# Patient Record
Sex: FEMALE | Race: WHITE | NOT HISPANIC OR LATINO | Employment: OTHER | URBAN - METROPOLITAN AREA
[De-identification: names, ages, dates, MRNs, and addresses within clinical notes are randomized per-mention and may not be internally consistent; named-entity substitution may affect disease eponyms.]

---

## 2017-05-11 ENCOUNTER — APPOINTMENT (OUTPATIENT)
Dept: RADIOLOGY | Facility: IMAGING CENTER | Age: 82
End: 2017-05-11
Attending: PHYSICIAN ASSISTANT
Payer: MEDICARE

## 2017-05-11 ENCOUNTER — OFFICE VISIT (OUTPATIENT)
Dept: URGENT CARE | Facility: CLINIC | Age: 82
End: 2017-05-11
Payer: MEDICARE

## 2017-05-11 VITALS
DIASTOLIC BLOOD PRESSURE: 80 MMHG | RESPIRATION RATE: 18 BRPM | SYSTOLIC BLOOD PRESSURE: 156 MMHG | HEART RATE: 97 BPM | OXYGEN SATURATION: 97 % | TEMPERATURE: 98.1 F

## 2017-05-11 DIAGNOSIS — S63.105A THUMB DISLOCATION, LEFT, INITIAL ENCOUNTER: ICD-10-CM

## 2017-05-11 DIAGNOSIS — S69.92XA THUMB INJURY, LEFT, INITIAL ENCOUNTER: ICD-10-CM
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
## 2017-05-11 PROCEDURE — 1101F PT FALLS ASSESS-DOCD LE1/YR: CPT | Mod: 8P | Performed by: PHYSICIAN ASSISTANT

## 2017-05-11 PROCEDURE — 26770 TREAT FINGER DISLOCATION: CPT | Performed by: PHYSICIAN ASSISTANT

## 2017-05-11 PROCEDURE — 73140 X-RAY EXAM OF FINGER(S): CPT | Mod: TC,LT | Performed by: PHYSICIAN ASSISTANT

## 2017-05-11 PROCEDURE — 99202 OFFICE O/P NEW SF 15 MIN: CPT | Mod: 25 | Performed by: PHYSICIAN ASSISTANT

## 2017-05-11 PROCEDURE — G8432 DEP SCR NOT DOC, RNG: HCPCS | Performed by: PHYSICIAN ASSISTANT

## 2017-05-11 PROCEDURE — G8421 BMI NOT CALCULATED: HCPCS | Performed by: PHYSICIAN ASSISTANT

## 2017-05-11 PROCEDURE — 4040F PNEUMOC VAC/ADMIN/RCVD: CPT | Mod: 8P | Performed by: PHYSICIAN ASSISTANT

## 2017-05-11 PROCEDURE — 4004F PT TOBACCO SCREEN RCVD TLK: CPT | Mod: 8P | Performed by: PHYSICIAN ASSISTANT

## 2017-05-11 ASSESSMENT — ENCOUNTER SYMPTOMS
SENSORY CHANGE: 0
CHILLS: 0
NECK PAIN: 0
LOSS OF CONSCIOUSNESS: 0
FOCAL WEAKNESS: 0
ABDOMINAL PAIN: 0
NAUSEA: 0
VOMITING: 0
BACK PAIN: 0
FEVER: 0

## 2017-05-11 NOTE — PROGRESS NOTES
Subjective:      Chrissie Dueñas is a 86 y.o. female who presents with Hand Injury            Hand Injury  Pertinent negatives include no abdominal pain, chills, fever, nausea, neck pain or vomiting.   pt seen w/ daughter present, states pt was visiting her from , not familiar w/ space, had fall in home, mechanical trip and fall, did not see step, fell forward onto left side, landed on left knee and FOOSH left hand. Felt fine after, happened early this am, the two went to a green house and walked around, no complaints of pain to knee. Denies swelling/catching or instability to knee. Denies PMH of injury/surgery to knee. Denies pain to knee.     Daughter noted swelling and impressive bruising to all of left thumb, brought pt here for eval. Notes full motion to base of thumb but c/o pain w/ motion to thumb itself, bruising and swelling, notes normal sensation. Denies PMH of injury/history of known arthritis or surgery to left thumb.     Review of Systems   Constitutional: Negative for fever and chills.   Gastrointestinal: Negative for nausea, vomiting and abdominal pain.   Musculoskeletal: Positive for joint pain ( POS For pain to left thumb s/p fall today). Negative for back pain and neck pain.   Neurological: Negative for sensory change, focal weakness and loss of consciousness.       PMH:  has no past medical history on file.  MEDS:   Current outpatient prescriptions:   •  LOSARTAN POTASSIUM PO, Take  by mouth., Disp: , Rfl:   ALLERGIES: No Known Allergies  SURGHX: No past surgical history on file.  SOCHX:    FH: Family history was reviewed, no pertinent findings to report  I have worn a mask for the entire encounter with this patient.      Objective:     /80 mmHg  Pulse 97  Temp(Src) 36.7 °C (98.1 °F)  Resp 18  SpO2 97%     Physical Exam   Constitutional: She is oriented to person, place, and time. She appears well-developed and well-nourished. No distress.   HENT:   Head: Normocephalic and atraumatic.    Right Ear: External ear normal.   Left Ear: External ear normal.   Nose: Nose normal.   Eyes: Conjunctivae and lids are normal. Right eye exhibits no discharge. Left eye exhibits no discharge. No scleral icterus.   Neck: Neck supple.   Pulmonary/Chest: Effort normal. No respiratory distress.   Musculoskeletal:        Left knee: Normal. She exhibits normal range of motion, no swelling, no effusion, no ecchymosis, no deformity, no laceration, no erythema, normal alignment, no LCL laxity, normal patellar mobility, no bony tenderness and no MCL laxity. No tenderness found. No medial joint line, no lateral joint line, no MCL, no LCL and no patellar tendon tenderness noted.        Left hand: She exhibits decreased range of motion ( pain w/ ROM at IPJ), tenderness, bony tenderness ( IPJ) and swelling. She exhibits normal two-point discrimination, normal capillary refill, no deformity and no laceration. Normal sensation noted. Normal strength ( normal motion and strength, IPJ not tested 2.2 pain) noted.        Hands:  Neurological: She is alert and oriented to person, place, and time. She is not disoriented.   Skin: Skin is warm and dry. She is not diaphoretic. No erythema. No pallor.   Psychiatric: Her speech is normal and behavior is normal.   Nursing note and vitals reviewed.    Dx thumb -   5/11/2017 1:16 PM    HISTORY/REASON FOR EXAM:  Pain/Deformity Following Trauma.  Ground-level fall    TECHNIQUE/EXAM DESCRIPTION AND NUMBER OF VIEWS:  3 views of the LEFT fingers.    COMPARISON: None    FINDINGS:  There is a fracture dislocation at the interphalangeal joint of the first digit. Small fracture fragments are seen. The distal phalanx is dislocated in the dorsal direction. Osteoarthritis effects the scaphoid/trapezium articulation and the first   carpometacarpal joint. There are cystic lucencies within the carpus. There is an old posttraumatic deformity of the fifth metacarpal. Interphalangeal joint space narrowing and  spurring is seen.         Impression        Fracture or dislocation of the interphalangeal joint of the first digit with associated soft tissue swelling.    Degenerative changes as above described.    Old post traumatic deformity of the fifth metacarpal.         Reading Provider Reading Date     Jesús Burgos M.D. May 11, 2017         Signing Provider Signing Date Signing Time     Jesús Burgos M.D. May 11, 2017  1:42 PM        Post reduction pt is w/ return of normal AROM to IPJ, will f/u w/ ortho. Urgent ortho referral placed. F/u w/ ortho       Assessment/Plan:     1. Thumb injury, left, initial encounter  Recommend conservative care, rest, ice, keep splint in place, OTC tylenol, f/u w/ ortho  Return to clinic with lack of resolution or progression of symptoms.  ER precautions with any worsening symptoms are reviewed with patient/caregiver and they do express understanding  - DX-FINGER(S) 2+ LEFT; Future    2. Thumb dislocation, left, initial encounter    - REFERRAL TO ORTHOPEDICS    3. Fall, initial encounter

## 2017-05-11 NOTE — MR AVS SNAPSHOT
Chrissie Dueñas   2017 12:15 PM   Office Visit   MRN: [id number]    Department:  [facility]   Dept Phone:  [phone]    Description:  Female : 1930   Provider:  Yordy Umana PA-C           Reason for Visit     Hand Injury x this am, Lt. hand thumb injury after fall. Swelling, brusing and numbness      Allergies as of 2017     No Known Allergies      You were diagnosed with     Thumb injury, left, initial encounter   [[id number]]       Fall, initial encounter   [[id number]]       Thumb dislocation, left, initial encounter   [[id number]]         Vital Signs     Blood Pressure Pulse Temperature Respirations Oxygen Saturation       156/80 mmHg 97 36.7 °C (98.1 °F) 18 97%       Basic Information     Date Of Birth Sex Race Ethnicity Preferred Language    1930 Female White Non- English      Health Maintenance     Patient has no pending health maintenance at this time      Current Immunizations     No immunizations on file.      Below and/or attached are the medications your provider expects you to take. Review all of your home medications and newly ordered medications with your provider and/or pharmacist. Follow medication instructions as directed by your provider and/or pharmacist. Please keep your medication list with you and share with your provider. Update the information when medications are discontinued, doses are changed, or new medications (including over-the-counter products) are added; and carry medication information at all times in the event of emergency situations     Allergies:  No Known Allergies          Medications  Valid as of: May 11, 2017 -  2:36 PM    Generic Name Brand Name Tablet Size Instructions for use    Losartan Potassium   Take  by mouth.        .                 Medicines prescribed today were sent to:     [id number] [place] - [address]    [address] [place] [address]    Phone: [phone] Fax: [phone]    Open 24 Hours?: No      Medication refill instructions:       If your prescription bottle indicates you have medication refills left, it is not necessary to call your provider’s office. Please contact your pharmacy and they will refill your medication.    If your prescription bottle indicates you do not have any refills left, you may request refills at any time through one of the following ways: The online [employer] system (except Urgent Care), by calling your provider’s office, or by asking your pharmacy to contact your provider’s office with a refill request. Medication refills are processed only during regular business hours and may not be available until the next business day. Your provider may request additional information or to have a follow-up visit with you prior to refilling your medication.   *Please Note: Medication refills are assigned a new Rx number when refilled electronically. Your pharmacy may indicate that no refills were authorized even though a new prescription for the same medication is available at the pharmacy. Please request the medicine by name with the pharmacy before contacting your provider for a refill.        Your To Do List     Future Labs/Procedures Complete By Expires    DX-FINGER(S) 2+ LEFT  As directed 5/11/2018      Referral     A referral request has been sent to our patient care coordination department. Please allow 3-5 business days for us to process this request and contact you either by phone or mail. If you do not hear from us by the 5th business day, please call us at [phone].           3[employer] Access Code: [email or web address]  Expires: 6/10/2017  1:17 PM    [employer]  A secure, online tool to manage your health information     [employer]’s [employer]® is a secure, online tool that connects you to your personalized health information from the privacy of your home -- day or night - making it very easy for you to manage your healthcare. Once the activation process is completed, you can even  access your medical information using the [employer] corrine, which is available for free in the Apple Corrine store or Google Play store.     [employer] provides the following levels of access (as shown below):   My Chart Features   [facility] Doctor [facility]  Specialists [facility]  Urgent  Care Non-[facility]  Primary Care  Doctor   Email your healthcare team securely and privately 24/7 X X X    Manage appointments: schedule your next appointment; view details of past/upcoming appointments X      Request prescription refills. X      View recent personal medical records, including lab and immunizations X X X X   View health record, including health history, allergies, medications X X X X   Read reports about your outpatient visits, procedures, consult and ER notes X X X X   See your discharge summary, which is a recap of your hospital and/or ER visit that includes your diagnosis, lab results, and care plan. X X       How to register for [employer]:  1. Go to  [email or web address].  2. Click on the Sign Up Now box, which takes you to the New Member Sign Up page. You will need to provide the following information:  a. Enter your [employer] Access Code exactly as it appears at the top of this page. (You will not need to use this code after you’ve completed the sign-up process. If you do not sign up before the expiration date, you must request a new code.)   b. Enter your date of birth.   c. Enter your home email address.   d. Click Submit, and follow the next screen’s instructions.  3. Create a [employer] ID. This will be your [employer] login ID and cannot be changed, so think of one that is secure and easy to remember.  4. Create a [employer] password. You can change your password at any time.  5. Enter your Password Reset Question and Answer. This can be used at a later time if you forget your password.   6. Enter your e-mail address. This allows you to receive e-mail notifications when new information is available in [employer].  7. Click  Sign Up. You can now view your health information.    For assistance activating your [employer] account, call [phone]

## 2020-03-30 ENCOUNTER — TELEPHONE (OUTPATIENT)
Dept: SCHEDULING | Facility: IMAGING CENTER | Age: 85
End: 2020-03-30

## 2020-06-01 ENCOUNTER — OFFICE VISIT (OUTPATIENT)
Dept: MEDICAL GROUP | Facility: PHYSICIAN GROUP | Age: 85
End: 2020-06-01
Payer: MEDICARE

## 2020-06-01 VITALS
TEMPERATURE: 98.2 F | OXYGEN SATURATION: 94 % | HEIGHT: 60 IN | RESPIRATION RATE: 16 BRPM | BODY MASS INDEX: 23.66 KG/M2 | SYSTOLIC BLOOD PRESSURE: 130 MMHG | HEART RATE: 94 BPM | WEIGHT: 120.5 LBS | DIASTOLIC BLOOD PRESSURE: 78 MMHG

## 2020-06-01 DIAGNOSIS — I10 BENIGN ESSENTIAL HTN: ICD-10-CM

## 2020-06-01 DIAGNOSIS — Z86.69 HISTORY OF MACULAR DEGENERATION: ICD-10-CM

## 2020-06-01 PROCEDURE — 99203 OFFICE O/P NEW LOW 30 MIN: CPT | Performed by: FAMILY MEDICINE

## 2020-06-01 RX ORDER — LOSARTAN POTASSIUM 50 MG/1
50 TABLET ORAL DAILY
COMMUNITY
End: 2021-06-21

## 2020-06-01 ASSESSMENT — ENCOUNTER SYMPTOMS
PALPITATIONS: 0
DIZZINESS: 0
EYES NEGATIVE: 1
PSYCHIATRIC NEGATIVE: 1
MYALGIAS: 0
BRUISES/BLEEDS EASILY: 0
COUGH: 0
DEPRESSION: 0
MUSCULOSKELETAL NEGATIVE: 1
HEMOPTYSIS: 0
HEADACHES: 0
HEARTBURN: 0
CHILLS: 0
NEUROLOGICAL NEGATIVE: 1
DOUBLE VISION: 0
GASTROINTESTINAL NEGATIVE: 1
TINGLING: 0
FEVER: 0
BLURRED VISION: 0
NAUSEA: 0
CARDIOVASCULAR NEGATIVE: 1
RESPIRATORY NEGATIVE: 1
CONSTITUTIONAL NEGATIVE: 1

## 2020-06-01 ASSESSMENT — PATIENT HEALTH QUESTIONNAIRE - PHQ9: CLINICAL INTERPRETATION OF PHQ2 SCORE: 0

## 2020-06-01 NOTE — LETTER
[facility]  Leobardo Barajas M.D.  [address]  [facility] [address]  Fax: [phone]   Authorization for Release/Disclosure of   Protected Health Information   Name: MICHELLE DUEÑAS : 1930 SSN: [id number]   Address: [address] [place] [address] [address] [place] [place] [address] Phone:    [phone] (home)    I authorize the entity listed below to release/disclose the PHI below to:   [facility]/Leobardo Barajas M.D. and Leobardo Barajas M.D.   Provider or Entity Name:     Address   City, State, [facility]   Phone:      Fax:     Reason for request: continuity of care   Information to be released:    [  ] LAST COLONOSCOPY,  including any PATH REPORT and follow-up  [  ] LAST FIT/COLOGUARD RESULT [  ] LAST DEXA  [  ] LAST MAMMOGRAM  [  ] LAST PAP  [  ] LAST LABS [  ] RETINA EXAM REPORT  [  ] IMMUNIZATION RECORDS  [  ] Release all info      [  ] Check here and initial the line next to each item to release ALL health information INCLUDING  _____ Care and treatment for drug and / or alcohol abuse  _____ HIV testing, infection status, or AIDS  _____ Genetic Testing    DATES OF SERVICE OR TIME PERIOD TO BE DISCLOSED: _____________  I understand and acknowledge that:  * This Authorization may be revoked at any time by you in writing, except if your health information has already been used or disclosed.  * Your health information that will be used or disclosed as a result of you signing this authorization could be re-disclosed by the recipient. If this occurs, your re-disclosed health information may no longer be protected by State or Federal laws.  * You may refuse to sign this Authorization. Your refusal will not affect your ability to obtain treatment.  * This Authorization becomes effective upon signing and will  on (date) __________.      If no date is indicated, this Authorization will  one (1) year from the signature date.    Name: Michelle Dueñas    Signature:   Date:     2020       PLEASE FAX REQUESTED  RECORDS BACK TO: [phone]

## 2020-06-01 NOTE — PROGRESS NOTES
Subjective:      Chrissie Dueñas is a 89 y.o. female who presents with [facility] (recently moved from [facility])            Moved here from [facility] to live in assisted living center. Daughter and family lives here  No new issues  1. History of macular degeneration    - REFERRAL TO OPHTHALMOLOGY  - Comp Metabolic Panel; Future  - Lipid Profile; Future  - CBC WITHOUT DIFFERENTIAL; Future    2. Benign essential HTN  Currently treated for HTN, taking meds with no CP or sob, monitors bp at home periodically. controlled    - Comp Metabolic Panel; Future  - Lipid Profile; Future  - CBC WITHOUT DIFFERENTIAL; Future    Past Medical History:  No date: Hypertension  Past Surgical History:  No date: OVARIAN CYSTECTOMY  Social History    Tobacco Use      Smoking status: Former Smoker        Quit date: 1974        Years since quittin.0      Smokeless tobacco: Never Used    Alcohol use: Not Currently    Drug use: Not Currently    Review of patient's family history indicates:  Problem: Cancer      Relation: Mother          Age of Onset: (Not Specified)  Problem: Stroke      Relation: Father          Age of Onset: (Not Specified)      Current Outpatient Medications: •  losartan (COZAAR) 50 MG Tab, Take 50 mg by mouth every day., Disp: , Rfl: •  LOSARTAN POTASSIUM PO, Take  by mouth., Disp: , Rfl:     Patient was instructed on the use of medications, either prescriptions or OTC and informed on when the appropriate follow up time period should be. In addition, patient was also instructed that should any acute worsening occur that they should notify this clinic asap or call 911.          Review of Systems   Constitutional: Negative.  Negative for chills and fever.   HENT: Negative.  Negative for hearing loss.    Eyes: Negative.  Negative for blurred vision and double vision.   Respiratory: Negative.  Negative for cough and hemoptysis.    Cardiovascular: Negative.  Negative for chest pain and palpitations.    Gastrointestinal: Negative.  Negative for heartburn and nausea.   Genitourinary: Negative.  Negative for dysuria.   Musculoskeletal: Negative.  Negative for myalgias.   Skin: Negative.  Negative for rash.   Neurological: Negative.  Negative for dizziness, tingling and headaches.   Endo/Heme/Allergies: Negative.  Does not bruise/bleed easily.   Psychiatric/Behavioral: Negative.  Negative for depression and suicidal ideas.   All other systems reviewed and are negative.         Objective:     /78 (BP Location: Right arm, Patient Position: Sitting)   Pulse 94   Temp 36.8 °C (98.2 °F) (Tympanic)   Resp 16   Ht 1.524 m (5')   Wt 54.7 kg (120 lb 8 oz)   SpO2 94%   BMI 23.53 kg/m²      Physical Exam  Vitals signs and nursing note reviewed.   Constitutional:       General: She is not in acute distress.     Appearance: She is well-developed. She is not diaphoretic.   HENT:      Head: Normocephalic and atraumatic.      Mouth/Throat:      Pharynx: No oropharyngeal exudate.   Eyes:      Pupils: Pupils are equal, round, and reactive to light.   Cardiovascular:      Rate and Rhythm: Normal rate and regular rhythm.      Heart sounds: Normal heart sounds. No murmur. No friction rub. No gallop.    Pulmonary:      Effort: Pulmonary effort is normal. No respiratory distress.      Breath sounds: Normal breath sounds. No wheezing or rales.   Chest:      Chest wall: No tenderness.   Neurological:      Mental Status: She is alert and oriented to person, place, and time.   Psychiatric:         Behavior: Behavior normal.         Thought Content: Thought content normal.         Judgment: Judgment normal.                 Assessment/Plan:       1. History of macular degeneration    - REFERRAL TO OPHTHALMOLOGY  - Comp Metabolic Panel; Future  - Lipid Profile; Future  - CBC WITHOUT DIFFERENTIAL; Future    2. Benign essential HTN    - Comp Metabolic Panel; Future  - Lipid Profile; Future  - CBC WITHOUT DIFFERENTIAL; Future

## 2020-06-23 ENCOUNTER — HOSPITAL ENCOUNTER (OUTPATIENT)
Dept: LAB | Facility: MEDICAL CENTER | Age: 85
End: 2020-06-23
Attending: FAMILY MEDICINE
Payer: MEDICARE

## 2020-06-23 DIAGNOSIS — I10 BENIGN ESSENTIAL HTN: ICD-10-CM

## 2020-06-23 DIAGNOSIS — Z86.69 HISTORY OF MACULAR DEGENERATION: ICD-10-CM

## 2020-06-23 LAB
ALBUMIN SERPL BCP-MCNC: 4.1 G/DL (ref 3.2–4.9)
ALBUMIN/GLOB SERPL: 1.3 G/DL
ALP SERPL-CCNC: 92 U/L (ref 30–99)
ALT SERPL-CCNC: 22 U/L (ref 2–50)
ANION GAP SERPL CALC-SCNC: 12 MMOL/L (ref 7–16)
AST SERPL-CCNC: 26 U/L (ref 12–45)
BILIRUB SERPL-MCNC: 0.6 MG/DL (ref 0.1–1.5)
BUN SERPL-MCNC: 23 MG/DL (ref 8–22)
CALCIUM SERPL-MCNC: 9.2 MG/DL (ref 8.5–10.5)
CHLORIDE SERPL-SCNC: 101 MMOL/L (ref 96–112)
CHOLEST SERPL-MCNC: 246 MG/DL (ref 100–199)
CO2 SERPL-SCNC: 24 MMOL/L (ref 20–33)
CREAT SERPL-MCNC: 0.72 MG/DL (ref 0.5–1.4)
ERYTHROCYTE [DISTWIDTH] IN BLOOD BY AUTOMATED COUNT: 46.4 FL (ref 35.9–50)
FASTING STATUS PATIENT QL REPORTED: NORMAL
GLOBULIN SER CALC-MCNC: 3.2 G/DL (ref 1.9–3.5)
GLUCOSE SERPL-MCNC: 84 MG/DL (ref 65–99)
HCT VFR BLD AUTO: 45.6 % (ref 37–47)
HDLC SERPL-MCNC: 69 MG/DL
HGB BLD-MCNC: 14.9 G/DL (ref 12–16)
LDLC SERPL CALC-MCNC: 151 MG/DL
MCH RBC QN AUTO: 30.7 PG (ref 27–33)
MCHC RBC AUTO-ENTMCNC: 32.7 G/DL (ref 33.6–35)
MCV RBC AUTO: 94 FL (ref 81.4–97.8)
PLATELET # BLD AUTO: 243 K/UL (ref 164–446)
PMV BLD AUTO: 9.8 FL (ref 9–12.9)
POTASSIUM SERPL-SCNC: 4.5 MMOL/L (ref 3.6–5.5)
PROT SERPL-MCNC: 7.3 G/DL (ref 6–8.2)
RBC # BLD AUTO: 4.85 M/UL (ref 4.2–5.4)
SODIUM SERPL-SCNC: 137 MMOL/L (ref 135–145)
TRIGL SERPL-MCNC: 132 MG/DL (ref 0–149)
WBC # BLD AUTO: 8.5 K/UL (ref 4.8–10.8)

## 2020-06-23 PROCEDURE — 80061 LIPID PANEL: CPT

## 2020-06-23 PROCEDURE — 85027 COMPLETE CBC AUTOMATED: CPT

## 2020-06-23 PROCEDURE — 80053 COMPREHEN METABOLIC PANEL: CPT

## 2020-06-23 PROCEDURE — 36415 COLL VENOUS BLD VENIPUNCTURE: CPT

## 2020-12-21 ENCOUNTER — OFFICE VISIT (OUTPATIENT)
Dept: MEDICAL GROUP | Facility: PHYSICIAN GROUP | Age: 85
End: 2020-12-21
Payer: MEDICARE

## 2020-12-21 VITALS
SYSTOLIC BLOOD PRESSURE: 148 MMHG | DIASTOLIC BLOOD PRESSURE: 82 MMHG | HEIGHT: 60 IN | HEART RATE: 129 BPM | TEMPERATURE: 97.2 F | BODY MASS INDEX: 24.15 KG/M2 | WEIGHT: 123 LBS | OXYGEN SATURATION: 97 %

## 2020-12-21 DIAGNOSIS — I10 BENIGN ESSENTIAL HTN: ICD-10-CM

## 2020-12-21 DIAGNOSIS — Z86.69 HISTORY OF MACULAR DEGENERATION: ICD-10-CM

## 2020-12-21 PROCEDURE — 99214 OFFICE O/P EST MOD 30 MIN: CPT | Performed by: FAMILY MEDICINE

## 2020-12-21 RX ORDER — LOSARTAN POTASSIUM 50 MG/1
TABLET ORAL
COMMUNITY
Start: 2020-10-03 | End: 2021-06-21 | Stop reason: SDUPTHER

## 2020-12-21 ASSESSMENT — ENCOUNTER SYMPTOMS
RESPIRATORY NEGATIVE: 1
NEUROLOGICAL NEGATIVE: 1
HEMOPTYSIS: 0
FEVER: 0
HEADACHES: 0
EYES NEGATIVE: 1
BRUISES/BLEEDS EASILY: 0
TINGLING: 0
PALPITATIONS: 0
COUGH: 0
DIZZINESS: 0
DOUBLE VISION: 0
DEPRESSION: 0
HEARTBURN: 0
MUSCULOSKELETAL NEGATIVE: 1
CONSTITUTIONAL NEGATIVE: 1
CHILLS: 0
BLURRED VISION: 0
NAUSEA: 0
MYALGIAS: 0
GASTROINTESTINAL NEGATIVE: 1
CARDIOVASCULAR NEGATIVE: 1
PSYCHIATRIC NEGATIVE: 1

## 2020-12-21 ASSESSMENT — FIBROSIS 4 INDEX: FIB4 SCORE: 2.05

## 2020-12-21 NOTE — PROGRESS NOTES
Subjective:      Chrissie Dueñas is a 89 y.o. female who presents with [facility] ([facility]            1. Benign essential HTN  Currently treated for HTN, taking meds with no CP or sob, monitors bp at home periodically. controlled      2. History of macular degeneration  Seen by ophth this year    Past Medical History:  No date: Hypertension  Past Surgical History:  No date: OVARIAN CYSTECTOMY  Social History    Tobacco Use      Smoking status: Former Smoker        Quit date: 1974        Years since quittin.5      Smokeless tobacco: Never Used    Alcohol use: Not Currently    Drug use: Not Currently    Review of patient's family history indicates:  Problem: Cancer      Relation: Mother          Age of Onset: (Not Specified)  Problem: Stroke      Relation: Father          Age of Onset: (Not Specified)      Current Outpatient Medications: •  losartan (COZAAR) 50 MG Tab, Take 50 mg by mouth every day., Disp: , Rfl: •  losartan (COZAAR) 50 MG Tab, TAKE 1 TABLET BY MOUTH  DAILY, Disp: , Rfl: •  LOSARTAN POTASSIUM PO, Take  by mouth., Disp: , Rfl:     Patient was instructed on the use of medications, either prescriptions or OTC and informed on when the appropriate follow up time period should be. In addition, patient was also instructed that should any acute worsening occur that they should notify this clinic asap or call 911.          Review of Systems   Constitutional: Negative.  Negative for chills and fever.   HENT: Negative.  Negative for hearing loss.    Eyes: Negative.  Negative for blurred vision and double vision.   Respiratory: Negative.  Negative for cough and hemoptysis.    Cardiovascular: Negative.  Negative for chest pain and palpitations.   Gastrointestinal: Negative.  Negative for heartburn and nausea.   Genitourinary: Negative.  Negative for dysuria.   Musculoskeletal: Negative.  Negative for myalgias.   Skin: Negative.  Negative for rash.   Neurological: Negative.  Negative for  dizziness, tingling and headaches.   Endo/Heme/Allergies: Negative.  Does not bruise/bleed easily.   Psychiatric/Behavioral: Negative.  Negative for depression and suicidal ideas.   All other systems reviewed and are negative.         Objective:     /82   Pulse (!) 129   Temp 36.2 °C (97.2 °F)   Ht 1.524 m (5')   Wt 55.8 kg (123 lb)   SpO2 97%   BMI 24.02 kg/m²      Physical Exam  Vitals signs and nursing note reviewed.   Constitutional:       General: She is not in acute distress.     Appearance: She is well-developed. She is not diaphoretic.   HENT:      Head: Normocephalic and atraumatic.      Mouth/Throat:      Pharynx: No oropharyngeal exudate.   Eyes:      Pupils: Pupils are equal, round, and reactive to light.   Cardiovascular:      Rate and Rhythm: Normal rate and regular rhythm.      Heart sounds: Normal heart sounds. No murmur. No friction rub. No gallop.    Pulmonary:      Effort: Pulmonary effort is normal. No respiratory distress.      Breath sounds: Normal breath sounds. No wheezing or rales.   Chest:      Chest wall: No tenderness.   Neurological:      Mental Status: She is alert and oriented to person, place, and time.   Psychiatric:         Behavior: Behavior normal.         Thought Content: Thought content normal.         Judgment: Judgment normal.                 Assessment/Plan:        1. Benign essential HTN      2. History of macular degeneration

## 2021-01-11 DIAGNOSIS — Z23 NEED FOR VACCINATION: ICD-10-CM

## 2021-03-19 ENCOUNTER — TELEPHONE (OUTPATIENT)
Dept: MEDICAL GROUP | Facility: PHYSICIAN GROUP | Age: 86
End: 2021-03-19

## 2021-03-20 NOTE — TELEPHONE ENCOUNTER
VOICEMAIL  1. Caller Name: Kailee Ruiz                      Call Back Number: [phone]    2. Message: pt's daughter called asking for lab orders to be sent to her so that pt can have them done for her June appointment.    3. Patient approves office to leave a detailed voicemail/MyChart message: N\A

## 2021-03-23 NOTE — TELEPHONE ENCOUNTER
Phone Number Called: [phone]     Call outcome: Left detailed message for patient. Informed to call back with any additional questions.    Message: Left message for daugghter to advise of lab orders and asked to return call to let us know what address she would like them mailed to

## 2021-05-12 ENCOUNTER — HOSPITAL ENCOUNTER (OUTPATIENT)
Dept: LAB | Facility: MEDICAL CENTER | Age: 86
End: 2021-05-12
Attending: FAMILY MEDICINE
Payer: MEDICARE

## 2021-05-12 DIAGNOSIS — Z86.69 HISTORY OF MACULAR DEGENERATION: ICD-10-CM

## 2021-05-12 DIAGNOSIS — I10 BENIGN ESSENTIAL HTN: ICD-10-CM

## 2021-05-12 LAB
ALBUMIN SERPL BCP-MCNC: 4.3 G/DL (ref 3.2–4.9)
ALBUMIN/GLOB SERPL: 1.4 G/DL
ALP SERPL-CCNC: 95 U/L (ref 30–99)
ALT SERPL-CCNC: 17 U/L (ref 2–50)
ANION GAP SERPL CALC-SCNC: 11 MMOL/L (ref 7–16)
AST SERPL-CCNC: 22 U/L (ref 12–45)
BILIRUB SERPL-MCNC: 0.6 MG/DL (ref 0.1–1.5)
BUN SERPL-MCNC: 19 MG/DL (ref 8–22)
CALCIUM SERPL-MCNC: 9.7 MG/DL (ref 8.5–10.5)
CHLORIDE SERPL-SCNC: 105 MMOL/L (ref 96–112)
CO2 SERPL-SCNC: 26 MMOL/L (ref 20–33)
CREAT SERPL-MCNC: 0.9 MG/DL (ref 0.5–1.4)
ERYTHROCYTE [DISTWIDTH] IN BLOOD BY AUTOMATED COUNT: 46.8 FL (ref 35.9–50)
FASTING STATUS PATIENT QL REPORTED: NORMAL
GLOBULIN SER CALC-MCNC: 3 G/DL (ref 1.9–3.5)
GLUCOSE SERPL-MCNC: 93 MG/DL (ref 65–99)
HCT VFR BLD AUTO: 44.9 % (ref 37–47)
HGB BLD-MCNC: 14.5 G/DL (ref 12–16)
MCH RBC QN AUTO: 32.5 PG (ref 27–33)
MCHC RBC AUTO-ENTMCNC: 32.3 G/DL (ref 33.6–35)
MCV RBC AUTO: 100.7 FL (ref 81.4–97.8)
PLATELET # BLD AUTO: 267 K/UL (ref 164–446)
PMV BLD AUTO: 10.2 FL (ref 9–12.9)
POTASSIUM SERPL-SCNC: 5.5 MMOL/L (ref 3.6–5.5)
PROT SERPL-MCNC: 7.3 G/DL (ref 6–8.2)
RBC # BLD AUTO: 4.46 M/UL (ref 4.2–5.4)
SODIUM SERPL-SCNC: 142 MMOL/L (ref 135–145)
T3 SERPL-MCNC: 75.5 NG/DL (ref 60–181)
T4 FREE SERPL-MCNC: 1.01 NG/DL (ref 0.93–1.7)
WBC # BLD AUTO: 8.7 K/UL (ref 4.8–10.8)

## 2021-05-12 PROCEDURE — 84480 ASSAY TRIIODOTHYRONINE (T3): CPT

## 2021-05-12 PROCEDURE — 84439 ASSAY OF FREE THYROXINE: CPT

## 2021-05-12 PROCEDURE — 36415 COLL VENOUS BLD VENIPUNCTURE: CPT

## 2021-05-12 PROCEDURE — 80053 COMPREHEN METABOLIC PANEL: CPT

## 2021-05-12 PROCEDURE — 85027 COMPLETE CBC AUTOMATED: CPT

## 2021-06-21 ENCOUNTER — OFFICE VISIT (OUTPATIENT)
Dept: MEDICAL GROUP | Facility: PHYSICIAN GROUP | Age: 86
End: 2021-06-21
Payer: MEDICARE

## 2021-06-21 VITALS
OXYGEN SATURATION: 98 % | WEIGHT: 127 LBS | RESPIRATION RATE: 12 BRPM | HEIGHT: 60 IN | DIASTOLIC BLOOD PRESSURE: 90 MMHG | BODY MASS INDEX: 24.94 KG/M2 | TEMPERATURE: 96.8 F | SYSTOLIC BLOOD PRESSURE: 132 MMHG | HEART RATE: 78 BPM

## 2021-06-21 DIAGNOSIS — Z23 NEED FOR VACCINATION: ICD-10-CM

## 2021-06-21 DIAGNOSIS — Z78.0 POST-MENOPAUSAL: ICD-10-CM

## 2021-06-21 DIAGNOSIS — I10 BENIGN ESSENTIAL HTN: ICD-10-CM

## 2021-06-21 DIAGNOSIS — Z86.69 HISTORY OF MACULAR DEGENERATION: ICD-10-CM

## 2021-06-21 PROCEDURE — 90732 PPSV23 VACC 2 YRS+ SUBQ/IM: CPT | Performed by: FAMILY MEDICINE

## 2021-06-21 PROCEDURE — G0009 ADMIN PNEUMOCOCCAL VACCINE: HCPCS | Performed by: FAMILY MEDICINE

## 2021-06-21 PROCEDURE — 99214 OFFICE O/P EST MOD 30 MIN: CPT | Mod: 25 | Performed by: FAMILY MEDICINE

## 2021-06-21 RX ORDER — LOSARTAN POTASSIUM 50 MG/1
TABLET ORAL
Qty: 90 TABLET | Refills: 3 | Status: SHIPPED
Start: 2021-06-21 | End: 2021-09-29

## 2021-06-21 ASSESSMENT — ENCOUNTER SYMPTOMS
CARDIOVASCULAR NEGATIVE: 1
DIZZINESS: 0
RESPIRATORY NEGATIVE: 1
DOUBLE VISION: 0
EYES NEGATIVE: 1
NAUSEA: 0
DEPRESSION: 0
PSYCHIATRIC NEGATIVE: 1
HEMOPTYSIS: 0
BLURRED VISION: 0
HEADACHES: 0
CONSTITUTIONAL NEGATIVE: 1
TINGLING: 0
NEUROLOGICAL NEGATIVE: 1
CHILLS: 0
MUSCULOSKELETAL NEGATIVE: 1
GASTROINTESTINAL NEGATIVE: 1
COUGH: 0
HEARTBURN: 0
FEVER: 0
PALPITATIONS: 0
BRUISES/BLEEDS EASILY: 0
MYALGIAS: 0

## 2021-06-21 ASSESSMENT — FIBROSIS 4 INDEX: FIB4 SCORE: 1.8

## 2021-06-21 ASSESSMENT — PATIENT HEALTH QUESTIONNAIRE - PHQ9: CLINICAL INTERPRETATION OF PHQ2 SCORE: 0

## 2021-06-21 NOTE — PROGRESS NOTES
Subjective:      Chrissie Dueñas is a 89 y.o. female who presents with Lab Results            1. Benign essential HTN  Currently treated for HTN, taking meds with no CP or sob, monitors bp at home periodically. controlled    - losartan (COZAAR) 50 MG Tab; TAKE 1 TABLET BY MOUTH  DAILY  Dispense: 90 tablet; Refill: 3    2. History of macular degeneration  Seeing ophth no treatment at now    3. Need for vaccination  =  - PneumoVax PPV23 =>1yo  - Shingles Vaccine (Shingrix)    4. Post-menopausal    - DS-BONE DENSITY STUDY (DEXA)    Past Medical History:  No date: Hypertension  Past Surgical History:  No date: OVARIAN CYSTECTOMY  Social History    Tobacco Use      Smoking status: Former Smoker        Quit date: 1974        Years since quittin.0      Smokeless tobacco: Never Used    Vaping Use      Vaping Use: Never used    Alcohol use: Not Currently    Drug use: Not Currently    Review of patient's family history indicates:  Problem: Cancer      Relation: Mother          Age of Onset: (Not Specified)  Problem: Stroke      Relation: Father          Age of Onset: (Not Specified)      Current Outpatient Medications: •  losartan (COZAAR) 50 MG Tab, TAKE 1 TABLET BY MOUTH  DAILY, Disp: 90 tablet, Rfl: 3    Patient was instructed on the use of medications, either prescriptions or OTC and informed on when the appropriate follow up time period should be. In addition, patient was also instructed that should any acute worsening occur that they should notify this clinic asap or call 911.          Review of Systems   Constitutional: Negative.  Negative for chills and fever.   HENT: Negative.  Negative for hearing loss.    Eyes: Negative.  Negative for blurred vision and double vision.   Respiratory: Negative.  Negative for cough and hemoptysis.    Cardiovascular: Negative.  Negative for chest pain and palpitations.   Gastrointestinal: Negative.  Negative for heartburn and nausea.   Genitourinary: Negative.  Negative for  dysuria.   Musculoskeletal: Negative.  Negative for myalgias.   Skin: Negative.  Negative for rash.   Neurological: Negative.  Negative for dizziness, tingling and headaches.   Endo/Heme/Allergies: Negative.  Does not bruise/bleed easily.   Psychiatric/Behavioral: Negative.  Negative for depression and suicidal ideas.   All other systems reviewed and are negative.         Objective:     /90 (BP Location: Left arm, Patient Position: Sitting, BP Cuff Size: Adult)   Pulse 78   Temp 36 °C (96.8 °F) (Temporal)   Resp 12   Ht 1.524 m (5')   Wt 57.6 kg (127 lb)   SpO2 98%   Breastfeeding No   BMI 24.80 kg/m²      Physical Exam  Vitals and nursing note reviewed.   Constitutional:       General: She is not in acute distress.     Appearance: She is well-developed. She is not diaphoretic.   HENT:      Head: Normocephalic and atraumatic.      Mouth/Throat:      Pharynx: No oropharyngeal exudate.   Eyes:      Pupils: Pupils are equal, round, and reactive to light.   Cardiovascular:      Rate and Rhythm: Normal rate and regular rhythm.      Heart sounds: Normal heart sounds. No murmur heard.   No friction rub. No gallop.    Pulmonary:      Effort: Pulmonary effort is normal. No respiratory distress.      Breath sounds: Normal breath sounds. No wheezing or rales.   Chest:      Chest wall: No tenderness.   Neurological:      Mental Status: She is alert and oriented to person, place, and time.   Psychiatric:         Behavior: Behavior normal.         Thought Content: Thought content normal.         Judgment: Judgment normal.                        Assessment/Plan:        1. Benign essential HTN    - losartan (COZAAR) 50 MG Tab; TAKE 1 TABLET BY MOUTH  DAILY  Dispense: 90 tablet; Refill: 3    2. History of macular degeneration      3. Need for vaccination    - PneumoVax PPV23 =>1yo  - Shingles Vaccine (Shingrix)    4. Post-menopausal    - DS-BONE DENSITY STUDY (DEXA)

## 2021-07-30 ENCOUNTER — HOSPITAL ENCOUNTER (OUTPATIENT)
Dept: RADIOLOGY | Facility: MEDICAL CENTER | Age: 86
End: 2021-07-30
Attending: FAMILY MEDICINE
Payer: MEDICARE

## 2021-07-30 ENCOUNTER — OFFICE VISIT (OUTPATIENT)
Dept: MEDICAL GROUP | Facility: MEDICAL CENTER | Age: 86
End: 2021-07-30
Payer: MEDICARE

## 2021-07-30 VITALS
WEIGHT: 126.4 LBS | DIASTOLIC BLOOD PRESSURE: 62 MMHG | BODY MASS INDEX: 24.81 KG/M2 | OXYGEN SATURATION: 97 % | HEART RATE: 88 BPM | HEIGHT: 60 IN | TEMPERATURE: 97.4 F | SYSTOLIC BLOOD PRESSURE: 110 MMHG

## 2021-07-30 DIAGNOSIS — Z86.69 HISTORY OF MACULAR DEGENERATION: ICD-10-CM

## 2021-07-30 DIAGNOSIS — I10 BENIGN ESSENTIAL HTN: ICD-10-CM

## 2021-07-30 DIAGNOSIS — L98.9 SKIN LESION: ICD-10-CM

## 2021-07-30 PROCEDURE — 77080 DXA BONE DENSITY AXIAL: CPT

## 2021-07-30 PROCEDURE — 99213 OFFICE O/P EST LOW 20 MIN: CPT | Performed by: PHYSICIAN ASSISTANT

## 2021-07-30 ASSESSMENT — FIBROSIS 4 INDEX: FIB4 SCORE: 1.82

## 2021-07-30 NOTE — ASSESSMENT & PLAN NOTE
Chronic, stable, no concerns. No dizziness, headache, leg swelling, chest pain. No h/o MI or stroke. Labs up to date.

## 2021-07-30 NOTE — ASSESSMENT & PLAN NOTE
Left cheek. Flesh/pearly white colored papule for 3 years. Not changing. Her youngest daughter is concerned about cancer. Patient agrees to see derm for bx.

## 2021-07-30 NOTE — PROGRESS NOTES
Chief Complaint   Patient presents with   • Establish Care     General check up       HISTORY OF THE PRESENT ILLNESS: This is a 89 y.o. female new patient to our practice. This pleasant patient is here today to establish care. Here with daughter. Doing very well. Living at a very nice assisted care facility. Daughter is here with her at appointment.     Skin lesion  Left cheek. Flesh/pearly white colored papule for 3 years. Not changing. Her youngest daughter is concerned about cancer. Patient agrees to see derm for bx.    Benign essential HTN  Chronic, stable, no concerns. No dizziness, headache, leg swelling, chest pain. No h/o MI or stroke. Labs up to date.    History of macular degeneration  Chronic, stable, monitored by specialist      Past Medical History:   Diagnosis Date   • Hypertension        Past Surgical History:   Procedure Laterality Date   • APPENDECTOMY     • OVARIAN CYSTECTOMY         Family Status   Relation Name Status   • Mo     • Fa       Family History   Problem Relation Age of Onset   • Cancer Mother    • Stroke Father        Social History     Tobacco Use   • Smoking status: Former Smoker     Quit date: 1974     Years since quittin.1   • Smokeless tobacco: Never Used   Vaping Use   • Vaping Use: Never used   Substance Use Topics   • Alcohol use: Not Currently   • Drug use: Not Currently       Allergies: Patient has no known allergies.    Current Outpatient Medications Ordered in Epic   Medication Sig Dispense Refill   • losartan (COZAAR) 50 MG Tab TAKE 1 TABLET BY MOUTH  DAILY 90 tablet 3     No current Epic-ordered facility-administered medications on file.       Review of Systems   Constitutional: Negative for fever, chills, weight loss and malaise/fatigue.   HENT: Negative for ear pain, nosebleeds, congestion, sore throat and neck pain.    Eyes: Negative for blurred vision.   Respiratory: Negative for cough, sputum production, shortness of breath and wheezing.     Cardiovascular: Negative for chest pain, palpitations, orthopnea and leg swelling.   Gastrointestinal: Negative for heartburn, nausea, vomiting and abdominal pain.   Genitourinary: Negative for dysuria, urgency and frequency.   Musculoskeletal: Negative for myalgias, back pain and joint pain.   Skin: Negative for rash and itching.   Neurological: Negative for dizziness, tingling, tremors, sensory change, focal weakness and headaches.   Endo/Heme/Allergies: Does not bruise/bleed easily.   Psychiatric/Behavioral: Negative for depression, anxiety, or memory loss.     All other systems reviewed and are negative except as in HPI.    Exam: /62 (BP Location: Left arm, Patient Position: Sitting, BP Cuff Size: Adult long)   Pulse 88   Temp 36.3 °C (97.4 °F) (Temporal)   Ht 1.524 m (5')   Wt 57.3 kg (126 lb 6.4 oz)   SpO2 97%   General: Normal appearing. No distress.  Pulmonary: Clear to ausculation.  Normal effort. No rales, ronchi, or wheezing.  Cardiovascular: Regular rate and rhythm without murmur. Carotid and radial pulses are intact and equal bilaterally.  Skin: Warm and dry.  Small, <5mm raised pearly white papule  Musculoskeletal: Normal gait. No extremity cyanosis, clubbing, or edema.  Psych: Normal mood and affect. Alert and oriented x3. Judgment and insight is normal.      Assessment/Plan  1. Skin lesion  REFERRAL TO DERMATOLOGY   2. Benign essential HTN     3. History of macular degeneration       F/u May for annual

## 2021-09-15 ENCOUNTER — OFFICE VISIT (OUTPATIENT)
Dept: MEDICAL GROUP | Facility: MEDICAL CENTER | Age: 86
End: 2021-09-15
Payer: MEDICARE

## 2021-09-15 VITALS
DIASTOLIC BLOOD PRESSURE: 58 MMHG | TEMPERATURE: 97.9 F | WEIGHT: 125.6 LBS | HEIGHT: 60 IN | HEART RATE: 75 BPM | SYSTOLIC BLOOD PRESSURE: 88 MMHG | OXYGEN SATURATION: 96 % | BODY MASS INDEX: 24.66 KG/M2

## 2021-09-15 DIAGNOSIS — I10 BENIGN ESSENTIAL HTN: ICD-10-CM

## 2021-09-15 PROCEDURE — 99213 OFFICE O/P EST LOW 20 MIN: CPT | Performed by: PHYSICIAN ASSISTANT

## 2021-09-15 RX ORDER — LOSARTAN POTASSIUM 25 MG/1
25 TABLET ORAL DAILY
Qty: 90 TABLET | Refills: 0 | Status: ON HOLD
Start: 2021-09-15 | End: 2021-10-10

## 2021-09-15 ASSESSMENT — FIBROSIS 4 INDEX: FIB4 SCORE: 1.82

## 2021-09-15 NOTE — ASSESSMENT & PLAN NOTE
Taking losartan 50mg daily, low BPs for the last 2 weeks, will decrease to 25mg daily and f/u 2 weeks

## 2021-09-29 ENCOUNTER — OFFICE VISIT (OUTPATIENT)
Dept: MEDICAL GROUP | Facility: MEDICAL CENTER | Age: 86
End: 2021-09-29
Payer: MEDICARE

## 2021-09-29 VITALS
SYSTOLIC BLOOD PRESSURE: 132 MMHG | TEMPERATURE: 97.6 F | RESPIRATION RATE: 18 BRPM | DIASTOLIC BLOOD PRESSURE: 62 MMHG | BODY MASS INDEX: 25.21 KG/M2 | OXYGEN SATURATION: 96 % | WEIGHT: 128.4 LBS | HEART RATE: 80 BPM | HEIGHT: 60 IN

## 2021-09-29 DIAGNOSIS — I10 BENIGN ESSENTIAL HTN: ICD-10-CM

## 2021-09-29 PROCEDURE — 99213 OFFICE O/P EST LOW 20 MIN: CPT | Performed by: PHYSICIAN ASSISTANT

## 2021-09-29 ASSESSMENT — FIBROSIS 4 INDEX: FIB4 SCORE: 1.82

## 2021-09-29 NOTE — ASSESSMENT & PLAN NOTE
Doing well on lowered dose of losartan. Currently taking 25mg in the morning. No dizziness or headache. No SOB or leg swelling. Has home BP monitor that she uses sometimes. Did get one reading at 140 systolic, otherwise normal range.

## 2021-09-29 NOTE — PROGRESS NOTES
Subjective     Chrissie Dueñas is a 89 y.o. female who presents with Follow-Up (Blood Pressure check )          Chief Complaint   Patient presents with   • Follow-Up     Blood Pressure check        HPI   Benign essential HTN  Doing well on lowered dose of losartan. Currently taking 25mg in the morning. No dizziness or headache. No SOB or leg swelling. Has home BP monitor that she uses sometimes. Did get one reading at 140 systolic, otherwise normal range.      ROS  No weight change. No fever/chills. No headache/dizziness. No neck or back pain. Sleeping well, eating well, no falls. No pain. No new urinary concerns or bowel concerns. Mood is good.       Active Ambulatory Problems     Diagnosis Date Noted   • History of macular degeneration 06/01/2020   • Benign essential HTN 06/01/2020   • Skin lesion 07/30/2021     Resolved Ambulatory Problems     Diagnosis Date Noted   • No Resolved Ambulatory Problems     Past Medical History:   Diagnosis Date   • Hypertension      Current Outpatient Medications   Medication Sig Dispense Refill   • losartan (COZAAR) 25 MG Tab Take 1 Tablet by mouth every day. 90 Tablet 0     No current facility-administered medications for this visit.   nkda  Non-smoker    Objective     /62 (BP Location: Left arm, Patient Position: Sitting, BP Cuff Size: Adult)   Pulse 80   Temp 36.4 °C (97.6 °F) (Temporal)   Resp 18   Ht 1.524 m (5')   Wt 58.2 kg (128 lb 6.4 oz)   SpO2 96%   BMI 25.08 kg/m²      Physical Exam  Vitals and nursing note reviewed.   Constitutional:       General: She is not in acute distress.     Appearance: Normal appearance. She is normal weight. She is not ill-appearing, toxic-appearing or diaphoretic.   Cardiovascular:      Rate and Rhythm: Normal rate and regular rhythm.   Pulmonary:      Effort: Pulmonary effort is normal.      Breath sounds: Normal breath sounds.   Skin:     General: Skin is warm and dry.      Coloration: Skin is not pale.      Findings: No rash.    Neurological:      General: No focal deficit present.      Mental Status: She is alert and oriented to person, place, and time.   Psychiatric:         Mood and Affect: Mood normal.         Behavior: Behavior normal.         Thought Content: Thought content normal.         Judgment: Judgment normal.                             Assessment & Plan        1. Benign essential HTN   cont losartan 25mg in the am, cont checking home BPs - if consistently 140 or over systolic then increase back to the 50mg daily

## 2021-10-07 ENCOUNTER — HOSPITAL ENCOUNTER (INPATIENT)
Facility: MEDICAL CENTER | Age: 86
LOS: 2 days | DRG: 605 | End: 2021-10-10
Attending: EMERGENCY MEDICINE | Admitting: SURGERY
Payer: MEDICARE

## 2021-10-07 ENCOUNTER — APPOINTMENT (OUTPATIENT)
Dept: RADIOLOGY | Facility: MEDICAL CENTER | Age: 86
DRG: 605 | End: 2021-10-07
Attending: EMERGENCY MEDICINE
Payer: MEDICARE

## 2021-10-07 DIAGNOSIS — S01.01XA LACERATION OF SCALP, INITIAL ENCOUNTER: ICD-10-CM

## 2021-10-07 DIAGNOSIS — S09.90XA CLOSED HEAD INJURY, INITIAL ENCOUNTER: ICD-10-CM

## 2021-10-07 LAB
BASOPHILS # BLD AUTO: 0.4 % (ref 0–1.8)
BASOPHILS # BLD: 0.05 K/UL (ref 0–0.12)
EOSINOPHIL # BLD AUTO: 0.03 K/UL (ref 0–0.51)
EOSINOPHIL NFR BLD: 0.2 % (ref 0–6.9)
ERYTHROCYTE [DISTWIDTH] IN BLOOD BY AUTOMATED COUNT: 45 FL (ref 35.9–50)
HCT VFR BLD AUTO: 39.1 % (ref 37–47)
HGB BLD-MCNC: 12.8 G/DL (ref 12–16)
IMM GRANULOCYTES # BLD AUTO: 0.07 K/UL (ref 0–0.11)
IMM GRANULOCYTES NFR BLD AUTO: 0.5 % (ref 0–0.9)
LYMPHOCYTES # BLD AUTO: 2.76 K/UL (ref 1–4.8)
LYMPHOCYTES NFR BLD: 19.7 % (ref 22–41)
MCH RBC QN AUTO: 31.8 PG (ref 27–33)
MCHC RBC AUTO-ENTMCNC: 32.7 G/DL (ref 33.6–35)
MCV RBC AUTO: 97.3 FL (ref 81.4–97.8)
MONOCYTES # BLD AUTO: 0.67 K/UL (ref 0–0.85)
MONOCYTES NFR BLD AUTO: 4.8 % (ref 0–13.4)
NEUTROPHILS # BLD AUTO: 10.42 K/UL (ref 2–7.15)
NEUTROPHILS NFR BLD: 74.4 % (ref 44–72)
NRBC # BLD AUTO: 0 K/UL
NRBC BLD-RTO: 0 /100 WBC
PLATELET # BLD AUTO: 287 K/UL (ref 164–446)
PMV BLD AUTO: 9.9 FL (ref 9–12.9)
RBC # BLD AUTO: 4.02 M/UL (ref 4.2–5.4)
WBC # BLD AUTO: 14 K/UL (ref 4.8–10.8)

## 2021-10-07 PROCEDURE — 700111 HCHG RX REV CODE 636 W/ 250 OVERRIDE (IP): Performed by: EMERGENCY MEDICINE

## 2021-10-07 PROCEDURE — 86901 BLOOD TYPING SEROLOGIC RH(D): CPT

## 2021-10-07 PROCEDURE — 304999 HCHG REPAIR-SIMPLE/INTERMED LEVEL 1

## 2021-10-07 PROCEDURE — 304217 HCHG IRRIGATION SYSTEM

## 2021-10-07 PROCEDURE — 86850 RBC ANTIBODY SCREEN: CPT

## 2021-10-07 PROCEDURE — 0HQ0XZZ REPAIR SCALP SKIN, EXTERNAL APPROACH: ICD-10-PCS | Performed by: EMERGENCY MEDICINE

## 2021-10-07 PROCEDURE — 85576 BLOOD PLATELET AGGREGATION: CPT | Mod: 91

## 2021-10-07 PROCEDURE — 99285 EMERGENCY DEPT VISIT HI MDM: CPT

## 2021-10-07 PROCEDURE — 96376 TX/PRO/DX INJ SAME DRUG ADON: CPT

## 2021-10-07 PROCEDURE — 86900 BLOOD TYPING SEROLOGIC ABO: CPT

## 2021-10-07 PROCEDURE — 96374 THER/PROPH/DIAG INJ IV PUSH: CPT

## 2021-10-07 PROCEDURE — 36415 COLL VENOUS BLD VENIPUNCTURE: CPT

## 2021-10-07 PROCEDURE — 85025 COMPLETE CBC W/AUTO DIFF WBC: CPT | Mod: 91

## 2021-10-07 PROCEDURE — 85384 FIBRINOGEN ACTIVITY: CPT

## 2021-10-07 PROCEDURE — 85610 PROTHROMBIN TIME: CPT

## 2021-10-07 PROCEDURE — 85347 COAGULATION TIME ACTIVATED: CPT

## 2021-10-07 PROCEDURE — 700101 HCHG RX REV CODE 250: Performed by: EMERGENCY MEDICINE

## 2021-10-07 PROCEDURE — 99291 CRITICAL CARE FIRST HOUR: CPT | Performed by: SURGERY

## 2021-10-07 PROCEDURE — 303747 HCHG EXTRA SUTURE

## 2021-10-07 PROCEDURE — 96375 TX/PRO/DX INJ NEW DRUG ADDON: CPT

## 2021-10-07 PROCEDURE — 85730 THROMBOPLASTIN TIME PARTIAL: CPT

## 2021-10-07 PROCEDURE — 70450 CT HEAD/BRAIN W/O DYE: CPT | Mod: MC

## 2021-10-07 RX ORDER — CEPHALEXIN 500 MG/1
500 CAPSULE ORAL 4 TIMES DAILY
Qty: 20 CAPSULE | Refills: 0 | Status: SHIPPED
Start: 2021-10-07 | End: 2021-10-10

## 2021-10-07 RX ORDER — CEPHALEXIN 500 MG/1
500 CAPSULE ORAL ONCE
Status: COMPLETED | OUTPATIENT
Start: 2021-10-07 | End: 2021-10-08

## 2021-10-07 RX ORDER — ONDANSETRON 2 MG/ML
4 INJECTION INTRAMUSCULAR; INTRAVENOUS ONCE
Status: COMPLETED | OUTPATIENT
Start: 2021-10-07 | End: 2021-10-07

## 2021-10-07 RX ORDER — LIDOCAINE HYDROCHLORIDE AND EPINEPHRINE 10; 10 MG/ML; UG/ML
20 INJECTION, SOLUTION INFILTRATION; PERINEURAL ONCE
Status: COMPLETED | OUTPATIENT
Start: 2021-10-07 | End: 2021-10-07

## 2021-10-07 RX ADMIN — FENTANYL CITRATE 50 MCG: 50 INJECTION INTRAMUSCULAR; INTRAVENOUS at 21:12

## 2021-10-07 RX ADMIN — ONDANSETRON 4 MG: 2 INJECTION INTRAMUSCULAR; INTRAVENOUS at 20:34

## 2021-10-07 RX ADMIN — LIDOCAINE HYDROCHLORIDE,EPINEPHRINE BITARTRATE 20 ML: 10; .01 INJECTION, SOLUTION INFILTRATION; PERINEURAL at 20:00

## 2021-10-07 RX ADMIN — FENTANYL CITRATE 50 MCG: 50 INJECTION, SOLUTION INTRAMUSCULAR; INTRAVENOUS at 20:00

## 2021-10-07 ASSESSMENT — PAIN DESCRIPTION - PAIN TYPE
TYPE: ACUTE PAIN

## 2021-10-07 ASSESSMENT — FIBROSIS 4 INDEX: FIB4 SCORE: 1.82

## 2021-10-08 ENCOUNTER — APPOINTMENT (OUTPATIENT)
Dept: RADIOLOGY | Facility: MEDICAL CENTER | Age: 86
DRG: 605 | End: 2021-10-08
Attending: NURSE PRACTITIONER
Payer: MEDICARE

## 2021-10-08 PROBLEM — D62 ACUTE BLOOD LOSS ANEMIA: Status: ACTIVE | Noted: 2021-10-08

## 2021-10-08 PROBLEM — Z11.52 ENCOUNTER FOR SCREENING FOR COVID-19: Status: ACTIVE | Noted: 2021-10-08

## 2021-10-08 PROBLEM — Z53.09 CONTRAINDICATION TO DEEP VEIN THROMBOSIS (DVT) PROPHYLAXIS: Status: ACTIVE | Noted: 2021-10-08

## 2021-10-08 PROBLEM — Z11.52 ENCOUNTER FOR SCREENING FOR COVID-19: Status: RESOLVED | Noted: 2021-10-08 | Resolved: 2021-10-08

## 2021-10-08 PROBLEM — T14.90XA TRAUMA: Status: ACTIVE | Noted: 2021-10-08

## 2021-10-08 PROBLEM — S01.01XA SCALP LACERATION, INITIAL ENCOUNTER: Status: ACTIVE | Noted: 2021-10-08

## 2021-10-08 LAB
ABO + RH BLD: NORMAL
ABO GROUP BLD: NORMAL
ALBUMIN SERPL BCP-MCNC: 3.9 G/DL (ref 3.2–4.9)
ALBUMIN/GLOB SERPL: 1.8 G/DL
ALP SERPL-CCNC: 75 U/L (ref 30–99)
ALT SERPL-CCNC: 11 U/L (ref 2–50)
ANION GAP SERPL CALC-SCNC: 12 MMOL/L (ref 7–16)
APTT PPP: 31.6 SEC (ref 24.7–36)
AST SERPL-CCNC: 22 U/L (ref 12–45)
BASOPHILS # BLD AUTO: 0.2 % (ref 0–1.8)
BASOPHILS # BLD AUTO: 0.3 % (ref 0–1.8)
BASOPHILS # BLD AUTO: 0.4 % (ref 0–1.8)
BASOPHILS # BLD: 0.03 K/UL (ref 0–0.12)
BASOPHILS # BLD: 0.05 K/UL (ref 0–0.12)
BASOPHILS # BLD: 0.08 K/UL (ref 0–0.12)
BILIRUB SERPL-MCNC: 0.5 MG/DL (ref 0.1–1.5)
BLD GP AB SCN SERPL QL: NORMAL
BUN SERPL-MCNC: 22 MG/DL (ref 8–22)
CALCIUM SERPL-MCNC: 8.3 MG/DL (ref 8.5–10.5)
CFT BLD TEG: 3.7 MIN (ref 4.6–9.1)
CFT P HPASE BLD TEG: 4.7 MIN (ref 4.3–8.3)
CHLORIDE SERPL-SCNC: 105 MMOL/L (ref 96–112)
CLOT ANGLE BLD TEG: 77 DEGREES (ref 63–78)
CLOT LYSIS 30M P MA LENFR BLD TEG: 0 % (ref 0–2.6)
CO2 SERPL-SCNC: 22 MMOL/L (ref 20–33)
CREAT SERPL-MCNC: 1.01 MG/DL (ref 0.5–1.4)
CT.EXTRINSIC BLD ROTEM: 0.8 MIN (ref 0.8–2.1)
EOSINOPHIL # BLD AUTO: 0 K/UL (ref 0–0.51)
EOSINOPHIL NFR BLD: 0 % (ref 0–6.9)
ERYTHROCYTE [DISTWIDTH] IN BLOOD BY AUTOMATED COUNT: 45.4 FL (ref 35.9–50)
ERYTHROCYTE [DISTWIDTH] IN BLOOD BY AUTOMATED COUNT: 45.6 FL (ref 35.9–50)
ERYTHROCYTE [DISTWIDTH] IN BLOOD BY AUTOMATED COUNT: 46.7 FL (ref 35.9–50)
GLOBULIN SER CALC-MCNC: 2.2 G/DL (ref 1.9–3.5)
GLUCOSE BLD-MCNC: 120 MG/DL (ref 65–99)
GLUCOSE SERPL-MCNC: 144 MG/DL (ref 65–99)
HCT VFR BLD AUTO: 30.7 % (ref 37–47)
HCT VFR BLD AUTO: 33.3 % (ref 37–47)
HCT VFR BLD AUTO: 34 % (ref 37–47)
HGB BLD-MCNC: 10.8 G/DL (ref 12–16)
HGB BLD-MCNC: 11 G/DL (ref 12–16)
HGB BLD-MCNC: 9.8 G/DL (ref 12–16)
IMM GRANULOCYTES # BLD AUTO: 0.11 K/UL (ref 0–0.11)
IMM GRANULOCYTES # BLD AUTO: 0.14 K/UL (ref 0–0.11)
IMM GRANULOCYTES # BLD AUTO: 0.14 K/UL (ref 0–0.11)
IMM GRANULOCYTES NFR BLD AUTO: 0.7 % (ref 0–0.9)
INR PPP: 1.27 (ref 0.87–1.13)
IRON SATN MFR SERPL: 20 % (ref 15–55)
IRON SERPL-MCNC: 42 UG/DL (ref 40–170)
LYMPHOCYTES # BLD AUTO: 1.06 K/UL (ref 1–4.8)
LYMPHOCYTES # BLD AUTO: 1.35 K/UL (ref 1–4.8)
LYMPHOCYTES # BLD AUTO: 1.86 K/UL (ref 1–4.8)
LYMPHOCYTES NFR BLD: 11.9 % (ref 22–41)
LYMPHOCYTES NFR BLD: 5.4 % (ref 22–41)
LYMPHOCYTES NFR BLD: 7 % (ref 22–41)
MCF BLD TEG: 65.2 MM (ref 52–69)
MCF.PLATELET INHIB BLD ROTEM: 25.8 MM (ref 15–32)
MCH RBC QN AUTO: 31.9 PG (ref 27–33)
MCH RBC QN AUTO: 32.1 PG (ref 27–33)
MCH RBC QN AUTO: 32.4 PG (ref 27–33)
MCHC RBC AUTO-ENTMCNC: 31.9 G/DL (ref 33.6–35)
MCHC RBC AUTO-ENTMCNC: 32.4 G/DL (ref 33.6–35)
MCHC RBC AUTO-ENTMCNC: 32.4 G/DL (ref 33.6–35)
MCV RBC AUTO: 100 FL (ref 81.4–97.8)
MCV RBC AUTO: 100.7 FL (ref 81.4–97.8)
MCV RBC AUTO: 98.6 FL (ref 81.4–97.8)
MONOCYTES # BLD AUTO: 0.52 K/UL (ref 0–0.85)
MONOCYTES # BLD AUTO: 0.55 K/UL (ref 0–0.85)
MONOCYTES # BLD AUTO: 0.98 K/UL (ref 0–0.85)
MONOCYTES NFR BLD AUTO: 2.7 % (ref 0–13.4)
MONOCYTES NFR BLD AUTO: 2.8 % (ref 0–13.4)
MONOCYTES NFR BLD AUTO: 6.3 % (ref 0–13.4)
NEUTROPHILS # BLD AUTO: 12.67 K/UL (ref 2–7.15)
NEUTROPHILS # BLD AUTO: 17.18 K/UL (ref 2–7.15)
NEUTROPHILS # BLD AUTO: 17.77 K/UL (ref 2–7.15)
NEUTROPHILS NFR BLD: 80.8 % (ref 44–72)
NEUTROPHILS NFR BLD: 89.1 % (ref 44–72)
NEUTROPHILS NFR BLD: 91 % (ref 44–72)
NRBC # BLD AUTO: 0 K/UL
NRBC BLD-RTO: 0 /100 WBC
PA AA BLD-ACNC: 3.7 % (ref 0–11)
PA ADP BLD-ACNC: 12.5 % (ref 0–17)
PLATELET # BLD AUTO: 249 K/UL (ref 164–446)
PLATELET # BLD AUTO: 258 K/UL (ref 164–446)
PLATELET # BLD AUTO: 272 K/UL (ref 164–446)
PMV BLD AUTO: 10.1 FL (ref 9–12.9)
PMV BLD AUTO: 10.2 FL (ref 9–12.9)
PMV BLD AUTO: 9.8 FL (ref 9–12.9)
POTASSIUM SERPL-SCNC: 4.7 MMOL/L (ref 3.6–5.5)
PROT SERPL-MCNC: 6.1 G/DL (ref 6–8.2)
PROTHROMBIN TIME: 15.6 SEC (ref 12–14.6)
RBC # BLD AUTO: 3.05 M/UL (ref 4.2–5.4)
RBC # BLD AUTO: 3.33 M/UL (ref 4.2–5.4)
RBC # BLD AUTO: 3.45 M/UL (ref 4.2–5.4)
RH BLD: NORMAL
SARS-COV+SARS-COV-2 AG RESP QL IA.RAPID: NOTDETECTED
SODIUM SERPL-SCNC: 139 MMOL/L (ref 135–145)
SPECIMEN SOURCE: NORMAL
TEG ALGORITHM TGALG: ABNORMAL
TIBC SERPL-MCNC: 209 UG/DL (ref 250–450)
UIBC SERPL-MCNC: 167 UG/DL (ref 110–370)
WBC # BLD AUTO: 15.7 K/UL (ref 4.8–10.8)
WBC # BLD AUTO: 19.3 K/UL (ref 4.8–10.8)
WBC # BLD AUTO: 19.5 K/UL (ref 4.8–10.8)

## 2021-10-08 PROCEDURE — 700111 HCHG RX REV CODE 636 W/ 250 OVERRIDE (IP): Performed by: SURGERY

## 2021-10-08 PROCEDURE — 85025 COMPLETE CBC W/AUTO DIFF WBC: CPT

## 2021-10-08 PROCEDURE — 83540 ASSAY OF IRON: CPT

## 2021-10-08 PROCEDURE — 700102 HCHG RX REV CODE 250 W/ 637 OVERRIDE(OP): Performed by: SURGERY

## 2021-10-08 PROCEDURE — 97165 OT EVAL LOW COMPLEX 30 MIN: CPT

## 2021-10-08 PROCEDURE — 770006 HCHG ROOM/CARE - MED/SURG/GYN SEMI*

## 2021-10-08 PROCEDURE — 80053 COMPREHEN METABOLIC PANEL: CPT

## 2021-10-08 PROCEDURE — 99232 SBSQ HOSP IP/OBS MODERATE 35: CPT | Performed by: SURGERY

## 2021-10-08 PROCEDURE — 700105 HCHG RX REV CODE 258: Performed by: SURGERY

## 2021-10-08 PROCEDURE — 87426 SARSCOV CORONAVIRUS AG IA: CPT

## 2021-10-08 PROCEDURE — 96376 TX/PRO/DX INJ SAME DRUG ADON: CPT

## 2021-10-08 PROCEDURE — A9270 NON-COVERED ITEM OR SERVICE: HCPCS | Performed by: SURGERY

## 2021-10-08 PROCEDURE — 83550 IRON BINDING TEST: CPT

## 2021-10-08 PROCEDURE — 93970 EXTREMITY STUDY: CPT

## 2021-10-08 PROCEDURE — 36415 COLL VENOUS BLD VENIPUNCTURE: CPT

## 2021-10-08 PROCEDURE — 700102 HCHG RX REV CODE 250 W/ 637 OVERRIDE(OP): Performed by: EMERGENCY MEDICINE

## 2021-10-08 PROCEDURE — 97162 PT EVAL MOD COMPLEX 30 MIN: CPT

## 2021-10-08 PROCEDURE — A9270 NON-COVERED ITEM OR SERVICE: HCPCS | Performed by: EMERGENCY MEDICINE

## 2021-10-08 PROCEDURE — 82962 GLUCOSE BLOOD TEST: CPT

## 2021-10-08 PROCEDURE — 97535 SELF CARE MNGMENT TRAINING: CPT

## 2021-10-08 RX ORDER — OXYCODONE HYDROCHLORIDE 5 MG/1
2.5 TABLET ORAL
Status: DISCONTINUED | OUTPATIENT
Start: 2021-10-08 | End: 2021-10-10 | Stop reason: HOSPADM

## 2021-10-08 RX ORDER — OXYCODONE HYDROCHLORIDE 5 MG/1
5 TABLET ORAL
Status: DISCONTINUED | OUTPATIENT
Start: 2021-10-08 | End: 2021-10-10 | Stop reason: HOSPADM

## 2021-10-08 RX ORDER — ACETAMINOPHEN 325 MG/1
650 TABLET ORAL EVERY 6 HOURS
Status: DISCONTINUED | OUTPATIENT
Start: 2021-10-08 | End: 2021-10-10 | Stop reason: HOSPADM

## 2021-10-08 RX ORDER — BISACODYL 10 MG
10 SUPPOSITORY, RECTAL RECTAL
Status: DISCONTINUED | OUTPATIENT
Start: 2021-10-08 | End: 2021-10-10 | Stop reason: HOSPADM

## 2021-10-08 RX ORDER — AMOXICILLIN 250 MG
1 CAPSULE ORAL
Status: DISCONTINUED | OUTPATIENT
Start: 2021-10-08 | End: 2021-10-10 | Stop reason: HOSPADM

## 2021-10-08 RX ORDER — HYDROMORPHONE HYDROCHLORIDE 1 MG/ML
0.25 INJECTION, SOLUTION INTRAMUSCULAR; INTRAVENOUS; SUBCUTANEOUS
Status: DISCONTINUED | OUTPATIENT
Start: 2021-10-08 | End: 2021-10-08

## 2021-10-08 RX ORDER — POLYETHYLENE GLYCOL 3350 17 G/17G
1 POWDER, FOR SOLUTION ORAL 2 TIMES DAILY
Status: DISCONTINUED | OUTPATIENT
Start: 2021-10-08 | End: 2021-10-10 | Stop reason: HOSPADM

## 2021-10-08 RX ORDER — ONDANSETRON 2 MG/ML
4 INJECTION INTRAMUSCULAR; INTRAVENOUS EVERY 4 HOURS PRN
Status: DISCONTINUED | OUTPATIENT
Start: 2021-10-08 | End: 2021-10-10 | Stop reason: HOSPADM

## 2021-10-08 RX ORDER — SODIUM CHLORIDE 9 MG/ML
INJECTION, SOLUTION INTRAVENOUS CONTINUOUS
Status: DISCONTINUED | OUTPATIENT
Start: 2021-10-08 | End: 2021-10-08

## 2021-10-08 RX ORDER — ONDANSETRON 4 MG/1
4 TABLET, ORALLY DISINTEGRATING ORAL EVERY 4 HOURS PRN
Status: DISCONTINUED | OUTPATIENT
Start: 2021-10-08 | End: 2021-10-10 | Stop reason: HOSPADM

## 2021-10-08 RX ORDER — AMOXICILLIN 250 MG
1 CAPSULE ORAL NIGHTLY
Status: DISCONTINUED | OUTPATIENT
Start: 2021-10-08 | End: 2021-10-10 | Stop reason: HOSPADM

## 2021-10-08 RX ORDER — CHOLECALCIFEROL (VITAMIN D3) 125 MCG
5 CAPSULE ORAL NIGHTLY
Status: DISCONTINUED | OUTPATIENT
Start: 2021-10-08 | End: 2021-10-10 | Stop reason: HOSPADM

## 2021-10-08 RX ORDER — ENEMA 19; 7 G/133ML; G/133ML
1 ENEMA RECTAL
Status: DISCONTINUED | OUTPATIENT
Start: 2021-10-08 | End: 2021-10-10 | Stop reason: HOSPADM

## 2021-10-08 RX ORDER — ACETAMINOPHEN 325 MG/1
650 TABLET ORAL EVERY 6 HOURS PRN
Status: DISCONTINUED | OUTPATIENT
Start: 2021-10-13 | End: 2021-10-10 | Stop reason: HOSPADM

## 2021-10-08 RX ORDER — DOCUSATE SODIUM 100 MG/1
100 CAPSULE, LIQUID FILLED ORAL 2 TIMES DAILY
Status: DISCONTINUED | OUTPATIENT
Start: 2021-10-08 | End: 2021-10-10 | Stop reason: HOSPADM

## 2021-10-08 RX ADMIN — OXYCODONE 2.5 MG: 5 TABLET ORAL at 04:22

## 2021-10-08 RX ADMIN — ACETAMINOPHEN 650 MG: 325 TABLET, FILM COATED ORAL at 02:28

## 2021-10-08 RX ADMIN — Medication 5 MG: at 01:27

## 2021-10-08 RX ADMIN — ONDANSETRON 4 MG: 2 INJECTION INTRAMUSCULAR; INTRAVENOUS at 00:11

## 2021-10-08 RX ADMIN — ACETAMINOPHEN 650 MG: 325 TABLET, FILM COATED ORAL at 19:49

## 2021-10-08 RX ADMIN — OXYCODONE 5 MG: 5 TABLET ORAL at 10:43

## 2021-10-08 RX ADMIN — HYDROMORPHONE HYDROCHLORIDE 0.25 MG: 1 INJECTION, SOLUTION INTRAMUSCULAR; INTRAVENOUS; SUBCUTANEOUS at 06:52

## 2021-10-08 RX ADMIN — SODIUM CHLORIDE 1000 ML: 9 INJECTION, SOLUTION INTRAVENOUS at 01:26

## 2021-10-08 RX ADMIN — OXYCODONE 5 MG: 5 TABLET ORAL at 21:42

## 2021-10-08 RX ADMIN — CEPHALEXIN 500 MG: 500 CAPSULE ORAL at 00:11

## 2021-10-08 RX ADMIN — ACETAMINOPHEN 650 MG: 325 TABLET, FILM COATED ORAL at 13:14

## 2021-10-08 RX ADMIN — OXYCODONE 2.5 MG: 5 TABLET ORAL at 17:30

## 2021-10-08 RX ADMIN — ACETAMINOPHEN 650 MG: 325 TABLET, FILM COATED ORAL at 08:25

## 2021-10-08 ASSESSMENT — COGNITIVE AND FUNCTIONAL STATUS - GENERAL
CLIMB 3 TO 5 STEPS WITH RAILING: A LITTLE
MOBILITY SCORE: 22
SUGGESTED CMS G CODE MODIFIER MOBILITY: CJ
DAILY ACTIVITIY SCORE: 23
SUGGESTED CMS G CODE MODIFIER MOBILITY: CI
HELP NEEDED FOR BATHING: A LITTLE
DAILY ACTIVITIY SCORE: 24
WALKING IN HOSPITAL ROOM: A LITTLE
SUGGESTED CMS G CODE MODIFIER DAILY ACTIVITY: CI
SUGGESTED CMS G CODE MODIFIER DAILY ACTIVITY: CH
CLIMB 3 TO 5 STEPS WITH RAILING: A LITTLE
MOBILITY SCORE: 23

## 2021-10-08 ASSESSMENT — LIFESTYLE VARIABLES
HAVE PEOPLE ANNOYED YOU BY CRITICIZING YOUR DRINKING: NO
HOW MANY TIMES IN THE PAST YEAR HAVE YOU HAD 5 OR MORE DRINKS IN A DAY: 0
EVER HAD A DRINK FIRST THING IN THE MORNING TO STEADY YOUR NERVES TO GET RID OF A HANGOVER: NO
ON A TYPICAL DAY WHEN YOU DRINK ALCOHOL HOW MANY DRINKS DO YOU HAVE: 1
TOTAL SCORE: 0
CONSUMPTION TOTAL: NEGATIVE
TOTAL SCORE: 0
EVER FELT BAD OR GUILTY ABOUT YOUR DRINKING: NO
ALCOHOL_USE: YES
TOTAL SCORE: 0
DOES PATIENT WANT TO STOP DRINKING: NO
AVERAGE NUMBER OF DAYS PER WEEK YOU HAVE A DRINK CONTAINING ALCOHOL: 5
HAVE YOU EVER FELT YOU SHOULD CUT DOWN ON YOUR DRINKING: NO

## 2021-10-08 ASSESSMENT — PAIN DESCRIPTION - PAIN TYPE
TYPE: ACUTE PAIN

## 2021-10-08 ASSESSMENT — PATIENT HEALTH QUESTIONNAIRE - PHQ9
1. LITTLE INTEREST OR PLEASURE IN DOING THINGS: NOT AT ALL
SUM OF ALL RESPONSES TO PHQ9 QUESTIONS 1 AND 2: 0
2. FEELING DOWN, DEPRESSED, IRRITABLE, OR HOPELESS: NOT AT ALL

## 2021-10-08 ASSESSMENT — COPD QUESTIONNAIRES
DO YOU EVER COUGH UP ANY MUCUS OR PHLEGM?: NO/ONLY WITH OCCASIONAL COLDS OR INFECTIONS
COPD SCREENING SCORE: 4
DURING THE PAST 4 WEEKS HOW MUCH DID YOU FEEL SHORT OF BREATH: NONE/LITTLE OF THE TIME
HAVE YOU SMOKED AT LEAST 100 CIGARETTES IN YOUR ENTIRE LIFE: YES

## 2021-10-08 ASSESSMENT — ACTIVITIES OF DAILY LIVING (ADL): TOILETING: INDEPENDENT

## 2021-10-08 ASSESSMENT — GAIT ASSESSMENTS: GAIT LEVEL OF ASSIST: UNABLE TO PARTICIPATE

## 2021-10-08 ASSESSMENT — ENCOUNTER SYMPTOMS
CARDIOVASCULAR NEGATIVE: 1
GASTROINTESTINAL NEGATIVE: 1
CONSTITUTIONAL NEGATIVE: 1
RESPIRATORY NEGATIVE: 1
MUSCULOSKELETAL NEGATIVE: 1
EYES NEGATIVE: 1
PSYCHIATRIC NEGATIVE: 1
HEADACHES: 1

## 2021-10-08 ASSESSMENT — FIBROSIS 4 INDEX
FIB4 SCORE: 1.79
FIB4 SCORE: 2.42
FIB4 SCORE: 1.79

## 2021-10-08 NOTE — ED NOTES
Pt ambulated with ER Tech 50ft. Pt became dizzy and diaphoretic.  Pt was sat down, BP was 57/38. ERP notified, pt back on [facility].

## 2021-10-08 NOTE — CARE PLAN
Problem: Pain - Standard  Goal: Alleviation of pain or a reduction in pain to the patient’s comfort goal  Outcome: Progressing  Note: Tolerating pain level with PRN medications     Problem: Fall Risk  Goal: Patient will remain free from falls  Outcome: Progressing  Note: Educated on moderate fall level risk and patient verbalizes fall risk precautions    The patient is Watcher - Medium risk of patient condition declining or worsening    Shift Goals  Clinical Goals: neuro assessments, dressing changes  Patient Goals: D/C   Family Goals: safety    Progress made toward(s) clinical / shift goals:  Monitoring patient's head laceration for signs of bleeding. Initiating Q4 neuro checks. Verbalized understanding of POC     Patient is not progressing towards the following goals:

## 2021-10-08 NOTE — DISCHARGE INSTRUCTIONS
Discharge Instructions    Discharged to home by car with relative. Discharged via wheelchair, hospital escort: Yes.  Special equipment needed: Not Applicable    Be sure to schedule a follow-up appointment with your primary care doctor or any specialists as instructed.     Discharge Plan:   Diet Plan: Discussed  Activity Level: Discussed  Confirmed Follow up Appointment: Patient to Call and Schedule Appointment  Confirmed Symptoms Management: Discussed  Medication Reconciliation Updated: Yes  Influenza Vaccine Indication: Not indicated: Previously immunized this influenza season and > 8 years of age    I understand that a diet low in cholesterol, fat, and sodium is recommended for good health. Unless I have been given specific instructions below for another diet, I accept this instruction as my diet prescription.   Other diet: As tolerated    Special Instructions: None    · Is patient discharged on Warfarin / Coumadin?   No     Depression / Suicide Risk    As you are discharged from this [facility] facility, it is important to learn how to keep safe from harming yourself.    Recognize the warning signs:  · Abrupt changes in personality, positive or negative- including increase in energy   · Giving away possessions  · Change in eating patterns- significant weight changes-  positive or negative  · Change in sleeping patterns- unable to sleep or sleeping all the time   · Unwillingness or inability to communicate  · Depression  · Unusual sadness, discouragement and loneliness  · Talk of wanting to die  · Neglect of personal appearance   · Rebelliousness- reckless behavior  · Withdrawal from people/activities they love  · Confusion- inability to concentrate     If you or a loved one observes any of these behaviors or has concerns about self-harm, here's what you can do:  · Talk about it- your feelings and reasons for harming yourself  · Remove any means that you might use to hurt yourself (examples: pills, rope,  extension cords, firearm)  · Get professional help from the community (Mental Health, Substance Abuse, psychological counseling)  · Do not be alone:Call your Safe Contact- someone whom you trust who will be there for you.  · Call your local CRISIS HOTLINE 565-6908 or 862-539-2963  · Call your local Children's Mobile Crisis Response Team [place] (475) 689-9658 or www.[employer]  · Call the toll free National Suicide Prevention Hotlines   · National Suicide Prevention Lifeline 937-932-OBFD (5052)  · [employer] Hope Line Network 800-SUICIDE (098-9492)      1.  Call or seek medical attention if questions or concerns arise   2.  Follow follow-up with Dr. Marlon Santo and or primary care physician within 1 week's time ,as needed ,if symptoms worsen,for wound check, and suture removal.   3.  May take over-the-counter Tylenol for pain.   4.  Follow-up with primary care provider before resuming prehospital antihypertensive.   5.  No swimming, hot tubs, baths or wound submersion. May shower.   6.  Wound may be open to air.   9. Seek immediate medical attention for for signs and symptoms of infection and/or bleeding.y shower.      Laceration Care    Keep the wound moist with bacitracin for at least 5 days,  keep it clean afterwards and protect the wound from sunburn with SPF 50 for 9 months.  Return for evidence of infection streaking, pus from the wound.    A laceration is a cut or lesion that goes through all layers of the skin and into the tissue just beneath the skin.    This has been repaired by your caregiver. Your caregiver used either suturing, stapling, or steri-strips to repair the laceration. This brings the skin margins together. This allows faster healing.    HOME CARE INSTRUCTIONS  Ø If you were given a dressing, you should change it at least once a day, or as instructed by your caregiver. If the bandage sticks, soak it off with a solution of hydrogen peroxide or soapy water.  Ø Twice a day, wash  the area with soap and water to remove all the creams or ointments if these were used. Rinse off the soap. Pat dry with a clean towel. Look for signs of infection (see below).  Ø Re-apply prescribed creams or ointments as instructed. This will help prevent infection. This also helps keep the bandage from sticking. Telfa over the wound and under the dressing or wrap will also help keep the bandage from sticking.  Ø If the bandage becomes wet, dirty, or develops a foul smell, change it as soon as possible.  Ø Acetaminophen (Tylenol®) or ibuprofen (Advil® or Motrin®) may be taken as directed for relief from pain and discomfort.  1   2 Seek medical attention IF:  Ø There is redness, swelling, increasing pain in the wound  Ø There is a red line that goes up your arm or leg.  Ø Pus is coming from wound.  Ø You develop an unexplained oral temperature above 101.  Ø You notice a foul smell coming from the wound or dressing.  Ø There is a breaking open of the wound  (edges not staying together) after sutures have been removed.    If you did not receive a tetanus shot today because you did not recall when your last one was given, make sure to check with your caregiver when you have your sutures removed to determine if you need one.

## 2021-10-08 NOTE — ED PROVIDER NOTES
ED Provider Note  CHIEF COMPLAINT  Chief Complaint   Patient presents with   • T-5000 Head Injury     pt bent down to get when she stood up hit the back of her head on the freezer door   • T-5000 GLF     pt fell forward after hitting her head       HPI  Chrissie Dueñas is a 89 y.o. female who presents to the emergency department with complaint of head injury.  The patient states she bent down and when she stood up she hit the back of her head on the freezer door resulting in a large laceration and significant active bleeding.  EMS was called secondary to profound bleeding.  She is on anticoagulation or on aspirin and Plavix.  She denies loss of consciousness, loss of sensation or strength arms or legs, nausea, vomiting but does complain of a significant profound headache on the left side of profound bleeding.    REVIEW OF SYSTEMS  Positives as above. Pertinent negatives include loss of sensation or strength arms legs, nausea, vomiting, use of anticoagulant, blood thinners.  All other 10 review of systems are negative    PAST MEDICAL HISTORY  Past Medical History:   Diagnosis Date   • Hypertension        FAMILY HISTORY  Noncontributory    SOCIAL HISTORY  Social History     Socioeconomic History   • Marital status: Single     Spouse name: Not on file   • Number of children: Not on file   • Years of education: Not on file   • Highest education level: Not on file   Occupational History   • Not on file   Tobacco Use   • Smoking status: Former Smoker     Quit date: 1974     Years since quittin.3   • Smokeless tobacco: Never Used   Vaping Use   • Vaping Use: Never used   Substance and Sexual Activity   • Alcohol use: Yes     Comment: glass of wine everyday    • Drug use: Not Currently   • Sexual activity: Not on file   Other Topics Concern   • Not on file   Social History Narrative   • Not on file     Social Determinants of Health     Financial Resource Strain:    • Difficulty of Paying Living Expenses:    Food  Insecurity:    • Worried About Running Out of Food in the Last Year:    • Ran Out of Food in the Last Year:    Transportation Needs:    • Lack of Transportation (Medical):    • Lack of Transportation (Non-Medical):    Physical Activity:    • Days of Exercise per Week:    • Minutes of Exercise per Session:    Stress:    • Feeling of Stress :    Social Connections:    • Frequency of Communication with Friends and Family:    • Frequency of Social Gatherings with Friends and Family:    • Attends Yazdanism Services:    • Active Member of Clubs or Organizations:    • Attends Club or Organization Meetings:    • Marital Status:    Intimate Partner Violence:    • Fear of Current or Ex-Partner:    • Emotionally Abused:    • Physically Abused:    • Sexually Abused:        SURGICAL HISTORY  Past Surgical History:   Procedure Laterality Date   • APPENDECTOMY     • OVARIAN CYSTECTOMY         CURRENT MEDICATIONS  Home Medications     Reviewed by Dre Roland (Pharmacy Tech) on 10/07/21 at 2239  Med List Status: Complete   Medication Last Dose Status   losartan (COZAAR) 25 MG Tab 10/7/2021 Active                ALLERGIES  No Known Allergies    PHYSICAL EXAM  VITAL SIGNS: BP (!) 90/57   Pulse 87   Temp 36 °C (96.8 °F) (Temporal)   Resp 18   Ht 1.524 m (5')   Wt 58.1 kg (128 lb)   SpO2 90%   BMI 25.00 kg/m²      Constitutional: Well developed, Well nourished, No acute distress, Non-toxic appearance.   Eyes: PERRLA, EOMI, Conjunctiva normal, No discharge.   HENT: 4 x 3 full-thickness laceration down to the periosteum of the skull with slight arterial bleeding in the left posterior parietal region.  Cardiovascular: Normal heart rate, Normal rhythm, No murmurs, No rubs, No gallops, and intact distal pulses.   Thorax & Lungs:  No respiratory distress, no rales, no rhonchi, No wheezing, No chest wall tenderness.   Abdomen: Bowel sounds normal, Soft, No tenderness, No guarding, No rebound, No pulsatile masses.    Extremities: Full range of motion, no deformity, no edema.  Neurologic: Alert & oriented x 3, No focal deficits noted, acting appropriately on exam.  Psychiatric: Affect normal for clinical presentation.      LABORATORY/ECG  Results for orders placed or performed during the hospital encounter of 10/07/21   CBC WITH DIFFERENTIAL   Result Value Ref Range    WBC 14.0 (H) 4.8 - 10.8 K/uL    RBC 4.02 (L) 4.20 - 5.40 M/uL    Hemoglobin 12.8 12.0 - 16.0 g/dL    Hematocrit 39.1 37.0 - 47.0 %    MCV 97.3 81.4 - 97.8 fL    MCH 31.8 27.0 - 33.0 pg    MCHC 32.7 (L) 33.6 - 35.0 g/dL    RDW 45.0 35.9 - 50.0 fL    Platelet Count 287 164 - 446 K/uL    MPV 9.9 9.0 - 12.9 fL    Neutrophils-Polys 74.40 (H) 44.00 - 72.00 %    Lymphocytes 19.70 (L) 22.00 - 41.00 %    Monocytes 4.80 0.00 - 13.40 %    Eosinophils 0.20 0.00 - 6.90 %    Basophils 0.40 0.00 - 1.80 %    Immature Granulocytes 0.50 0.00 - 0.90 %    Nucleated RBC 0.00 /100 WBC    Neutrophils (Absolute) 10.42 (H) 2.00 - 7.15 K/uL    Lymphs (Absolute) 2.76 1.00 - 4.80 K/uL    Monos (Absolute) 0.67 0.00 - 0.85 K/uL    Eos (Absolute) 0.03 0.00 - 0.51 K/uL    Baso (Absolute) 0.05 0.00 - 0.12 K/uL    Immature Granulocytes (abs) 0.07 0.00 - 0.11 K/uL    NRBC (Absolute) 0.00 K/uL   PT/INR   Result Value Ref Range    PT 15.6 (H) 12.0 - 14.6 sec    INR 1.27 (H) 0.87 - 1.13   PTT   Result Value Ref Range    APTT 31.6 24.7 - 36.0 sec   CBC WITH DIFFERENTIAL   Result Value Ref Range    WBC 19.3 (H) 4.8 - 10.8 K/uL    RBC 3.45 (L) 4.20 - 5.40 M/uL    Hemoglobin 11.0 (L) 12.0 - 16.0 g/dL    Hematocrit 34.0 (L) 37.0 - 47.0 %    MCV 98.6 (H) 81.4 - 97.8 fL    MCH 31.9 27.0 - 33.0 pg    MCHC 32.4 (L) 33.6 - 35.0 g/dL    RDW 45.4 35.9 - 50.0 fL    Platelet Count 272 164 - 446 K/uL    MPV 10.2 9.0 - 12.9 fL    Neutrophils-Polys 89.10 (H) 44.00 - 72.00 %    Lymphocytes 7.00 (L) 22.00 - 41.00 %    Monocytes 2.80 0.00 - 13.40 %    Eosinophils 0.00 0.00 - 6.90 %    Basophils 0.40 0.00 - 1.80 %     Immature Granulocytes 0.70 0.00 - 0.90 %    Nucleated RBC 0.00 /100 WBC    Neutrophils (Absolute) 17.18 (H) 2.00 - 7.15 K/uL    Lymphs (Absolute) 1.35 1.00 - 4.80 K/uL    Monos (Absolute) 0.55 0.00 - 0.85 K/uL    Eos (Absolute) 0.00 0.00 - 0.51 K/uL    Baso (Absolute) 0.08 0.00 - 0.12 K/uL    Immature Granulocytes (abs) 0.14 (H) 0.00 - 0.11 K/uL    NRBC (Absolute) 0.00 K/uL         RADIOLOGY/PROCEDURES  CT-HEAD W/O   Final Result      1.  Diffuse atrophy and white matter changes.   2.  No acute intracranial hemorrhage or territorial infarct.   3.  Large LEFT occipital scalp hematoma with probable associated laceration.        Laceration Repair Procedure Note    Indication: Laceration    Procedure: The patient was placed in the appropriate position and anesthesia around the laceration was obtained by infiltration using 2% Lidocaine with epinephrine. The area was then irrigated with normal saline.  There was significant arterial bleeding therefore 2.0 Vicryl sutures were utilized approximately 10 figure-of-eight sutures and then 1.0 Vicryl suture was utilized approximately 3 with significant hemostasis.  There were outside sutures of two-point 0 Ethilon suture numbers 12.  The patient has slight oozing from the wound on direct pressure after this but no significant hemorrhage.   Total repaired wound length: 3.5 cm  Other Items: Suture count: 25    The patient tolerated the procedure poorly and became lightheaded and dizzy upon ambulating required hospitalization.          COURSE & MEDICAL DECISION MAKING  Pertinent Labs & Imaging studies reviewed. (See chart for details)  This is a pleasant 89-year-old female presents with closed head injury as well as profound laceration to the left parietal region.  Here in the emergency department, the patient had arterial hemorrhage that required over 1.5 hours of intervention.  And multiple figure-of-eight sutures and finally had cessation of hemorrhaging.  For this reason, she  was closed as above.  Intermittently she had hypotensive episodes but did receive fentanyl x2 that may be causing her hypotension and vasovagal episode.  Prior to discharge, her blood pressure was normal, she received 2 L normal saline with significant improvement of her blood pressure, she is speaking in full sentences.  She did feel nauseous therefore she received Zofran x2.  She could have a closed head injury resulting in her nausea and vasovagal-like episodes.  I tried to ambulate the patient but she made around the nursing station and then became extremely lightheaded and dizzy had to sit down.  Her blood pressure was down in the 50s at that point time but responded in the next blood pressure is 95.  Blood pressure is waxing and wheezing.  She has had no symptoms prior to this although she does have a hemoglobin of 12 and a slight leukocytosis I do believe secondary to stress response.  At this point she is slightly oozy at the lesion on her head, she see 1 Keflex tab because of the nature of the wound is deep and multiple sutures are involved.  I discussed the patient Dr. Santo who graciously excepts hospitalization of this patient for observation secondary to her hypotension, dizziness, inability to ambulate without feeling that she is going to fall.      FINAL IMPRESSION     1. Closed head injury, initial encounter Active   2. Laceration of scalp, initial encounter Active              Electronically signed by: Jd Daniels D.O., 10/7/2021 7:16 PM

## 2021-10-08 NOTE — PROGRESS NOTES
Patient arrived to floor from ED.  Assessment complete.  A&O x 4. Patient calls appropriately.  Patient ambulates with standby assist.   Patient has 4/10 pain. Pain managed with prescribed medications.  + nausea. Pt on a clear liquid diet.  Laceration to back of head, gauze/ace wrap. Drainage noted. Ice pack in use as needed.  - void, + flatus, last BM PTA.  Patient denies SOB.  SCD's on.  Review plan with of care with patient. Call light and personal belongings within reach. Hourly rounding in place. All needs met at this time.

## 2021-10-08 NOTE — CARE PLAN
The patient is Watcher - Medium risk of patient condition declining or worsening    Shift Goals  Clinical Goals: hemodynamic stability  Patient Goals: rest, discharge    Progress made toward(s) clinical / shift goals: Patient reports a decrease in headache pain, pain scale in use, medicating per MAR. Patient encouraged to call for assistance.    Problem: Pain - Standard  Goal: Alleviation of pain or a reduction in pain to the patient’s comfort goal  Outcome: Progressing     Problem: Knowledge Deficit - Standard  Goal: Patient and family/care givers will demonstrate understanding of plan of care, disease process/condition, diagnostic tests and medications  Outcome: Progressing     Patient is not progressing towards the following goals:

## 2021-10-08 NOTE — ASSESSMENT & PLAN NOTE
Struck he head on freezer door.  Pt did not fall to the ground after  T-5000 Activation.  Marlon Santo MD. Trauma Surgery.

## 2021-10-08 NOTE — ASSESSMENT & PLAN NOTE
Significant blood loss noted at the scene as well as prior to control bleeding here  Hypotension in the emergency department -responded to fluids  Trend Hb  Transfuse 1 unit PRBC if Hb < 7.0  Iron studies completed.  IV iron supplementation not indicated.

## 2021-10-08 NOTE — PROGRESS NOTES
TRAUMA TERTIARY SURVEY     Mental status adequate for full examination?: Yes    Spine cleared (radiologically and/or clinically): Yes    PHYSICAL EXAMINATION:  Vitals: BP (!) 93/57   Pulse (!) 103   Temp 36.3 °C (97.4 °F) (Temporal)   Resp 18   Ht 1.524 m (5')   Wt 56.7 kg (125 lb)   SpO2 97%   BMI 24.41 kg/m²   Constitutional:     General Appearance: appears stated age, is in no apparent distress.  HEENT:    Laceration wrapped.  No overt signs of bleeding.. The pupils are equal, round, and reactive to light bilaterally. The extraocular muscles are intact bilaterally.. The nares and oropharynx are clear. The midface and jaw are stable. No malocclusion is evident.  Neck:    The cervical spine is supple and non tender. Normal range of motion. The trachea is midline.  Respiratory:   Inspection: Unlabored respirations, no intercostal retractions, paradoxical motion, or accessory muscle use.   Palpation:  The chest is nontender. The clavicles are non deformed bilaterally..   Auscultation: clear to auscultation.  Cardiovascular:   Auscultation: tachycardia .   Peripheral Pulses: Normal.   Abdomen:   Abdomen is soft, nontender, without organomegaly or masses.  Musculoskeletal:   The pelvis is stable.  No significant angulation, deformity, or soft tissue injury involving the upper and lower extremities. Normal range of motion.   Back:   The thoracolumbar spine was examined. Examination is remarkable for no significant tenderness, swelling, or deformity in the thoracolumbar region.  Skin:   The skin is warm and dry.  Neurologic:    [place] Coma Scale (GCS) 15 [id number]. Neurologic examination revealed no focal deficits noted, mental status intact.  Psychiatric:   The patient does not appear depressed or anxious.    IMAGING:  CT-HEAD W/O   Final Result      1.  Diffuse atrophy and white matter changes.   2.  No acute intracranial hemorrhage or territorial infarct.   3.  Large LEFT occipital scalp hematoma with probable  associated laceration.      US-TRAUMA VEIN SCREEN LOWER BILAT EXTREMITY    (Results Pending)     All current laboratory studies/radiology exams reviewed: Yes    Completed Consultations:  Not applicable.    Pending Consultations:  Not applicable.    Newly Identified Diagnoses and Injuries:  None.    TOTAL RAP SCORE:  RAP Score Total: 4      Assesment ETOH: Negative.

## 2021-10-08 NOTE — H&P
Patient seen, data reviewed and discussed.  Agree with assessment and plan.   Trauma Surgery History and Physical  10/8/2021    Trauma Physician: Marlon Santo MD.     CC: Trauma The patient was triaged as a T-5000 in accordance with established pre hospital protols. An expeditious primary and secondary survey with required adjuncts was conducted. See Trauma Narrator for full details.    HPI: This is a 89 y.o female presents to [facility] for bleeding from scalp wound.  The patient states she was getting ice cream out of the freezer and left the door open.  She bent down and when she stood up she hit the back of her head on the freezer door.  She sustained a significant laceration to the back of her head and active bleeding.  Report patient reports a significant blood in her kitchen. EMS was called secondary to profound bleeding.  She is on anticoagulation or on aspirin and Plavix.  She denies loss of consciousness, loss of sensation or strength arms or legs, or vomiting.  She does complain of a left posterior headache and nausea.  Patient reports she did not fall down when this happened and she has full recall of events.       Past Medical History:   Diagnosis Date   • Hypertension        Past Surgical History:   Procedure Laterality Date   • APPENDECTOMY     • OVARIAN CYSTECTOMY         Current Facility-Administered Medications   Medication Dose Route Frequency Provider Last Rate Last Admin   • Respiratory Therapy Consult   Nebulization Continuous RT Marlon Santo M.D.       • Pharmacy Consult Request ...Pain Management Review 1 Each  1 Each Other PHARMACY TO DOSE Marlon Santo M.D.       • ondansetron (ZOFRAN) syringe/vial injection 4 mg  4 mg Intravenous Q4HRS PRN Marlon Santo M.D.   4 mg at 10/08/21 0011   • ondansetron (ZOFRAN ODT) dispertab 4 mg  4 mg Oral Q4HRS PRN Marlon Santo M.D.       • docusate sodium (COLACE) capsule 100 mg  100 mg Oral BID Marlon  Reji Santo M.D.       • senna-docusate (PERICOLACE or SENOKOT S) 8.6-50 MG per tablet 1 Tablet  1 Tablet Oral Nightly Marlon Santo M.D.       • senna-docusate (PERICOLACE or SENOKOT S) 8.6-50 MG per tablet 1 Tablet  1 Tablet Oral Q24HRS PRN Marlon Santo M.D.       • polyethylene glycol/lytes (MIRALAX) PACKET 1 Packet  1 Packet Oral BID Marlon Santo M.D.       • magnesium hydroxide (MILK OF MAGNESIA) suspension 30 mL  30 mL Oral DAILY Marlon Santo M.D.       • bisacodyl (DULCOLAX) suppository 10 mg  10 mg Rectal Q24HRS PRN Marlon Santo M.D.       • sodium phosphate (Fleet) enema 133 mL  1 Each Rectal Once PRN Marlon Santo M.D.       • NS infusion   Intravenous Continuous Marlon Santo M.D. 75 mL/hr at 10/08/21 0126 1,000 mL at 10/08/21 0126   • acetaminophen (Tylenol) tablet 650 mg  650 mg Oral Q6HRS Marlon Santo M.D.        Followed by   • [START ON 10/13/2021] acetaminophen (Tylenol) tablet 650 mg  650 mg Oral Q6HRS PRN Marlon Santo M.D.       • oxyCODONE immediate-release (ROXICODONE) tablet 2.5 mg  2.5 mg Oral Q3HRS PRN Marlon Santo M.D.        Or   • oxyCODONE immediate-release (ROXICODONE) tablet 5 mg  5 mg Oral Q3HRS PRN Marlon Santo M.D.        Or   • HYDROmorphone (Dilaudid) injection 0.25 mg  0.25 mg Intravenous Q3HRS PRN Marlon Santo M.D.       • melatonin tablet 5 mg  5 mg Oral Nightly Marlon Santo M.D.   5 mg at 10/08/21 0127     Current Outpatient Medications   Medication Sig Dispense Refill   • cephALEXin (KEFLEX) 500 MG Cap Take 1 Capsule by mouth 4 times a day for 5 days. 20 Capsule 0   • losartan (COZAAR) 25 MG Tab Take 1 Tablet by mouth every day. 90 Tablet 0       Social History     Socioeconomic History   • Marital status: Single     Spouse name: Not on file   • Number of children: Not on file   • Years of education: Not on file   • Highest education level: Not on file   Occupational  History   • Not on file   Tobacco Use   • Smoking status: Former Smoker     Quit date: 1974     Years since quittin.3   • Smokeless tobacco: Never Used   Vaping Use   • Vaping Use: Never used   Substance and Sexual Activity   • Alcohol use: Yes     Comment: glass of wine everyday    • Drug use: Not Currently   • Sexual activity: Not on file   Other Topics Concern   • Not on file   Social History Narrative   • Not on file     Social Determinants of Health     Financial Resource Strain:    • Difficulty of Paying Living Expenses:    Food Insecurity:    • Worried About Running Out of Food in the Last Year:    • Ran Out of Food in the Last Year:    Transportation Needs:    • Lack of Transportation (Medical):    • Lack of Transportation (Non-Medical):    Physical Activity:    • Days of Exercise per Week:    • Minutes of Exercise per Session:    Stress:    • Feeling of Stress :    Social Connections:    • Frequency of Communication with Friends and Family:    • Frequency of Social Gatherings with Friends and Family:    • Attends Holiness Services:    • Active Member of Clubs or Organizations:    • Attends Club or Organization Meetings:    • Marital Status:    Intimate Partner Violence:    • Fear of Current or Ex-Partner:    • Emotionally Abused:    • Physically Abused:    • Sexually Abused:        Family History   Problem Relation Age of Onset   • Cancer Mother    • Stroke Father        Allergies:  Patient has no known allergies.    Review of Systems:  Constitutional: Negative for fever, chills, weight loss, malaise/fatigue and diaphoresis.   HENT: Negative for hearing loss, ear pain, nosebleeds, congestion, sore throat, neck pain, and ear discharge.    Eyes: Negative for blurred vision, double vision, and redness.   Respiratory: Negative for cough, sputum production, shortness of breath, wheezing and stridor.    Cardiovascular: Negative for chest pain, palpitations.   Gastrointestinal: Negative for heartburn,  nausea, vomiting, abdominal pain, diarrhea, constipation.  Genitourinary: Negative for dysuria, urgency, frequency.   Musculoskeletal: Negative for myalgias, back pain, joint pain and falls.   Skin: Negative for itching and rash.  Neurological: Negative for dizziness, loss of consciousness, weakness. Positive for headache.   Endo/Heme/Allergies: Negative for environmental allergies. Does not bruise/bleed easily.   Psychiatric/Behavioral: Negative for depression and substance abuse. The patient is not nervous/anxious.    Physical Exam:  BP (!) 91/54   Pulse 91   Temp 36 °C (96.8 °F) (Temporal)   Resp 18   Ht 1.524 m (5')   Wt 58.1 kg (128 lb)   SpO2 92%     Constitutional: Awake, alert, oriented x3. No acute distress. GCS 15. E4 V5 M6.  Head: No cephalohematoma. Pupils are 2 mm,  reactive bilaterally. Midface stable. No malocclusion.  TMs clear bilaterally. No drainage from the mouth or nose.  Approximately 3.5 cm posterior scalp laceration -previously closed and dressed  Neck: No tracheal deviation. No midline cervical spine tenderness.  Cardiovascular: Normal rate, regular rhythm, normal heart sounds and intact distal pulses.  Exam reveals no gallop and no friction rub.  No murmur heard.  Pulmonary/Chest: Clavicles nontender to palpation. There is no chest wall tenderness bilaterally.  No crepitus. Positive breath sounds bilaterally.   Abdominal: Soft, nondistended. Nontender to palpation. Pelvis is stable to anterior-posterior compression. .   Musculoskeletal: Right upper extremity grossly atraumatic, palpable radial pulse. 5/5  strength. Full ROM and strength at elbow.  Left upper extremity grossly atraumatic, palpable radial pulse. 5/5  strength. Full ROM and strength at elbow.  Right lower extremity grossly atraumatic. 5/5 strength in ankle plantar flexion and dorsiflexion. No pain and full ROM at right knee and hip.   Left  lower extremity grossly atraumatic. 5/5 strength in ankle plantar flexion  and dorsiflexion. No pain and full ROM at left knee and hip.   Back: Midline thoracic and lumbar spines are nontender to palpation. No step-offs.   : Normal male external genitalia. Rectal exam not done. No blood visible at urethral meatus.   Neurological: Sensation intact to light touch dorsum and plantar surfaces of both feet and the medial and lateral aspects of both lower legs.  Sensation intact to light touch dorsum and plantar surfaces of both hands.   Skin: Skin is warm and dry.  No diaphoresis. No erythema. No pallor.     Labs:  Recent Labs     10/07/21  2048 10/07/21  2336   WBC 14.0* 19.3*   RBC 4.02* 3.45*   HEMOGLOBIN 12.8 11.0*   HEMATOCRIT 39.1 34.0*   MCV 97.3 98.6*   MCH 31.8 31.9   MCHC 32.7* 32.4*   RDW 45.0 45.4   PLATELETCT 287 272   MPV 9.9 10.2         Recent Labs     10/07/21  2336   APTT 31.6   INR 1.27*     Recent Labs     10/07/21  2336   INR 1.27*       Radiology:  CT-HEAD W/O   Final Result      1.  Diffuse atrophy and white matter changes.   2.  No acute intracranial hemorrhage or territorial infarct.   3.  Large LEFT occipital scalp hematoma with probable associated laceration.      US-TRAUMA VEIN SCREEN LOWER BILAT EXTREMITY    (Results Pending)         Assessment: This is a 89 y.o with hypotension after significant blood loss from scalp laceration.    Plan:   TEG with platelet mapping  Serial H/H  Check iron studies  Admit to floor  Covid testing    Acute blood loss anemia  Significant blood loss noted at the scene as well as prior to control bleeding here  Hypotension in the emergency department -responded to fluids  Serial Hb  Transfuse 1 unit PRBC if Hb < 7.0  Check iron studies and replace as indicated.    Trauma  Struck he head on freezer door.  Pt did not fall to the ground after  T-5000 Activation.  Marlon Santo MD. Trauma Surgery.    Encounter for screening for COVID-19  Admission SARS-CoV-2 testing negative. Repeat SARS-CoV-2 testing not indicated. Isolation  precautions de-escalated.    Scalp laceration, initial encounter  Large left occipital scalp hematoma with probable associated laceration.  Repaired in ED.    Contraindication to deep vein thrombosis (DVT) prophylaxis  Prophylactic anticoagulation for thrombotic prevention initially contraindicated secondary to elevated bleeding risk.  10/8 Trauma surveillance venous duplex scanning ordered.    Findings discussed with Dr. Daniels in the emergency department, the patient and her daughter at the bedside.    Time spent: Trauma / Critical Care Time 55 minutes excluding procedures.    Marlon Santo MD  [facility]  [phone]

## 2021-10-08 NOTE — PROGRESS NOTES
Trauma / Surgical Daily Progress Note    Date of Service  10/8/2021    Chief Complaint  89 y.o. female admitted 10/7/2021 with scalp laceration.    Interval Events    Admitted to general surgical harden.  Scalp laceration dressed.  No overt signs of bleeding.    -Trend laboratory studies.  -Disposition: Home when medically cleared.  -Counseled.    Review of Systems  Review of Systems   Constitutional: Negative.    HENT: Negative.    Eyes: Negative.    Respiratory: Negative.    Cardiovascular: Negative.    Gastrointestinal: Negative.    Genitourinary: Negative.    Musculoskeletal: Negative.    Neurological: Positive for headaches.   Psychiatric/Behavioral: Negative.         Vital Signs  Temp:  [36 °C (96.8 °F)-36.3 °C (97.4 °F)] 36.3 °C (97.4 °F)  Pulse:  [] 103  Resp:  [18] 18  BP: ()/(35-87) 93/57  SpO2:  [86 %-99 %] 97 %    Physical Exam  Physical Exam  Constitutional:       General: She is not in acute distress.     Appearance: She is not ill-appearing, toxic-appearing or diaphoretic.   HENT:      Head:      Comments: Laceration dressed.  No overt signs of bleeding.     Mouth/Throat:      Mouth: Mucous membranes are moist.      Pharynx: Oropharynx is clear.   Eyes:      Conjunctiva/sclera: Conjunctivae normal.   Cardiovascular:      Rate and Rhythm: Tachycardia present.      Pulses: Normal pulses.   Pulmonary:      Effort: No respiratory distress.   Chest:      Chest wall: No tenderness.   Abdominal:      General: There is no distension.      Tenderness: There is no abdominal tenderness. There is no guarding or rebound.   Musculoskeletal:         General: No swelling or tenderness.      Cervical back: Neck supple. No tenderness.   Skin:     General: Skin is warm and dry.      Capillary Refill: Capillary refill takes less than 2 seconds.   Neurological:      General: No focal deficit present.      Mental Status: She is alert and oriented to person, place, and time.   Psychiatric:         Mood and Affect:  Mood normal.         Behavior: Behavior normal.         Thought Content: Thought content normal.         Judgment: Judgment normal.         Laboratory  Recent Results (from the past 24 hour(s))   CBC WITH DIFFERENTIAL    Collection Time: 10/07/21  8:48 PM   Result Value Ref Range    WBC 14.0 (H) 4.8 - 10.8 K/uL    RBC 4.02 (L) 4.20 - 5.40 M/uL    Hemoglobin 12.8 12.0 - 16.0 g/dL    Hematocrit 39.1 37.0 - 47.0 %    MCV 97.3 81.4 - 97.8 fL    MCH 31.8 27.0 - 33.0 pg    MCHC 32.7 (L) 33.6 - 35.0 g/dL    RDW 45.0 35.9 - 50.0 fL    Platelet Count 287 164 - 446 K/uL    MPV 9.9 9.0 - 12.9 fL    Neutrophils-Polys 74.40 (H) 44.00 - 72.00 %    Lymphocytes 19.70 (L) 22.00 - 41.00 %    Monocytes 4.80 0.00 - 13.40 %    Eosinophils 0.20 0.00 - 6.90 %    Basophils 0.40 0.00 - 1.80 %    Immature Granulocytes 0.50 0.00 - 0.90 %    Nucleated RBC 0.00 /100 WBC    Neutrophils (Absolute) 10.42 (H) 2.00 - 7.15 K/uL    Lymphs (Absolute) 2.76 1.00 - 4.80 K/uL    Monos (Absolute) 0.67 0.00 - 0.85 K/uL    Eos (Absolute) 0.03 0.00 - 0.51 K/uL    Baso (Absolute) 0.05 0.00 - 0.12 K/uL    Immature Granulocytes (abs) 0.07 0.00 - 0.11 K/uL    NRBC (Absolute) 0.00 K/uL   ABO Rh Confirm    Collection Time: 10/07/21  8:48 PM   Result Value Ref Range    ABO Rh Confirm O POS    PT/INR    Collection Time: 10/07/21 11:36 PM   Result Value Ref Range    PT 15.6 (H) 12.0 - 14.6 sec    INR 1.27 (H) 0.87 - 1.13   PTT    Collection Time: 10/07/21 11:36 PM   Result Value Ref Range    APTT 31.6 24.7 - 36.0 sec   CBC WITH DIFFERENTIAL    Collection Time: 10/07/21 11:36 PM   Result Value Ref Range    WBC 19.3 (H) 4.8 - 10.8 K/uL    RBC 3.45 (L) 4.20 - 5.40 M/uL    Hemoglobin 11.0 (L) 12.0 - 16.0 g/dL    Hematocrit 34.0 (L) 37.0 - 47.0 %    MCV 98.6 (H) 81.4 - 97.8 fL    MCH 31.9 27.0 - 33.0 pg    MCHC 32.4 (L) 33.6 - 35.0 g/dL    RDW 45.4 35.9 - 50.0 fL    Platelet Count 272 164 - 446 K/uL    MPV 10.2 9.0 - 12.9 fL    Neutrophils-Polys 89.10 (H) 44.00 - 72.00 %     Lymphocytes 7.00 (L) 22.00 - 41.00 %    Monocytes 2.80 0.00 - 13.40 %    Eosinophils 0.00 0.00 - 6.90 %    Basophils 0.40 0.00 - 1.80 %    Immature Granulocytes 0.70 0.00 - 0.90 %    Nucleated RBC 0.00 /100 WBC    Neutrophils (Absolute) 17.18 (H) 2.00 - 7.15 K/uL    Lymphs (Absolute) 1.35 1.00 - 4.80 K/uL    Monos (Absolute) 0.55 0.00 - 0.85 K/uL    Eos (Absolute) 0.00 0.00 - 0.51 K/uL    Baso (Absolute) 0.08 0.00 - 0.12 K/uL    Immature Granulocytes (abs) 0.14 (H) 0.00 - 0.11 K/uL    NRBC (Absolute) 0.00 K/uL   PLATELET MAPPING WITH BASIC TEG    Collection Time: 10/07/21 11:36 PM   Result Value Ref Range    Reaction Time Initial-R 3.7 (L) 4.6 - 9.1 min    React Time Initial Hep 4.7 4.3 - 8.3 min    Clot Kinetics-K 0.8 0.8 - 2.1 min    Clot Angle-Angle 77.0 63.0 - 78.0 degrees    Maximum Clot Strength-MA 65.2 52.0 - 69.0 mm    TEG Functional Fibrinogen(MA) 25.8 15.0 - 32.0 mm    Lysis 30 minutes-LY30 0.0 0.0 - 2.6 %    % Inhibition ADP 12.5 0.0 - 17.0 %    % Inhibition AA 3.7 0.0 - 11.0 %    TEG Algorithm Link Algorithm    COD - Adult (Type and Screen)    Collection Time: 10/07/21 11:41 PM   Result Value Ref Range    ABO Grouping Only O     Rh Grouping Only POS     Antibody Screen-Cod NEG    SARS-COV Antigen KLAUS: Collect dry nasal swab    Collection Time: 10/08/21 12:14 AM   Result Value Ref Range    SARS-CoV-2 Source Nasal Swab     SARS-COV ANTIGEN KLAUS NotDetected Not-Detected   POCT glucose device results    Collection Time: 10/08/21  3:10 AM   Result Value Ref Range    Glucose - Accu-Ck 120 (H) 65 - 99 mg/dL   Comp Metabolic Panel    Collection Time: 10/08/21  4:18 AM   Result Value Ref Range    Sodium 139 135 - 145 mmol/L    Potassium 4.7 3.6 - 5.5 mmol/L    Chloride 105 96 - 112 mmol/L    Co2 22 20 - 33 mmol/L    Anion Gap 12.0 7.0 - 16.0    Glucose 144 (H) 65 - 99 mg/dL    Bun 22 8 - 22 mg/dL    Creatinine 1.01 0.50 - 1.40 mg/dL    Calcium 8.3 (L) 8.5 - 10.5 mg/dL    AST(SGOT) 22 12 - 45 U/L    ALT(SGPT) 11 2  - 50 U/L    Alkaline Phosphatase 75 30 - 99 U/L    Total Bilirubin 0.5 0.1 - 1.5 mg/dL    Albumin 3.9 3.2 - 4.9 g/dL    Total Protein 6.1 6.0 - 8.2 g/dL    Globulin 2.2 1.9 - 3.5 g/dL    A-G Ratio 1.8 g/dL   CBC with Differential: Tomorrow AM    Collection Time: 10/08/21  4:18 AM   Result Value Ref Range    WBC 19.5 (H) 4.8 - 10.8 K/uL    RBC 3.33 (L) 4.20 - 5.40 M/uL    Hemoglobin 10.8 (L) 12.0 - 16.0 g/dL    Hematocrit 33.3 (L) 37.0 - 47.0 %    .0 (H) 81.4 - 97.8 fL    MCH 32.4 27.0 - 33.0 pg    MCHC 32.4 (L) 33.6 - 35.0 g/dL    RDW 45.6 35.9 - 50.0 fL    Platelet Count 258 164 - 446 K/uL    MPV 10.1 9.0 - 12.9 fL    Neutrophils-Polys 91.00 (H) 44.00 - 72.00 %    Lymphocytes 5.40 (L) 22.00 - 41.00 %    Monocytes 2.70 0.00 - 13.40 %    Eosinophils 0.00 0.00 - 6.90 %    Basophils 0.20 0.00 - 1.80 %    Immature Granulocytes 0.70 0.00 - 0.90 %    Nucleated RBC 0.00 /100 WBC    Neutrophils (Absolute) 17.77 (H) 2.00 - 7.15 K/uL    Lymphs (Absolute) 1.06 1.00 - 4.80 K/uL    Monos (Absolute) 0.52 0.00 - 0.85 K/uL    Eos (Absolute) 0.00 0.00 - 0.51 K/uL    Baso (Absolute) 0.03 0.00 - 0.12 K/uL    Immature Granulocytes (abs) 0.14 (H) 0.00 - 0.11 K/uL    NRBC (Absolute) 0.00 K/uL   ESTIMATED GFR    Collection Time: 10/08/21  4:18 AM   Result Value Ref Range    GFR If African American >60 >60 mL/min/1.73 m 2    GFR If Non  51 (A) >60 mL/min/1.73 m 2       Fluids    Intake/Output Summary (Last 24 hours) at 10/8/2021 0847  Last data filed at 10/8/2021 0833  Gross per 24 hour   Intake 240 ml   Output 0 ml   Net 240 ml       Core Measures & Quality Metrics    Cornejo catheter: No Cornejo      DVT Prophylaxis: Contraindicated - High bleeding risk  DVT prophylaxis - mechanical: Not indicated at this time, ambulatory      Assessed for rehab: Patient returned to prior level of function, rehabilitation not indicated at this time    RAP Score Total: 4    Assesment ETOH:  Negative.        Assessment/Plan  Contraindication to deep vein thrombosis (DVT) prophylaxis- (present on admission)  Assessment & Plan  Prophylactic anticoagulation for thrombotic prevention initially contraindicated secondary to elevated bleeding risk.  10/8 Trauma surveillance venous duplex scanning ordered.   Ambulatory.      Scalp laceration, initial encounter- (present on admission)  Assessment & Plan  Large left occipital scalp hematoma with probable associated laceration.  Repaired in ED.      Acute blood loss anemia- (present on admission)  Assessment & Plan  Significant blood loss noted at the scene as well as prior to control bleeding here  Hypotension in the emergency department -responded to fluids  Trend Hb  Transfuse 1 unit PRBC if Hb < 7.0  Check iron studies and replace as indicated.      Trauma- (present on admission)  Assessment & Plan  Struck he head on freezer door.  Pt did not fall to the ground after  T-5000 Activation.  Marlon Santo MD. Trauma Surgery.          Discussed patient condition with Patient and trauma surgery, Dr. Marlon Santo.

## 2021-10-08 NOTE — DISCHARGE SUMMARY
Trauma Discharge Summary    DATE OF ADMISSION: 10/7/2021    DATE OF DISCHARGE: 10/10/2021    LENGTH OF STAY: 3 days    ATTENDING PHYSICIAN: Marlon Santo M.D.    CONSULTING PHYSICIAN:   1.  None    DISCHARGE DIAGNOSIS:  Active Problems:    1. Acute blood loss anemia POA: Yes    2. Scalp laceration, initial encounter POA: Yes    3. Contraindication to deep vein thrombosis (DVT) prophylaxis POA: Yes    4. Trauma POA: Yes  Resolved Problems:    1. Encounter for screening for COVID-19 POA: Yes    PROCEDURES:  1.  Laceration repair in the emergency department.    HISTORY OF PRESENT ILLNESS: The patient is a 89 y.o. female, on Plavix and aspirin, who reportedly sustained a scalp laceration.  Review of the records indicate the patient was getting ice cream out of the freezer and left the freezer door open.  She then bent down and when she stood up she hit her back of her head on the freezer door.  There was profound bleeding and emergency medical services was called.  There was no loss of consciousness.  She was transferred to [facility] in [facility].    HOSPITAL COURSE: The patient was triaged as a trauma activation. Following resuscitation and laceration repair the patient was transported to General Surgical Unit.  CT imaging of the head demonstrated no acute intracranial hemorrhage or tentorial infarct.  Laboratory studies were trended.    Past medical history was significant for hypertension with outpatient use of Cozaar.  Patient is normotensive at this time.  It is recommended that she does not take Cozaar at this time.  Follow-up with her primary care provider.    On the day of discharge patient was a Kenyatta Coma Score 15 with no focal neurological deficits.  Was no active bleeding noted from her scalp laceration.  Subsequently ready to be discharged home in good condition on......    HOSPITAL PROBLEM LIST:  Contraindication to deep vein thrombosis (DVT) prophylaxis- (present on  admission)  Assessment & Plan  Prophylactic anticoagulation for thrombotic prevention initially contraindicated secondary to elevated bleeding risk.  10/8 Trauma surveillance venous duplex scanning ordered.   Ambulatory.      Scalp laceration, initial encounter- (present on admission)  Assessment & Plan  Large left occipital scalp hematoma with probable associated laceration.  Repaired in ED.      Acute blood loss anemia- (present on admission)  Assessment & Plan  Significant blood loss noted at the scene as well as prior to control bleeding here  Hypotension in the emergency department -responded to fluids  Trend Hb  Transfuse 1 unit PRBC if Hb < 7.0  Check iron studies and replace as indicated.      Trauma- (present on admission)  Assessment & Plan  Struck he head on freezer door.  Pt did not fall to the ground after  T-5000 Activation.  Marlon Santo MD. Trauma Surgery.        DISCHARGE PHYSICAL EXAM: See epic physical exam dated 10/10/21    DISPOSITION: Discharged  on 10/10/2021. The patient was and her family were counseled and questions were answered. Specifically, signs and symptoms of infection, and bleeding discussed and the patient agrees to seek medical attention if any of these develop.    DISCHARGE MEDICATIONS:  I reviewed the patients controlled substance history and obtained a controlled substance use informed consent (if applicable) provided by [facility] and the patient has been prescribed.     Medication List      START taking these medications      Instructions   cephALEXin 500 MG Caps  Commonly known as: KEFLEX   Take 1 Capsule by mouth 4 times a day for 5 days.  Dose: 500 mg        ASK your doctor about these medications      Instructions   losartan 25 MG Tabs  Commonly known as: COZAAR   Take 1 Tablet by mouth every day.  Dose: 25 mg            ACTIVITY:  As tolerated    WOUND CARE:  Wound may be open to air.  No swimming or hot tubs baths or wound submersion.  May  shower.  Follow-up with primary care provider or Dr. Marlon Santo within 1 week's time for suture/staple removal and wound check.    DIET:  Orders Placed This Encounter   Procedures   • Diet Order Diet: Clear Liquid     Standing Status:   Standing     Number of Occurrences:   1     Order Specific Question:   Diet:     Answer:   Clear Liquid [10]       FOLLOW UP:  [facility], Emergency Dept  [address] [id number] [phone]    If symptoms worsen    Corina Madison P.A.-C.  [address]  [address]  [facility] [id number] [phone]    Schedule an appointment as soon as possible for a visit  As needed    Marlon Santo M.D.  [address]  [address]  [facility] [address] [phone]      For wound re-check, As needed, If symptoms worsen, suture/staple removal      TIME SPENT ON DISCHARGE: 35 minutes      ____________________________________________  TIMI Andino

## 2021-10-08 NOTE — ASSESSMENT & PLAN NOTE
Prophylactic anticoagulation for thrombotic prevention initially contraindicated secondary to elevated bleeding risk.  10/8 Trauma screening bilateral lower extremity venous duplex negative for above knee DVT.   Ambulatory.

## 2021-10-08 NOTE — ED NOTES
Pt transported to the floor via gurney by  Tech. Pt alert and oriented at this time. Pt belongings and paper work sent with patient.

## 2021-10-08 NOTE — ED NOTES
Report from Mandi VIVAS. Pt a&ox4, bleeding to back of head. Pt medicated per MAR. Family at bedside.

## 2021-10-08 NOTE — ED TRIAGE NOTES
Chief Complaint   Patient presents with   • T-5000 Head Injury     pt bent down to get when she stood up hit the back of her head on the freezer door   • T-5000 GLF     pt fell forward after hitting her head     Pt BIB EMS for above complaint. Pt was getting ice cream from her freezer was bent down and when she stood up she hit her head on the freezer door. Pt has large hematoma to the Lt occipital region. Pt denies LOC and is not taking any blood thinners. Pt is alert and oriented GCS 15. Pt is unsure when her last tetanus shot was.    BP (!) 189/87   Pulse 92   Temp 36 °C (96.8 °F) (Temporal)   Resp 18   Ht 1.524 m (5')   Wt 58.1 kg (128 lb)   SpO2 93%   BMI 25.00 kg/m²

## 2021-10-08 NOTE — ED NOTES
Med Rec completed per patient's daughter at bedside  Allergies reviewed  No ORAL antibiotics in last 30 days

## 2021-10-08 NOTE — THERAPY
"Physical Therapy   Initial Evaluation     Patient Name: Chrissie Dueñas  Age:  89 y.o., Sex:  female  Medical Record #: [id number]  Today's Date: 10/8/2021     Precautions  Precautions: Fall Risk    Assessment  Patient is 89 y.o. female admitted after hitting head on freezer door, now with large scalp hematoma. Pt I with all bed mobility, however, limited by vitals. Pt became tachy into 120's sitting EOB with drop in BP (see below) with nausea and dizziness. Would benefit from a cardiology consult given unstable vitals with EOB activity. Pt participates in exercise classes 4x/week and anticipate will be able to ambulate community distances once BP controlled. Recommend home with no needs once vitals well managed. Will follow for PT.     Plan    Recommend Physical Therapy 2 times per week until therapy goals are met for the following treatments:  Gait Training, Stair Training, Therapeutic Activities and Therapeutic Exercises    DC Equipment Recommendations: None  Discharge Recommendations: Anticipate that the patient will have no further physical therapy needs after discharge from the hospital (once BP/HR controlled)       Subjective    \"My blood pressure is usually high\"     Objective     10/08/21 1209   Total Time Spent   Total Time Spent (Mins) 30   Charge Group   PT Evaluation PT Evaluation Mod   PT Self Care / Home Evaluation  1   Initial Contact Note    Initial Contact Note Order Received and Verified, Physical Therapy Evaluation in Progress with Full Report to Follow.   Precautions   Precautions Fall Risk   Vitals   O2 Delivery Device None - Room Air   Vitals Comments Pt received in Semi-fowlers with . Supine /57 Hr 112; Seated BP 98/53  w/ dizziness and nausea; Returned to supine BP 99/54 HR 99 with symptoms resolved.   Pain 0 - 10 Group   Therapist Pain Assessment Post Activity Pain Same as Prior to Activity;Nurse Notified;0   Prior Living Situation   Prior Services Home-Independent   Housing " / Facility Independent Living Facility   Steps Into Home 0   Steps In Home 0   Bathroom Set up Walk In Shower;Grab Bars;Shower Chair   Equipment Owned None   Lives with - Patient's Self Care Capacity Alone and Able to Care For Self   Comments Aide assists with shopping and appointments on wednesdays. Daughter visits on saturdays. Pt fully independent, attends aerobics and yoga classes 4x/week. No longer drives   Prior Level of Functional Mobility   Bed Mobility Independent   Transfer Status Independent   Ambulation Independent   Distance Ambulation (Feet)   (Community)   Assistive Devices Used None   Stairs Independent   Cognition    Cognition / Consciousness WDL   Level of Consciousness Alert   Comments A&Ox4, pleasant and cooperative   Passive ROM Lower Body   Passive ROM Lower Body WDL   Active ROM Lower Body    Active ROM Lower Body  WDL   Strength Lower Body   Lower Body Strength  WDL   Sensation Lower Body   Lower Extremity Sensation   WDL   Strength Upper Body   Upper Body Strength  WDL   Upper Body Muscle Tone   Upper Body Muscle Tone  WDL   Neurological Concerns   Neurological Concerns No   Coordination Upper Body   Coordination Not Tested   Coordination Lower Body    Coordination Lower Body  WDL   Vision   Vision Comments Pt wears glasses at baseline   Balance Assessment   Sitting Balance (Static) Good   Sitting Balance (Dynamic) Good   Standing Balance (Static) Good   Standing Balance (Dynamic) Good   Weight Shift Sitting Good   Weight Shift Standing   (NT)   Comments Standing not truly tested 2/2 vitals not stable   Gait Analysis   Gait Level Of Assist Unable to Participate  (2/2 vitals)   Bed Mobility    Supine to Sit Independent   Sit to Supine Independent   Scooting Independent   Functional Mobility   Sit to Stand Minimal Assist  (took few side steps toward HOB)   How much difficulty does the patient currently have...   Turning over in bed (including adjusting bedclothes, sheets and blankets)? 4    Sitting down on and standing up from a chair with arms (e.g., wheelchair, bedside commode, etc.) 4   Moving from lying on back to sitting on the side of the bed? 4   How much help from another person does the patient currently need...   Moving to and from a bed to a chair (including a wheelchair)? 4   Need to walk in a hospital room? 3   Climbing 3-5 steps with a railing? 3   6 clicks Mobility Score 22   Activity Tolerance   Sitting in Chair NT 2/2 vitals   Sitting Edge of Bed 15 min   Standing 1 min   Edema / Skin Assessment   Edema / Skin  WDL   Patient / Family Goals    Patient / Family Goal #1 To go home   Short Term Goals    Short Term Goal # 1 Pt will be able to transfer to chair with SPV in 6 visits in order to improved functional mobility   Short Term Goal # 2 Pt will perform STS with SPV in 6 visits in order to improve functional mobility   Short Term Goal # 3 Pt will ambulate 150ft with SPV in 6 visits in order to ambulate community distances.   Education Group   Education Provided Role of Physical Therapist   Role of Physical Therapist Patient Response Patient;Acceptance;Explanation;Verbal Demonstration   Additional Comments Pt receptive to edu about BP and HR and orthostatic hypotension, management on s/s of orthostatic hypotension, and PT POC.   Problem List    Problems Other (Comments)  (mainly limited by HR/BP not stable with OOB mobility)   Anticipated Discharge Equipment and Recommendations   DC Equipment Recommendations None   Discharge Recommendations Anticipate that the patient will have no further physical therapy needs after discharge from the hospital  (once BP/HR controlled)   Interdisciplinary Plan of Care Collaboration   IDT Collaboration with  Nursing;Occupational Therapist   Patient Position at End of Therapy In Bed;Call Light within Reach;Tray Table within Reach;Phone within Reach;Family / Friend in Room   Session Information   Date / Session Number  10/8 1 (1/2, 10/14)          264.9

## 2021-10-08 NOTE — PROGRESS NOTES
4 Eyes Skin Assessment Completed by SANGEETHA Cano and SANGEETHA Booker.    Head Incision to dry gauze/ace wrap with drainage  Ears Redness, abrasion to right ear.  Nose WDL  Mouth WDL  Neck WDL  Breast/Chest WDL  Shoulder Blades WDL  Spine WDL, bump to mid back.  (R) Arm/Elbow/Hand WDL  (L) Arm/Elbow/Hand Redness and Blanching  Abdomen WDL  Groin WDL  Scrotum/Coccyx/Buttocks WDL  (R) Leg WDL  (L) Leg WDL  (R) Heel/Foot/Toe WDL  (L) Heel/Foot/Toe WDL          Devices In Places Pulse Ox, SCD's and Nasal Cannula      Interventions In Place Gray Ear Foams, Pillows and Pressure Redistribution Mattress    Possible Skin Injury No    Pictures Uploaded Into Epic N/A  Wound Consult Placed N/A  RN Wound Prevention Protocol Ordered No

## 2021-10-08 NOTE — THERAPY
"Occupational Therapy   Initial Evaluation     Patient Name: Chrissie Dueñas  Age:  89 y.o., Sex:  female  Medical Record #: [id number]  Today's Date: 10/8/2021       Precautions: Fall Risk  Comments: due to dizziness with OOB     Assessment    Patient is 89 y.o. female who stood from flexed position and struck head on open freezer door resulting in scalp lac with significant blood loss. Pt dx with anemia. Seen now for OT eval. Pt very pleasant, cooperative, oriented to all but exact day of month (was within 1 day). Pt able to mobilize to/from EOB, stand and side step with no more than supv, however OOB activity limited by unstable VS (see below for details). Pt is likely close to baseline function but unable to tolerate OOB activity due to hypotension and tachycardia. Anticipate minimal skilled need, but OT will follow at lower frequency to ensure safe ADL and transfers prior to DC.     Plan    Recommend Occupational Therapy 2 times per week until therapy goals are met for the following treatments:  Neuro Re-Education / Balance, Self Care/Activities of Daily Living and Therapeutic Activities.    DC Equipment Recommendations: Unable to determine at this time (Anticipate none)  Discharge Recommendations: Anticipate that the patient will have no further occupational therapy needs after discharge from the hospital     Subjective    \"I only take one medication.\"     Objective       10/08/21 1202   Prior Living Situation   Housing / Facility Independent Living Facility   Steps Into Home 0   Steps In Home 0   Bathroom Set up Walk In Shower;Grab Bars;Shower Chair   Equipment Owned Grab Bar(s) In Tub / Shower;Tub / Shower Seat  (built-in seat)   Lives with - Patient's Self Care Capacity Alone and Able to Care For Self  (ILF)   Comments Pt lives in ILF. Report she has meal and housekeeping services. Reports hired aide comes 1x/week to take her shopping and to some appointments. Daughter also assists with transportation and " checks on pt regularly.    Prior Level of ADL Function   Self Feeding Independent   Grooming / Hygiene Independent   Bathing Independent  (standing)   Dressing Independent   Toileting Independent   Prior Level of IADL Function   Medication Management Independent   Laundry Independent   Kitchen Mobility Independent   Finances Independent   Home Management Requires Assist   Shopping Independent   Prior Level Of Mobility Independent Without Device in Community;Independent Without Device in Home   Driving / Transportation Relatives / Others Provide Transportation   Leisure Interests Exercise   Vitals   Pulse (!) 106   Patient BP Position Supine   Blood Pressure  120/57   Vitals Comments BP 98/53 with HR of 126 sitting EOB with report of mild dizziness & nausea    Active ROM Upper Body   Dominant Hand Right   Comments 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   Strength Upper Body   Comments WNL BUE except decreased  strength due to RA   Coordination Upper Body   Fine Motor Coordination Impaired dexterity B hands due to RA   Balance Assessment   Sitting Balance (Static) Good   Sitting Balance (Dynamic) Good   Standing Balance (Static) Fair   Standing Balance (Dynamic) Fair -   Weight Shift Sitting Good   Weight Shift Standing Fair   Comments brief standing due to unstable VS   Bed Mobility    Supine to Sit Modified Independent   Sit to Supine Modified Independent   Scooting Modified Independent  (seated)   ADL Assessment   Grooming   (NT, already completed )   Lower Body Dressing   (NT due to VS unstable )   Toileting   (NT due to VS unstable )   Functional Mobility   Sit to Stand Minimal Assist  (CGA)   Bed, Chair, Wheelchair Transfer   (deferred due to VS unstable )   Short Term Goals   Short Term Goal # 1 Pt will complete ADL transfers with supv   Short Term Goal # 2 Pt will complete standing grooming with supv    Short Term Goal # 3 Pt will complete FB dressing with supv    Short Term Goal # 4 Pt  will tolerate >20 min OOB activity with VSS and no c/o dizziness

## 2021-10-09 LAB
ANION GAP SERPL CALC-SCNC: 8 MMOL/L (ref 7–16)
BUN SERPL-MCNC: 22 MG/DL (ref 8–22)
CALCIUM SERPL-MCNC: 8.1 MG/DL (ref 8.5–10.5)
CHLORIDE SERPL-SCNC: 102 MMOL/L (ref 96–112)
CO2 SERPL-SCNC: 23 MMOL/L (ref 20–33)
CREAT SERPL-MCNC: 0.93 MG/DL (ref 0.5–1.4)
ERYTHROCYTE [DISTWIDTH] IN BLOOD BY AUTOMATED COUNT: 45.8 FL (ref 35.9–50)
GLUCOSE SERPL-MCNC: 106 MG/DL (ref 65–99)
HCT VFR BLD AUTO: 26.4 % (ref 37–47)
HGB BLD-MCNC: 8.4 G/DL (ref 12–16)
MCH RBC QN AUTO: 31.9 PG (ref 27–33)
MCHC RBC AUTO-ENTMCNC: 31.8 G/DL (ref 33.6–35)
MCV RBC AUTO: 100.4 FL (ref 81.4–97.8)
PLATELET # BLD AUTO: 189 K/UL (ref 164–446)
PMV BLD AUTO: 10.3 FL (ref 9–12.9)
POTASSIUM SERPL-SCNC: 4.3 MMOL/L (ref 3.6–5.5)
RBC # BLD AUTO: 2.63 M/UL (ref 4.2–5.4)
SODIUM SERPL-SCNC: 133 MMOL/L (ref 135–145)
WBC # BLD AUTO: 11.1 K/UL (ref 4.8–10.8)

## 2021-10-09 PROCEDURE — 700102 HCHG RX REV CODE 250 W/ 637 OVERRIDE(OP): Performed by: SURGERY

## 2021-10-09 PROCEDURE — 99231 SBSQ HOSP IP/OBS SF/LOW 25: CPT | Performed by: SURGERY

## 2021-10-09 PROCEDURE — 770006 HCHG ROOM/CARE - MED/SURG/GYN SEMI*

## 2021-10-09 PROCEDURE — 80048 BASIC METABOLIC PNL TOTAL CA: CPT

## 2021-10-09 PROCEDURE — 36415 COLL VENOUS BLD VENIPUNCTURE: CPT

## 2021-10-09 PROCEDURE — 85027 COMPLETE CBC AUTOMATED: CPT

## 2021-10-09 PROCEDURE — A9270 NON-COVERED ITEM OR SERVICE: HCPCS | Performed by: SURGERY

## 2021-10-09 RX ADMIN — ACETAMINOPHEN 650 MG: 325 TABLET, FILM COATED ORAL at 19:40

## 2021-10-09 RX ADMIN — OXYCODONE 2.5 MG: 5 TABLET ORAL at 12:37

## 2021-10-09 RX ADMIN — OXYCODONE 2.5 MG: 5 TABLET ORAL at 03:23

## 2021-10-09 RX ADMIN — POLYETHYLENE GLYCOL 3350 1 PACKET: 17 POWDER, FOR SOLUTION ORAL at 05:39

## 2021-10-09 RX ADMIN — OXYCODONE 2.5 MG: 5 TABLET ORAL at 08:32

## 2021-10-09 RX ADMIN — ACETAMINOPHEN 650 MG: 325 TABLET, FILM COATED ORAL at 01:57

## 2021-10-09 RX ADMIN — OXYCODONE 5 MG: 5 TABLET ORAL at 19:40

## 2021-10-09 ASSESSMENT — PAIN DESCRIPTION - PAIN TYPE
TYPE: ACUTE PAIN

## 2021-10-09 ASSESSMENT — ENCOUNTER SYMPTOMS
CONSTITUTIONAL NEGATIVE: 1
EYES NEGATIVE: 1
PSYCHIATRIC NEGATIVE: 1
RESPIRATORY NEGATIVE: 1
GASTROINTESTINAL NEGATIVE: 1
MUSCULOSKELETAL NEGATIVE: 1
HEADACHES: 1
CARDIOVASCULAR NEGATIVE: 1

## 2021-10-09 NOTE — CARE PLAN
The patient is Stable - Low risk of patient condition declining or worsening    Shift Goals  Clinical Goals: reduction in dizziness, pain control  Patient Goals: pain control  Family Goals: safety    Progress made toward(s) clinical / shift goals: Pain assessments in use, medicated for pain as needed per MAR. Patient encouraged to call for assistance.     Problem: Pain - Standard  Goal: Alleviation of pain or a reduction in pain to the patient’s comfort goal  Outcome: Progressing     Problem: Knowledge Deficit - Standard  Goal: Patient and family/care givers will demonstrate understanding of plan of care, disease process/condition, diagnostic tests and medications  Outcome: Progressing     Patient is not progressing towards the following goals:

## 2021-10-09 NOTE — PROGRESS NOTES
This RN attempted to get PT to bathroom with walker. Patient began experiencing dizziness within a minute of being OOB. Trauma notified. Not discharging today.

## 2021-10-09 NOTE — PROGRESS NOTES
Bedside report received.  Assessment complete.  A&O x 4. Patient calls appropriately.  Patient x1 assist transfer to commode. Dizziness reported upon ambulating.   Patient has 5/10 pain. Pain managed with prescribed medications.  Denies N&V. Tolerating regular diet.  Laceration to back of head to sutures, dressing CDI.  Last BM PTA.  Patient denies SOB.  SCD's on.  Review plan with of care with patient. Call light and personal belongings within reach. Hourly rounding in place. All needs met at this time.

## 2021-10-09 NOTE — CARE PLAN
Problem: Pain - Standard  Problem: Knowledge Deficit - Standard  Goal: Patient and family/care givers will demonstrate understanding of plan of care, disease process/condition, diagnostic tests and medications  Outcome: Progressing  Note: Explained need for further hospital treatment and how D/C with dizziness and fatigue is dangerous. Patient verbalizes understanding     Goal: Alleviation of pain or a reduction in pain to the patient’s comfort goal  Outcome: Progressing  Note: Level of pain is decreasing. Pain that does occur is maintained with PRN medications   The patient is Stable - Low risk of patient condition declining or worsening    Shift Goals  Clinical Goals: safety, hemodynamic stability  Patient Goals: discharge  Family Goals: safety    Progress made toward(s) clinical / shift goals:  Monitoring patient's hgb level. Provider updated on labs. Fall risk precautions in place.     Patient is not progressing towards the following goals:

## 2021-10-09 NOTE — PROGRESS NOTES
Trauma / Surgical Daily Progress Note    Date of Service  10/9/2021    Chief Complaint  89 y.o. female admitted 10/7/2021 with scalp laceration.    Interval Events    No critical events overnight.  No overt changes in clinical exam.  Wound approximated with no further signs and symptoms of bleeding or hematoma.  Hemoglobin drift down.  Iron studies completed IV supplementation not indicated.    -Disposition: Road test.  If patient able to ambulate and accomplish activities of daily living potential discharge.  -Counseled    Review of Systems  Review of Systems   Constitutional: Negative.    HENT: Negative.    Eyes: Negative.    Respiratory: Negative.    Cardiovascular: Negative.    Gastrointestinal: Negative.    Genitourinary: Negative.    Musculoskeletal: Negative.    Neurological: Positive for headaches.   Psychiatric/Behavioral: Negative.         Vital Signs  Temp:  [36.2 °C (97.1 °F)-36.8 °C (98.2 °F)] 36.8 °C (98.2 °F)  Pulse:  [] 73  Resp:  [14-18] 14  BP: ()/(45-61) 102/56  SpO2:  [92 %-97 %] 97 %    Physical Exam  Physical Exam  Constitutional:       General: She is not in acute distress.     Appearance: She is not ill-appearing, toxic-appearing or diaphoretic.   HENT:      Head:      Comments: Wound approximated.  No hematoma or overt signs of bleeding.     Mouth/Throat:      Mouth: Mucous membranes are moist.      Pharynx: Oropharynx is clear.   Eyes:      Conjunctiva/sclera: Conjunctivae normal.   Cardiovascular:      Rate and Rhythm: Normal rate.      Pulses: Normal pulses.   Pulmonary:      Effort: No respiratory distress.   Chest:      Chest wall: No tenderness.   Abdominal:      General: There is no distension.      Tenderness: There is no abdominal tenderness. There is no guarding or rebound.   Musculoskeletal:         General: No swelling or tenderness.      Cervical back: Neck supple. No tenderness.   Skin:     General: Skin is warm and dry.      Capillary Refill: Capillary refill takes  less than 2 seconds.   Neurological:      General: No focal deficit present.      Mental Status: She is alert and oriented to person, place, and time.   Psychiatric:         Mood and Affect: Mood normal.         Behavior: Behavior normal.         Thought Content: Thought content normal.         Judgment: Judgment normal.         Laboratory  Recent Results (from the past 24 hour(s))   IRON/TOTAL IRON BIND    Collection Time: 10/08/21 11:35 AM   Result Value Ref Range    Iron 42 40 - 170 ug/dL    Total Iron Binding 209 (L) 250 - 450 ug/dL    Unsat Iron Binding 167 110 - 370 ug/dL    % Saturation 20 15 - 55 %   CBC WITH DIFFERENTIAL    Collection Time: 10/08/21 11:35 AM   Result Value Ref Range    WBC 15.7 (H) 4.8 - 10.8 K/uL    RBC 3.05 (L) 4.20 - 5.40 M/uL    Hemoglobin 9.8 (L) 12.0 - 16.0 g/dL    Hematocrit 30.7 (L) 37.0 - 47.0 %    .7 (H) 81.4 - 97.8 fL    MCH 32.1 27.0 - 33.0 pg    MCHC 31.9 (L) 33.6 - 35.0 g/dL    RDW 46.7 35.9 - 50.0 fL    Platelet Count 249 164 - 446 K/uL    MPV 9.8 9.0 - 12.9 fL    Neutrophils-Polys 80.80 (H) 44.00 - 72.00 %    Lymphocytes 11.90 (L) 22.00 - 41.00 %    Monocytes 6.30 0.00 - 13.40 %    Eosinophils 0.00 0.00 - 6.90 %    Basophils 0.30 0.00 - 1.80 %    Immature Granulocytes 0.70 0.00 - 0.90 %    Nucleated RBC 0.00 /100 WBC    Neutrophils (Absolute) 12.67 (H) 2.00 - 7.15 K/uL    Lymphs (Absolute) 1.86 1.00 - 4.80 K/uL    Monos (Absolute) 0.98 (H) 0.00 - 0.85 K/uL    Eos (Absolute) 0.00 0.00 - 0.51 K/uL    Baso (Absolute) 0.05 0.00 - 0.12 K/uL    Immature Granulocytes (abs) 0.11 0.00 - 0.11 K/uL    NRBC (Absolute) 0.00 K/uL   Basic Metabolic Panel    Collection Time: 10/09/21  3:48 AM   Result Value Ref Range    Sodium 133 (L) 135 - 145 mmol/L    Potassium 4.3 3.6 - 5.5 mmol/L    Chloride 102 96 - 112 mmol/L    Co2 23 20 - 33 mmol/L    Glucose 106 (H) 65 - 99 mg/dL    Bun 22 8 - 22 mg/dL    Creatinine 0.93 0.50 - 1.40 mg/dL    Calcium 8.1 (L) 8.5 - 10.5 mg/dL    Anion Gap 8.0  7.0 - 16.0   CBC WITHOUT DIFFERENTIAL    Collection Time: 10/09/21  3:48 AM   Result Value Ref Range    WBC 11.1 (H) 4.8 - 10.8 K/uL    RBC 2.63 (L) 4.20 - 5.40 M/uL    Hemoglobin 8.4 (L) 12.0 - 16.0 g/dL    Hematocrit 26.4 (L) 37.0 - 47.0 %    .4 (H) 81.4 - 97.8 fL    MCH 31.9 27.0 - 33.0 pg    MCHC 31.8 (L) 33.6 - 35.0 g/dL    RDW 45.8 35.9 - 50.0 fL    Platelet Count 189 164 - 446 K/uL    MPV 10.3 9.0 - 12.9 fL   ESTIMATED GFR    Collection Time: 10/09/21  3:48 AM   Result Value Ref Range    GFR If African American >60 >60 mL/min/1.73 m 2    GFR If Non  56 (A) >60 mL/min/1.73 m 2       Fluids    Intake/Output Summary (Last 24 hours) at 10/9/2021 0724  Last data filed at 10/9/2021 0400  Gross per 24 hour   Intake 900 ml   Output 420 ml   Net 480 ml       Core Measures & Quality Metrics    Cornejo catheter: No Cornejo      DVT Prophylaxis: Contraindicated - High bleeding risk  DVT prophylaxis - mechanical: Not indicated at this time, ambulatory      Assessed for rehab: Patient returned to prior level of function, rehabilitation not indicated at this time    RAP Score Total: 4    Assesment ETOH: Negative.        Assessment/Plan  Contraindication to deep vein thrombosis (DVT) prophylaxis- (present on admission)  Assessment & Plan  Prophylactic anticoagulation for thrombotic prevention initially contraindicated secondary to elevated bleeding risk.  10/8 Trauma surveillance venous duplex scanning ordered.   Ambulatory.    Scalp laceration, initial encounter- (present on admission)  Assessment & Plan  Large left occipital scalp hematoma with probable associated laceration.  Repaired in ED.      Acute blood loss anemia- (present on admission)  Assessment & Plan  Significant blood loss noted at the scene as well as prior to control bleeding here  Hypotension in the emergency department -responded to fluids  Trend Hb  Transfuse 1 unit PRBC if Hb < 7.0  Iron studies completed.  IV iron supplementation not  indicated.    Trauma- (present on admission)  Assessment & Plan  Struck he head on freezer door.  Pt did not fall to the ground after  T-5000 Activation.  Marlon Santo MD. Trauma Surgery.        Discussed patient condition with Patient and trauma surgery, Dr. Marlon Santo.

## 2021-10-10 VITALS
OXYGEN SATURATION: 91 % | BODY MASS INDEX: 24.32 KG/M2 | RESPIRATION RATE: 16 BRPM | HEIGHT: 60 IN | SYSTOLIC BLOOD PRESSURE: 124 MMHG | TEMPERATURE: 97.2 F | DIASTOLIC BLOOD PRESSURE: 53 MMHG | WEIGHT: 123.9 LBS | HEART RATE: 71 BPM

## 2021-10-10 LAB
ANION GAP SERPL CALC-SCNC: 9 MMOL/L (ref 7–16)
BASOPHILS # BLD AUTO: 0.3 % (ref 0–1.8)
BASOPHILS # BLD: 0.03 K/UL (ref 0–0.12)
BUN SERPL-MCNC: 13 MG/DL (ref 8–22)
CALCIUM SERPL-MCNC: 8.4 MG/DL (ref 8.5–10.5)
CHLORIDE SERPL-SCNC: 105 MMOL/L (ref 96–112)
CO2 SERPL-SCNC: 26 MMOL/L (ref 20–33)
CREAT SERPL-MCNC: 0.69 MG/DL (ref 0.5–1.4)
EOSINOPHIL # BLD AUTO: 0.02 K/UL (ref 0–0.51)
EOSINOPHIL NFR BLD: 0.2 % (ref 0–6.9)
ERYTHROCYTE [DISTWIDTH] IN BLOOD BY AUTOMATED COUNT: 45.7 FL (ref 35.9–50)
GLUCOSE SERPL-MCNC: 100 MG/DL (ref 65–99)
HCT VFR BLD AUTO: 25.6 % (ref 37–47)
HGB BLD-MCNC: 8.1 G/DL (ref 12–16)
IMM GRANULOCYTES # BLD AUTO: 0.03 K/UL (ref 0–0.11)
IMM GRANULOCYTES NFR BLD AUTO: 0.3 % (ref 0–0.9)
LYMPHOCYTES # BLD AUTO: 2.32 K/UL (ref 1–4.8)
LYMPHOCYTES NFR BLD: 27 % (ref 22–41)
MCH RBC QN AUTO: 31.6 PG (ref 27–33)
MCHC RBC AUTO-ENTMCNC: 31.6 G/DL (ref 33.6–35)
MCV RBC AUTO: 100 FL (ref 81.4–97.8)
MONOCYTES # BLD AUTO: 0.62 K/UL (ref 0–0.85)
MONOCYTES NFR BLD AUTO: 7.2 % (ref 0–13.4)
NEUTROPHILS # BLD AUTO: 5.57 K/UL (ref 2–7.15)
NEUTROPHILS NFR BLD: 65 % (ref 44–72)
NRBC # BLD AUTO: 0 K/UL
NRBC BLD-RTO: 0 /100 WBC
PLATELET # BLD AUTO: 195 K/UL (ref 164–446)
PMV BLD AUTO: 10.1 FL (ref 9–12.9)
POTASSIUM SERPL-SCNC: 4.3 MMOL/L (ref 3.6–5.5)
RBC # BLD AUTO: 2.56 M/UL (ref 4.2–5.4)
SODIUM SERPL-SCNC: 140 MMOL/L (ref 135–145)
WBC # BLD AUTO: 8.6 K/UL (ref 4.8–10.8)

## 2021-10-10 PROCEDURE — 99239 HOSP IP/OBS DSCHRG MGMT >30: CPT | Performed by: NURSE PRACTITIONER

## 2021-10-10 PROCEDURE — 85025 COMPLETE CBC W/AUTO DIFF WBC: CPT

## 2021-10-10 PROCEDURE — 700102 HCHG RX REV CODE 250 W/ 637 OVERRIDE(OP): Performed by: SURGERY

## 2021-10-10 PROCEDURE — 80048 BASIC METABOLIC PNL TOTAL CA: CPT

## 2021-10-10 PROCEDURE — A9270 NON-COVERED ITEM OR SERVICE: HCPCS | Performed by: SURGERY

## 2021-10-10 PROCEDURE — 36415 COLL VENOUS BLD VENIPUNCTURE: CPT

## 2021-10-10 RX ORDER — ACETAMINOPHEN 325 MG/1
325 TABLET ORAL EVERY 4 HOURS PRN
COMMUNITY
Start: 2021-10-10 | End: 2022-08-03

## 2021-10-10 RX ADMIN — ACETAMINOPHEN 650 MG: 325 TABLET, FILM COATED ORAL at 02:13

## 2021-10-10 RX ADMIN — OXYCODONE 2.5 MG: 5 TABLET ORAL at 12:16

## 2021-10-10 RX ADMIN — OXYCODONE 2.5 MG: 5 TABLET ORAL at 02:13

## 2021-10-10 RX ADMIN — OXYCODONE 2.5 MG: 5 TABLET ORAL at 07:07

## 2021-10-10 ASSESSMENT — PAIN DESCRIPTION - PAIN TYPE
TYPE: ACUTE PAIN
TYPE: ACUTE PAIN;CHRONIC PAIN
TYPE: ACUTE PAIN

## 2021-10-10 ASSESSMENT — ENCOUNTER SYMPTOMS
PSYCHIATRIC NEGATIVE: 1
RESPIRATORY NEGATIVE: 1
GASTROINTESTINAL NEGATIVE: 1
HEADACHES: 0
CONSTITUTIONAL NEGATIVE: 1
EYES NEGATIVE: 1
CARDIOVASCULAR NEGATIVE: 1
MUSCULOSKELETAL NEGATIVE: 1
NEUROLOGICAL NEGATIVE: 1

## 2021-10-10 NOTE — CARE PLAN
The patient is Stable - Low risk of patient condition declining or worsening    Shift Goals  Clinical Goals: decreased o2 demands, pain control  Patient Goals: d/c  Family Goals: safety    Progress made toward(s) clinical / shift goals: Pain assessments in use, pain controlled with prescribed medications. Ice pack in use, patient up to bathroom. No report of dizziness     Problem: Pain - Standard  Goal: Alleviation of pain or a reduction in pain to the patient’s comfort goal  Outcome: Progressing     Problem: Knowledge Deficit - Standard  Goal: Patient and family/care givers will demonstrate understanding of plan of care, disease process/condition, diagnostic tests and medications  Outcome: Progressing     Patient is not progressing towards the following goals:

## 2021-10-10 NOTE — PROGRESS NOTES
Bedside report received.  Assessment complete.  A&O x 4. Patient calls appropriately.  Patient x1 assist transfer to commode.   Patient has 7/10 pain. Pain managed with prescribed medications.  Denies N&V. Tolerating regular diet.  Laceration to back of head to sutures, dressing CDI.  Last BM PTA.  Patient denies SOB.  SCD's on.  Review plan with of care with patient. Call light and personal belongings within reach. Hourly rounding in place. All needs met at this time

## 2021-10-10 NOTE — PROGRESS NOTES
Trauma / Surgical Daily Progress Note    Date of Service  10/10/2021    Chief Complaint  89 y.o. female admitted 10/7/2021 with scalp laceration.    Interval Events    No critical events overnight.  Overt changes in clinical exam.  Wound well approximated with no overt signs and symptoms of infection, bleeding or hematoma.  Hemoglobin 8.1.  No leukocytosis.  Ambulatory.   Patient requesting discharge with daughter.    -Clinically stable at this time.  -Disposition: Discharged under care of supervision of daughter.  -Counseled.    Review of Systems  Review of Systems   Constitutional: Negative.    HENT: Negative.    Eyes: Negative.    Respiratory: Negative.    Cardiovascular: Negative.    Gastrointestinal: Negative.    Genitourinary: Negative.    Musculoskeletal: Negative.    Neurological: Negative.  Negative for headaches.   Psychiatric/Behavioral: Negative.         Vital Signs  Temp:  [36.2 °C (97.2 °F)-37.1 °C (98.8 °F)] 36.2 °C (97.2 °F)  Pulse:  [] 71  Resp:  [14-20] 16  BP: (108-141)/(45-75) 124/53  SpO2:  [91 %-98 %] 91 %    Physical Exam  Physical Exam  Constitutional:       General: She is not in acute distress.     Appearance: She is not ill-appearing, toxic-appearing or diaphoretic.   HENT:      Head:      Comments: Wound approximated.  No hematoma or overt signs of bleeding.     Mouth/Throat:      Mouth: Mucous membranes are moist.      Pharynx: Oropharynx is clear.   Eyes:      Conjunctiva/sclera: Conjunctivae normal.   Cardiovascular:      Rate and Rhythm: Normal rate.      Pulses: Normal pulses.   Pulmonary:      Effort: No respiratory distress.   Chest:      Chest wall: No tenderness.   Abdominal:      General: There is no distension.      Tenderness: There is no abdominal tenderness. There is no guarding or rebound.   Musculoskeletal:         General: No swelling or tenderness.      Cervical back: Neck supple. No tenderness.   Skin:     General: Skin is warm and dry.      Capillary Refill:  Capillary refill takes less than 2 seconds.   Neurological:      General: No focal deficit present.      Mental Status: She is alert and oriented to person, place, and time.   Psychiatric:         Mood and Affect: Mood normal.         Behavior: Behavior normal.         Thought Content: Thought content normal.         Judgment: Judgment normal.         Laboratory  Recent Results (from the past 24 hour(s))   CBC WITH DIFFERENTIAL    Collection Time: 10/10/21  7:40 AM   Result Value Ref Range    WBC 8.6 4.8 - 10.8 K/uL    RBC 2.56 (L) 4.20 - 5.40 M/uL    Hemoglobin 8.1 (L) 12.0 - 16.0 g/dL    Hematocrit 25.6 (L) 37.0 - 47.0 %    .0 (H) 81.4 - 97.8 fL    MCH 31.6 27.0 - 33.0 pg    MCHC 31.6 (L) 33.6 - 35.0 g/dL    RDW 45.7 35.9 - 50.0 fL    Platelet Count 195 164 - 446 K/uL    MPV 10.1 9.0 - 12.9 fL    Neutrophils-Polys 65.00 44.00 - 72.00 %    Lymphocytes 27.00 22.00 - 41.00 %    Monocytes 7.20 0.00 - 13.40 %    Eosinophils 0.20 0.00 - 6.90 %    Basophils 0.30 0.00 - 1.80 %    Immature Granulocytes 0.30 0.00 - 0.90 %    Nucleated RBC 0.00 /100 WBC    Neutrophils (Absolute) 5.57 2.00 - 7.15 K/uL    Lymphs (Absolute) 2.32 1.00 - 4.80 K/uL    Monos (Absolute) 0.62 0.00 - 0.85 K/uL    Eos (Absolute) 0.02 0.00 - 0.51 K/uL    Baso (Absolute) 0.03 0.00 - 0.12 K/uL    Immature Granulocytes (abs) 0.03 0.00 - 0.11 K/uL    NRBC (Absolute) 0.00 K/uL   Basic Metabolic Panel    Collection Time: 10/10/21  7:40 AM   Result Value Ref Range    Sodium 140 135 - 145 mmol/L    Potassium 4.3 3.6 - 5.5 mmol/L    Chloride 105 96 - 112 mmol/L    Co2 26 20 - 33 mmol/L    Glucose 100 (H) 65 - 99 mg/dL    Bun 13 8 - 22 mg/dL    Creatinine 0.69 0.50 - 1.40 mg/dL    Calcium 8.4 (L) 8.5 - 10.5 mg/dL    Anion Gap 9.0 7.0 - 16.0   ESTIMATED GFR    Collection Time: 10/10/21  7:40 AM   Result Value Ref Range    GFR If African American >60 >60 mL/min/1.73 m 2    GFR If Non African American >60 >60 mL/min/1.73 m 2       Fluids    Intake/Output  Summary (Last 24 hours) at 10/10/2021 1101  Last data filed at 10/10/2021 0415  Gross per 24 hour   Intake 480 ml   Output 120 ml   Net 360 ml       Core Measures & Quality Metrics    Cornejo catheter: No Cornejo      DVT Prophylaxis: Contraindicated - High bleeding risk  DVT prophylaxis - mechanical: Not indicated at this time, ambulatory      Assessed for rehab: Patient returned to prior level of function, rehabilitation not indicated at this time    RAP Score Total: 4    Assesment ETOH: Negative.        Assessment/Plan  Contraindication to deep vein thrombosis (DVT) prophylaxis- (present on admission)  Assessment & Plan  Prophylactic anticoagulation for thrombotic prevention initially contraindicated secondary to elevated bleeding risk.  10/8 Trauma screening bilateral lower extremity venous duplex negative for above knee DVT.   Ambulatory.    Scalp laceration, initial encounter- (present on admission)  Assessment & Plan  Large left occipital scalp hematoma with probable associated laceration.  Repaired in ED.       Acute blood loss anemia- (present on admission)  Assessment & Plan  Significant blood loss noted at the scene as well as prior to control bleeding here  Hypotension in the emergency department -responded to fluids  Trend Hb  Transfuse 1 unit PRBC if Hb < 7.0  Iron studies completed.  IV iron supplementation not indicated.     Trauma- (present on admission)  Assessment & Plan  Struck he head on freezer door.  Pt did not fall to the ground after  T-5000 Activation.  Marlon Santo MD. Trauma Surgery.        Discussed patient condition with Patient and trauma surgery, Dr. Marlon Santo.

## 2021-10-10 NOTE — CARE PLAN
Problem: Pain - Standard  Goal: Alleviation of pain or a reduction in pain to the patient’s comfort goal  Outcome: Progressing  Note: Patient reports less frequent and intense level of pain to scalp lac     Problem: Fall Risk  Goal: Patient will remain free from falls  Outcome: Progressing  Note: Patient educated on low fall risk level and precautions associated. Verbalizes and demonstrates understanding   The patient is Stable - Low risk of patient condition declining or worsening    Shift Goals  Clinical Goals: Shower, ambulate, monitor dizziness  Patient Goals: d/c home  Family Goals: safety    Progress made toward(s) clinical / shift goals:  Patient up to chair for meals, performing ADLs, showered. Expected DC today    Patient is not progressing towards the following goals:

## 2021-10-10 NOTE — PROGRESS NOTES
Patient discharged to home with daughter and staff escort. IV discontinued. Prescriptions given to patient, verbalized understanding, consent signed and in chart. Discharge instructions given regarding laceration care, follow ups, and safety.  Education provided, patient asked questions and verbalized understanding. Discharge paperwork signed and in chart. Patient is tolerating diet, stable when ambulating, and pain is well controlled. All belongings collected. No further questions or concerns at this time.

## 2021-10-18 ENCOUNTER — OFFICE VISIT (OUTPATIENT)
Dept: MEDICAL GROUP | Facility: MEDICAL CENTER | Age: 86
End: 2021-10-18
Payer: MEDICARE

## 2021-10-18 VITALS
HEART RATE: 87 BPM | HEIGHT: 60 IN | DIASTOLIC BLOOD PRESSURE: 72 MMHG | WEIGHT: 125.2 LBS | OXYGEN SATURATION: 97 % | SYSTOLIC BLOOD PRESSURE: 134 MMHG | TEMPERATURE: 97.1 F | BODY MASS INDEX: 24.58 KG/M2

## 2021-10-18 DIAGNOSIS — Z09 HOSPITAL DISCHARGE FOLLOW-UP: ICD-10-CM

## 2021-10-18 DIAGNOSIS — S01.01XA SCALP LACERATION, INITIAL ENCOUNTER: ICD-10-CM

## 2021-10-18 PROCEDURE — 99213 OFFICE O/P EST LOW 20 MIN: CPT | Performed by: PHYSICIAN ASSISTANT

## 2021-10-18 ASSESSMENT — FIBROSIS 4 INDEX: FIB4 SCORE: 3.1

## 2021-10-18 NOTE — ASSESSMENT & PLAN NOTE
Incision site appears to be healing well, thick scabbing, 7 sutures out of possible 12? Per note. Patient is not having any headache, dizziness, vision change. Taking her iron as prescribed.

## 2021-10-19 NOTE — PROGRESS NOTES
Subjective     Chrissie Dueñas is a 89 y.o. female who presents with Suture / Staple Removal (From head injury)          Chief Complaint   Patient presents with   • Suture / Staple Removal     From head injury       HPIPatient presents for follow up after head injury. She accidentally hit her head on the freezer and it started bleeding profusely. Went to ER on 10/7/2021. Per notes bleeding was substantial, maybe involving an artery? Sutured but d/t blood loss she was hospitalized through the 10th. She was not on any blood thinners prior to this injury and is not on any now. She is now taking iron d/t acute anemia secondary to blood loss. She is feeling well. No dizziness, weakness, headache, nausea. Feeling well overall. Needs to have sutures out today.     ROSno fever/chills. No vision change. No weight change. No neck pain or stiffness. No chest pain, SOB or difficulty breathing. No rash.        Active Ambulatory Problems     Diagnosis Date Noted   • History of macular degeneration 06/01/2020   • Benign essential HTN 06/01/2020   • Skin lesion 07/30/2021   • Acute blood loss anemia 10/08/2021   • Trauma 10/08/2021   • Scalp laceration, initial encounter 10/08/2021   • Contraindication to deep vein thrombosis (DVT) prophylaxis 10/08/2021     Resolved Ambulatory Problems     Diagnosis Date Noted   • Encounter for screening for COVID-19 10/08/2021     Past Medical History:   Diagnosis Date   • Hypertension      Current Outpatient Medications   Medication Sig Dispense Refill   • acetaminophen (TYLENOL) 325 MG Tab        No current facility-administered medications for this visit.   nkda  Non-smoker      Objective     /72 (BP Location: Left arm, Patient Position: Sitting, BP Cuff Size: Adult)   Pulse 87   Temp 36.2 °C (97.1 °F) (Temporal)   Ht 1.524 m (5')   Wt 56.8 kg (125 lb 3.2 oz)   SpO2 97%   Breastfeeding No   BMI 24.45 kg/m²      Physical Exam  Vitals and nursing note reviewed.   Constitutional:        General: She is not in acute distress.     Appearance: Normal appearance. She is normal weight. She is not ill-appearing, toxic-appearing or diaphoretic.   Skin:     General: Skin is warm and dry.      Coloration: Skin is not pale.      Findings: No rash.             Comments: Thick scabbing, not all sutures visible, bruising   Neurological:      General: No focal deficit present.      Mental Status: She is alert and oriented to person, place, and time.   Psychiatric:         Mood and Affect: Mood normal.         Behavior: Behavior normal.         Thought Content: Thought content normal.         Judgment: Judgment normal.                             Assessment & Plan        1. Hospital discharge follow-up      2. Scalp laceration, initial encounter    Soaked scab with saline, removed 7 sutures - will f/u one week. Discussed with patient that d/t fragility of skin and h/o arterial bleed I would like to wait another week to pick at the scab and pull on more of the sutures

## 2021-10-27 ENCOUNTER — APPOINTMENT (OUTPATIENT)
Dept: RADIOLOGY | Facility: MEDICAL CENTER | Age: 86
End: 2021-10-27
Attending: EMERGENCY MEDICINE
Payer: MEDICARE

## 2021-10-27 ENCOUNTER — OFFICE VISIT (OUTPATIENT)
Dept: MEDICAL GROUP | Facility: MEDICAL CENTER | Age: 86
End: 2021-10-27
Payer: MEDICARE

## 2021-10-27 ENCOUNTER — HOSPITAL ENCOUNTER (EMERGENCY)
Facility: MEDICAL CENTER | Age: 86
End: 2021-10-27
Attending: EMERGENCY MEDICINE
Payer: MEDICARE

## 2021-10-27 VITALS
RESPIRATION RATE: 18 BRPM | HEIGHT: 60 IN | TEMPERATURE: 97.4 F | WEIGHT: 125 LBS | SYSTOLIC BLOOD PRESSURE: 162 MMHG | HEART RATE: 82 BPM | BODY MASS INDEX: 24.54 KG/M2 | DIASTOLIC BLOOD PRESSURE: 73 MMHG | OXYGEN SATURATION: 99 %

## 2021-10-27 VITALS
BODY MASS INDEX: 24.58 KG/M2 | HEIGHT: 60 IN | HEART RATE: 101 BPM | TEMPERATURE: 97 F | DIASTOLIC BLOOD PRESSURE: 74 MMHG | OXYGEN SATURATION: 98 % | WEIGHT: 125.2 LBS | SYSTOLIC BLOOD PRESSURE: 144 MMHG

## 2021-10-27 DIAGNOSIS — R94.31 ABNORMAL EKG: ICD-10-CM

## 2021-10-27 DIAGNOSIS — Z87.828 HISTORY OF HEAD INJURY: ICD-10-CM

## 2021-10-27 DIAGNOSIS — R42 DIZZY: ICD-10-CM

## 2021-10-27 DIAGNOSIS — R06.02 SOB (SHORTNESS OF BREATH): ICD-10-CM

## 2021-10-27 DIAGNOSIS — R00.0 TACHYCARDIA: ICD-10-CM

## 2021-10-27 DIAGNOSIS — R42 DIZZINESS: ICD-10-CM

## 2021-10-27 LAB
ALBUMIN SERPL BCP-MCNC: 4.9 G/DL (ref 3.2–4.9)
ALBUMIN/GLOB SERPL: 1.6 G/DL
ALP SERPL-CCNC: 101 U/L (ref 30–99)
ALT SERPL-CCNC: 18 U/L (ref 2–50)
ANION GAP SERPL CALC-SCNC: 18 MMOL/L (ref 7–16)
AST SERPL-CCNC: 27 U/L (ref 12–45)
BASOPHILS # BLD AUTO: 0.2 % (ref 0–1.8)
BASOPHILS # BLD: 0.02 K/UL (ref 0–0.12)
BILIRUB SERPL-MCNC: 0.6 MG/DL (ref 0.1–1.5)
BUN SERPL-MCNC: 11 MG/DL (ref 8–22)
CALCIUM SERPL-MCNC: 9.8 MG/DL (ref 8.5–10.5)
CHLORIDE SERPL-SCNC: 98 MMOL/L (ref 96–112)
CO2 SERPL-SCNC: 20 MMOL/L (ref 20–33)
CREAT SERPL-MCNC: 0.75 MG/DL (ref 0.5–1.4)
EKG IMPRESSION: NORMAL
EOSINOPHIL # BLD AUTO: 0 K/UL (ref 0–0.51)
EOSINOPHIL NFR BLD: 0 % (ref 0–6.9)
ERYTHROCYTE [DISTWIDTH] IN BLOOD BY AUTOMATED COUNT: 54.4 FL (ref 35.9–50)
GLOBULIN SER CALC-MCNC: 3 G/DL (ref 1.9–3.5)
GLUCOSE SERPL-MCNC: 110 MG/DL (ref 65–99)
HCT VFR BLD AUTO: 38.1 % (ref 37–47)
HGB BLD-MCNC: 12.7 G/DL (ref 12–16)
IMM GRANULOCYTES # BLD AUTO: 0.02 K/UL (ref 0–0.11)
IMM GRANULOCYTES NFR BLD AUTO: 0.2 % (ref 0–0.9)
INR PPP: 1.05 (ref 0.87–1.13)
LYMPHOCYTES # BLD AUTO: 1.59 K/UL (ref 1–4.8)
LYMPHOCYTES NFR BLD: 17.5 % (ref 22–41)
MCH RBC QN AUTO: 33.8 PG (ref 27–33)
MCHC RBC AUTO-ENTMCNC: 33.3 G/DL (ref 33.6–35)
MCV RBC AUTO: 101.3 FL (ref 81.4–97.8)
MONOCYTES # BLD AUTO: 0.4 K/UL (ref 0–0.85)
MONOCYTES NFR BLD AUTO: 4.4 % (ref 0–13.4)
NEUTROPHILS # BLD AUTO: 7.03 K/UL (ref 2–7.15)
NEUTROPHILS NFR BLD: 77.7 % (ref 44–72)
NRBC # BLD AUTO: 0 K/UL
NRBC BLD-RTO: 0 /100 WBC
PLATELET # BLD AUTO: 344 K/UL (ref 164–446)
PMV BLD AUTO: 9.4 FL (ref 9–12.9)
POTASSIUM SERPL-SCNC: 4.4 MMOL/L (ref 3.6–5.5)
PROT SERPL-MCNC: 7.9 G/DL (ref 6–8.2)
PROTHROMBIN TIME: 13.3 SEC (ref 12–14.6)
RBC # BLD AUTO: 3.76 M/UL (ref 4.2–5.4)
SODIUM SERPL-SCNC: 136 MMOL/L (ref 135–145)
TROPONIN T SERPL-MCNC: 22 NG/L (ref 6–19)
WBC # BLD AUTO: 9.1 K/UL (ref 4.8–10.8)

## 2021-10-27 PROCEDURE — 85025 COMPLETE CBC W/AUTO DIFF WBC: CPT

## 2021-10-27 PROCEDURE — 93005 ELECTROCARDIOGRAM TRACING: CPT | Performed by: EMERGENCY MEDICINE

## 2021-10-27 PROCEDURE — U0005 INFEC AGEN DETEC AMPLI PROBE: HCPCS

## 2021-10-27 PROCEDURE — 71045 X-RAY EXAM CHEST 1 VIEW: CPT

## 2021-10-27 PROCEDURE — 93005 ELECTROCARDIOGRAM TRACING: CPT

## 2021-10-27 PROCEDURE — 99284 EMERGENCY DEPT VISIT MOD MDM: CPT

## 2021-10-27 PROCEDURE — 80053 COMPREHEN METABOLIC PANEL: CPT

## 2021-10-27 PROCEDURE — 99214 OFFICE O/P EST MOD 30 MIN: CPT | Performed by: PHYSICIAN ASSISTANT

## 2021-10-27 PROCEDURE — U0003 INFECTIOUS AGENT DETECTION BY NUCLEIC ACID (DNA OR RNA); SEVERE ACUTE RESPIRATORY SYNDROME CORONAVIRUS 2 (SARS-COV-2) (CORONAVIRUS DISEASE [COVID-19]), AMPLIFIED PROBE TECHNIQUE, MAKING USE OF HIGH THROUGHPUT TECHNOLOGIES AS DESCRIBED BY CMS-2020-01-R: HCPCS

## 2021-10-27 PROCEDURE — 70450 CT HEAD/BRAIN W/O DYE: CPT | Mod: MG

## 2021-10-27 PROCEDURE — 85610 PROTHROMBIN TIME: CPT

## 2021-10-27 PROCEDURE — 84484 ASSAY OF TROPONIN QUANT: CPT

## 2021-10-27 ASSESSMENT — FIBROSIS 4 INDEX
FIB4 SCORE: 3.1
FIB4 SCORE: 3.1

## 2021-10-27 ASSESSMENT — LIFESTYLE VARIABLES: DO YOU DRINK ALCOHOL: NO

## 2021-10-27 NOTE — ED PROVIDER NOTES
ED PROVIDER NOTE    Scribed for Elana Rubin M.D. by Gisselle Bush. 10/27/2021, 4:34 PM.    This is an addendum to the note on Chrissie Dueñas. For further details and full chart entry, see the previously signed ED Provider Note written by Dr. Alfaro (ERP).      3:34 PM - I discussed the patient's case with Dr. Alfaro (ERP) who will transfer care of the patient to me at this time.        3:34 PM- The patient's continuing management included waiting for CT-results    4:53 PM - CT-head shows no acute findings. The patient with be discharged home at this time.     CT-HEAD W/O   Final Result      1.  Cerebral atrophy.      2.  White matter lucencies most consistent with small vessel ischemic change versus demyelination or gliosis.      3. Marked interval decrease in soft tissue swelling in the left parietal-occipital region since previous exam.      DX-CHEST-LIMITED (1 VIEW)    (Results Pending)     The patient will return for new or worsening symptoms and is stable at the time of discharge.    The patient is referred to a primary physician for blood pressure management, diabetic screening, and for all other preventative health concerns.    DISPOSITION:  Patient will be discharged home in stable condition.    FOLLOW UP:  [facility], Emergency Dept  [address] [address] [phone]  Go to   If symptoms worsen    Corina Madison P.A.-C.  [address]  [address]  [facility] [address] [phone]    Call in 1 day  for recheck      FINAL IMPRESSION   1. Dizzy         Gisselle AMES (Cesilia), am scribing for, and in the presence of, Elana Rubin M.D..    Electronically signed by: Gisselle Bush (Cesilia), 10/27/2021    Elana AMES M.D. personally performed the services described in this documentation, as scribed by Gisselle Bush in my presence, and it is both accurate and complete.    The note accurately reflects work and decisions made by me.   Elana Rubin M.D.  10/27/2021  5:07 PM

## 2021-10-27 NOTE — ED TRIAGE NOTES
Chief Complaint   Patient presents with   • Dizziness     pt recently here as trauma for hitting her head on the fridge and being on thinners. pt states since being d/c'd on 10/10 having intermittent dizziness   • Suture / Staple Removal     pt was at PCP to get stitches removed for her scalp, when PCP told pt to go to ER to get workup for dizziness       Pt walk in for above. Pt aox4, GCS 15. Pt denies any weakness, N/V or specific timing of the dizziness. Per daughter, there is still 5 stitches to be removed. Educated pt on triage process and to notify if there is any change.    /80   Pulse 93   Temp 36.3 °C (97.4 °F) (Temporal)   Resp 16   Ht 1.524 m (5')   Wt 56.7 kg (125 lb)   SpO2 96%   BMI 24.41 kg/m²

## 2021-10-27 NOTE — PROGRESS NOTES
Subjective     Chrissie Dueñas is a 89 y.o. female who presents with Suture / Staple Removal (top of head) and Dizziness (SOB x 1 week)          Chief Complaint   Patient presents with   • Suture / Staple Removal     top of head   • Dizziness     SOB x 1 week       HPIPatient presents for suture removal. Here with granddaughter. States she is feeling dizzy and SOB for a week. Not taking her BP medication. Denies headache or vision change. Denies chest pain. Denies cognitive change. No new fall. No focal weakness. Initial fall 10/7/21. Admitted to [facility] d/t acute blood loss anemia. Sent home after 1 night stay.    ROSno weight change. No fever/chills. No neck pain or stiffness. No chest pain. No difficulty breathing. No n/v/d/c or abdominal pain. No rash        Active Ambulatory Problems     Diagnosis Date Noted   • History of macular degeneration 06/01/2020   • Benign essential HTN 06/01/2020   • Skin lesion 07/30/2021   • Acute blood loss anemia 10/08/2021   • Trauma 10/08/2021   • Scalp laceration, initial encounter 10/08/2021   • Contraindication to deep vein thrombosis (DVT) prophylaxis 10/08/2021     Resolved Ambulatory Problems     Diagnosis Date Noted   • Encounter for screening for COVID-19 10/08/2021     Past Medical History:   Diagnosis Date   • Hypertension      Current Outpatient Medications   Medication Sig Dispense Refill   • acetaminophen (TYLENOL) 325 MG Tab        No current facility-administered medications for this visit.   nkda  Non-smoker      Objective     /74 (BP Location: Right arm, Patient Position: Sitting, BP Cuff Size: Adult)   Pulse (!) 101   Temp 36.1 °C (97 °F) (Temporal)   Ht 1.524 m (5')   Wt 56.8 kg (125 lb 3.2 oz)   SpO2 98%   Breastfeeding No   BMI 24.45 kg/m²      Physical Exam  Vitals and nursing note reviewed.   Constitutional:       General: She is not in acute distress.     Appearance: She is normal weight. She is not toxic-appearing.   HENT:      Head:       Comments: No visible erythema or swelling around sutures, no discharge, scabbing present  Eyes:      Conjunctiva/sclera: Conjunctivae normal.   Cardiovascular:      Rate and Rhythm: Regular rhythm. Tachycardia present.   Skin:     General: Skin is warm.      Coloration: Skin is pale.   Neurological:      General: No focal deficit present.      Mental Status: She is alert and oriented to person, place, and time.   Psychiatric:         Mood and Affect: Mood normal.         Behavior: Behavior normal.         Thought Content: Thought content normal.         Judgment: Judgment normal.                  EKG: abnormal result with prolonged MD interval IRBBB            Assessment & Plan        1. Dizziness    - EKG - Clinic Performed    2. SOB (shortness of breath)    - EKG - Clinic Performed    3. History of head injury      4. Abnormal EKG      5. Tachycardia    Discussed with patient and her grand daughter that I am very concerned about her new onset of SOB and dizziness in context of head injury causing acute blood loss anemia, they will go to ER for further evaluation

## 2021-10-27 NOTE — DISCHARGE INSTRUCTIONS
Please come back if you feel worse in any way    I made a appointment for cardiology consult to make sure that your heart is okay.  You should get a call phone call for them at the end of this week.

## 2021-10-27 NOTE — ED PROVIDER NOTES
ED Provider Note    CHIEF COMPLAINT  Dizziness  Concerned about her heart    HPI  Chrissie Dueñas is a 89 y.o. female who presents with a chief complaint of dizziness and concerned about her heart.    Her main issues are dizziness and shortness of breath.    Dizziness  Dizziness duration for about 2 to 3 weeks.  It is intermittent described as feeling lightheaded not the room spinning.  It is not positional.  It will last anywhere from few seconds to a few minutes.  She had episodes that occurred while she was doing yoga just sitting at the chair.  There is no associated diplopia.  No associated nausea no vomiting no weakness in the arms or legs.    Shortness of breath  Duration for 7 to 10 days.  She states that she is double vaccinated for Covid.  She not get the booster.  She denies any fever chills no sputum.  Nonproductive.  Note leg swelling.  No history of DVT no PE.    Apparently she had hit her head she has been in the hospital for significant head laceration.  No blood loss.  Patient was discharged home apparently she been seeing her doctor to get a suture removed from her head apparently had difficulty seeing her.  She states she was feeling dizzy then EKG there was abnormality on EKG and she was sent here.    EKG here shows Q waves in 2 3 and aVF.    REVIEW OF SYSTEMS  General: No fever or chills.  Eyes: No eye discharge. No eye pain.  Ear nose throat: No sore throat or  trouble swallowing.  Pulmonary: Mild shortness of breath.  Cardiovascular: No chest pain or chest pressure.  GI: No abdominal pain nausea or vomiting.  : No dysuria or hematuria  Dermatologic: No rashes. No abrasions.  Neurologic: See above.  All other systems are negative      PAST MEDICAL HISTORY  Past Medical History:   Diagnosis Date   • Hypertension        FAMILY HISTORY  Family History   Problem Relation Age of Onset   • Cancer Mother    • Stroke Father        SOCIAL HISTORY  Social History     Socioeconomic History   • Marital  status: Single     Spouse name: Not on file   • Number of children: Not on file   • Years of education: Not on file   • Highest education level: Not on file   Occupational History   • Not on file   Tobacco Use   • Smoking status: Former Smoker     Quit date: 1974     Years since quittin.4   • Smokeless tobacco: Never Used   Vaping Use   • Vaping Use: Never used   Substance and Sexual Activity   • Alcohol use: Yes     Comment: glass of wine everyday    • Drug use: Not Currently   • Sexual activity: Not on file   Other Topics Concern   • Not on file   Social History Narrative   • Not on file     Social Determinants of Health     Financial Resource Strain:    • Difficulty of Paying Living Expenses:    Food Insecurity:    • Worried About Running Out of Food in the Last Year:    • Ran Out of Food in the Last Year:    Transportation Needs:    • Lack of Transportation (Medical):    • Lack of Transportation (Non-Medical):    Physical Activity:    • Days of Exercise per Week:    • Minutes of Exercise per Session:    Stress:    • Feeling of Stress :    Social Connections:    • Frequency of Communication with Friends and Family:    • Frequency of Social Gatherings with Friends and Family:    • Attends [employer] Services:    • Active Member of Clubs or Organizations:    • Attends Club or Organization Meetings:    • Marital Status:    Intimate Partner Violence:    • Fear of Current or Ex-Partner:    • Emotionally Abused:    • Physically Abused:    • Sexually Abused:        SURGICAL HISTORY  Past Surgical History:   Procedure Laterality Date   • APPENDECTOMY     • OVARIAN CYSTECTOMY         CURRENT MEDICATIONS  Home Medications     Reviewed by Radha Nash R.N. (Registered Nurse) on 10/27/21 at 1049  Med List Status: Complete   Medication Last Dose Status   acetaminophen (TYLENOL) 325 MG Tab  Active                ALLERGIES  No Known Allergies    PHYSICAL EXAM  VITAL SIGNS: /79   Pulse 90   Temp 36.3 °C (97.4  °F) (Temporal)   Resp 16   Ht 1.524 m (5')   Wt 56.7 kg (125 lb)   SpO2 98%   BMI 24.41 kg/m²    Constitutional: Well developed, Well nourished, no acute distress,   HENT: Normocephalic, Atraumatic, Oropharynx moist, No oral exudates, Nose normal.  Cannot see tympanic membrane that she has bilateral hearing aids that are implanted  Eyes: PERRLA, EOMI, Conjunctiva normal, No discharge.   Musculoskeletal: Neck normal range of motion, No tenderness, Supple,  Cardiovascular: Regular rhythm rate of 80 no murmurs  Thorax & Lungs: Clear to auscultation bilaterally no wheezes rales or rhonchi  Abdomen: Bowel sounds normal, Soft, No tenderness, No masses, No pulsatile masses.   Skin: Warm, Dry, No erythema, No rash.   : No CVA tenderness.   Neurologic: Stroke scale is 0.  She has questionable positive Emery-Hallpike on the right side coming up from the bed with her help to tilt to the right.  Psychiatric:  Calm, not anxious        RADIOLOGY/PROCEDURES  Results for orders placed or performed during the hospital encounter of 10/27/21   CBC WITH DIFFERENTIAL   Result Value Ref Range    WBC 9.1 4.8 - 10.8 K/uL    RBC 3.76 (L) 4.20 - 5.40 M/uL    Hemoglobin 12.7 12.0 - 16.0 g/dL    Hematocrit 38.1 37.0 - 47.0 %    .3 (H) 81.4 - 97.8 fL    MCH 33.8 (H) 27.0 - 33.0 pg    MCHC 33.3 (L) 33.6 - 35.0 g/dL    RDW 54.4 (H) 35.9 - 50.0 fL    Platelet Count 344 164 - 446 K/uL    MPV 9.4 9.0 - 12.9 fL    Neutrophils-Polys 77.70 (H) 44.00 - 72.00 %    Lymphocytes 17.50 (L) 22.00 - 41.00 %    Monocytes 4.40 0.00 - 13.40 %    Eosinophils 0.00 0.00 - 6.90 %    Basophils 0.20 0.00 - 1.80 %    Immature Granulocytes 0.20 0.00 - 0.90 %    Nucleated RBC 0.00 /100 WBC    Neutrophils (Absolute) 7.03 2.00 - 7.15 K/uL    Lymphs (Absolute) 1.59 1.00 - 4.80 K/uL    Monos (Absolute) 0.40 0.00 - 0.85 K/uL    Eos (Absolute) 0.00 0.00 - 0.51 K/uL    Baso (Absolute) 0.02 0.00 - 0.12 K/uL    Immature Granulocytes (abs) 0.02 0.00 - 0.11 K/uL    NRBC  (Absolute) 0.00 K/uL   COMP METABOLIC PANEL   Result Value Ref Range    Sodium 136 135 - 145 mmol/L    Potassium 4.4 3.6 - 5.5 mmol/L    Chloride 98 96 - 112 mmol/L    Co2 20 20 - 33 mmol/L    Anion Gap 18.0 (H) 7.0 - 16.0    Glucose 110 (H) 65 - 99 mg/dL    Bun 11 8 - 22 mg/dL    Creatinine 0.75 0.50 - 1.40 mg/dL    Calcium 9.8 8.5 - 10.5 mg/dL    AST(SGOT) 27 12 - 45 U/L    ALT(SGPT) 18 2 - 50 U/L    Alkaline Phosphatase 101 (H) 30 - 99 U/L    Total Bilirubin 0.6 0.1 - 1.5 mg/dL    Albumin 4.9 3.2 - 4.9 g/dL    Total Protein 7.9 6.0 - 8.2 g/dL    Globulin 3.0 1.9 - 3.5 g/dL    A-G Ratio 1.6 g/dL   PROTHROMBIN TIME   Result Value Ref Range    PT 13.3 12.0 - 14.6 sec    INR 1.05 0.87 - 1.13   TROPONIN   Result Value Ref Range    Troponin T 22 (H) 6 - 19 ng/L   SARS-CoV-2, PCR (In-House)   Result Value Ref Range    SARS-CoV-2 Source NP Swab    ESTIMATED GFR   Result Value Ref Range    GFR If African American >60 >60 mL/min/1.73 m 2    GFR If Non African American >60 >60 mL/min/1.73 m 2   EKG (NOW)   Result Value Ref Range    Report       [facility] Emergency Dept.    Test Date:  2021-10-27  Pt Name:    MICHELLE JIANG                Department: ER  MRN:        [id number]                      Room:  Gender:     Female                       Technician: [id number]  :        [id number]                   Requested By:ER TRIAGE PROTOCOL  Order #:    [id number]                    Reading MD: Sunny PERALES MD    Measurements  Intervals                                Axis  Rate:       82                           P:          -60  TN:         236                          QRS:        -117  QRSD:       116                          T:          -24  QT:         376  QTc:        439    Interpretive Statements  Sinus rhythm.  There is slight first-degree AV block.  Slightly widened QRS  with  left axis deviation.  Q waves in 2 3 aVF.  Inverted T waves in the anterior  leads abnormal Q waves of undetermined  age.  Electronically Signed On 10- 15:35:50 PDT by Sunny PERALES MD        No orders to display     Procedure  Suture removal the patient this extensive peroxide placed over to the scalp and the scab/eschar was removed by myself.  Is able to find several dark blue sutures and remove them.  Patient had no bleeding afterwards and tolerated procedure well    COURSE & MEDICAL DECISION MAKING  Pertinent Labs & Imaging studies reviewed. (See chart for details)  Pleasant female who comes in today at 89 years old with shortness of breath for 1/2 weeks.  This point differential was pneumonia possible Covid less likely CHF or allergic reaction or bronchospasm.    X-ray was clear no signs of effusions or erythema this is a wet read from the radiologist as we are in downtime.    CT of the head is pending at this time of dictation    Blood work shows some decrease CO2 so that might be mild dehydration she received 1 L of normal saline.  And reevaluate    At this point I think dizziness may be secondary to peripheral or central as she does have some positional dizziness.  To make sure there is no bleed in her brain.  Is been duration now for quite some time a few weeks and so this may be also postconcussive at this point CT head is negative anything safely send her home I do not think is a posterior cerebellar incident at this time.  And the patient can be followed up with her PCP for further work-up.  At this point in setting of a CT scan to my friend.    In addition because of her Q waves her troponin is negative her 22 which is in his low and she had no chest pain or chest pressure and EKG is indeterminate age so therefore we will have her follow-up as an outpatient cardiology and consult was done.    FINAL IMPRESSION  1.  Suture removal  2.  Dizziness  3.  Abnormal EKG      Electronically signed by: Sunny Perales M.D., 10/27/2021 3:36 PM

## 2021-10-28 LAB
SARS-COV-2 RNA RESP QL NAA+PROBE: NOTDETECTED
SPECIMEN SOURCE: NORMAL

## 2021-12-10 ENCOUNTER — APPOINTMENT (OUTPATIENT)
Dept: RADIOLOGY | Facility: MEDICAL CENTER | Age: 86
End: 2021-12-10
Attending: EMERGENCY MEDICINE
Payer: MEDICARE

## 2021-12-10 ENCOUNTER — HOSPITAL ENCOUNTER (EMERGENCY)
Facility: MEDICAL CENTER | Age: 86
End: 2021-12-10
Attending: EMERGENCY MEDICINE
Payer: MEDICARE

## 2021-12-10 VITALS
OXYGEN SATURATION: 96 % | RESPIRATION RATE: 20 BRPM | TEMPERATURE: 97.4 F | HEART RATE: 80 BPM | HEIGHT: 60 IN | BODY MASS INDEX: 24.35 KG/M2 | SYSTOLIC BLOOD PRESSURE: 149 MMHG | WEIGHT: 124 LBS | DIASTOLIC BLOOD PRESSURE: 79 MMHG

## 2021-12-10 DIAGNOSIS — R19.7 DIARRHEA, UNSPECIFIED TYPE: ICD-10-CM

## 2021-12-10 DIAGNOSIS — R11.10 INTRACTABLE VOMITING, PRESENCE OF NAUSEA NOT SPECIFIED, UNSPECIFIED VOMITING TYPE: ICD-10-CM

## 2021-12-10 DIAGNOSIS — R89.9 ABNORMAL LABORATORY TEST: ICD-10-CM

## 2021-12-10 LAB
ALBUMIN SERPL BCP-MCNC: 4.1 G/DL (ref 3.2–4.9)
ALBUMIN/GLOB SERPL: 1.6 G/DL
ALP SERPL-CCNC: 83 U/L (ref 30–99)
ALT SERPL-CCNC: 11 U/L (ref 2–50)
ANION GAP SERPL CALC-SCNC: 15 MMOL/L (ref 7–16)
APPEARANCE UR: CLEAR
AST SERPL-CCNC: 27 U/L (ref 12–45)
BASOPHILS # BLD AUTO: 0.2 % (ref 0–1.8)
BASOPHILS # BLD: 0.02 K/UL (ref 0–0.12)
BILIRUB SERPL-MCNC: 0.5 MG/DL (ref 0.1–1.5)
BILIRUB UR QL STRIP.AUTO: NEGATIVE
BUN SERPL-MCNC: 13 MG/DL (ref 8–22)
CALCIUM SERPL-MCNC: 9 MG/DL (ref 8.5–10.5)
CHLORIDE SERPL-SCNC: 96 MMOL/L (ref 96–112)
CO2 SERPL-SCNC: 20 MMOL/L (ref 20–33)
COLOR UR: YELLOW
CREAT SERPL-MCNC: 0.66 MG/DL (ref 0.5–1.4)
EKG IMPRESSION: NORMAL
EOSINOPHIL # BLD AUTO: 0 K/UL (ref 0–0.51)
EOSINOPHIL NFR BLD: 0 % (ref 0–6.9)
ERYTHROCYTE [DISTWIDTH] IN BLOOD BY AUTOMATED COUNT: 44.5 FL (ref 35.9–50)
GLOBULIN SER CALC-MCNC: 2.5 G/DL (ref 1.9–3.5)
GLUCOSE SERPL-MCNC: 95 MG/DL (ref 65–99)
GLUCOSE UR STRIP.AUTO-MCNC: NEGATIVE MG/DL
HCT VFR BLD AUTO: 45 % (ref 37–47)
HGB BLD-MCNC: 15.8 G/DL (ref 12–16)
IMM GRANULOCYTES # BLD AUTO: 0.03 K/UL (ref 0–0.11)
IMM GRANULOCYTES NFR BLD AUTO: 0.3 % (ref 0–0.9)
KETONES UR STRIP.AUTO-MCNC: ABNORMAL MG/DL
LACTATE BLD-SCNC: 1.6 MMOL/L (ref 0.5–2)
LEUKOCYTE ESTERASE UR QL STRIP.AUTO: NEGATIVE
LYMPHOCYTES # BLD AUTO: 1.07 K/UL (ref 1–4.8)
LYMPHOCYTES NFR BLD: 11.8 % (ref 22–41)
MCH RBC QN AUTO: 33 PG (ref 27–33)
MCHC RBC AUTO-ENTMCNC: 35.1 G/DL (ref 33.6–35)
MCV RBC AUTO: 93.9 FL (ref 81.4–97.8)
MICRO URNS: ABNORMAL
MONOCYTES # BLD AUTO: 0.41 K/UL (ref 0–0.85)
MONOCYTES NFR BLD AUTO: 4.5 % (ref 0–13.4)
NEUTROPHILS # BLD AUTO: 7.56 K/UL (ref 2–7.15)
NEUTROPHILS NFR BLD: 83.2 % (ref 44–72)
NITRITE UR QL STRIP.AUTO: NEGATIVE
NRBC # BLD AUTO: 0 K/UL
NRBC BLD-RTO: 0 /100 WBC
PH UR STRIP.AUTO: 5.5 [PH] (ref 5–8)
PLATELET # BLD AUTO: 309 K/UL (ref 164–446)
PMV BLD AUTO: 11.4 FL (ref 9–12.9)
POTASSIUM SERPL-SCNC: 5.1 MMOL/L (ref 3.6–5.5)
PROT SERPL-MCNC: 6.6 G/DL (ref 6–8.2)
PROT UR QL STRIP: NEGATIVE MG/DL
RBC # BLD AUTO: 4.79 M/UL (ref 4.2–5.4)
RBC UR QL AUTO: NEGATIVE
SODIUM SERPL-SCNC: 131 MMOL/L (ref 135–145)
SP GR UR STRIP.AUTO: 1
UROBILINOGEN UR STRIP.AUTO-MCNC: 0.2 MG/DL
WBC # BLD AUTO: 9.1 K/UL (ref 4.8–10.8)

## 2021-12-10 PROCEDURE — 87077 CULTURE AEROBIC IDENTIFY: CPT | Mod: 91

## 2021-12-10 PROCEDURE — 83605 ASSAY OF LACTIC ACID: CPT

## 2021-12-10 PROCEDURE — 87086 URINE CULTURE/COLONY COUNT: CPT

## 2021-12-10 PROCEDURE — 85025 COMPLETE CBC W/AUTO DIFF WBC: CPT

## 2021-12-10 PROCEDURE — 700105 HCHG RX REV CODE 258: Performed by: EMERGENCY MEDICINE

## 2021-12-10 PROCEDURE — 71045 X-RAY EXAM CHEST 1 VIEW: CPT

## 2021-12-10 PROCEDURE — 87150 DNA/RNA AMPLIFIED PROBE: CPT

## 2021-12-10 PROCEDURE — 36415 COLL VENOUS BLD VENIPUNCTURE: CPT

## 2021-12-10 PROCEDURE — 80053 COMPREHEN METABOLIC PANEL: CPT

## 2021-12-10 PROCEDURE — 87040 BLOOD CULTURE FOR BACTERIA: CPT

## 2021-12-10 PROCEDURE — 81003 URINALYSIS AUTO W/O SCOPE: CPT

## 2021-12-10 PROCEDURE — 93005 ELECTROCARDIOGRAM TRACING: CPT | Performed by: EMERGENCY MEDICINE

## 2021-12-10 PROCEDURE — 99284 EMERGENCY DEPT VISIT MOD MDM: CPT

## 2021-12-10 RX ORDER — SODIUM CHLORIDE 9 MG/ML
1000 INJECTION, SOLUTION INTRAVENOUS ONCE
Status: COMPLETED | OUTPATIENT
Start: 2021-12-10 | End: 2021-12-10

## 2021-12-10 RX ADMIN — SODIUM CHLORIDE 500 ML: 9 INJECTION, SOLUTION INTRAVENOUS at 16:00

## 2021-12-10 ASSESSMENT — ENCOUNTER SYMPTOMS
ROS GI COMMENTS: POSITIVE FOR DECREASED APPETITE
FEVER: 0
NAUSEA: 1
SHORTNESS OF BREATH: 0
COUGH: 0
ABDOMINAL PAIN: 0
VOMITING: 1
DIARRHEA: 1

## 2021-12-10 ASSESSMENT — FIBROSIS 4 INDEX: FIB4 SCORE: 1.68

## 2021-12-10 ASSESSMENT — LIFESTYLE VARIABLES: DO YOU DRINK ALCOHOL: NO

## 2021-12-10 NOTE — ED PROVIDER NOTES
ED Provider Note    Scribed for Fabrizio Downing M.D. by Deedee Diaz. 12/10/2021, 1:30 PM.    Primary care provider: Corina Madison P.A.-C.  Means of arrival: EMS  History obtained from: patient  History limited by: none    CHIEF COMPLAINT  Chief Complaint   Patient presents with   • Abnormal Labs   • Nausea   • Diarrhea   • UTI       HPI  Chrissie Dueñas is a 89 y.o. female who presents to the Emergency Department for evlaution of abnormal labs. Patient describes she woke up with nausea, vomiting, and diarrhea onset 3 days ago. She was seen by home health who treated her with nausea mediation. Her nausea and diarrhea are resolved at this time however she has associated decreased appetite. Denies abdominal pain, dysuria, fever, cough, hematemesis, chest pain, or shortness of breath. Denies abdominal surgeries. She has a past medical history of hypertension which she takes losartan for. Denies any other pertinent past medical history.     REVIEW OF SYSTEMS  Review of Systems   Constitutional: Negative for fever.   Respiratory: Negative for cough and shortness of breath.    Cardiovascular: Negative for chest pain.   Gastrointestinal: Positive for diarrhea, nausea and vomiting. Negative for abdominal pain.        Positive for decreased appetite   Genitourinary: Negative for dysuria.   All other systems reviewed and are negative.    PAST MEDICAL HISTORY   has a past medical history of Hypertension.    SURGICAL HISTORY   has a past surgical history that includes ovarian cystectomy and appendectomy.    SOCIAL HISTORY  Social History     Tobacco Use   • Smoking status: Former Smoker     Quit date: 1974     Years since quittin.5   • Smokeless tobacco: Never Used   Vaping Use   • Vaping Use: Never used   Substance Use Topics   • Alcohol use: Yes     Comment: glass of wine everyday    • Drug use: Not Currently      Social History     Substance and Sexual Activity   Drug Use Not Currently       FAMILY  HISTORY  Family History   Problem Relation Age of Onset   • Cancer Mother    • Stroke Father        CURRENT MEDICATIONS  Home Medications     Reviewed by Valarie Keene R.N. (Registered Nurse) on 12/10/21 at 1328  Med List Status: Partial   Medication Last Dose Status   acetaminophen (TYLENOL) 325 MG Tab  Active   LOSARTAN POTASSIUM PO 12/7/2021 Active                ALLERGIES  No Known Allergies    PHYSICAL EXAM  VITAL SIGNS: BP (!) 189/84   Pulse 85   Temp 36.1 °C (96.9 °F) (Temporal)   Resp 16   Ht 1.524 m (5')   Wt 56.2 kg (124 lb)   SpO2 92%   BMI 24.22 kg/m²   Vitals reviewed.  Constitutional: Well developed, Well nourished, No acute distress, Non-toxic appearance.   HENT: Normocephalic, Atraumatic, Bilateral external ears normal, Oropharynx moist, No oral exudates, Nose normal.   Eyes: PERRL, EOMI, Conjunctiva normal, No discharge.   Neck: Normal range of motion, No tenderness, Supple, No stridor.   Cardiovascular: Systolic ejection murmur, Normal heart rate, Normal rhythm, No rubs,  Thorax & Lungs: Normal breath sounds, No respiratory distress, No wheezing,  Abdomen: Bowel sounds normal, Soft, No tenderness, slightly distended abdomen   Skin: Warm, Dry, No erythema, No rash.   Back: No tenderness, No CVA tenderness.   Musculoskeletal: Good range of motion in all major joints.  Neurologic: Alert,No focal deficits noted.   Psychiatric: Affect normal     LABS  Results for orders placed or performed during the hospital encounter of 12/10/21   LACTIC ACID   Result Value Ref Range    Lactic Acid 1.6 0.5 - 2.0 mmol/L   CBC WITH DIFFERENTIAL   Result Value Ref Range    WBC 9.1 4.8 - 10.8 K/uL    RBC 4.79 4.20 - 5.40 M/uL    Hemoglobin 15.8 12.0 - 16.0 g/dL    Hematocrit 45.0 37.0 - 47.0 %    MCV 93.9 81.4 - 97.8 fL    MCH 33.0 27.0 - 33.0 pg    MCHC 35.1 (H) 33.6 - 35.0 g/dL    RDW 44.5 35.9 - 50.0 fL    Platelet Count 309 164 - 446 K/uL    MPV 11.4 9.0 - 12.9 fL    Neutrophils-Polys 83.20 (H) 44.00 -  72.00 %    Lymphocytes 11.80 (L) 22.00 - 41.00 %    Monocytes 4.50 0.00 - 13.40 %    Eosinophils 0.00 0.00 - 6.90 %    Basophils 0.20 0.00 - 1.80 %    Immature Granulocytes 0.30 0.00 - 0.90 %    Nucleated RBC 0.00 /100 WBC    Neutrophils (Absolute) 7.56 (H) 2.00 - 7.15 K/uL    Lymphs (Absolute) 1.07 1.00 - 4.80 K/uL    Monos (Absolute) 0.41 0.00 - 0.85 K/uL    Eos (Absolute) 0.00 0.00 - 0.51 K/uL    Baso (Absolute) 0.02 0.00 - 0.12 K/uL    Immature Granulocytes (abs) 0.03 0.00 - 0.11 K/uL    NRBC (Absolute) 0.00 K/uL   URINALYSIS    Specimen: Urine   Result Value Ref Range    Color Yellow     Character Clear     Specific Gravity 1.004 <1.035    Ph 5.5 5.0 - 8.0    Glucose Negative Negative mg/dL    Ketones Trace (A) Negative mg/dL    Protein Negative Negative mg/dL    Bilirubin Negative Negative    Urobilinogen, Urine 0.2 Negative    Nitrite Negative Negative    Leukocyte Esterase Negative Negative    Occult Blood Negative Negative    Micro Urine Req see below    Comp Metabolic Panel   Result Value Ref Range    Sodium 131 (L) 135 - 145 mmol/L    Potassium 5.1 3.6 - 5.5 mmol/L    Chloride 96 96 - 112 mmol/L    Co2 20 20 - 33 mmol/L    Anion Gap 15.0 7.0 - 16.0    Glucose 95 65 - 99 mg/dL    Bun 13 8 - 22 mg/dL    Creatinine 0.66 0.50 - 1.40 mg/dL    Calcium 9.0 8.5 - 10.5 mg/dL    AST(SGOT) 27 12 - 45 U/L    ALT(SGPT) 11 2 - 50 U/L    Alkaline Phosphatase 83 30 - 99 U/L    Total Bilirubin 0.5 0.1 - 1.5 mg/dL    Albumin 4.1 3.2 - 4.9 g/dL    Total Protein 6.6 6.0 - 8.2 g/dL    Globulin 2.5 1.9 - 3.5 g/dL    A-G Ratio 1.6 g/dL   ESTIMATED GFR   Result Value Ref Range    GFR If African American >60 >60 mL/min/1.73 m 2    GFR If Non African American >60 >60 mL/min/1.73 m 2   EKG   Result Value Ref Range    Report       [facility].    Test Date:  2021-12-10  Pt Name:    MICHELLE JIANG                Department: ER  MRN:        [id number]                      Room:       ORTHO  Gender:      Female                       Technician: [id number]  :        [id number]                   Requested By:ER TRIAGE PROTOCOL  Order #:    [id number]                    Reading MD: RACHEAL WHITE. AMD    Measurements  Intervals                                Axis  Rate:       80                           P:          36  ME:         284                          QRS:        -117  QRSD:       108                          T:          -25  QT:         404  QTc:        466    Interpretive Statements  SINUS RHYTHM  FIRST DEGREE AV BLOCK  PROBABLE LEFT ATRIAL ABNORMALITY  IRBBB AND LPFB  INFERIOR INFARCT, AGE INDETERMINATE  ABNRM R PROG, CONSIDER ASMI OR LEAD PLACEMENT  BASELINE WANDER IN LEAD(S) II,III,aVF  Compared to ECG 10/27/2021 10:39:49  No significant change from prior  Electronically Signed  On 12- 16:31:32 PST by RACHEAL WHITE. AMD       All labs reviewed by me.    EKG Interpretation  Interpreted by me as above    RADIOLOGY  DX-CHEST-PORTABLE (1 VIEW)   Final Result      No acute cardiac or pulmonary abnormalities are identified.        The radiologist's interpretation of all radiological studies have been reviewed by me.    COURSE & MEDICAL DECISION MAKING  Pertinent Labs & Imaging studies reviewed. (See chart for details)    Obtained and reviewed past medical records from 12/10/21 which indicated UTI, abnormal potassium, and abnormal sodium. She was sent here by her PCP for treatment.     1:30 PM Patient seen and examined at bedside. The patient presents with vomiting, diarrhea and decreased appetite, and the differential diagnosis includes but is not limited to dehydration, electrolyte abnormality gastroenteritis, bowel obstruction.  Patient was sent by her reguarlar doctor due to abnormal findings on her labs. I informed the patient that we will repeat these and treat her accordingly. Ordered for chest x-ray, lactic acid, CBC w/ diff, CMP, UA, urine culture, blood cultures, and EKG to evaluate.    The  patient was sent and she had some abnormal labs.  Specifically she had a possible UTI give her a single dose of Keflex, and she had abnormal potassium and sodium.  She does not vomiting and diarrhea.  She states she feels pretty well and would not come in except for they told her to.  She states that a little bit of lack of appetite is little concerned about that.  Regarding her urine.  She has no dysuria.  She has had frequent UTIs but does not like she has one now.  Her urinalysis here is unremarkable.  Without a fever, white count, or an abnormal urine and no symptoms I do not justify antibiotic therapy.    The patient's labs are obtained.  She does have a slightly low sodium of 131 and potassium is abnormal at 5.1.  These do not require any specific therapy.  She does look a little dry for decreased oral intake of fluids.  Her labs also do significant dehydration we will give her a small dose of and asked to because of her decreased oral intake.    Patient is given NS because of decreased oral intake.  She is reassessed after 5 cc boluses and improved.    Is also states that she is a hard time eating.  This makes her gag has not had much of an appetite.  Give her some applesauce and some water and she is able to hold down she feels well.  This point she is reexamined her abdominal exam is benign without tenderness or peritonitis she looks well she is not febrile or ill-appearing.  She has no other symptoms she is comfortable going home.    Was prescribed Zofran by the home health care team.  This made her dizzy will have her stop.  She is drink frequent small amounts of fluids.  Return for pain fever vomiting or other concerns.  Questions are answered, she is agreeable to plan and discharged in good condition.    Corina Madison, PDesiraeA.-C.  [address]  [address]  [facility] [address]  [phone]    Schedule an appointment as soon as possible for a visit in 3 days          FINAL IMPRESSION  1. Intractable  vomiting, presence of nausea not specified, unspecified vomiting type    2. Diarrhea, unspecified type    3. Abnormal laboratory test          Deedee AMES (Scribe), am scribing for, and in the presence of, Fabrizio Downing M.D..    Electronically signed by: Deedee Diaz (Scribe), 12/10/2021    Fabrizio AMES M.D. personally performed the services described in this documentation, as scribed by Deedee Diaz in my presence, and it is both accurate and complete.    The note accurately reflects work and decisions made by me.  Fabrizio Downing M.D.  12/10/2021  4:37 PM

## 2021-12-10 NOTE — ED NOTES
Pt BIB EMS from home.  Pt reports that she ate at the [facility] [employer] [facility] Monday night for dinner at her [facility] living home.  Tuesday afternoon started to have loose stool and nausea.  Seen by dr coffey at [facility] and blood work completed and found pt to have K+ 5.2 and Na 127-127.  Pt was also found to have UTI - Keflex given.  Pt also rec'd one liter of NS.  ERP to see.

## 2021-12-11 NOTE — DISCHARGE INSTRUCTIONS
Drink frequent small amounts.  Drink things with calories until you feel better like soups, juices, and Gatorade.  Return if you have abdominal pain, fever, vomiting, diarrhea, cannot tolerate food or fluids or other concerns.  Follow-up with your doctor.

## 2021-12-11 NOTE — ED NOTES
Apple sauce provided and tolerated without difficulty.    Pt reports she is ready for discharge, IVF infusing.

## 2021-12-11 NOTE — ED NOTES
Report from Valarie VIVAS.  Patient care assumed at this time.  Pt awaiting completion of IVF for discharge, approx 200cc left to infuse.  Pt resting comfortably.

## 2021-12-11 NOTE — ED NOTES
Pt and daughter given d/c instructions and f/u info with verbal understanding. Pt directed to complete ABX and discontinue zofran per ERP.  VSS at discharge.  PIV d/c'd with tip intact.  Pt dressed w/ assistance from daughter.  Pt taken from the ED via w/c by daughter.  All belongings in possession at discharge.

## 2021-12-12 LAB
BACTERIA BLD CULT: ABNORMAL
BACTERIA BLD CULT: ABNORMAL
BACTERIA UR CULT: NORMAL
SIGNIFICANT IND 70042: ABNORMAL
SIGNIFICANT IND 70042: NORMAL
SITE SITE: ABNORMAL
SITE SITE: NORMAL
SOURCE SOURCE: ABNORMAL
SOURCE SOURCE: NORMAL

## 2021-12-12 NOTE — ED NOTES
"ED Positive Culture Follow-up/Notification Note:    Date: 12/12/2021     Patient seen in the ED on 12/10/2021 for nausea and vomiting x3 days.   1. Intractable vomiting, presence of nausea not specified, unspecified vomiting type    2. Diarrhea, unspecified type    3. Abnormal laboratory test       Discharge Medication List as of 12/10/2021  4:53 PM          Allergies: Patient has no known allergies.     Vitals:    12/10/21 1434 12/10/21 1530 12/10/21 1601 12/10/21 1633   BP: (!) 179/73 (!) 176/74 148/76 149/79   Pulse: 81 77 79 80   Resp: 18 17 18 20   Temp:    36.3 °C (97.4 °F)   TempSrc:    Temporal   SpO2: 94% 93% 93% 96%   Weight:       Height:           Final cultures:   Results     Procedure Component Value Units Date/Time    BLOOD CULTURE [[id number]]  (Abnormal) Collected: 12/10/21 1407    Order Status: Completed Specimen: Blood from Peripheral Updated: 12/12/21 1141     Significant Indicator POS     Source BLD     Site PERIPHERAL     Culture Result Growth detected by Bactec instrument. 12/11/2021  21:01  Negative for Staphylococcus aureus and MRSA by PCR. Correlate  ongoing need for antibiotics with clinical condition.        Coagulase-negative Staphylococcus species  Possible Contaminant  Isolated from one set only, please correlate with clinical  condition. Contact the Microbiology department within 48 hr  if identification and susceptibility are needed.      Narrative:      Collected By:  Per Hospital Policy: Only change Specimen Src: to \"Line\" if  specified by physician order.  No site indicated    URINE CULTURE(NEW) [[id number]] Collected: 12/10/21 1430    Order Status: Completed Specimen: Urine Updated: 12/12/21 1018     Significant Indicator NEG     Source UR     Site -     Culture Result Usual skin valente <10,000 cfu/mL    Narrative:      Collected By:  Indication for culture:->Emergency Room Patient  Collected By:    BLOOD CULTURE [[id number]] Collected: 12/10/21 1433    Order Status: Completed " "Specimen: Blood from Peripheral Updated: 12/11/21 0825     Significant Indicator NEG     Source BLD     Site PERIPHERAL     Culture Result No Growth  Note: Blood cultures are incubated for 5 days and  are monitored continuously.Positive blood cultures  are called to the RN and reported as soon as  they are identified.      Narrative:      Collected By:  Per Hospital Policy: Only change Specimen Src: to \"Line\" if  specified by physician order.  Left AC    URINALYSIS [[id number]]  (Abnormal) Collected: 12/10/21 1430    Order Status: Completed Specimen: Urine Updated: 12/10/21 1446     Color Yellow     Character Clear     Specific Gravity 1.004     Ph 5.5     Glucose Negative mg/dL      Ketones Trace mg/dL      Protein Negative mg/dL      Bilirubin Negative     Urobilinogen, Urine 0.2     Nitrite Negative     Leukocyte Esterase Negative     Occult Blood Negative     Micro Urine Req see below     Comment: Microscopic examination not performed when specimen is clear  and chemically negative for protein, blood, leukocyte esterase  and nitrite.         Narrative:      Collected By:  Indication for culture:->Emergency Room Patient          Plan:   Patient with 1/2 blood cx sets with possible staph species, negative for MRSA/MSSA. Patient with normal WBC count and afebrile. Suspect this is a contaminant. Patient not currently on any antibiotic therapy. No further follow up needed at this point.    Brittany Lizarraga, PharmD  PGY2 Infectious Diseases Pharmacy Resident    "

## 2021-12-15 LAB
BACTERIA BLD CULT: NORMAL
SIGNIFICANT IND 70042: NORMAL
SITE SITE: NORMAL
SOURCE SOURCE: NORMAL

## 2021-12-21 ENCOUNTER — OFFICE VISIT (OUTPATIENT)
Dept: MEDICAL GROUP | Facility: MEDICAL CENTER | Age: 86
End: 2021-12-21
Payer: MEDICARE

## 2021-12-21 VITALS
DIASTOLIC BLOOD PRESSURE: 70 MMHG | HEIGHT: 60 IN | WEIGHT: 123.02 LBS | HEART RATE: 102 BPM | SYSTOLIC BLOOD PRESSURE: 138 MMHG | OXYGEN SATURATION: 96 % | BODY MASS INDEX: 24.15 KG/M2 | TEMPERATURE: 97.7 F

## 2021-12-21 DIAGNOSIS — F33.0 MILD EPISODE OF RECURRENT MAJOR DEPRESSIVE DISORDER (HCC): ICD-10-CM

## 2021-12-21 PROBLEM — S01.01XA SCALP LACERATION, INITIAL ENCOUNTER: Status: RESOLVED | Noted: 2021-10-08 | Resolved: 2021-12-21

## 2021-12-21 PROBLEM — T14.90XA TRAUMA: Status: RESOLVED | Noted: 2021-10-08 | Resolved: 2021-12-21

## 2021-12-21 PROBLEM — D62 ACUTE BLOOD LOSS ANEMIA: Status: RESOLVED | Noted: 2021-10-08 | Resolved: 2021-12-21

## 2021-12-21 PROCEDURE — 99214 OFFICE O/P EST MOD 30 MIN: CPT | Performed by: PHYSICIAN ASSISTANT

## 2021-12-21 RX ORDER — CEFDINIR 300 MG/1
CAPSULE ORAL
COMMUNITY
End: 2021-12-21

## 2021-12-21 RX ORDER — ONDANSETRON 4 MG/1
TABLET, FILM COATED ORAL
COMMUNITY
End: 2021-12-21

## 2021-12-21 RX ORDER — LOSARTAN POTASSIUM 50 MG/1
50 TABLET ORAL DAILY
COMMUNITY
End: 2022-02-02 | Stop reason: SDUPTHER

## 2021-12-21 RX ORDER — ONDANSETRON 4 MG/1
TABLET, ORALLY DISINTEGRATING ORAL
COMMUNITY
End: 2021-12-21

## 2021-12-21 RX ORDER — ESCITALOPRAM OXALATE 5 MG/1
5 TABLET ORAL DAILY
Qty: 30 TABLET | Refills: 0 | Status: SHIPPED | OUTPATIENT
Start: 2021-12-21 | End: 2022-02-02 | Stop reason: SDUPTHER

## 2021-12-21 RX ORDER — CEPHALEXIN 500 MG/1
CAPSULE ORAL
COMMUNITY
End: 2021-12-21

## 2021-12-21 RX ORDER — SODIUM CHLORIDE 0.9 % (FLUSH) 0.9 %
SYRINGE (ML) INJECTION
COMMUNITY
End: 2021-12-24

## 2021-12-21 RX ORDER — CEFDINIR 300 MG/1
CAPSULE ORAL
COMMUNITY
Start: 2021-12-10 | End: 2021-12-21

## 2021-12-21 RX ORDER — ONDANSETRON 4 MG/1
TABLET, FILM COATED ORAL
COMMUNITY
Start: 2021-12-08 | End: 2021-12-21

## 2021-12-21 ASSESSMENT — ANXIETY QUESTIONNAIRES
IF YOU CHECKED OFF ANY PROBLEMS ON THIS QUESTIONNAIRE, HOW DIFFICULT HAVE THESE PROBLEMS MADE IT FOR YOU TO DO YOUR WORK, TAKE CARE OF THINGS AT HOME, OR GET ALONG WITH OTHER PEOPLE: NOT DIFFICULT AT ALL
7. FEELING AFRAID AS IF SOMETHING AWFUL MIGHT HAPPEN: NOT AT ALL
3. WORRYING TOO MUCH ABOUT DIFFERENT THINGS: NOT AT ALL
4. TROUBLE RELAXING: NOT AT ALL
GAD7 TOTAL SCORE: 1
6. BECOMING EASILY ANNOYED OR IRRITABLE: NOT AT ALL
2. NOT BEING ABLE TO STOP OR CONTROL WORRYING: NOT AT ALL
5. BEING SO RESTLESS THAT IT IS HARD TO SIT STILL: NOT AT ALL
1. FEELING NERVOUS, ANXIOUS, OR ON EDGE: SEVERAL DAYS

## 2021-12-21 ASSESSMENT — PATIENT HEALTH QUESTIONNAIRE - PHQ9
CLINICAL INTERPRETATION OF PHQ2 SCORE: 3
5. POOR APPETITE OR OVEREATING: 0 - NOT AT ALL
SUM OF ALL RESPONSES TO PHQ QUESTIONS 1-9: 6

## 2021-12-21 ASSESSMENT — FIBROSIS 4 INDEX: FIB4 SCORE: 2.4

## 2021-12-22 PROBLEM — F33.0 MILD EPISODE OF RECURRENT MAJOR DEPRESSIVE DISORDER (HCC): Status: ACTIVE | Noted: 2021-12-22

## 2021-12-22 NOTE — PROGRESS NOTES
Discharge instructions given to pt. Prescriptions sent to pt's pharmacy. Pt educated, verbalizes understanding. All belongings accounted for. Pt ambulated out of ED with steady gait to go home with family at side. PIV removed and dressing applied.       Subjective:   Chrissie Dueñas is a 89 y.o. female here today for concern about depression. Here with daughter. Recently went to the ER with dehydration. Daughter thinks she isn't eating and drinking enough.    Chief Complaint   Patient presents with   • Hospital Follow-up     Dehydrated & loss of appetite, declining ever since hitting her head, shaking, depression       Mild episode of recurrent major depressive disorder (HCC)  Has had this once before. About 10 years ago. Took medicine for a few months.    Now noticing depression symptoms again. Few weeks to months. In assisted living seeing sick people, dying people, just getting her down. Not interested in her normal activities. Noticeable to family members.    Depression Screening    Little interest or pleasure in doing things?  2 - more than half the days   Feeling down, depressed , or hopeless? 1 - several days   Trouble falling or staying asleep, or sleeping too much?  0 - not at all   Feeling tired or having little energy?  3 - nearly every day   Poor appetite or overeating?  0 - not at all   Feeling bad about yourself - or that you are a failure or have let yourself or your family down? 0 - not at all   Trouble concentrating on things, such as reading the newspaper or watching television? 0 - not at all   Moving or speaking so slowly that other people could have noticed.  Or the opposite - being so fidgety or restless that you have been moving around a lot more than usual?  0 - not at all   Thoughts that you would be better off dead, or of hurting yourself?  0 - not at all   Patient Health Questionnaire Score: 6       If depressive symptoms identified deferred to follow up visit unless specifically addressed in assesment and plan.    Interpretation of PHQ-9 Total Score   Score Severity   1-4 No Depression   5-9 Mild Depression   10-14 Moderate Depression   15-19 Moderately Severe Depression   20-27 Severe Depression         Current medicines (including  changes today)  Current Outpatient Medications   Medication Sig Dispense Refill   • normal saline flush 0.9 % Solution sodium chloride 0.9 % intravenous solution   1 L administered on scene. Time administered: 1135     • losartan (COZAAR) 50 MG Tab losartan 50 mg tablet     • escitalopram (LEXAPRO) 5 MG tablet Take 1 Tablet by mouth every day. 30 Tablet 0   • acetaminophen (TYLENOL) 325 MG Tab        No current facility-administered medications for this visit.     She  has a past medical history of Hypertension.    ROS   No fever/chills.  No headache/dizziness. No focal weakness. No sore throat, nasal congestion, ear pain. No chest pain, no shortness of breath, difficulty breathing. No n/v/d/c or abdominal pain. No urinary complaint. No rash or skin lesion. No joint pain or swelling.       Objective:     /70 (BP Location: Right arm, Patient Position: Sitting, BP Cuff Size: Adult)   Pulse (!) 102   Temp 36.5 °C (97.7 °F) (Temporal)   Ht 1.524 m (5')   Wt 55.8 kg (123 lb 0.3 oz)   SpO2 96%  Body mass index is 24.03 kg/m².   Physical Exam:  Constitutional: WDWN, NAD  Skin: Warm, dry, good turgor, no rashes in visible areas.  Respiratory: Unlabored respiratory effort, lungs clear to auscultation, no wheezes, no ronchi.  Cardiovascular: Normal S1, S2, no murmur, no leg edema.  Psych: Alert and oriented x3, normal affect and mood.    Assessment and Plan:   The following treatment plan was discussed    1. Mild episode of recurrent major depressive disorder (HCC)    - escitalopram (LEXAPRO) 5 MG tablet; Take 1 Tablet by mouth every day.  Dispense: 30 Tablet; Refill: 0      Followup: one month

## 2021-12-22 NOTE — ASSESSMENT & PLAN NOTE
Has had this once before. About 10 years ago. Took medicine for a few months.    Now noticing depression symptoms again. Few weeks to months. In assisted living seeing sick people, dying people, just getting her down. Not interested in her normal activities. Noticeable to family members.    Depression Screening    Little interest or pleasure in doing things?  2 - more than half the days   Feeling down, depressed , or hopeless? 1 - several days   Trouble falling or staying asleep, or sleeping too much?  0 - not at all   Feeling tired or having little energy?  3 - nearly every day   Poor appetite or overeating?  0 - not at all   Feeling bad about yourself - or that you are a failure or have let yourself or your family down? 0 - not at all   Trouble concentrating on things, such as reading the newspaper or watching television? 0 - not at all   Moving or speaking so slowly that other people could have noticed.  Or the opposite - being so fidgety or restless that you have been moving around a lot more than usual?  0 - not at all   Thoughts that you would be better off dead, or of hurting yourself?  0 - not at all   Patient Health Questionnaire Score: 6       If depressive symptoms identified deferred to follow up visit unless specifically addressed in assesment and plan.    Interpretation of PHQ-9 Total Score   Score Severity   1-4 No Depression   5-9 Mild Depression   10-14 Moderate Depression   15-19 Moderately Severe Depression   20-27 Severe Depression

## 2021-12-24 ENCOUNTER — HOSPITAL ENCOUNTER (INPATIENT)
Facility: MEDICAL CENTER | Age: 86
LOS: 3 days | DRG: 641 | End: 2021-12-27
Attending: EMERGENCY MEDICINE | Admitting: HOSPITALIST
Payer: MEDICARE

## 2021-12-24 ENCOUNTER — APPOINTMENT (OUTPATIENT)
Dept: RADIOLOGY | Facility: MEDICAL CENTER | Age: 86
DRG: 641 | End: 2021-12-24
Attending: EMERGENCY MEDICINE
Payer: MEDICARE

## 2021-12-24 DIAGNOSIS — F33.0 MILD EPISODE OF RECURRENT MAJOR DEPRESSIVE DISORDER (HCC): ICD-10-CM

## 2021-12-24 DIAGNOSIS — E87.1 ACUTE HYPONATREMIA: ICD-10-CM

## 2021-12-24 DIAGNOSIS — G47.00 INSOMNIA, UNSPECIFIED TYPE: ICD-10-CM

## 2021-12-24 DIAGNOSIS — E87.8 ELECTROLYTE IMBALANCE: ICD-10-CM

## 2021-12-24 DIAGNOSIS — R63.0 ANOREXIA: ICD-10-CM

## 2021-12-24 DIAGNOSIS — R62.51 FAILURE TO THRIVE (CHILD): ICD-10-CM

## 2021-12-24 DIAGNOSIS — Z53.09 CONTRAINDICATION TO DEEP VEIN THROMBOSIS (DVT) PROPHYLAXIS: ICD-10-CM

## 2021-12-24 DIAGNOSIS — F41.9 ANXIETY: ICD-10-CM

## 2021-12-24 LAB
ALBUMIN SERPL BCP-MCNC: 4.2 G/DL (ref 3.2–4.9)
ALBUMIN/GLOB SERPL: 1.4 G/DL
ALP SERPL-CCNC: 100 U/L (ref 30–99)
ALT SERPL-CCNC: 12 U/L (ref 2–50)
ANION GAP SERPL CALC-SCNC: 14 MMOL/L (ref 7–16)
ANION GAP SERPL CALC-SCNC: 17 MMOL/L (ref 7–16)
APPEARANCE UR: CLEAR
AST SERPL-CCNC: 21 U/L (ref 12–45)
BASOPHILS # BLD AUTO: 0.2 % (ref 0–1.8)
BASOPHILS # BLD: 0.02 K/UL (ref 0–0.12)
BILIRUB SERPL-MCNC: 0.5 MG/DL (ref 0.1–1.5)
BILIRUB UR QL STRIP.AUTO: NEGATIVE
BUN SERPL-MCNC: 8 MG/DL (ref 8–22)
BUN SERPL-MCNC: 8 MG/DL (ref 8–22)
CALCIUM SERPL-MCNC: 10 MG/DL (ref 8.4–10.2)
CALCIUM SERPL-MCNC: 9.5 MG/DL (ref 8.4–10.2)
CHLORIDE SERPL-SCNC: 84 MMOL/L (ref 96–112)
CHLORIDE SERPL-SCNC: 87 MMOL/L (ref 96–112)
CK SERPL-CCNC: 53 U/L (ref 0–154)
CO2 SERPL-SCNC: 19 MMOL/L (ref 20–33)
CO2 SERPL-SCNC: 23 MMOL/L (ref 20–33)
COLOR UR: YELLOW
CREAT SERPL-MCNC: 0.63 MG/DL (ref 0.5–1.4)
CREAT SERPL-MCNC: 0.63 MG/DL (ref 0.5–1.4)
EOSINOPHIL # BLD AUTO: 0.05 K/UL (ref 0–0.51)
EOSINOPHIL NFR BLD: 0.5 % (ref 0–6.9)
EPI CELLS #/AREA URNS HPF: NORMAL /HPF
ERYTHROCYTE [DISTWIDTH] IN BLOOD BY AUTOMATED COUNT: 37.9 FL (ref 35.9–50)
GLOBULIN SER CALC-MCNC: 3 G/DL (ref 1.9–3.5)
GLUCOSE SERPL-MCNC: 103 MG/DL (ref 65–99)
GLUCOSE SERPL-MCNC: 90 MG/DL (ref 65–99)
GLUCOSE UR STRIP.AUTO-MCNC: NEGATIVE MG/DL
HCT VFR BLD AUTO: 42.5 % (ref 37–47)
HGB BLD-MCNC: 14.8 G/DL (ref 12–16)
IMM GRANULOCYTES # BLD AUTO: 0.03 K/UL (ref 0–0.11)
IMM GRANULOCYTES NFR BLD AUTO: 0.3 % (ref 0–0.9)
KETONES UR STRIP.AUTO-MCNC: NEGATIVE MG/DL
LEUKOCYTE ESTERASE UR QL STRIP.AUTO: NEGATIVE
LYMPHOCYTES # BLD AUTO: 1.47 K/UL (ref 1–4.8)
LYMPHOCYTES NFR BLD: 15.7 % (ref 22–41)
MAGNESIUM SERPL-MCNC: 1.4 MG/DL (ref 1.5–2.5)
MCH RBC QN AUTO: 31.4 PG (ref 27–33)
MCHC RBC AUTO-ENTMCNC: 34.8 G/DL (ref 33.6–35)
MCV RBC AUTO: 90 FL (ref 81.4–97.8)
MICRO URNS: ABNORMAL
MONOCYTES # BLD AUTO: 0.62 K/UL (ref 0–0.85)
MONOCYTES NFR BLD AUTO: 6.6 % (ref 0–13.4)
MUCOUS THREADS #/AREA URNS HPF: NORMAL /HPF
NEUTROPHILS # BLD AUTO: 7.2 K/UL (ref 2–7.15)
NEUTROPHILS NFR BLD: 76.7 % (ref 44–72)
NITRITE UR QL STRIP.AUTO: NEGATIVE
NRBC # BLD AUTO: 0 K/UL
NRBC BLD-RTO: 0 /100 WBC
OSMOLALITY SERPL: 250 MOSM/KG H2O (ref 278–298)
PH UR STRIP.AUTO: 6 [PH] (ref 5–8)
PLATELET # BLD AUTO: 258 K/UL (ref 164–446)
PMV BLD AUTO: 9.2 FL (ref 9–12.9)
POTASSIUM SERPL-SCNC: 4.1 MMOL/L (ref 3.6–5.5)
POTASSIUM SERPL-SCNC: 4.3 MMOL/L (ref 3.6–5.5)
PROT SERPL-MCNC: 7.2 G/DL (ref 6–8.2)
PROT UR QL STRIP: NEGATIVE MG/DL
RBC # BLD AUTO: 4.72 M/UL (ref 4.2–5.4)
RBC # URNS HPF: NORMAL /HPF
RBC UR QL AUTO: ABNORMAL
SODIUM SERPL-SCNC: 120 MMOL/L (ref 135–145)
SODIUM SERPL-SCNC: 124 MMOL/L (ref 135–145)
SP GR UR STRIP.AUTO: 1.01
TSH SERPL DL<=0.005 MIU/L-ACNC: 1.86 UIU/ML (ref 0.38–5.33)
WBC # BLD AUTO: 9.4 K/UL (ref 4.8–10.8)
WBC #/AREA URNS HPF: NORMAL /HPF

## 2021-12-24 PROCEDURE — 84133 ASSAY OF URINE POTASSIUM: CPT

## 2021-12-24 PROCEDURE — 700111 HCHG RX REV CODE 636 W/ 250 OVERRIDE (IP): Performed by: HOSPITALIST

## 2021-12-24 PROCEDURE — A9270 NON-COVERED ITEM OR SERVICE: HCPCS | Performed by: HOSPITALIST

## 2021-12-24 PROCEDURE — 84443 ASSAY THYROID STIM HORMONE: CPT

## 2021-12-24 PROCEDURE — 83735 ASSAY OF MAGNESIUM: CPT

## 2021-12-24 PROCEDURE — 85025 COMPLETE CBC W/AUTO DIFF WBC: CPT

## 2021-12-24 PROCEDURE — 80053 COMPREHEN METABOLIC PANEL: CPT

## 2021-12-24 PROCEDURE — 83935 ASSAY OF URINE OSMOLALITY: CPT

## 2021-12-24 PROCEDURE — 36415 COLL VENOUS BLD VENIPUNCTURE: CPT

## 2021-12-24 PROCEDURE — 82436 ASSAY OF URINE CHLORIDE: CPT

## 2021-12-24 PROCEDURE — 99223 1ST HOSP IP/OBS HIGH 75: CPT | Mod: AI | Performed by: HOSPITALIST

## 2021-12-24 PROCEDURE — 80048 BASIC METABOLIC PNL TOTAL CA: CPT

## 2021-12-24 PROCEDURE — 84300 ASSAY OF URINE SODIUM: CPT

## 2021-12-24 PROCEDURE — 770006 HCHG ROOM/CARE - MED/SURG/GYN SEMI*

## 2021-12-24 PROCEDURE — 83930 ASSAY OF BLOOD OSMOLALITY: CPT

## 2021-12-24 PROCEDURE — 70450 CT HEAD/BRAIN W/O DYE: CPT | Mod: ME

## 2021-12-24 PROCEDURE — 72125 CT NECK SPINE W/O DYE: CPT | Mod: ME

## 2021-12-24 PROCEDURE — 81001 URINALYSIS AUTO W/SCOPE: CPT

## 2021-12-24 PROCEDURE — 99285 EMERGENCY DEPT VISIT HI MDM: CPT

## 2021-12-24 PROCEDURE — 700105 HCHG RX REV CODE 258: Performed by: HOSPITALIST

## 2021-12-24 PROCEDURE — 82550 ASSAY OF CK (CPK): CPT

## 2021-12-24 PROCEDURE — 700102 HCHG RX REV CODE 250 W/ 637 OVERRIDE(OP): Performed by: HOSPITALIST

## 2021-12-24 PROCEDURE — 82570 ASSAY OF URINE CREATININE: CPT

## 2021-12-24 RX ORDER — LABETALOL HYDROCHLORIDE 5 MG/ML
10 INJECTION, SOLUTION INTRAVENOUS EVERY 4 HOURS PRN
Status: DISCONTINUED | OUTPATIENT
Start: 2021-12-24 | End: 2021-12-27 | Stop reason: HOSPADM

## 2021-12-24 RX ORDER — ONDANSETRON 4 MG/1
4 TABLET, ORALLY DISINTEGRATING ORAL EVERY 4 HOURS PRN
Status: DISCONTINUED | OUTPATIENT
Start: 2021-12-24 | End: 2021-12-27 | Stop reason: HOSPADM

## 2021-12-24 RX ORDER — MAGNESIUM SULFATE HEPTAHYDRATE 40 MG/ML
2 INJECTION, SOLUTION INTRAVENOUS ONCE
Status: COMPLETED | OUTPATIENT
Start: 2021-12-24 | End: 2021-12-24

## 2021-12-24 RX ORDER — SODIUM CHLORIDE 9 MG/ML
INJECTION, SOLUTION INTRAVENOUS CONTINUOUS
Status: DISCONTINUED | OUTPATIENT
Start: 2021-12-24 | End: 2021-12-27 | Stop reason: HOSPADM

## 2021-12-24 RX ORDER — HEPARIN SODIUM 5000 [USP'U]/ML
5000 INJECTION, SOLUTION INTRAVENOUS; SUBCUTANEOUS EVERY 8 HOURS
Status: DISCONTINUED | OUTPATIENT
Start: 2021-12-24 | End: 2021-12-27 | Stop reason: HOSPADM

## 2021-12-24 RX ORDER — ZOLPIDEM TARTRATE 5 MG/1
2.5 TABLET ORAL NIGHTLY PRN
Status: DISCONTINUED | OUTPATIENT
Start: 2021-12-24 | End: 2021-12-27 | Stop reason: HOSPADM

## 2021-12-24 RX ORDER — ONDANSETRON 2 MG/ML
4 INJECTION INTRAMUSCULAR; INTRAVENOUS EVERY 4 HOURS PRN
Status: DISCONTINUED | OUTPATIENT
Start: 2021-12-24 | End: 2021-12-27 | Stop reason: HOSPADM

## 2021-12-24 RX ORDER — ACETAMINOPHEN 325 MG/1
650 TABLET ORAL EVERY 6 HOURS PRN
Status: DISCONTINUED | OUTPATIENT
Start: 2021-12-24 | End: 2021-12-27 | Stop reason: HOSPADM

## 2021-12-24 RX ADMIN — MAGNESIUM SULFATE 2 G: 2 INJECTION INTRAVENOUS at 21:36

## 2021-12-24 RX ADMIN — ZOLPIDEM TARTRATE 2.5 MG: 5 TABLET ORAL at 21:36

## 2021-12-24 RX ADMIN — SODIUM CHLORIDE: 9 INJECTION, SOLUTION INTRAVENOUS at 21:36

## 2021-12-24 ASSESSMENT — LIFESTYLE VARIABLES
TOTAL SCORE: 0
HAVE PEOPLE ANNOYED YOU BY CRITICIZING YOUR DRINKING: NO
TOTAL SCORE: 0
HAVE YOU EVER FELT YOU SHOULD CUT DOWN ON YOUR DRINKING: NO
ALCOHOL_USE: YES
ON A TYPICAL DAY WHEN YOU DRINK ALCOHOL HOW MANY DRINKS DO YOU HAVE: 1
EVER HAD A DRINK FIRST THING IN THE MORNING TO STEADY YOUR NERVES TO GET RID OF A HANGOVER: NO
AVERAGE NUMBER OF DAYS PER WEEK YOU HAVE A DRINK CONTAINING ALCOHOL: 5
HOW MANY TIMES IN THE PAST YEAR HAVE YOU HAD 5 OR MORE DRINKS IN A DAY: 0
TOTAL SCORE: 0
CONSUMPTION TOTAL: NEGATIVE
EVER FELT BAD OR GUILTY ABOUT YOUR DRINKING: NO

## 2021-12-24 ASSESSMENT — COGNITIVE AND FUNCTIONAL STATUS - GENERAL
DRESSING REGULAR LOWER BODY CLOTHING: A LITTLE
TOILETING: A LITTLE
MOVING FROM LYING ON BACK TO SITTING ON SIDE OF FLAT BED: A LITTLE
PERSONAL GROOMING: A LITTLE
SUGGESTED CMS G CODE MODIFIER MOBILITY: CK
HELP NEEDED FOR BATHING: A LITTLE
DAILY ACTIVITIY SCORE: 20
CLIMB 3 TO 5 STEPS WITH RAILING: A LOT
STANDING UP FROM CHAIR USING ARMS: A LITTLE
SUGGESTED CMS G CODE MODIFIER DAILY ACTIVITY: CJ
MOVING TO AND FROM BED TO CHAIR: A LITTLE
WALKING IN HOSPITAL ROOM: A LOT
MOBILITY SCORE: 17

## 2021-12-24 ASSESSMENT — FIBROSIS 4 INDEX
FIB4 SCORE: 2.4
FIB4 SCORE: 2.14

## 2021-12-24 ASSESSMENT — PATIENT HEALTH QUESTIONNAIRE - PHQ9
1. LITTLE INTEREST OR PLEASURE IN DOING THINGS: NOT AT ALL
2. FEELING DOWN, DEPRESSED, IRRITABLE, OR HOPELESS: NOT AT ALL
SUM OF ALL RESPONSES TO PHQ9 QUESTIONS 1 AND 2: 0

## 2021-12-24 ASSESSMENT — PAIN DESCRIPTION - PAIN TYPE: TYPE: ACUTE PAIN

## 2021-12-25 PROBLEM — R62.51 FAILURE TO THRIVE (CHILD): Status: ACTIVE | Noted: 2021-12-25

## 2021-12-25 LAB
ALBUMIN SERPL BCP-MCNC: 3.6 G/DL (ref 3.2–4.9)
ALBUMIN/GLOB SERPL: 1.4 G/DL
ALP SERPL-CCNC: 86 U/L (ref 30–99)
ALT SERPL-CCNC: 11 U/L (ref 2–50)
ANION GAP SERPL CALC-SCNC: 13 MMOL/L (ref 7–16)
ANION GAP SERPL CALC-SCNC: 13 MMOL/L (ref 7–16)
AST SERPL-CCNC: 18 U/L (ref 12–45)
BASOPHILS # BLD AUTO: 0.2 % (ref 0–1.8)
BASOPHILS # BLD: 0.02 K/UL (ref 0–0.12)
BILIRUB SERPL-MCNC: 0.6 MG/DL (ref 0.1–1.5)
BUN SERPL-MCNC: 9 MG/DL (ref 8–22)
BUN SERPL-MCNC: 9 MG/DL (ref 8–22)
CALCIUM SERPL-MCNC: 8.6 MG/DL (ref 8.4–10.2)
CALCIUM SERPL-MCNC: 8.8 MG/DL (ref 8.4–10.2)
CHLORIDE SERPL-SCNC: 89 MMOL/L (ref 96–112)
CHLORIDE SERPL-SCNC: 90 MMOL/L (ref 96–112)
CHLORIDE UR-SCNC: 44 MMOL/L
CO2 SERPL-SCNC: 21 MMOL/L (ref 20–33)
CO2 SERPL-SCNC: 22 MMOL/L (ref 20–33)
CREAT SERPL-MCNC: 0.58 MG/DL (ref 0.5–1.4)
CREAT SERPL-MCNC: 0.62 MG/DL (ref 0.5–1.4)
CREAT UR-MCNC: 60.1 MG/DL
EOSINOPHIL # BLD AUTO: 0.01 K/UL (ref 0–0.51)
EOSINOPHIL NFR BLD: 0.1 % (ref 0–6.9)
ERYTHROCYTE [DISTWIDTH] IN BLOOD BY AUTOMATED COUNT: 39 FL (ref 35.9–50)
GLOBULIN SER CALC-MCNC: 2.5 G/DL (ref 1.9–3.5)
GLUCOSE SERPL-MCNC: 117 MG/DL (ref 65–99)
GLUCOSE SERPL-MCNC: 83 MG/DL (ref 65–99)
HCT VFR BLD AUTO: 40.5 % (ref 37–47)
HGB BLD-MCNC: 14 G/DL (ref 12–16)
IMM GRANULOCYTES # BLD AUTO: 0.05 K/UL (ref 0–0.11)
IMM GRANULOCYTES NFR BLD AUTO: 0.6 % (ref 0–0.9)
LYMPHOCYTES # BLD AUTO: 1.54 K/UL (ref 1–4.8)
LYMPHOCYTES NFR BLD: 17.3 % (ref 22–41)
MCH RBC QN AUTO: 31.5 PG (ref 27–33)
MCHC RBC AUTO-ENTMCNC: 34.6 G/DL (ref 33.6–35)
MCV RBC AUTO: 91.2 FL (ref 81.4–97.8)
MONOCYTES # BLD AUTO: 0.74 K/UL (ref 0–0.85)
MONOCYTES NFR BLD AUTO: 8.3 % (ref 0–13.4)
NEUTROPHILS # BLD AUTO: 6.54 K/UL (ref 2–7.15)
NEUTROPHILS NFR BLD: 73.5 % (ref 44–72)
NRBC # BLD AUTO: 0 K/UL
NRBC BLD-RTO: 0 /100 WBC
OSMOLALITY UR: 238 MOSM/KG H2O (ref 300–900)
PHOSPHATE SERPL-MCNC: 3.6 MG/DL (ref 2.5–4.5)
PLATELET # BLD AUTO: 243 K/UL (ref 164–446)
PMV BLD AUTO: 9.4 FL (ref 9–12.9)
POTASSIUM SERPL-SCNC: 4 MMOL/L (ref 3.6–5.5)
POTASSIUM SERPL-SCNC: 4.2 MMOL/L (ref 3.6–5.5)
POTASSIUM UR-SCNC: 28 MMOL/L
PROT SERPL-MCNC: 6.1 G/DL (ref 6–8.2)
RBC # BLD AUTO: 4.44 M/UL (ref 4.2–5.4)
SODIUM SERPL-SCNC: 123 MMOL/L (ref 135–145)
SODIUM SERPL-SCNC: 125 MMOL/L (ref 135–145)
SODIUM SERPL-SCNC: 127 MMOL/L (ref 135–145)
SODIUM UR-SCNC: 36 MMOL/L
WBC # BLD AUTO: 8.9 K/UL (ref 4.8–10.8)

## 2021-12-25 PROCEDURE — 700105 HCHG RX REV CODE 258: Performed by: FAMILY MEDICINE

## 2021-12-25 PROCEDURE — 700111 HCHG RX REV CODE 636 W/ 250 OVERRIDE (IP): Performed by: HOSPITALIST

## 2021-12-25 PROCEDURE — 84100 ASSAY OF PHOSPHORUS: CPT

## 2021-12-25 PROCEDURE — 99233 SBSQ HOSP IP/OBS HIGH 50: CPT | Performed by: FAMILY MEDICINE

## 2021-12-25 PROCEDURE — 700102 HCHG RX REV CODE 250 W/ 637 OVERRIDE(OP): Performed by: HOSPITALIST

## 2021-12-25 PROCEDURE — A9270 NON-COVERED ITEM OR SERVICE: HCPCS | Performed by: FAMILY MEDICINE

## 2021-12-25 PROCEDURE — 700102 HCHG RX REV CODE 250 W/ 637 OVERRIDE(OP): Performed by: FAMILY MEDICINE

## 2021-12-25 PROCEDURE — 36415 COLL VENOUS BLD VENIPUNCTURE: CPT

## 2021-12-25 PROCEDURE — 97162 PT EVAL MOD COMPLEX 30 MIN: CPT

## 2021-12-25 PROCEDURE — 85025 COMPLETE CBC W/AUTO DIFF WBC: CPT

## 2021-12-25 PROCEDURE — A9270 NON-COVERED ITEM OR SERVICE: HCPCS | Performed by: HOSPITALIST

## 2021-12-25 PROCEDURE — 80053 COMPREHEN METABOLIC PANEL: CPT

## 2021-12-25 PROCEDURE — 700102 HCHG RX REV CODE 250 W/ 637 OVERRIDE(OP): Performed by: STUDENT IN AN ORGANIZED HEALTH CARE EDUCATION/TRAINING PROGRAM

## 2021-12-25 PROCEDURE — 80048 BASIC METABOLIC PNL TOTAL CA: CPT

## 2021-12-25 PROCEDURE — 770006 HCHG ROOM/CARE - MED/SURG/GYN SEMI*

## 2021-12-25 PROCEDURE — A9270 NON-COVERED ITEM OR SERVICE: HCPCS | Performed by: STUDENT IN AN ORGANIZED HEALTH CARE EDUCATION/TRAINING PROGRAM

## 2021-12-25 PROCEDURE — 84295 ASSAY OF SERUM SODIUM: CPT

## 2021-12-25 RX ORDER — HYDROXYZINE HYDROCHLORIDE 25 MG/1
25 TABLET, FILM COATED ORAL 3 TIMES DAILY PRN
Status: DISCONTINUED | OUTPATIENT
Start: 2021-12-25 | End: 2021-12-27 | Stop reason: HOSPADM

## 2021-12-25 RX ORDER — CHOLECALCIFEROL (VITAMIN D3) 125 MCG
10 CAPSULE ORAL NIGHTLY
Status: DISCONTINUED | OUTPATIENT
Start: 2021-12-25 | End: 2021-12-27 | Stop reason: HOSPADM

## 2021-12-25 RX ORDER — SODIUM CHLORIDE 1 G/1
1 TABLET ORAL 2 TIMES DAILY WITH MEALS
Status: DISCONTINUED | OUTPATIENT
Start: 2021-12-25 | End: 2021-12-27 | Stop reason: HOSPADM

## 2021-12-25 RX ADMIN — HYDROXYZINE HYDROCHLORIDE 25 MG: 25 TABLET, FILM COATED ORAL at 13:36

## 2021-12-25 RX ADMIN — Medication 400 MG: at 17:15

## 2021-12-25 RX ADMIN — ZOLPIDEM TARTRATE 2.5 MG: 5 TABLET ORAL at 16:32

## 2021-12-25 RX ADMIN — HYDROXYZINE HYDROCHLORIDE 25 MG: 25 TABLET, FILM COATED ORAL at 21:35

## 2021-12-25 RX ADMIN — SODIUM CHLORIDE 1 G: 1 TABLET ORAL at 16:32

## 2021-12-25 RX ADMIN — SODIUM CHLORIDE: 9 INJECTION, SOLUTION INTRAVENOUS at 15:13

## 2021-12-25 RX ADMIN — HYDROXYZINE HYDROCHLORIDE 25 MG: 25 TABLET, FILM COATED ORAL at 05:01

## 2021-12-25 RX ADMIN — Medication 400 MG: at 08:30

## 2021-12-25 RX ADMIN — HEPARIN SODIUM 5000 UNITS: 5000 INJECTION, SOLUTION INTRAVENOUS; SUBCUTANEOUS at 13:36

## 2021-12-25 ASSESSMENT — PAIN DESCRIPTION - PAIN TYPE: TYPE: ACUTE PAIN

## 2021-12-25 ASSESSMENT — ENCOUNTER SYMPTOMS
COUGH: 0
WEAKNESS: 1
HEARTBURN: 0
HEMOPTYSIS: 0
FEVER: 0
BLURRED VISION: 0
DEPRESSION: 0
MYALGIAS: 0
DOUBLE VISION: 0
HEADACHES: 0
NAUSEA: 0
NECK PAIN: 0
DIZZINESS: 0

## 2021-12-25 ASSESSMENT — COGNITIVE AND FUNCTIONAL STATUS - GENERAL
WALKING IN HOSPITAL ROOM: A LITTLE
MOVING FROM LYING ON BACK TO SITTING ON SIDE OF FLAT BED: A LITTLE
STANDING UP FROM CHAIR USING ARMS: A LITTLE
CLIMB 3 TO 5 STEPS WITH RAILING: A LITTLE
SUGGESTED CMS G CODE MODIFIER MOBILITY: CJ
MOBILITY SCORE: 20

## 2021-12-25 ASSESSMENT — GAIT ASSESSMENTS
GAIT LEVEL OF ASSIST: MINIMAL ASSIST
DISTANCE (FEET): 60
DEVIATION: STEP TO
ASSISTIVE DEVICE: FRONT WHEEL WALKER

## 2021-12-25 NOTE — ASSESSMENT & PLAN NOTE
Patient's health has been declining since October per family and patient.  Appetite has been poor.  Encourage supplements between meals  PT/OT/dietitian evaluation.  Pending SNF acceptance.

## 2021-12-25 NOTE — PROGRESS NOTES
[facility] Medicine Daily Progress Note    Date of Service  12/25/2021    Chief Complaint  Chrissie Dueñas is a 89 y.o. female admitted 12/24/2021 with generalized weakness and failure to thrive.    Hospital Course  This is a 89 years old female does not have significant past medical history other than hypertension comes in due to generalized weakness and failure to thrive.  Apparently patient has been falling and had a closed head injury in October when she hit her head on a [place].  Since then her health has been declining.  Her appetite and p.o. intake has been decreasing.  Comes in to ER for further evaluation.  Noted to be hemodynamically stable.  Lab work was significant for electrolyte imbalance including severe hyponatremia with sodium of 120 mmol/L.  Start on IV fluids.  PT/OT eval pending.  Interval Problem Update  Resting in bed comfortably.  Stated that she feels better today.  Still have poor appetite.  Had breakfast this morning.  Hemodynamically stable and afebrile.  No acute distress noted.  No issues overnight per staff.    I have personally seen and examined the patient at bedside. I discussed the plan of care with patient and bedside RN.    Consultants/Specialty  None    Code Status  Full Code    Disposition  Patient is not medically cleared for discharge.   Anticipate discharge to to skilled nursing facility.  I have placed the appropriate orders for post-discharge needs.    Review of Systems  Review of Systems   Constitutional: Positive for malaise/fatigue. Negative for fever.   HENT: Negative for hearing loss and tinnitus.    Eyes: Negative for blurred vision and double vision.   Respiratory: Negative for cough and hemoptysis.    Cardiovascular: Negative for chest pain.   Gastrointestinal: Negative for heartburn and nausea.   Genitourinary: Negative for dysuria, frequency and urgency.   Musculoskeletal: Negative for myalgias and neck pain.   Skin: Negative for rash.   Neurological: Positive for  weakness. Negative for dizziness and headaches.   Psychiatric/Behavioral: Negative for depression and suicidal ideas.        Physical Exam  Temp:  [36.5 °C (97.7 °F)-36.6 °C (97.8 °F)] 36.5 °C (97.7 °F)  Pulse:  [66-92] 76  Resp:  [17-18] 17  BP: (120-154)/(67-71) 120/67  SpO2:  [93 %-97 %] 93 %    Physical Exam  Constitutional:       General: She is not in acute distress.  HENT:      Head: Normocephalic and atraumatic.      Mouth/Throat:      Mouth: Mucous membranes are dry.   Eyes:      Extraocular Movements: Extraocular movements intact.   Cardiovascular:      Rate and Rhythm: Normal rate and regular rhythm.      Heart sounds: No friction rub. No gallop.    Pulmonary:      Effort: No respiratory distress.      Breath sounds: Normal breath sounds.   Abdominal:      General: There is no distension.      Palpations: Abdomen is soft.   Musculoskeletal:         General: No tenderness.   Neurological:      General: No focal deficit present.      Mental Status: She is alert and oriented to person, place, and time.   Psychiatric:         Behavior: Behavior normal.         Fluids    Intake/Output Summary (Last 24 hours) at 12/25/2021 1142  Last data filed at 12/25/2021 0905  Gross per 24 hour   Intake 220 ml   Output --   Net 220 ml       Laboratory  Recent Labs     12/24/21  1752 12/25/21  0658   WBC 9.4 8.9   RBC 4.72 4.44   HEMOGLOBIN 14.8 14.0   HEMATOCRIT 42.5 40.5   MCV 90.0 91.2   MCH 31.4 31.5   MCHC 34.8 34.6   RDW 37.9 39.0   PLATELETCT 258 243   MPV 9.2 9.4     Recent Labs     12/24/21  2232 12/25/21  0658 12/25/21  1050   SODIUM 124* 123* 125*   POTASSIUM 4.3 4.2 4.0   CHLORIDE 87* 89* 90*   CO2 23 21 22   GLUCOSE 90 83 117*   BUN 8 9 9   CREATININE 0.63 0.58 0.62   CALCIUM 9.5 8.8 8.6                   Imaging  CT-CSPINE WITHOUT PLUS RECONS   Final Result      1.  There is no acute fracture of the cervical spine.   2.  There is extensive degenerative change throughout the cervical spine with degenerative  areas of anterolisthesis as noted above in the cervical and upper thoracic spine.         CT-HEAD W/O   Final Result      1.  There is no acute intracranial hemorrhage or infarct.      2.  White matter lucencies most consistent with small vessel ischemic change versus demyelination or gliosis.      3.  There is cerebral atrophy.              Assessment/Plan  * Acute hyponatremia- (present on admission)  Assessment & Plan  Likely due to poor p.o. intake, serum osmolality quite low 250 but does not appear volume overloaded  Still awaiting additional hyponatremia labs  Start gentle IV fluids, trend BMP every 6 hours, monitor for overcorrection although this does seem like an acute process  12/25: Continue monitoring sodium levels every 8.  Avoid rapid correction.  Expect 0.5 to 1 mEq/h.    Failure to thrive (child)- (present on admission)  Assessment & Plan  Patient's health has been declining since October per family and patient.  Appetite has been poor.  Encourage supplements between meals  PT/OT/dietitian evaluation.    Anorexia  Assessment & Plan  Patient reports poor p.o. intake although she is unclear why  PT OT SLP  Nutrition consult    Benign essential HTN- (present on admission)  Assessment & Plan  Holding losartan, as needed's ordered       VTE prophylaxis: heparin ppx    I have performed a physical exam and reviewed and updated ROS and Plan today (12/25/2021). In review of yesterday's note (12/24/2021), there are no changes except as documented above.

## 2021-12-25 NOTE — ASSESSMENT & PLAN NOTE
Likely due to poor p.o. intake, serum osmolality quite low 250 but does not appear volume overloaded  Still awaiting additional hyponatremia labs  Start gentle IV fluids, trend BMP every 6 hours, monitor for overcorrection although this does seem like an acute process  12/25: Continue monitoring sodium levels every 8.  Avoid rapid correction.  Expect 0.5 to 1 mEq/h.  12/26: Improved with hydration.  Sodium up to 133 mmol/L.  No need for strict and close monitoring for now.

## 2021-12-25 NOTE — PROGRESS NOTES
4 Eyes Skin Assessment Completed by SANGEETHA Cochran and SANGEETHA Ferreira.    Head WDL  Ears WDL  Nose WDL  Mouth WDL  Neck WDL  Breast/Chest WDL  Shoulder Blades WDL  Spine WDL  (R) Arm/Elbow/Hand WDL  (L) Arm/Elbow/Hand WDL  Abdomen WDL  Groin WDL  Scrotum/Coccyx/Buttocks WDL  (R) Leg WDL  (L) Leg WDL  (R) Heel/Foot/Toe WDL  (L) Heel/Foot/Toe WDL          Devices In Places Pulse Ox      Interventions In Place N/A    Possible Skin Injury No    Pictures Uploaded Into Epic N/A  Wound Consult Placed N/A  RN Wound Prevention Protocol Ordered No

## 2021-12-25 NOTE — ED NOTES
Med Rec completed per patient interview  Allergies reviewed  Cefdinir 300 mg one capsule twice daily on 12/10/21 to 12/13/21   Ok per patient to discuss medications with visitor present  Pref[facility] [address] [facility] [address]

## 2021-12-25 NOTE — THERAPY
Physical Therapy   Initial Evaluation     Patient Name: Chrissie Dueñas  Age:  89 y.o., Sex:  female  Medical Record #: [id number]  Today's Date: 12/25/2021          Assessment  Patient is 89 y.o. female with a diagnosis of hyponatremia Pt lives at independent living and she is usually active.Daughter reports she has been struggling taking care of herself with regards to medication ,eating.Acute PT needed to improve transfers and ambulation      Plan    Recommend Physical Therapy 4 times per week until therapy goals are met for the following treatments:  Bed Mobility, Gait Training, Neuro Re-Education / Balance, Therapeutic Activities and Therapeutic Exercises      12/25/21 1000   Total Time Spent   Total Time Spent (Mins) 30   Charge Group   PT Evaluation PT Evaluation Mod   Initial Contact Note    Initial Contact Note Order Received and Verified, Physical Therapy Evaluation in Progress with Full Report to Follow.   Pain 0 - 10 Group   Therapist Pain Assessment 0   Prior Living Situation   Prior Services None   Housing / Facility Independent Living Facility   Steps Into Home 0   Steps In Home 0   Equipment Owned Front-Wheel Walker   Lives with - Patient's Self Care Capacity Alone and Able to Care For Self   Prior Level of Functional Mobility   Bed Mobility Independent   Transfer Status Independent   Ambulation Independent   Distance Ambulation (Feet)   (community amb)   Assistive Devices Used None   Cognition    Cognition / Consciousness WDL   Level of Consciousness Alert   Passive ROM Lower Body   Passive ROM Lower Body WDL   Active ROM Lower Body    Active ROM Lower Body  WDL   Strength Lower Body   Lower Body Strength  X   Comments general weakness   Coordination Lower Body    Coordination Lower Body  WDL   Balance Assessment   Sitting Balance (Static) Fair   Sitting Balance (Dynamic) Fair   Standing Balance (Static) Fair -   Standing Balance (Dynamic) Poor   Weight Shift Sitting Fair   Weight Shift Standing Fair    Gait Analysis   Gait Level Of Assist Minimal Assist   Assistive Device Front Wheel Walker   Distance (Feet) 60   # of Times Distance was Traveled 1   Deviation Step To   # of Stairs Climbed 0   Weight Bearing Status full   Bed Mobility    Supine to Sit Modified Independent   Sit to Supine Modified Independent   Scooting Modified Independent   Functional Mobility   Sit to Stand Minimal Assist   Bed, Chair, Wheelchair Transfer Minimal Assist   Transfer Method Stand Step   How much difficulty does the patient currently have...   Turning over in bed (including adjusting bedclothes, sheets and blankets)? 4   Sitting down on and standing up from a chair with arms (e.g., wheelchair, bedside commode, etc.) 3   Moving from lying on back to sitting on the side of the bed? 4   How much help from another person does the patient currently need...   Moving to and from a bed to a chair (including a wheelchair)? 3   Need to walk in a hospital room? 3   Climbing 3-5 steps with a railing? 3   6 clicks Mobility Score 20   Activity Tolerance   Sitting Edge of Bed 10   Standing 10   Patient / Family Goals    Patient / Family Goal #1 Home with daughter   Short Term Goals    Short Term Goal # 1 S with transfers in 4 V    Short Term Goal # 2 S with ambulation x 150 feet in 4 V   Problem List    Problems Impaired Transfers;Impaired Ambulation;Functional Strength Deficit;Impaired Balance;Decreased Activity Tolerance   Anticipated Discharge Equipment and Recommendations   DC Equipment Recommendations None   Discharge Recommendations Anticipate that the patient will have no further physical therapy needs after discharge from the hospital   Interdisciplinary Plan of Care Collaboration   IDT Collaboration with  Nursing   Session Information   Date / Session Number  12/25-1 1/4 12/31       DC Equipment Recommendations: (P) None  Discharge Recommendations: SNF V home depending on progress.

## 2021-12-25 NOTE — CARE PLAN
Problem: Knowledge Deficit - Standard  Goal: Patient and family/care givers will demonstrate understanding of plan of care, disease process/condition, diagnostic tests and medications  Outcome: Progressing     Problem: Fall Risk  Goal: Patient will remain free from falls  Outcome: Progressing   The patient is Stable - Low risk of patient condition declining or worsening    Shift Goals  Clinical Goals: rest comfortably   Patient Goals: rest comfortably     Progress made toward(s) clinical / shift goals:  all goals     Patient is not progressing towards the following goals:

## 2021-12-25 NOTE — CARE PLAN
The patient is Stable - Low risk of patient condition declining or worsening    Shift Goals  Clinical Goals:  (monitor Sodium level)  Patient Goals: rest comfortably     Progress made toward(s) clinical / shift goals:  Patient latest Sodium level is 125.Patient is getting NS at 83 ml/hour and on Sodium Chloride tablet as ordered. Sodium level monitoring is every 8 hours as ordered.

## 2021-12-25 NOTE — ED PROVIDER NOTES
ED Provider Note    CHIEF COMPLAINT  Chief Complaint   Patient presents with   • Failure to Thrive     Pt daughter states the patient has been declining ever since a head injury.  Pt is not eating and drinking.  Pt has increase dizziness and nauseated       HPI  Chrissie Dueñas is a 89 y.o. female who presents with failure to thrive.  Patient has a history of hypertension, was seen in in October of this year after a mechanical ground-level fall in which she sustained a laceration to her scalp, but did not have any associated intracranial hemorrhage.  She was seen 2 weeks ago by my partner, for nausea and diarrhea.  Found to have questionable urinary tract infection, was treated with a single dose of Keflex, however her culture returned negative.  Patient daughter reports that her mother is simply not doing well ever since her head injury in October. She has become less ambulatory and her diet has become incredibly minimal. Patient eats 1 or 2 Jell-O's a day. Patient denies any associated pain. She denies any nausea or vomiting. She denies any headache, she denies any dizziness. Patient was recently started on Lexapro by her primary care physician who was concerned about possible depression as a cause of patient's symptoms.      REVIEW OF SYSTEMS  ROS    See HPI for further details. All other systems are negative.     PAST MEDICAL HISTORY   has a past medical history of Hypertension.    SOCIAL HISTORY  Social History     Tobacco Use   • Smoking status: Former Smoker     Quit date: 1974     Years since quittin.5   • Smokeless tobacco: Never Used   Vaping Use   • Vaping Use: Never used   Substance and Sexual Activity   • Alcohol use: Yes     Comment: glass of wine everyday    • Drug use: Not Currently   • Sexual activity: Not on file       SURGICAL HISTORY   has a past surgical history that includes ovarian cystectomy and appendectomy.    CURRENT MEDICATIONS  Home Medications    **Home medications have not yet  been reviewed for this encounter**         ALLERGIES  No Known Allergies    PHYSICAL EXAM  Vitals:    12/24/21 1720   Pulse: 92   Resp: 18   Temp: 36.6 °C (97.8 °F)   SpO2: 97%       Physical Exam  Constitutional:       Appearance: She is well-developed.   HENT:      Head: Normocephalic and atraumatic.   Eyes:      Conjunctiva/sclera: Conjunctivae normal.   Cardiovascular:      Rate and Rhythm: Normal rate and regular rhythm.   Pulmonary:      Effort: Pulmonary effort is normal.      Breath sounds: Normal breath sounds.   Abdominal:      General: Bowel sounds are normal. There is no distension.      Palpations: Abdomen is soft.      Tenderness: There is no abdominal tenderness. There is no rebound.   Musculoskeletal:      Cervical back: Normal range of motion and neck supple.      Comments: Cervical, thoracic spine and lumbar spine extremity tenderness to palpation or obvious bony abnormality.   Skin:     General: Skin is warm and dry.      Findings: No rash.   Neurological:      Mental Status: She is alert and oriented to person, place, and time.      Comments: Distal and proximal strength 5/5 in upper and lower extremities. No dysmetria. No sensation deficits. No visual field deficits. Cranial nerves intact.   Shuffling gate     Psychiatric:         Behavior: Behavior normal.           DIAGNOSTIC STUDIES / PROCEDURES      LABS  Results for orders placed or performed during the hospital encounter of 12/24/21   CBC WITH DIFFERENTIAL   Result Value Ref Range    WBC 9.4 4.8 - 10.8 K/uL    RBC 4.72 4.20 - 5.40 M/uL    Hemoglobin 14.8 12.0 - 16.0 g/dL    Hematocrit 42.5 37.0 - 47.0 %    MCV 90.0 81.4 - 97.8 fL    MCH 31.4 27.0 - 33.0 pg    MCHC 34.8 33.6 - 35.0 g/dL    RDW 37.9 35.9 - 50.0 fL    Platelet Count 258 164 - 446 K/uL    MPV 9.2 9.0 - 12.9 fL    Neutrophils-Polys 76.70 (H) 44.00 - 72.00 %    Lymphocytes 15.70 (L) 22.00 - 41.00 %    Monocytes 6.60 0.00 - 13.40 %    Eosinophils 0.50 0.00 - 6.90 %    Basophils  0.20 0.00 - 1.80 %    Immature Granulocytes 0.30 0.00 - 0.90 %    Nucleated RBC 0.00 /100 WBC    Neutrophils (Absolute) 7.20 (H) 2.00 - 7.15 K/uL    Lymphs (Absolute) 1.47 1.00 - 4.80 K/uL    Monos (Absolute) 0.62 0.00 - 0.85 K/uL    Eos (Absolute) 0.05 0.00 - 0.51 K/uL    Baso (Absolute) 0.02 0.00 - 0.12 K/uL    Immature Granulocytes (abs) 0.03 0.00 - 0.11 K/uL    NRBC (Absolute) 0.00 K/uL   CMP   Result Value Ref Range    Sodium 120 (L) 135 - 145 mmol/L    Potassium 4.1 3.6 - 5.5 mmol/L    Chloride 84 (L) 96 - 112 mmol/L    Co2 19 (L) 20 - 33 mmol/L    Anion Gap 17.0 (H) 7.0 - 16.0    Glucose 103 (H) 65 - 99 mg/dL    Bun 8 8 - 22 mg/dL    Creatinine 0.63 0.50 - 1.40 mg/dL    Calcium 10.0 8.4 - 10.2 mg/dL    AST(SGOT) 21 12 - 45 U/L    ALT(SGPT) 12 2 - 50 U/L    Alkaline Phosphatase 100 (H) 30 - 99 U/L    Total Bilirubin 0.5 0.1 - 1.5 mg/dL    Albumin 4.2 3.2 - 4.9 g/dL    Total Protein 7.2 6.0 - 8.2 g/dL    Globulin 3.0 1.9 - 3.5 g/dL    A-G Ratio 1.4 g/dL   URINALYSIS    Specimen: Blood   Result Value Ref Range    Color Yellow     Character Clear     Specific Gravity 1.010 <1.035    Ph 6.0 5.0 - 8.0    Glucose Negative Negative mg/dL    Ketones Negative Negative mg/dL    Protein Negative Negative mg/dL    Bilirubin Negative Negative    Nitrite Negative Negative    Leukocyte Esterase Negative Negative    Occult Blood Trace (A) Negative    Micro Urine Req Microscopic    CREATINE KINASE   Result Value Ref Range    CPK Total 53 0 - 154 U/L   TSH WITH REFLEX TO FT4   Result Value Ref Range    TSH 1.860 0.380 - 5.330 uIU/mL   ESTIMATED GFR   Result Value Ref Range    GFR If African American >60 >60 mL/min/1.73 m 2    GFR If Non African American >60 >60 mL/min/1.73 m 2   URINE MICROSCOPIC (W/UA)   Result Value Ref Range    WBC Rare /hpf    RBC Rare /hpf    Epithelial Cells Rare Few /hpf    Mucous Threads Rare /hpf   OSMOLALITY SERUM   Result Value Ref Range    Osmolality Serum 250 (L) 278 - 298 mOsm/kg H2O   Magnesium    Result Value Ref Range    Magnesium 1.4 (L) 1.5 - 2.5 mg/dL         RADIOLOGY  CT-CSPINE WITHOUT PLUS RECONS   Final Result      1.  There is no acute fracture of the cervical spine.   2.  There is extensive degenerative change throughout the cervical spine with degenerative areas of anterolisthesis as noted above in the cervical and upper thoracic spine.         CT-HEAD W/O   Final Result      1.  There is no acute intracranial hemorrhage or infarct.      2.  White matter lucencies most consistent with small vessel ischemic change versus demyelination or gliosis.      3.  There is cerebral atrophy.                 COURSE & MEDICAL DECISION MAKING  Pertinent Labs & Imaging studies reviewed. (See chart for details)    Patient here with progressively worsening gait issues. She is having symptoms of some depression. She is not eating well. Possible developing dementia. There is possible ICH as the cause and therefore will check CT. We'll also check CT patient cervical spine. Will check basic labs evaluate for Khadra abnormality or evidence of macrocytic anemia to suggest B12 abnormality.  Patient recent labs revealed significant hyponatremia.  This is likely at least contributing to her symptoms.  Patient had urine electrolytes sent.  I discussed the case with hospitalist.  Patient will be admitted for further care.  I have updated patient and her daughter.    The patient will return for worsening symptoms and is stable at the time of discharge. The patient verbalizes understanding and will comply.    FINAL IMPRESSION  1.  Symptomatic hyponatremia         Electronically signed by: Marlon Gupta M.D., 12/24/2021 5:29 PM

## 2021-12-25 NOTE — PROGRESS NOTES
Received patient from Night shift RN . Patient is awake and alert.No sign of distress. Patient denies any nausea at the moment.Breakfast  Served.Able to eat her food 50%, as per patient she ate better today compare yesterday.  Fall precaution in placed, kept bed in lowest position and call light within reach.

## 2021-12-25 NOTE — H&P
HISTORY OF PRESENT ILLNESS:  Tani Ralph is a pleasant 77 year old male who presents to the clinic for follow-up of multiple medical problems    The patient has type 2 diabetes mellitus. Denies polyuria , polydipsia, fatigue or unintentional weight loss. Takes medications regularly.  Denies side effects from the medications.  Checks blood sugars twice daily.  He brings blood sugar readings today.  Average 185 Denies episodes of hypoglycemia.      Patient has mild tingling and numbness in his toes .  He continues to follow-up with podiatry for further management of his thickened toenails.    He knows that has been experiencing bilateral feet pain only in the morning when he wakes up.  He denies intermittent claudications.    He has chronic kidney disease.  Patient admits to occasional difficulty with urine stream.  PSA was checked and was noted to be elevated.    Patient has hypertension. he  takes her medications regularly. There have been no side effects. he  denies hypertensive symptoms. No chest pain, shortness of breath or palpitations. No coughing, orthopnea or paroxysmal nocturnal dyspnea. No headaches or dizziness.     Patient has hypercholesterolemia. he takes her medications regularly. he denies side effects. No muscle aches or pains.     He has anemia.  He denies any bleeding.      PAST MEDICAL HISTORY:  Past Medical History:   Diagnosis Date   • Arthritis     hip   • Basal cell carcinoma 02/2020    Superficial BCC right tricep, treated with ED&C on 5/27/2020   • Chronic back pain    • Colon polyp 05/30/2019    dr hager   • Diverticulosis of colon    • DM (diabetes mellitus) (CMS/HCC)    • HTN (hypertension)    • Hyperlipidemia    • Malignant neoplasm (CMS/HCC)     basal cell rt. arm       MEDICATIONS:   Current Outpatient Medications   Medication Sig Dispense Refill   • Camphor-Eucalyptus-Menthol (VAPORIZING CHEST RUB EX) Puts on toes     • metformin (GLUCOPHAGE) 1000 MG tablet TAKE 1 TABLET BY MOUTH   [facility] Medicine History & Physical Note    Date of Service  12/24/2021    PCP: Corina Madison P.A.-C.    CC: Weakness    HPI:   This is a 89 y.o. female with history of hypertension brought in by her daughter for suspected failure to thrive, found to be significantly hyponatremic with a sodium of 120.  Daughter at bedside, relays history that patient has been slowly declining since ground-level fall while living at her assisted living facility, she suffered a laceration to her scalp after hitting her head on a refrigerator however no intracranial bleeding.  Since that time patient has been more lethargic weak unable to participate in activities that she usually does such as yoga and other exercise.  She only eats 1 to 2 kg/day and does not feel she has much of an appetite.  Denies abdominal pain no bloody stools no fevers or chills.    I discussed this patient's case with Dr Gupta of the emergency department.     Consultants:   None    ROS: As above. The remainder of a complete review of systems is negative in all systems except as noted.    PMHx:   has a past medical history of Hypertension.    PSHx:   has a past surgical history that includes ovarian cystectomy and appendectomy.    SHx:   reports that she quit smoking about 47 years ago. She has never used smokeless tobacco. She reports current alcohol use. She reports previous drug use.    FHx:  Reviewed with patient, no pertinent family history noted.    Allergies:  No Known Allergies    Medications:  No current facility-administered medications on file prior to encounter.     Current Outpatient Medications on File Prior to Encounter   Medication Sig Dispense Refill   • losartan (COZAAR) 50 MG Tab Take 50 mg by mouth every day.     • escitalopram (LEXAPRO) 5 MG tablet Take 1 Tablet by mouth every day. 30 Tablet 0   • acetaminophen (TYLENOL) 325 MG Tab Take 325 mg by mouth every four hours as needed for Mild Pain.         Objective Exam:  Vitals:     12/24/21 1718 12/24/21 1720 12/24/21 2000   BP:   154/71   Pulse:  92 81   Resp:  18 18   Temp:  36.6 °C (97.8 °F) 36.6 °C (97.8 °F)   TempSrc:  Temporal Temporal   SpO2:  97% 97%   Weight: 55.6 kg (122 lb 9.2 oz)  56.5 kg (124 lb 9 oz)       Physical Exam  Vitals and nursing note reviewed.   Constitutional:       General: She is not in acute distress.     Appearance: She is not ill-appearing or diaphoretic.   HENT:      Head: Normocephalic.      Mouth/Throat:      Mouth: Mucous membranes are dry.   Eyes:      General: No scleral icterus.     Conjunctiva/sclera: Conjunctivae normal.   Cardiovascular:      Rate and Rhythm: Normal rate.      Heart sounds: Normal heart sounds.   Pulmonary:      Effort: Pulmonary effort is normal. No respiratory distress.      Breath sounds: No stridor. No wheezing.   Abdominal:      General: There is no distension.      Palpations: Abdomen is soft.      Tenderness: There is no abdominal tenderness. There is no guarding.   Musculoskeletal:         General: No tenderness or deformity.      Cervical back: Normal range of motion.   Skin:     General: Skin is warm and dry.      Findings: No erythema or rash.   Neurological:      Mental Status: She is alert and oriented to person, place, and time.      Motor: Weakness present.   Psychiatric:         Mood and Affect: Mood normal.         Thought Content: Thought content normal.         Laboratory--reviewed personally and are as follows:  Lab Results   Component Value Date/Time    WBC 9.4 12/24/2021 05:52 PM    RBC 4.72 12/24/2021 05:52 PM    HEMOGLOBIN 14.8 12/24/2021 05:52 PM    HEMATOCRIT 42.5 12/24/2021 05:52 PM    MCV 90.0 12/24/2021 05:52 PM    MCH 31.4 12/24/2021 05:52 PM    MCHC 34.8 12/24/2021 05:52 PM    MPV 9.2 12/24/2021 05:52 PM    NEUTSPOLYS 76.70 (H) 12/24/2021 05:52 PM    LYMPHOCYTES 15.70 (L) 12/24/2021 05:52 PM    MONOCYTES 6.60 12/24/2021 05:52 PM    EOSINOPHILS 0.50 12/24/2021 05:52 PM    BASOPHILS 0.20 12/24/2021 05:52 PM  TWICE DAILY WITH MEALS 180 tablet 0   • glipiZIDE (GLUCOTROL ER) 10 MG 24 hr tablet Take 1 tablet by mouth 2 times daily. 180 tablet 1   • SOFTCLIX LANCETS Misc USE TO TEST BLOOD SUGAR  TWICE DAILY. 200 each 3   • lisinopril (ZESTRIL) 20 MG tablet TAKE 1 TABLET BY MOUTH  DAILY 90 tablet 3   • rosuvastatin (CRESTOR) 40 MG tablet TAKE 1 TABLET BY MOUTH  DAILY 90 tablet 3   • Accu-Chek Aleyda Plus test strip TEST BLOOD SUGAR TWO TIMES  DAILY AS DIRECTED 200 strip 3   • Acetaminophen (TYLENOL PO)      • biotin (BIOTIN MAXIMUM STRENGTH) 10 MG tablet Take by mouth daily. Indications: taking 1000 mcg 2xday 5000 mcg daily     • Blood Glucose Monitoring Suppl (ACCU-CHEK ALEYDA PLUS) W/DEVICE Kit Use 2 times daily. 1 kit 0   • IRON-VITAMINS PO Per pt one tab  Flinstone's vitamin with iron daily     • aspirin 81 MG tablet Take 81 mg by mouth daily. Per pt takes one tab for 12 days then stops, gives platelets (every 2 weeks)     • Multiple Vitamins-Minerals (CENTRUM SILVER) tablet Take 1 tablet by mouth daily.        No current facility-administered medications for this visit.       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   • Drug use: No   • Sexual activity: Not on file   Other Topics Concern   • Not on file   Social History Narrative   • Not on file     Social Determinants of Health     Financial Resource Strain:    • Social Determinants:       Lab Results   Component Value Date/Time    SODIUM 120 (L) 12/24/2021 05:52 PM    POTASSIUM 4.1 12/24/2021 05:52 PM    CHLORIDE 84 (L) 12/24/2021 05:52 PM    CO2 19 (L) 12/24/2021 05:52 PM    GLUCOSE 103 (H) 12/24/2021 05:52 PM    BUN 8 12/24/2021 05:52 PM    CREATININE 0.63 12/24/2021 05:52 PM      Lab Results   Component Value Date/Time    PROTHROMBTM 13.3 10/27/2021 01:45 PM    INR 1.05 10/27/2021 01:45 PM        Radiology  CT-CSPINE WITHOUT PLUS RECONS   Final Result      1.  There is no acute fracture of the cervical spine.   2.  There is extensive degenerative change throughout the cervical spine with degenerative areas of anterolisthesis as noted above in the cervical and upper thoracic spine.         CT-HEAD W/O   Final Result      1.  There is no acute intracranial hemorrhage or infarct.      2.  White matter lucencies most consistent with small vessel ischemic change versus demyelination or gliosis.      3.  There is cerebral atrophy.             Assessment/Plan:  * Acute hyponatremia- (present on admission)  Assessment & Plan  Likely due to poor p.o. intake, serum osmolality quite low 250 but does not appear volume overloaded  Still awaiting additional hyponatremia labs  Start gentle IV fluids, trend BMP every 6 hours, monitor for overcorrection although this does seem like an acute process    Anorexia  Assessment & Plan  Patient reports poor p.o. intake although she is unclear why  PT OT SLP  Nutrition consult    Benign essential HTN- (present on admission)  Assessment & Plan  Holding losartan, as needed's ordered       It is expected patient will stay beyond 2 midnights.    VTE prophylaxis: heparin     Financial Resource Strain:    Food Insecurity:    • Social Determinants: Food Insecurity:    Transportation Needs:    • Lack of Transportation (Medical):    • Lack of Transportation (Non-Medical):    Physical Activity:    • Days of Exercise per Week:    • Minutes of Exercise per Session:    Stress:    • Social Determinants: Stress:    Social Connections:    • Social Determinants: Social Connections:    Intimate Partner Violence:    • Social Determinants: Intimate Partner Violence Past Fear:    • Social Determinants: Intimate Partner Violence Current Fear:        PHYSICAL EXAMINATION:   GENERAL: The patient is alert, awake, oriented x3, in no acute distress.   VITAL SIGNS:  Visit Vitals  /72   Pulse 66   Resp 16   Ht 5' 7.75\" (1.721 m)   Wt 98.3 kg   SpO2 99%   BMI 33.21 kg/m²       HEENT: Pupils equal, round and reactive to light and accommodation. Extraocular muscles intact. Anicteric sclerae. Tympanic membranes clear. Throat :  Patient has face mask  NECK: Supple with full range of motion. No submandibular, cervical, or supraclavicular lymphadenopathy. No thyromegaly. No jugular venous distension or audible bruit.   HEART: Regular rate and rhythm. No S3 or S4, no murmurs.   LUNGS: Clear to auscultation bilaterally. No rhonchi or rales. Adequate air entry. Normal respiratory effort.   ABDOMEN: Soft, nontender, nondistended .  No hepatosplenomegaly, no masses.   EXTREMITIES: No edema, clubbing or cyanosis. Dorsalis pedis 2+ bilaterally.  Sensation slightly diminished to monofilament touch.  Bilateral hammer feet.  Thick discolored toenails with loss of Luster.    Psychiatry:  Normal mood.  Varied appropriate affect  No visits with results within 1 Day(s) from this visit.   Latest known visit with results is:   Lab Services on 07/05/2021   Component Date Value   • Fasting Status 07/05/2021 12    • Sodium 07/05/2021 139    • Potassium 07/05/2021 4.8    • Chloride 07/05/2021 113*   • Carbon Dioxide 07/05/2021 19*    • Anion Gap 07/05/2021 12    • Glucose 07/05/2021 180*   • BUN 07/05/2021 25*   • Creatinine 07/05/2021 1.36*   • Glomerular Filtration Ra* 07/05/2021 50*   • BUN/ Creatinine Ratio 07/05/2021 18    • Calcium 07/05/2021 9.5    • Bilirubin, Total 07/05/2021 0.8    • GOT/AST 07/05/2021 20    • GPT/ALT 07/05/2021 24    • Alkaline Phosphatase 07/05/2021 36*   • Albumin 07/05/2021 4.1    • Protein, Total 07/05/2021 7.3    • Globulin 07/05/2021 3.2    • A/G Ratio 07/05/2021 1.3    • Hemoglobin A1C 07/05/2021 8.4*   • Prostate Specific Antigen 07/05/2021 6.90*   • WBC 07/05/2021 7.5    • RBC 07/05/2021 3.90*   • HGB 07/05/2021 11.9*   • HCT 07/05/2021 35.2*   • MCV 07/05/2021 90.3    • MCH 07/05/2021 30.5    • MCHC 07/05/2021 33.8    • RDW-CV 07/05/2021 12.9    • RDW-SD 07/05/2021 41.6    • PLT 07/05/2021 256    • Neutrophil, Percent 07/05/2021 60    • Lymphocytes, Percent 07/05/2021 22    • Mono, Percent 07/05/2021 10    • Eosinophils, Percent 07/05/2021 8    • Basophils, Percent 07/05/2021 0    • Absolute Neutrophils 07/05/2021 4.5    • Absolute Lymphocytes 07/05/2021 1.7    • Absolute Monocytes 07/05/2021 0.8    • Absolute Eosinophils  07/05/2021 0.6*   • Absolute Basophils 07/05/2021 0.0        ASSESSMENT AND PLAN:   Uncontrolled type 2 diabetes mellitus with hyperglycemia (CMS/[facility])  (primary encounter diagnosis)  Comment:  Uncontrolled.  Risks and benefits of medications discussed in details.  Plan: empagliflozin (Jardiance) 10 MG tablet  Monitor blood sugars closely and check at night to avoid episodes of hypoglycemia if low blood sugar readings patient to call the clinic will decrease glipizide    Diabetic peripheral neuropathy associated with type 2 diabetes mellitus (CMS/[facility]  Plan:  Proper foot care continue follow-up with podiatry    Leg cramps  Plan: MAGNESIUM    Type 2 diabetes with nephropathy (CMS/[facility])  Plan:  Adequate diabetes control    Stage 3a chronic kidney disease (CMS/[facility])  Comment:  Probably related  to diabetes  Plan: Avoid nephrotoxic medications  Avoid nonsteroidal anti-inflammatory  Continue monitoring kidney functions   VITAMIN D -25 HYDROXY    Elevated PSA  Comment:  Patient is symptomatic  Plan: SERVICE TO UROLOGY    Essential hypertension  Comment:  Fairly controlled  Plan:  Continue current medications    Hyperlipidemia, unspecified hyperlipidemia type  Plan:  Continue current medication    Anemia, unspecified type  Comment:Of note that we had iron studies before B12 folate and they were within normal.  Probably really anemia of chronic disease related to chronic kidney disease  Plan:  Continue monitoring      Follow-up in 4 months.  Sooner if needed.

## 2021-12-25 NOTE — ED NOTES
Pt assisted to the commode with 1x assistance. Pt AAO4 with no pain. Pt reports she feels weak and unsteady on her feet without help

## 2021-12-26 LAB
ALBUMIN SERPL BCP-MCNC: 3.8 G/DL (ref 3.2–4.9)
ALBUMIN/GLOB SERPL: 1.4 G/DL
ALP SERPL-CCNC: 89 U/L (ref 30–99)
ALT SERPL-CCNC: 10 U/L (ref 2–50)
ANION GAP SERPL CALC-SCNC: 12 MMOL/L (ref 7–16)
AST SERPL-CCNC: 17 U/L (ref 12–45)
BASOPHILS # BLD AUTO: 0.2 % (ref 0–1.8)
BASOPHILS # BLD: 0.02 K/UL (ref 0–0.12)
BILIRUB SERPL-MCNC: 0.6 MG/DL (ref 0.1–1.5)
BUN SERPL-MCNC: 8 MG/DL (ref 8–22)
CALCIUM SERPL-MCNC: 8.8 MG/DL (ref 8.4–10.2)
CHLORIDE SERPL-SCNC: 97 MMOL/L (ref 96–112)
CO2 SERPL-SCNC: 24 MMOL/L (ref 20–33)
CREAT SERPL-MCNC: 0.71 MG/DL (ref 0.5–1.4)
EOSINOPHIL # BLD AUTO: 0.01 K/UL (ref 0–0.51)
EOSINOPHIL NFR BLD: 0.1 % (ref 0–6.9)
ERYTHROCYTE [DISTWIDTH] IN BLOOD BY AUTOMATED COUNT: 39.9 FL (ref 35.9–50)
GLOBULIN SER CALC-MCNC: 2.7 G/DL (ref 1.9–3.5)
GLUCOSE SERPL-MCNC: 96 MG/DL (ref 65–99)
HCT VFR BLD AUTO: 43.2 % (ref 37–47)
HGB BLD-MCNC: 14.5 G/DL (ref 12–16)
IMM GRANULOCYTES # BLD AUTO: 0.04 K/UL (ref 0–0.11)
IMM GRANULOCYTES NFR BLD AUTO: 0.4 % (ref 0–0.9)
LYMPHOCYTES # BLD AUTO: 1.6 K/UL (ref 1–4.8)
LYMPHOCYTES NFR BLD: 16.1 % (ref 22–41)
MAGNESIUM SERPL-MCNC: 1.8 MG/DL (ref 1.5–2.5)
MCH RBC QN AUTO: 30.9 PG (ref 27–33)
MCHC RBC AUTO-ENTMCNC: 33.6 G/DL (ref 33.6–35)
MCV RBC AUTO: 91.9 FL (ref 81.4–97.8)
MONOCYTES # BLD AUTO: 0.74 K/UL (ref 0–0.85)
MONOCYTES NFR BLD AUTO: 7.4 % (ref 0–13.4)
NEUTROPHILS # BLD AUTO: 7.53 K/UL (ref 2–7.15)
NEUTROPHILS NFR BLD: 75.8 % (ref 44–72)
NRBC # BLD AUTO: 0 K/UL
NRBC BLD-RTO: 0 /100 WBC
PLATELET # BLD AUTO: 252 K/UL (ref 164–446)
PMV BLD AUTO: 9.6 FL (ref 9–12.9)
POTASSIUM SERPL-SCNC: 3.8 MMOL/L (ref 3.6–5.5)
PROT SERPL-MCNC: 6.5 G/DL (ref 6–8.2)
RBC # BLD AUTO: 4.7 M/UL (ref 4.2–5.4)
SODIUM SERPL-SCNC: 133 MMOL/L (ref 135–145)
SODIUM SERPL-SCNC: 133 MMOL/L (ref 135–145)
WBC # BLD AUTO: 9.9 K/UL (ref 4.8–10.8)

## 2021-12-26 PROCEDURE — A9270 NON-COVERED ITEM OR SERVICE: HCPCS | Performed by: FAMILY MEDICINE

## 2021-12-26 PROCEDURE — 36415 COLL VENOUS BLD VENIPUNCTURE: CPT

## 2021-12-26 PROCEDURE — 700102 HCHG RX REV CODE 250 W/ 637 OVERRIDE(OP): Performed by: FAMILY MEDICINE

## 2021-12-26 PROCEDURE — 770006 HCHG ROOM/CARE - MED/SURG/GYN SEMI*

## 2021-12-26 PROCEDURE — 700102 HCHG RX REV CODE 250 W/ 637 OVERRIDE(OP): Performed by: STUDENT IN AN ORGANIZED HEALTH CARE EDUCATION/TRAINING PROGRAM

## 2021-12-26 PROCEDURE — 700111 HCHG RX REV CODE 636 W/ 250 OVERRIDE (IP): Performed by: HOSPITALIST

## 2021-12-26 PROCEDURE — 84295 ASSAY OF SERUM SODIUM: CPT

## 2021-12-26 PROCEDURE — 85025 COMPLETE CBC W/AUTO DIFF WBC: CPT

## 2021-12-26 PROCEDURE — 700105 HCHG RX REV CODE 258: Performed by: FAMILY MEDICINE

## 2021-12-26 PROCEDURE — A9270 NON-COVERED ITEM OR SERVICE: HCPCS | Performed by: HOSPITALIST

## 2021-12-26 PROCEDURE — 80053 COMPREHEN METABOLIC PANEL: CPT

## 2021-12-26 PROCEDURE — 700102 HCHG RX REV CODE 250 W/ 637 OVERRIDE(OP): Performed by: HOSPITALIST

## 2021-12-26 PROCEDURE — 83735 ASSAY OF MAGNESIUM: CPT

## 2021-12-26 PROCEDURE — 99232 SBSQ HOSP IP/OBS MODERATE 35: CPT | Performed by: FAMILY MEDICINE

## 2021-12-26 PROCEDURE — A9270 NON-COVERED ITEM OR SERVICE: HCPCS | Performed by: STUDENT IN AN ORGANIZED HEALTH CARE EDUCATION/TRAINING PROGRAM

## 2021-12-26 RX ADMIN — ZOLPIDEM TARTRATE 2.5 MG: 5 TABLET ORAL at 19:23

## 2021-12-26 RX ADMIN — HEPARIN SODIUM 5000 UNITS: 5000 INJECTION, SOLUTION INTRAVENOUS; SUBCUTANEOUS at 13:23

## 2021-12-26 RX ADMIN — SODIUM CHLORIDE: 9 INJECTION, SOLUTION INTRAVENOUS at 05:27

## 2021-12-26 RX ADMIN — SODIUM CHLORIDE 1 G: 1 TABLET ORAL at 16:38

## 2021-12-26 RX ADMIN — SODIUM CHLORIDE: 9 INJECTION, SOLUTION INTRAVENOUS at 17:20

## 2021-12-26 RX ADMIN — SODIUM CHLORIDE 1 G: 1 TABLET ORAL at 07:50

## 2021-12-26 RX ADMIN — Medication 400 MG: at 05:27

## 2021-12-26 RX ADMIN — HYDROXYZINE HYDROCHLORIDE 25 MG: 25 TABLET, FILM COATED ORAL at 07:59

## 2021-12-26 ASSESSMENT — PAIN DESCRIPTION - PAIN TYPE
TYPE: ACUTE PAIN
TYPE: ACUTE PAIN

## 2021-12-26 ASSESSMENT — ENCOUNTER SYMPTOMS
HEADACHES: 0
NECK PAIN: 0
HEARTBURN: 0
ORTHOPNEA: 0
DIZZINESS: 0
PALPITATIONS: 0
COUGH: 0
MYALGIAS: 0
TINGLING: 0
WEAKNESS: 1
FEVER: 0
DOUBLE VISION: 0
NAUSEA: 0
BLURRED VISION: 0
CHILLS: 0
DEPRESSION: 0
HEMOPTYSIS: 0

## 2021-12-26 ASSESSMENT — LIFESTYLE VARIABLES: SUBSTANCE_ABUSE: 0

## 2021-12-26 NOTE — PROGRESS NOTES
Hospital Medicine Daily Progress Note    Date of Service  12/26/2021    Chief Complaint  Chrissie Dueñas is a 89 y.o. female admitted 12/24/2021 with generalized weakness and failure to thrive.    Hospital Course  This is a 89 years old female does not have significant past medical history other than hypertension comes in due to generalized weakness and failure to thrive.  Apparently patient has been falling and had a closed head injury in October when she hit her head on a [place].  Since then her health has been declining.  Her appetite and p.o. intake has been decreasing.  Comes in to ER for further evaluation.  Noted to be hemodynamically stable.  Lab work was significant for electrolyte imbalance including severe hyponatremia with sodium of 120 mmol/L.  Start on IV fluids.  PT/OT eval pending.  Interval Problem Update  Resting in bed comfortably.  Stated that she feels better today.  Still have poor appetite.  Had breakfast this morning.  Hemodynamically stable and afebrile.  No acute distress noted.  No issues overnight per staff.  12/26: Patient laying in bed comfortably.  Stated her appetite has improved.  Hemodynamically stable and afebrile.  Sodium urine normal levels.  No acute distress noted.  No issues overnight per staff.  I have personally seen and examined the patient at bedside. I discussed the plan of care with patient and bedside RN.    Consultants/Specialty  None    Code Status  Full Code    Disposition  Patient is not medically cleared for discharge.   Anticipate discharge to to skilled nursing facility.  I have placed the appropriate orders for post-discharge needs.    Review of Systems  Review of Systems   Constitutional: Positive for malaise/fatigue. Negative for chills and fever.   HENT: Negative for hearing loss.    Eyes: Negative for blurred vision and double vision.   Respiratory: Negative for cough and hemoptysis.    Cardiovascular: Negative for chest pain, palpitations and orthopnea.    Gastrointestinal: Negative for heartburn and nausea.   Genitourinary: Negative for dysuria, frequency and urgency.   Musculoskeletal: Negative for myalgias and neck pain.   Skin: Negative for rash.   Neurological: Positive for weakness. Negative for dizziness, tingling and headaches.   Psychiatric/Behavioral: Negative for depression, substance abuse and suicidal ideas.        Physical Exam  Temp:  [36.4 °C (97.6 °F)-36.7 °C (98 °F)] 36.5 °C (97.7 °F)  Pulse:  [70-90] 79  Resp:  [16-20] 16  BP: (119-138)/(55-91) 138/91  SpO2:  [90 %-98 %] 98 %    Physical Exam  Constitutional:       General: She is not in acute distress.     Appearance: She is not ill-appearing.   HENT:      Head: Normocephalic and atraumatic.      Mouth/Throat:      Mouth: Mucous membranes are dry.      Pharynx: No posterior oropharyngeal erythema.   Eyes:      Extraocular Movements: Extraocular movements intact.   Cardiovascular:      Rate and Rhythm: Normal rate and regular rhythm.      Heart sounds: No friction rub. No gallop.    Pulmonary:      Effort: No respiratory distress.      Breath sounds: Normal breath sounds.   Abdominal:      General: There is no distension.      Palpations: Abdomen is soft.      Tenderness: There is no abdominal tenderness.   Musculoskeletal:         General: No tenderness.   Neurological:      General: No focal deficit present.      Mental Status: She is alert. She is disoriented.   Psychiatric:         Mood and Affect: Mood normal.         Behavior: Behavior normal.         Thought Content: Thought content normal.         Fluids    Intake/Output Summary (Last 24 hours) at 12/26/2021 1227  Last data filed at 12/26/2021 0800  Gross per 24 hour   Intake 540 ml   Output 900 ml   Net -360 ml       Laboratory  Recent Labs     12/24/21  1752 12/25/21  0658 12/26/21  0453   WBC 9.4 8.9 9.9   RBC 4.72 4.44 4.70   HEMOGLOBIN 14.8 14.0 14.5   HEMATOCRIT 42.5 40.5 43.2   MCV 90.0 91.2 91.9   MCH 31.4 31.5 30.9   MCHC 34.8  34.6 33.6   RDW 37.9 39.0 39.9   PLATELETCT 258 243 252   MPV 9.2 9.4 9.6     Recent Labs     12/25/21  0658 12/25/21  0658 12/25/21  1050 12/25/21  1050 12/25/21  1919 12/26/21  0453 12/26/21  0843   SODIUM 123*   < > 125*   < > 127* 133* 133*   POTASSIUM 4.2  --  4.0  --   --  3.8  --    CHLORIDE 89*  --  90*  --   --  97  --    CO2 21  --  22  --   --  24  --    GLUCOSE 83  --  117*  --   --  96  --    BUN 9  --  9  --   --  8  --    CREATININE 0.58  --  0.62  --   --  0.71  --    CALCIUM 8.8  --  8.6  --   --  8.8  --     < > = values in this interval not displayed.                   Imaging  CT-CSPINE WITHOUT PLUS RECONS   Final Result      1.  There is no acute fracture of the cervical spine.   2.  There is extensive degenerative change throughout the cervical spine with degenerative areas of anterolisthesis as noted above in the cervical and upper thoracic spine.         CT-HEAD W/O   Final Result      1.  There is no acute intracranial hemorrhage or infarct.      2.  White matter lucencies most consistent with small vessel ischemic change versus demyelination or gliosis.      3.  There is cerebral atrophy.              Assessment/Plan  * Acute hyponatremia- (present on admission)  Assessment & Plan  Likely due to poor p.o. intake, serum osmolality quite low 250 but does not appear volume overloaded  Still awaiting additional hyponatremia labs  Start gentle IV fluids, trend BMP every 6 hours, monitor for overcorrection although this does seem like an acute process  12/25: Continue monitoring sodium levels every 8.  Avoid rapid correction.  Expect 0.5 to 1 mEq/h.  12/26: Improved with hydration.  Sodium up to 133 mmol/L.  No need for strict and close monitoring for now.     Failure to thrive (child)- (present on admission)  Assessment & Plan  Patient's health has been declining since October per family and patient.  Appetite has been poor.  Encourage supplements between meals  PT/OT/dietitian  evaluation.  Pending SNF acceptance.    Anorexia  Assessment & Plan  Patient reports poor p.o. intake although she is unclear why  PT OT SLP  Nutrition consult    Benign essential HTN- (present on admission)  Assessment & Plan  Holding losartan, as needed's ordered       VTE prophylaxis: heparin ppx    I have performed a physical exam and reviewed and updated ROS and Plan today (12/26/2021). In review of yesterday's note (12/25/2021), there are no changes except as documented above.

## 2021-12-26 NOTE — CARE PLAN
The patient is Stable - Low risk of patient condition declining or worsening    Shift Goals  Clinical Goals: monitor NA + levels   Patient Goals: rest comfortably     Progress made toward(s) clinical / shift goals: Patient's Latest sodium level I 133. Still with ongoing IV fluid NS at 83 ml/hour.To continue monitor....

## 2021-12-26 NOTE — PROGRESS NOTES
Received patient from Night shift RN . Patient is awake and alert.No sign of distress.eating her breakfast,  Patient denies any nausea or pain. Fall precaution in placed, kept bed in lowest position and call light within reach.

## 2021-12-26 NOTE — CARE PLAN
Problem: Knowledge Deficit - Standard  Goal: Patient and family/care givers will demonstrate understanding of plan of care, disease process/condition, diagnostic tests and medications  Outcome: Progressing     Problem: Fall Risk  Goal: Patient will remain free from falls  Outcome: Progressing   The patient is Stable - Low risk of patient condition declining or worsening    Shift Goals  Clinical Goals: monitor NA levels   Patient Goals: rest comfortably     Progress made toward(s) clinical / shift goals:      Patient is not progressing towards the following goals:

## 2021-12-27 VITALS
RESPIRATION RATE: 18 BRPM | SYSTOLIC BLOOD PRESSURE: 137 MMHG | OXYGEN SATURATION: 95 % | WEIGHT: 124.56 LBS | DIASTOLIC BLOOD PRESSURE: 59 MMHG | TEMPERATURE: 98.3 F | BODY MASS INDEX: 24.33 KG/M2 | HEART RATE: 80 BPM

## 2021-12-27 PROBLEM — E87.1 ACUTE HYPONATREMIA: Status: RESOLVED | Noted: 2021-12-24 | Resolved: 2021-12-27

## 2021-12-27 LAB
ALBUMIN SERPL BCP-MCNC: 3.3 G/DL (ref 3.2–4.9)
ALBUMIN/GLOB SERPL: 1.4 G/DL
ALP SERPL-CCNC: 83 U/L (ref 30–99)
ALT SERPL-CCNC: 10 U/L (ref 2–50)
ANION GAP SERPL CALC-SCNC: 10 MMOL/L (ref 7–16)
AST SERPL-CCNC: 17 U/L (ref 12–45)
BILIRUB SERPL-MCNC: 0.4 MG/DL (ref 0.1–1.5)
BUN SERPL-MCNC: 8 MG/DL (ref 8–22)
CALCIUM SERPL-MCNC: 8.2 MG/DL (ref 8.4–10.2)
CHLORIDE SERPL-SCNC: 101 MMOL/L (ref 96–112)
CO2 SERPL-SCNC: 24 MMOL/L (ref 20–33)
CREAT SERPL-MCNC: 0.62 MG/DL (ref 0.5–1.4)
FLUAV RNA SPEC QL NAA+PROBE: NEGATIVE
FLUBV RNA SPEC QL NAA+PROBE: NEGATIVE
GLOBULIN SER CALC-MCNC: 2.4 G/DL (ref 1.9–3.5)
GLUCOSE SERPL-MCNC: 96 MG/DL (ref 65–99)
POTASSIUM SERPL-SCNC: 4 MMOL/L (ref 3.6–5.5)
PROT SERPL-MCNC: 5.7 G/DL (ref 6–8.2)
RSV RNA SPEC QL NAA+PROBE: NEGATIVE
SARS-COV-2 RNA RESP QL NAA+PROBE: NOTDETECTED
SODIUM SERPL-SCNC: 135 MMOL/L (ref 135–145)
SPECIMEN SOURCE: NORMAL

## 2021-12-27 PROCEDURE — 700111 HCHG RX REV CODE 636 W/ 250 OVERRIDE (IP): Performed by: HOSPITALIST

## 2021-12-27 PROCEDURE — 97165 OT EVAL LOW COMPLEX 30 MIN: CPT

## 2021-12-27 PROCEDURE — 92610 EVALUATE SWALLOWING FUNCTION: CPT

## 2021-12-27 PROCEDURE — 0241U HCHG SARS-COV-2 COVID-19 NFCT DS RESP RNA 4 TRGT MIC: CPT

## 2021-12-27 PROCEDURE — A9270 NON-COVERED ITEM OR SERVICE: HCPCS | Performed by: STUDENT IN AN ORGANIZED HEALTH CARE EDUCATION/TRAINING PROGRAM

## 2021-12-27 PROCEDURE — 700102 HCHG RX REV CODE 250 W/ 637 OVERRIDE(OP): Performed by: STUDENT IN AN ORGANIZED HEALTH CARE EDUCATION/TRAINING PROGRAM

## 2021-12-27 PROCEDURE — 80053 COMPREHEN METABOLIC PANEL: CPT

## 2021-12-27 PROCEDURE — A9270 NON-COVERED ITEM OR SERVICE: HCPCS | Performed by: FAMILY MEDICINE

## 2021-12-27 PROCEDURE — C9803 HOPD COVID-19 SPEC COLLECT: HCPCS | Performed by: FAMILY MEDICINE

## 2021-12-27 PROCEDURE — 700102 HCHG RX REV CODE 250 W/ 637 OVERRIDE(OP): Performed by: FAMILY MEDICINE

## 2021-12-27 PROCEDURE — 99239 HOSP IP/OBS DSCHRG MGMT >30: CPT | Performed by: FAMILY MEDICINE

## 2021-12-27 PROCEDURE — 36415 COLL VENOUS BLD VENIPUNCTURE: CPT

## 2021-12-27 PROCEDURE — 700105 HCHG RX REV CODE 258: Performed by: FAMILY MEDICINE

## 2021-12-27 RX ORDER — ZOLPIDEM TARTRATE 5 MG/1
2.5 TABLET ORAL NIGHTLY PRN
Qty: 5 TABLET | Refills: 0 | Status: SHIPPED | OUTPATIENT
Start: 2021-12-27 | End: 2022-01-06

## 2021-12-27 RX ORDER — SODIUM CHLORIDE 1 G/1
1 TABLET ORAL 2 TIMES DAILY WITH MEALS
Qty: 90 TABLET | Refills: 3 | Status: SHIPPED | OUTPATIENT
Start: 2021-12-27 | End: 2022-08-03

## 2021-12-27 RX ORDER — HYDROXYZINE HYDROCHLORIDE 25 MG/1
25 TABLET, FILM COATED ORAL 3 TIMES DAILY PRN
Qty: 30 TABLET | Refills: 0 | Status: SHIPPED
Start: 2021-12-27 | End: 2022-03-02

## 2021-12-27 RX ADMIN — HYDROXYZINE HYDROCHLORIDE 25 MG: 25 TABLET, FILM COATED ORAL at 13:12

## 2021-12-27 RX ADMIN — Medication 400 MG: at 05:13

## 2021-12-27 RX ADMIN — SODIUM CHLORIDE: 9 INJECTION, SOLUTION INTRAVENOUS at 05:14

## 2021-12-27 RX ADMIN — HEPARIN SODIUM 5000 UNITS: 5000 INJECTION, SOLUTION INTRAVENOUS; SUBCUTANEOUS at 05:13

## 2021-12-27 RX ADMIN — SODIUM CHLORIDE 1 G: 1 TABLET ORAL at 07:50

## 2021-12-27 ASSESSMENT — COGNITIVE AND FUNCTIONAL STATUS - GENERAL
HELP NEEDED FOR BATHING: A LITTLE
DRESSING REGULAR LOWER BODY CLOTHING: A LITTLE
TOILETING: A LITTLE
SUGGESTED CMS G CODE MODIFIER DAILY ACTIVITY: CJ
DAILY ACTIVITIY SCORE: 21

## 2021-12-27 ASSESSMENT — PAIN DESCRIPTION - PAIN TYPE
TYPE: ACUTE PAIN
TYPE: ACUTE PAIN

## 2021-12-27 ASSESSMENT — ACTIVITIES OF DAILY LIVING (ADL): TOILETING: INDEPENDENT

## 2021-12-27 NOTE — DISCHARGE PLANNING
Agency/Facility Name: [facility]  Spoke To: Carmen  Outcome: Per carmen the pt daughter called her about pt going to this facility. The daughter thought [facility] was private pay only. Carmen told the pts daughter it was not and the patients insurance is accepted. Carmen advised to this DPA that they dont have a referral and PAT told yanelis the pt has not gave choice yet.     D[facility] as the pt daughter wants this to be the pts first choice.     1233  Agency/Facility Name: [facility]  Spoke To: Carmen  Outcome: per carmen they can take the pt at 1530 today via their transport

## 2021-12-27 NOTE — DISCHARGE PLANNING
Anticipated Discharge Disposition: SNF- [facility]     Action: LSW informed by [facility] that [facility] has a bed available for pt today. Family at bedside and they can take pt today.     LSW met with pt and pt's daughter, Kailee at bedside to collect SNF CHOICE. Pt provided a signature for SNF CHOICE- [facility].     Barriers to Discharge: None     Plan: Await transport time for transfer to SNF, LSW to continue to follow and assist as needed     Addendum 1230  LSW faxed SNF CHOICE to [facility].     Addendum 1234  LSW informed by Fla[facility] STEWART that transport confirmed for 1530.     LSW messaged Dr. Rodriguez and requested COVID test. Negative result required prior to transfer.     Addendum 1250  LSW met with pt at bedside to provide update. Pt provided signature for [facility].   LSW called pt's daughter, Kailee to provide update.     LSW able to meet with Dr. Rodriguez to collect signature for COB[facility].     LSW to place completed transfer packet in pt's chart.     LSW to f/u with pending COVID test results.     Addendum 1353  Pt's COVID results are negative.

## 2021-12-27 NOTE — THERAPY
"Occupational Therapy   Initial Evaluation     Patient Name: Chrissie Dueñas  Age:  89 y.o., Sex:  female  Medical Record #: [id number]  Today's Date: 12/27/2021     Precautions  Precautions: Fall Risk    Assessment  Patient is a 89 y.o. female with h/o HTN who presented to the hospital with increasing weakness and lethargy. Pt found to be hyponatremic. Prior to admit, pt was independent with basic ADLs and functional mobility, other than having assist with bathing due to a fear of falling. During OT eval, pt required CGA for lower body dressing and assist for balance when walking in room with FWW. Pt limited by decreased activity tolerance, impaired balance and generalized weakness. She would benefit from continued OT services.     Plan    Recommend Occupational Therapy 3 times per week until therapy goals are met for the following treatments:  Adaptive Equipment, Self Care/Activities of Daily Living, Therapeutic Activities and Therapeutic Exercises.    DC Equipment Recommendations: Unable to determine at this time  Discharge Recommendations: Recommend post-acute placement for additional occupational therapy services prior to discharge home     Subjective    \"How long do you think it will take for me to get back to normal?\"     Objective       12/27/21 1255   Prior Living Situation   Prior Services Intermittent Physical Support for ADL Per Service   Housing / Facility Independent Living Facility   Steps Into Home 0   Steps In Home 0   Bathroom Set up Bathtub / Shower Combination;Tub Transfer Bench   Equipment Owned Tub Transfer Bench;Grab Bar(s) In Tub / Shower;Grab Bar(s) By Toilet   Lives with - Patient's Self Care Capacity Alone and Able to Care For Self   Comments Pt has assist for meals, bathing and housekeeping    Prior Level of ADL Function   Self Feeding Independent   Grooming / Hygiene Independent   Bathing Requires Assist   Dressing Independent   Toileting Independent   Prior Level of IADL Function " lm     Medication Management Independent   Laundry Independent   Kitchen Mobility Requires Assist   Home Management Requires Assist   Prior Level Of Mobility Independent Without Device in Community;Independent Without Device in Home   Precautions   Precautions Fall Risk   Vitals   O2 Delivery Device None - Room Air   Pain 0 - 10 Group   Therapist Pain Assessment During Activity;Nurse Notified;0   Cognition    Cognition / Consciousness WDL   Level of Consciousness Alert   Active ROM Upper Body   Active ROM Upper Body  WDL   Comments arthritic changes B hands    Strength Upper Body   Upper Body Strength  WDL   Upper Body Muscle Tone   Upper Body Muscle Tone  WDL   Coordination Upper Body   Coordination WDL   Balance Assessment   Sitting Balance (Static) Fair   Sitting Balance (Dynamic) Fair   Standing Balance (Static) Fair -   Standing Balance (Dynamic) Poor +   Weight Shift Sitting Fair   Weight Shift Standing Fair   Comments standing with FWW   Bed Mobility    Supine to Sit Supervised   Sit to Supine Supervised   Scooting Supervised   ADL Assessment   Eating   (assist to open containers )   Grooming Supervision;Seated  (declined to stand at sink due to fatigue/weakness )   Lower Body Dressing Contact Guard Assist  (socks and shoes )   Toileting   (using pure wick catheter )   How much help from another person does the patient currently need...   Putting on and taking off regular lower body clothing? 3   Bathing (including washing, rinsing, and drying)? 3   Toileting, which includes using a toilet, bedpan, or urinal? 3   Putting on and taking off regular upper body clothing? 4   Taking care of personal grooming such as brushing teeth? 4   Eating meals? 4   6 Clicks Daily Activity Score 21   Functional Mobility   Sit to Stand Supervised   Bed, Chair, Wheelchair Transfer Minimal Assist   Mobility walked with FWW and assist for balance    Patient / Family Goals   Patient / Family Goal #1 to get stronger    Short Term Goals    Short Term Goal # 1 Pt will stand at the sink to groom with supervision    Short Term Goal # 2 Pt will complete toilet transfers with supervision    Short Term Goal # 3 Pt will complete LB dressing with supervision    Education Group   Role of Occupational Therapist Patient Response Patient;Acceptance;Explanation;Verbal Demonstration   Problem List   Problem List Decreased Active Daily Living Skills;Decreased Homemaking Skills;Decreased Functional Mobility;Decreased Activity Tolerance;Impaired Postural Control / Balance

## 2021-12-27 NOTE — PROGRESS NOTES
Received patient from Night shift RN . Patient is awake and alert.No sign of distress. Patient denies any nausea or pain. Patient was able to eat her breakfast and consumed 75%. Fall precaution in placed, kept bed in lowest position and call light within reach.

## 2021-12-27 NOTE — DISCHARGE PLANNING
[facility] Transitional Care Coordination    Referral from:  Dr. Rodriguez    Insurance Provider on Facesheet: MCR/ANT    Potential Rehab Diagnosis: Debility    Chart review indicates patient may have on going medical management and may have therapy needs to possibly meet inpatient rehab facility criteria with the goal of returning to community.    D/C support: TBD     Physiatry consultation pended per protocol.     Debility.  Would appreciate an initial OT eval once appropriate.  Msg placed to Dr. Rodriguez, Kaitlin KLEIN & Kayla KLEIN.     Thank you for the referral.     [facility].  [facility] will no longer follow.  Please reach out to myself @ [phone] with any questions.

## 2021-12-27 NOTE — DISCHARGE INSTRUCTIONS
Discharge Instructions    Discharged to other by medical transportation with escort. Discharged via wheelchair, hospital escort: Yes.  Special equipment needed: Not Applicable    Be sure to schedule a follow-up appointment with your primary care doctor or any specialists as instructed.     Discharge Plan:   Diet Plan: Discussed  Activity Level: Discussed  Confirmed Follow up Appointment: Patient to Call and Schedule Appointment  Confirmed Symptoms Management: Discussed  Medication Reconciliation Updated: Yes  Influenza Vaccine Indication: Patient Refuses    I understand that a diet low in cholesterol, fat, and sodium is recommended for good health. Unless I have been given specific instructions below for another diet, I accept this instruction as my diet prescription.   Other diet: Resumed as before    Special Instructions: None    · Is patient discharged on Warfarin / Coumadin?   No     Depression / Suicide Risk    As you are discharged from this [facility] facility, it is important to learn how to keep safe from harming yourself.    Recognize the warning signs:  · Abrupt changes in personality, positive or negative- including increase in energy   · Giving away possessions  · Change in eating patterns- significant weight changes-  positive or negative  · Change in sleeping patterns- unable to sleep or sleeping all the time   · Unwillingness or inability to communicate  · Depression  · Unusual sadness, discouragement and loneliness  · Talk of wanting to die  · Neglect of personal appearance   · Rebelliousness- reckless behavior  · Withdrawal from people/activities they love  · Confusion- inability to concentrate     If you or a loved one observes any of these behaviors or has concerns about self-harm, here's what you can do:  · Talk about it- your feelings and reasons for harming yourself  · Remove any means that you might use to hurt yourself (examples: pills, rope, extension cords, firearm)  · Get professional  help from the community (Mental Health, Substance Abuse, psychological counseling)  · Do not be alone:Call your Safe Contact- someone whom you trust who will be there for you.  · Call your local CRISIS HOTLINE 104-2569 or 244-419-0008  · Call your local Children's Mobile Crisis Response Team [place] (686) 278-8877 or www.Yodio  · Call the toll free National Suicide Prevention Hotlines   · National Suicide Prevention Lifeline 857-956-XEGO (8825)  · National Hope Line Network 800-SUICIDE (609-8774)

## 2021-12-27 NOTE — DISCHARGE SUMMARY
Discharge Summary    CHIEF COMPLAINT ON ADMISSION  Chief Complaint   Patient presents with   • Failure to Thrive     Pt daughter states the patient has been declining ever since a head injury.  Pt is not eating and drinking.  Pt has increase dizziness and nauseated       Reason for Admission  Dehydration; Not eating; Nausea      CODE STATUS  Full Code    HPI & HOSPITAL COURSE  This is a 89 years old female does not have significant past medical history other than hypertension comes in due to generalized weakness and failure to thrive.  Apparently patient has been falling and had a closed head injury in October when she hit her head on a [place].  Since then her health has been declining.  Her appetite and p.o. intake has been decreasing.  Comes in to ER for further evaluation.  Noted to be hemodynamically stable.  Lab work was significant for electrolyte imbalance including severe hyponatremia with sodium of 120 mmol/L.  Start on IV fluids with normal saline at salt tablets were added.  Apparently her hyponatremia has been fluctuating in the past but this time worsened.  Sodium levels improved gradually the course of hospital stay as expected.  Physical occupational therapy evaluated the patient recommended postacute care placement.  Referral was sent and patient was accepted.  Continue to be hemodynamically and clinically stable.  Was cleared for discharge from medical standpoint.    Therefore, she is discharged in guarded and stable condition to skilled nursing facility.    The patient met 2-midnight criteria for an inpatient stay at the time of discharge.      FOLLOW UP ITEMS POST DISCHARGE  Follow-up with MD at SNF facility as per recommendations.    DISCHARGE DIAGNOSES  Principal Problem (Resolved):    Acute hyponatremia POA: Yes  Active Problems:    Benign essential HTN POA: Yes    Anorexia POA: Unknown    Failure to thrive (child) POA: Yes      FOLLOW UP  Future Appointments   Date Time Provider Department  [place]   1/19/2022  9:30 AM Corina Madison P.A.-C. [facility]   6/27/2022 10:00 AM SHAHLA [place]         Schedule an appointment as soon as possible for a visit        MEDICATIONS ON DISCHARGE     Medication List      START taking these medications      Instructions   hydrOXYzine HCl 25 MG Tabs  Commonly known as: ATARAX   Take 1 Tablet by mouth 3 times a day as needed for Anxiety.  Dose: 25 mg     magnesium oxide 400 MG Tabs tablet  Start taking on: December 28, 2021  Commonly known as: MAG-OX   Take 1 Tablet by mouth every day.  Dose: 400 mg     sodium chloride 1 GM Tabs  Commonly known as: SALT   Take 1 Tablet by mouth 2 times a day with meals.  Dose: 1 g     zolpidem 5 MG Tabs  Commonly known as: AMBIEN   Take 0.5 Tablets by mouth at bedtime as needed for Sleep for up to 10 days.  Dose: 2.5 mg        CONTINUE taking these medications      Instructions   acetaminophen 325 MG Tabs  Commonly known as: Tylenol   Doctor's comments: May take over-the-counter as directed for pain.  Take 325 mg by mouth every four hours as needed for Mild Pain.  Dose: 325 mg     escitalopram 5 MG tablet  Commonly known as: Lexapro   Take 1 Tablet by mouth every day.  Dose: 5 mg     losartan 50 MG Tabs  Commonly known as: COZAAR   Take 50 mg by mouth every day.  Dose: 50 mg            Allergies  No Known Allergies    DIET  Orders Placed This Encounter   Procedures   • Diet Order Diet: Regular     Standing Status:   Standing     Number of Occurrences:   1     Order Specific Question:   Diet:     Answer:   Regular [1]       ACTIVITY  As tolerated.  Weight bearing as tolerated    LINES, DRAINS, AND WOUNDS  This is an automated list. Peripheral IVs will be removed prior to discharge.       Wound 10/07/21 Head Dorsal posterior head laceration (Active)                  MENTAL STATUS ON TRANSFER             CONSULTATIONS  None    PROCEDURES  None    LABORATORY  Lab Results   Component Value Date    SODIUM 135  12/27/2021    POTASSIUM 4.0 12/27/2021    CHLORIDE 101 12/27/2021    CO2 24 12/27/2021    GLUCOSE 96 12/27/2021    BUN 8 12/27/2021    CREATININE 0.62 12/27/2021        Lab Results   Component Value Date    WBC 9.9 12/26/2021    HEMOGLOBIN 14.5 12/26/2021    HEMATOCRIT 43.2 12/26/2021    PLATELETCT 252 12/26/2021        Total time of the discharge process exceeds 35 minutes.

## 2021-12-27 NOTE — THERAPY
"Speech Language Pathology   Clinical Swallow Evaluation     Patient Name: Chrissie Dueñas  AGE:  89 y.o., SEX:  female  Medical Record #: [id number]  Today's Date: 12/27/2021          Assessment    89 y/o admitted on 12/24 for weakness. PMH includes HTN. Not seen by SLP before at [facility].     CT of head found \"There is no acute intracranial hemorrhage or infarct.\"    Pt seen on this date for a clinical swallow evaluation. Pt endorsed no hx of dysphagia or difficulty swallowing. She reported that she has had improved appetite and has been enjoying the food while admitted. No asymmetry or weakness appreciated during the oral motor evaluation. She consumed trials of her regular/thin liquid lunch and only throat clear x1 noted. Mastication and swallow trigger timely. She independently fed self but needed assistance opening containers 2/2 arthritis. At this time, recommend continuation regular/thin liquid diet. No further acute SLP services are recommended at this time, however, please re-consult with further concerns or difficulty.    Plan    1) recommend continuation regular/thin liquid diet    Recommend Speech Therapy for Evaluation only     Discharge Recommendations: (P) Anticipate that the patient will have no further speech therapy needs after discharge from the hospital       Objective       12/27/21 1226   Vitals   O2 (LPM) 0   O2 Delivery Device None - Room Air   Pain 0 - 10 Group   Therapist Pain Assessment Post Activity Pain Same as Prior to Activity;Nurse Notified;0   Prior Living Situation   Lives with - Patient's Self Care Capacity Alone and Able to Care For Self   Prior Level Of Function   Communication Within Functional Limits   Swallow Within Functional Limits   Dentition Intact   Hearing Within Functional Limits for Evaluation   Vision Within Functional Limits for Evaluation   Patient's Primary Language English   Occupation (Pre-Hospital Vocational) Not Employed   Oral Motor Eval    Is Patient Able to " Complete Oral Motor Eval Yes, Within Normal Limits   Laryngeal Function   Voice Quality Within Functional Limits   Volutional Cough Not Tested   Excursion Upon Swallow Complete   Oral Food Presentation   Single Swallow Thin (0) Within Functional Limits   Serial Swallow Thin (0) Within Functional Limits   Soft & Bite-Sized (6) - (Dysphagia III) Within Functional Limits   Regular (7) Within Functional Limits   Tracheostomy   Tracheostomy  No   Dysphagia Strategies / Recommendations   Strategies / Interventions Recommended (Yes / No) Yes   Compensatory Strategies None   Diet / Liquid Recommendation Regular (7);Thin (0)   Medication Administration  Whole with Liquid Wash   Dysphagia Rating   Nutritional Liquid Intake Rating Scale Non thickened beverages   Nutritional Food Intake Rating Scale Total oral diet with no restrictions

## 2021-12-27 NOTE — PROGRESS NOTES
Discharge paper work reviewed with patient  at bedside.Copy given.Questions encouraged and aswered. I.V removed. Patient  Was taken by the [facility] transporter.packet with prescription handed to him( [place])

## 2021-12-27 NOTE — DISCHARGE PLANNING
Anticipated Discharge Disposition:   [facility]NF, [facility]     Action:   Chart review complete.     Per MD, patient to discharge to SNF today and is medically cleared. SNF choice to be collected by [facility].     1030: MD and bedside RN notified that OT evaluation and note requested from rehab.     1230: Informed by DPA that [facility] has accepted and that they can take her today. Patient is okay to go by wheelchair.     1235: Transport set up for 1530 today via [facility] wheelchair van. Bedside RN and MD notified.     Barriers to Discharge:   None     Plan:   Hospital care management will continue to assist with discharge planning needs.     Care Transition Team Assessment    Information Source  Orientation Level: Oriented X4  Information Given By: Other (Comments)  Who is responsible for making decisions for patient? : Patient    Readmission Evaluation  Is this a readmission?: Yes - unplanned readmission    Elopement Risk  Legal Hold: No  Ambulatory or Self Mobile in Wheelchair: No-Not an Elopement Risk  Disoriented: No  Psychiatric Symptoms: None  History of Wandering: No  Elopement this Admit: No  Vocalizing Wanting to Leave: No  Displays Behaviors, Body Language Wanting to Leave: No-Not at Risk for Elopement  Elopement Risk: Not at Risk for Elopement    Interdisciplinary Discharge Planning  Does Admitting Nurse Feel This Could be a Complex Discharge?: No  Primary Care Physician: CHARLIE ASHBY  Lives with - Patient's Self Care Capacity: Alone and Able to Care For Self  Patient or legal guardian wants to designate a caregiver: No  Support Systems: Children  Housing / Facility: Independent Living Facility  [facility] Care Facility: [facility]  Do You Take your Prescribed Medications Regularly: Yes  Able to Return to Previous ADL's: Future Time w/Therapy  Mobility Issues: Yes  Prior Services: None  Patient Prefers to be Discharged to:: snf  Assistance Needed: Yes  Durable Medical Equipment: Not Applicable    Discharge  Preparedness  What is your plan after discharge?: Skilled nursing facility  What are your discharge supports?: Child  Prior Functional Level: Needs Assist with Activities of Daily Living,Needs Assist with Medication Management    Functional Assesment  Prior Functional Level: Needs Assist with Activities of Daily Living,Needs Assist with Medication Management    Finances  Financial Barriers to Discharge: No  Prescription Coverage: Yes    Vision / Hearing Impairment  Vision Impairment : Yes  Right Eye Vision: Impaired,Wears Glasses  Left Eye Vision: Impaired,Wears Glasses  Hearing Impairment : Yes  Hearing Impairment: Both Ears,Hearing Device(s) Available  Does Pt Need Special Equipment for the Hearing Impaired?: Yes-But Does not Need for Facility to Arrange Equipment    Domestic Abuse  Have you ever been the victim of abuse or violence?: No  Physical Abuse or Sexual Abuse: No  Verbal Abuse or Emotional Abuse: No  Possible Abuse/Neglect Reported to:: Not Applicable    Discharge Risks or Barriers  Discharge risks or barriers?: Complex medical needs  Patient risk factors: Cognitive / sensory / physical deficit,Complex medical needs,Vulnerable adult    Anticipated Discharge Information  Discharge Disposition: D/T to SNF with medicare cert w/planned hosp IP readmit (99)  Discharge Address: [address] [place] [address]

## 2021-12-28 PROBLEM — F33.0 MILD EPISODE OF RECURRENT MAJOR DEPRESSIVE DISORDER (HCC): Chronic | Status: ACTIVE | Noted: 2021-12-22

## 2021-12-28 PROBLEM — R42 DIZZINESS: Chronic | Status: ACTIVE | Noted: 2021-12-28

## 2021-12-28 PROBLEM — F51.01 PRIMARY INSOMNIA: Status: ACTIVE | Noted: 2021-12-28

## 2021-12-28 PROBLEM — Z86.69 HISTORY OF MACULAR DEGENERATION: Chronic | Status: ACTIVE | Noted: 2020-06-01

## 2021-12-28 PROBLEM — R42 DIZZINESS: Status: ACTIVE | Noted: 2021-12-28

## 2021-12-28 PROBLEM — I10 BENIGN ESSENTIAL HTN: Chronic | Status: ACTIVE | Noted: 2020-06-01

## 2021-12-28 PROBLEM — E87.1 ACUTE HYPONATREMIA: Chronic | Status: ACTIVE | Noted: 2021-12-24

## 2021-12-28 PROBLEM — Z91.81 HISTORY OF FALL: Status: ACTIVE | Noted: 2021-12-28

## 2021-12-29 PROBLEM — R62.7 ADULT FAILURE TO THRIVE: Status: ACTIVE | Noted: 2021-12-25

## 2022-01-02 PROBLEM — R63.0 ANOREXIA: Chronic | Status: ACTIVE | Noted: 2021-12-24

## 2022-01-11 NOTE — PROGRESS NOTES
Chief Complaint   Patient presents with   • Vomiting     Vomiting and diarrhea since last night. Exposed to COVID at        VISIT DIAGNOSIS:   1. Runny nose    2. Vomiting and diarrhea        HPI  Patience ZACK Pittman is a 4 year old female who presents to the Urgent Care Clinic with his mom providing history; chief complaint of vomiting and diarrhea last night.  Did tolerate drinking juice and having saltines today.  Found out about covid exposure at .  No cough.  Minimal congestion.  No fever, chills.    + known direct exposure to Covid 19    PHYSICAL EXAM  Vitals:    01/11/22 1214   Pulse: 117   Resp: 24   Temp: 98 °F (36.7 °C)   TempSrc: Temporal   SpO2: 99%   Weight: (!) 36.3 kg (80 lb)       General: 4 year old female alert and in no apparent distress. Non toxic appearance.  Well nourished and well hydrated.  Heart:  Regular rate and rhythm with no murmur.  Lungs: Clear to auscultation bilaterally. No wheezes, rhonchi, or rales.  No respiratory distress.  Abdomen: soft, nontender, nondistended.  No hepatosplenomegaly.  Normal bowel sounds  HEENT:   Head: normocephalic, Atraumatic.  Eyes: Without conjunctival injection or exudate.  No scleral icterus.  PERRL.  Ears:   Left TM normal, no MICHELLE, no erythema; Left EAC clear  Right TM normal, no MICHELLE, no erythema; Right EAC clear  Nose:  Normal mucosa, no drainage  Mouth/throat: Mucous membranes moist. No oral lesions seen. Pharynx without erythema or exudate.   Neck: No cervical or supraclavicular lymphadenopathy, no masses  Skin: Warm and dry with no rash.    DIAGNOSTICS  Covid PCR pending    ASSESSMENT AND PLAN:  Plan   1. Runny nose    2. Vomiting and diarrhea      Should maintain isolation until covid test result final to determine next steps.    Monitor for continued appropriate activity level and po intake.   Clear liquids and bland diet encouraged.   If there is concern regarding new symptoms, persistent vomiting, fever, or breathing trouble, then  Subjective:   Chrissie Dueñas is a 89 y.o. female here today for DMV paperwork, see scanned in chart. HTN follow up.    Benign essential HTN  Taking losartan 50mg daily, low BPs for the last 2 weeks, will decrease to 25mg daily and f/u 2 weeks       Current medicines (including changes today)  Current Outpatient Medications   Medication Sig Dispense Refill   • losartan (COZAAR) 25 MG Tab Take 1 Tablet by mouth every day. 90 Tablet 0   • losartan (COZAAR) 50 MG Tab TAKE 1 TABLET BY MOUTH  DAILY 90 tablet 3     No current facility-administered medications for this visit.     She  has a past medical history of Hypertension.    ROS   No fever/chills. No weight change. No headache/dizziness. No focal weakness. No sore throat, nasal congestion, ear pain. No chest pain, no shortness of breath, difficulty breathing. No n/v/d/c or abdominal pain. No urinary complaint. No rash or skin lesion. No joint pain or swelling.       Objective:     BP (!) 88/58 (BP Location: Left arm, Patient Position: Sitting, BP Cuff Size: Adult)   Pulse 75   Temp 36.6 °C (97.9 °F) (Temporal)   Ht 1.524 m (5')   Wt 57 kg (125 lb 9.6 oz)   SpO2 96%  Body mass index is 24.53 kg/m².   Physical Exam:  Constitutional: WDWN, NAD  Skin: Warm, dry, good turgor, no rashes in visible areas.  Respiratory: Unlabored respiratory effort, lungs clear to auscultation, no wheezes, no ronchi.  Cardiovascular: Normal S1, S2  Psych: Alert and oriented x3, normal affect and mood.    Assessment and Plan:   The following treatment plan was discussed    1. Benign essential HTN  - losartan (COZAAR) 25 MG Tab; Take 1 Tablet by mouth every day.  Dispense: 90 Tablet; Refill: 0      Followup: 2 weeks          check in with primary care physician or go to ED.

## 2022-02-02 ENCOUNTER — OFFICE VISIT (OUTPATIENT)
Dept: MEDICAL GROUP | Facility: MEDICAL CENTER | Age: 87
End: 2022-02-02
Payer: MEDICARE

## 2022-02-02 ENCOUNTER — HOSPITAL ENCOUNTER (OUTPATIENT)
Dept: LAB | Facility: MEDICAL CENTER | Age: 87
End: 2022-02-02
Attending: PHYSICIAN ASSISTANT
Payer: MEDICARE

## 2022-02-02 VITALS
HEART RATE: 73 BPM | SYSTOLIC BLOOD PRESSURE: 120 MMHG | DIASTOLIC BLOOD PRESSURE: 62 MMHG | TEMPERATURE: 98.5 F | HEIGHT: 60 IN | OXYGEN SATURATION: 92 % | BODY MASS INDEX: 23.48 KG/M2 | WEIGHT: 119.6 LBS

## 2022-02-02 DIAGNOSIS — Z00.00 MEDICARE ANNUAL WELLNESS VISIT, SUBSEQUENT: ICD-10-CM

## 2022-02-02 DIAGNOSIS — F51.01 PRIMARY INSOMNIA: ICD-10-CM

## 2022-02-02 DIAGNOSIS — R42 DIZZINESS: Chronic | ICD-10-CM

## 2022-02-02 DIAGNOSIS — I10 BENIGN ESSENTIAL HTN: ICD-10-CM

## 2022-02-02 DIAGNOSIS — E87.1 ACUTE HYPONATREMIA: ICD-10-CM

## 2022-02-02 DIAGNOSIS — Z91.81 HISTORY OF FALL: ICD-10-CM

## 2022-02-02 DIAGNOSIS — R63.0 ANOREXIA: Chronic | ICD-10-CM

## 2022-02-02 DIAGNOSIS — F33.0 MILD EPISODE OF RECURRENT MAJOR DEPRESSIVE DISORDER (HCC): ICD-10-CM

## 2022-02-02 LAB
ANION GAP SERPL CALC-SCNC: 10 MMOL/L (ref 7–16)
BUN SERPL-MCNC: 13 MG/DL (ref 8–22)
CALCIUM SERPL-MCNC: 9.6 MG/DL (ref 8.5–10.5)
CHLORIDE SERPL-SCNC: 100 MMOL/L (ref 96–112)
CO2 SERPL-SCNC: 23 MMOL/L (ref 20–33)
CREAT SERPL-MCNC: 0.74 MG/DL (ref 0.5–1.4)
GLUCOSE SERPL-MCNC: 87 MG/DL (ref 65–99)
POTASSIUM SERPL-SCNC: 4.7 MMOL/L (ref 3.6–5.5)
SODIUM SERPL-SCNC: 133 MMOL/L (ref 135–145)

## 2022-02-02 PROCEDURE — 80048 BASIC METABOLIC PNL TOTAL CA: CPT

## 2022-02-02 PROCEDURE — 36415 COLL VENOUS BLD VENIPUNCTURE: CPT

## 2022-02-02 PROCEDURE — G0438 PPPS, INITIAL VISIT: HCPCS | Performed by: PHYSICIAN ASSISTANT

## 2022-02-02 RX ORDER — ESCITALOPRAM OXALATE 5 MG/1
5 TABLET ORAL DAILY
Qty: 90 TABLET | Refills: 1 | Status: SHIPPED | OUTPATIENT
Start: 2022-02-02 | End: 2022-08-03

## 2022-02-02 RX ORDER — LOSARTAN POTASSIUM 50 MG/1
50 TABLET ORAL DAILY
Qty: 90 TABLET | Refills: 1 | Status: SHIPPED | OUTPATIENT
Start: 2022-02-02 | End: 2022-05-11 | Stop reason: SDUPTHER

## 2022-02-02 ASSESSMENT — FIBROSIS 4 INDEX: FIB4 SCORE: 1.94

## 2022-02-02 ASSESSMENT — ENCOUNTER SYMPTOMS: GENERAL WELL-BEING: GOOD

## 2022-02-02 ASSESSMENT — PATIENT HEALTH QUESTIONNAIRE - PHQ9: CLINICAL INTERPRETATION OF PHQ2 SCORE: 0

## 2022-02-02 ASSESSMENT — ACTIVITIES OF DAILY LIVING (ADL): BATHING_REQUIRES_ASSISTANCE: 0

## 2022-02-02 NOTE — PROGRESS NOTES
Chief Complaint   Patient presents with   • Medicare Annual Wellness     Discuss hospital follow up   • Medication Management     Discuss medications         HPI:  Chrissie is a 89 y.o. here for Medicare Annual Wellness Visit. F/u hospitalization, skilled nursing visit.     Acute hyponatremia  Taking sodium chloride 1 gram daily, will check BMP    Anorexia  Eating much better now    Benign essential HTN  Taking losartan as prescribed, no dizziness or headache    Dizziness  Improved, no current symptoms    Mild episode of recurrent major depressive disorder (HCC)  Taking lexapro 5mg as prescribed, agrees to continue current    Primary insomnia  States she is sleeping well currently      Patient Active Problem List    Diagnosis Date Noted   • Dizziness 12/28/2021   • Primary insomnia 12/28/2021   • History of fall 12/28/2021   • Failure to thrive in adult 12/25/2021   • Acute hyponatremia 12/24/2021   • Anorexia 12/24/2021   • Mild episode of recurrent major depressive disorder (HCC) 12/22/2021   • Contraindication to deep vein thrombosis (DVT) prophylaxis 10/08/2021   • Skin lesion 07/30/2021   • History of macular degeneration 06/01/2020   • Benign essential HTN 06/01/2020       Current Outpatient Medications   Medication Sig Dispense Refill   • escitalopram (LEXAPRO) 5 MG tablet Take 1 Tablet by mouth every day. 90 Tablet 1   • losartan (COZAAR) 50 MG Tab Take 1 Tablet by mouth every day. 90 Tablet 1   • hydrOXYzine HCl (ATARAX) 25 MG Tab Take 1 Tablet by mouth 3 times a day as needed for Anxiety. 30 Tablet 0   • magnesium oxide (MAG-OX) 400 MG Tab tablet Take 1 Tablet by mouth every day. 10 Tablet 0   • sodium chloride (SALT) 1 GM Tab Take 1 Tablet by mouth 2 times a day with meals. 90 Tablet 3   • acetaminophen (TYLENOL) 325 MG Tab Take 325 mg by mouth every four hours as needed for Mild Pain.       No current facility-administered medications for this visit.        Patient is taking medications as noted in  medication list.  Current supplements as per medication list.     Allergies: Patient has no known allergies.    Current social contact/activities:     Is patient current with immunizations? Yes.    She  reports that she quit smoking about 47 years ago. She has never used smokeless tobacco. She reports current alcohol use. She reports previous drug use.  Counseling given: Not Answered        DPA/Advanced directive: Patient has Living Will on file.     ROS:    Gait: Uses no assistive device   Ostomy: No   Other tubes: No   Amputations: No   Chronic oxygen use No   Last eye exam 10/2021, Dr. Garcia   Wears hearing aids: Yes   : Denies any urinary leakage during the last 6 months      Screening:        Depression Screening    Little interest or pleasure in doing things?  0 - not at all  Feeling down, depressed, or hopeless? 0 - not at all  Patient Health Questionnaire Score: 0    If depressive symptoms identified deferred to follow up visit unless specifically addressed in assessment and plan.    Interpretation of PHQ-9 Total Score   Score Severity   1-4 No Depression   5-9 Mild Depression   10-14 Moderate Depression   15-19 Moderately Severe Depression   20-27 Severe Depression    Screening for Cognitive Impairment    Three Minute Recall (captain, garden, picture)  3/3 Josesito Coker, Picture  Jamarcus clock face with all 12 numbers and set the hands to show 5 past 8.  Yes 8:05 5/5  If cognitive concerns identified, deferred for follow up unless specifically addressed in assessment and plan.    Fall Risk Assessment    Has the patient had two or more falls in the last year or any fall with injury in the last year?  No  If fall risk identified, deferred for follow up unless specifically addressed in assessment and plan.    Safety Assessment    Throw rugs on floor.  No  Handrails on all stairs.  No  Good lighting in all hallways.  Yes  Difficulty hearing.  No  Patient counseled about all safety risks that were  identified.    Functional Assessment ADLs    Are there any barriers preventing you from cooking for yourself or meeting nutritional needs?  No.    Are there any barriers preventing you from driving safely or obtaining transportation?  No.    Are there any barriers preventing you from using a telephone or calling for help?  No.    Are there any barriers preventing you from shopping?  No.    Are there any barriers preventing you from taking care of your own finances?  No.    Are there any barriers preventing you from managing your medications?  No.    Are there any barriers preventing you from showering, bathing or dressing yourself?  No.    Are you currently engaging in any exercise or physical activity?  Yes.  Chair yoga twice a week  What is your perception of your health?  Good.    Health Maintenance Summary          Overdue - IMM ZOSTER VACCINES (2 of 2) Overdue since 11/12/2018 09/17/2018  Imm Admin: Zoster Vaccine Recombinant (RZV) (SHINGRIX)          BONE DENSITY (Every 5 Years) Tentatively due on 7/30/2026 07/30/2021  DS-BONE DENSITY STUDY (DEXA)          IMM DTaP/Tdap/Td Vaccine (2 - Td or Tdap) Next due on 9/7/2029 09/07/2019  Imm Admin: Tdap Vaccine          IMM PNEUMOCOCCAL VACCINE: 65+ Years (Series Information) Completed    06/21/2021  Imm Admin: Pneumococcal polysaccharide vaccine (PPSV-23)          IMM INFLUENZA (Series Information) Completed    09/14/2021  Imm Admin: Influenza Vaccine Quad Recombinant    09/09/2020  Imm Admin: Influenza Vaccine Adult HD    10/07/2019  Imm Admin: Influenza Vaccine Adult HD    09/17/2018  Imm Admin: Influenza Vaccine Adult HD    09/20/2017  Imm Admin: Influenza Vaccine Adult HD    Only the first 5 history entries have been loaded, but more history exists.          COVID-19 Vaccine (Series Information) Completed    11/10/2021  Imm Admin: Pfizer SARS-CoV-2 Vaccine 12+    01/26/2021  Imm Admin: Pfizer SARS-CoV-2 Vaccine    01/05/2021  Imm Admin: Pfizer  SARS-CoV-2 Vaccine          Annual Wellness Visit  Completed    2022  Done    2022  Visit Dx: Medicare annual wellness visit, subsequent          IMM HEP B VACCINE (Series Information) Aged Out    No completion history exists for this topic.          IMM MENINGOCOCCAL VACCINE (MCV4) (Series Information) Aged Out    No completion history exists for this topic.          Discontinued - MAMMOGRAM  Discontinued    No completion history exists for this topic.          Discontinued - PAP SMEAR  Discontinued    No completion history exists for this topic.          Discontinued - COLORECTAL CANCER SCREENING  Discontinued    No completion history exists for this topic.                Patient Care Team:  Corina Madison P.A.-C. as PCP - General (Family Medicine)    Social History     Tobacco Use   • Smoking status: Former Smoker     Quit date: 1974     Years since quittin.7   • Smokeless tobacco: Never Used   Vaping Use   • Vaping Use: Never used   Substance Use Topics   • Alcohol use: Yes     Comment: glass of wine everyday    • Drug use: Not Currently     Family History   Problem Relation Age of Onset   • Cancer Mother    • Stroke Father      She  has a past medical history of Hypertension.   Past Surgical History:   Procedure Laterality Date   • APPENDECTOMY     • OVARIAN CYSTECTOMY             Exam:     /62 (BP Location: Left arm, Patient Position: Sitting, BP Cuff Size: Adult)   Pulse 73   Temp 36.9 °C (98.5 °F) (Temporal)   Ht 1.524 m (5')   Wt 54.3 kg (119 lb 9.6 oz)   SpO2 92%  Body mass index is 23.36 kg/m².      Alert, oriented in no acute distress.  Eye contact is good, speech goal directed, affect calm      Assessment and Plan. The following treatment and monitoring plan is recommended:    1. Medicare annual wellness visit, subsequent     2. Acute hyponatremia  Basic Metabolic Panel   3. Mild episode of recurrent major depressive disorder (HCC)  escitalopram (LEXAPRO) 5 MG tablet    4. Benign essential HTN  losartan (COZAAR) 50 MG Tab   5. Anorexia     6. Dizziness     7. Primary insomnia           Services suggested: none at this time  Health Care Screening recommendations as per orders if indicated.  Referrals offered: PT/OT/Nutrition counseling/Behavioral Health/Smoking cessation as per orders if indicated.    Discussion today about general wellness and lifestyle habits:    · Prevent falls and reduce trip hazards; Cautioned about securing or removing rugs.  · Have a working fire alarm and carbon monoxide detector;   · Engage in regular physical activity and social activities       Follow-up: 1 month

## 2022-03-02 ENCOUNTER — OFFICE VISIT (OUTPATIENT)
Dept: MEDICAL GROUP | Facility: MEDICAL CENTER | Age: 87
End: 2022-03-02
Payer: MEDICARE

## 2022-03-02 ENCOUNTER — HOSPITAL ENCOUNTER (OUTPATIENT)
Dept: LAB | Facility: MEDICAL CENTER | Age: 87
End: 2022-03-02
Attending: PHYSICIAN ASSISTANT
Payer: MEDICARE

## 2022-03-02 VITALS
OXYGEN SATURATION: 94 % | WEIGHT: 121.2 LBS | HEIGHT: 60 IN | SYSTOLIC BLOOD PRESSURE: 128 MMHG | TEMPERATURE: 96.8 F | BODY MASS INDEX: 23.8 KG/M2 | HEART RATE: 75 BPM | DIASTOLIC BLOOD PRESSURE: 56 MMHG

## 2022-03-02 DIAGNOSIS — F33.0 MILD EPISODE OF RECURRENT MAJOR DEPRESSIVE DISORDER (HCC): Chronic | ICD-10-CM

## 2022-03-02 DIAGNOSIS — E87.1 ACUTE HYPONATREMIA: ICD-10-CM

## 2022-03-02 DIAGNOSIS — I10 BENIGN ESSENTIAL HTN: Chronic | ICD-10-CM

## 2022-03-02 PROBLEM — R62.7 FAILURE TO THRIVE IN ADULT: Status: RESOLVED | Noted: 2021-12-25 | Resolved: 2022-03-02

## 2022-03-02 PROBLEM — R42 DIZZINESS: Chronic | Status: RESOLVED | Noted: 2021-12-28 | Resolved: 2022-03-02

## 2022-03-02 PROBLEM — R63.0 ANOREXIA: Chronic | Status: RESOLVED | Noted: 2021-12-24 | Resolved: 2022-03-02

## 2022-03-02 PROBLEM — F51.01 PRIMARY INSOMNIA: Status: RESOLVED | Noted: 2021-12-28 | Resolved: 2022-03-02

## 2022-03-02 LAB
ANION GAP SERPL CALC-SCNC: 13 MMOL/L (ref 7–16)
BUN SERPL-MCNC: 16 MG/DL (ref 8–22)
CALCIUM SERPL-MCNC: 9.4 MG/DL (ref 8.5–10.5)
CHLORIDE SERPL-SCNC: 103 MMOL/L (ref 96–112)
CO2 SERPL-SCNC: 22 MMOL/L (ref 20–33)
CREAT SERPL-MCNC: 0.9 MG/DL (ref 0.5–1.4)
FASTING STATUS PATIENT QL REPORTED: NORMAL
GLUCOSE SERPL-MCNC: 91 MG/DL (ref 65–99)
POTASSIUM SERPL-SCNC: 4.9 MMOL/L (ref 3.6–5.5)
SODIUM SERPL-SCNC: 138 MMOL/L (ref 135–145)

## 2022-03-02 PROCEDURE — 36415 COLL VENOUS BLD VENIPUNCTURE: CPT

## 2022-03-02 PROCEDURE — 99214 OFFICE O/P EST MOD 30 MIN: CPT | Performed by: PHYSICIAN ASSISTANT

## 2022-03-02 PROCEDURE — 80048 BASIC METABOLIC PNL TOTAL CA: CPT

## 2022-03-02 ASSESSMENT — FIBROSIS 4 INDEX: FIB4 SCORE: 1.94

## 2022-03-03 NOTE — ASSESSMENT & PLAN NOTE
Taking salt tablet once daily, would like to stop if possible, will check labs. Feeling well, eating well.

## 2022-03-03 NOTE — PROGRESS NOTES
Subjective:   Chrissie Dueñas is a 89 y.o. female here today for f/u medications    Chief Complaint   Patient presents with   • Medication Management     Follow up Sodium chloride   • Medication Refill     Lexapro, discuss if she should continue taking       Mild episode of recurrent major depressive disorder (HCC)  Patient states symptoms have resolved, would like to stop the lexapro    Benign essential HTN  Doing well on current losartan 50mg daily, agrees to continue current    Acute hyponatremia  Taking salt tablet once daily, would like to stop if possible, will check labs. Feeling well, eating well.       Current medicines (including changes today)  Current Outpatient Medications   Medication Sig Dispense Refill   • escitalopram (LEXAPRO) 5 MG tablet Take 1 Tablet by mouth every day. 90 Tablet 1   • losartan (COZAAR) 50 MG Tab Take 1 Tablet by mouth every day. 90 Tablet 1   • sodium chloride (SALT) 1 GM Tab Take 1 Tablet by mouth 2 times a day with meals. 90 Tablet 3   • acetaminophen (TYLENOL) 325 MG Tab Take 325 mg by mouth every four hours as needed for Mild Pain.       No current facility-administered medications for this visit.     She  has a past medical history of Hypertension.    ROS   No fever/chills. No weight change. No headache/dizziness. No focal weakness. No sore throat, nasal congestion, ear pain. No chest pain, no shortness of breath, difficulty breathing. No n/v/d/c or abdominal pain. No urinary complaint. No rash or skin lesion. No joint pain or swelling.     Objective:     /56 (BP Location: Right arm, Patient Position: Sitting, BP Cuff Size: Adult long)   Pulse 75   Temp 36 °C (96.8 °F) (Temporal)   Ht 1.524 m (5')   Wt 55 kg (121 lb 3.2 oz)   SpO2 94%  Body mass index is 23.67 kg/m².   Physical Exam:  Constitutional: WDWN, NAD  Skin: Warm, dry, good turgor, no rashes in visible areas.  Respiratory: Unlabored respiratory effort, lungs clear to auscultation, no wheezes, no  erasmo.  Cardiovascular: Normal S1, S2, no murmur, no leg edema.  Psych: Alert and oriented x3, normal affect and mood.    Assessment and Plan:   The following treatment plan was discussed    1. Mild episode of recurrent major depressive disorder (HCC)  Ok to taper, f/u if symptoms recur    2. Acute hyponatremia  Will call patient and let her know about results, may be able to stop the salt tablet  - Basic Metabolic Panel; Future    3. Benign essential HTN  Cont current      Followup: 3 months

## 2022-05-11 ENCOUNTER — OFFICE VISIT (OUTPATIENT)
Dept: MEDICAL GROUP | Facility: MEDICAL CENTER | Age: 87
End: 2022-05-11
Payer: MEDICARE

## 2022-05-11 VITALS
BODY MASS INDEX: 24.35 KG/M2 | SYSTOLIC BLOOD PRESSURE: 132 MMHG | TEMPERATURE: 98.2 F | HEIGHT: 60 IN | DIASTOLIC BLOOD PRESSURE: 66 MMHG | HEART RATE: 89 BPM | OXYGEN SATURATION: 95 % | WEIGHT: 124 LBS

## 2022-05-11 DIAGNOSIS — R09.81 NASAL CONGESTION: ICD-10-CM
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
## 2022-05-11 DIAGNOSIS — I10 BENIGN ESSENTIAL HTN: ICD-10-CM

## 2022-05-11 PROCEDURE — 99214 OFFICE O/P EST MOD 30 MIN: CPT | Performed by: PHYSICIAN ASSISTANT

## 2022-05-11 RX ORDER — LOSARTAN POTASSIUM 50 MG/1
50 TABLET ORAL DAILY
Qty: 90 TABLET | Refills: 1 | Status: SHIPPED | OUTPATIENT
Start: 2022-05-11 | End: 2022-08-03 | Stop reason: SDUPTHER

## 2022-05-11 RX ORDER — FLUTICASONE PROPIONATE 50 MCG
1 SPRAY, SUSPENSION (ML) NASAL DAILY
Qty: 16 G | Refills: 1 | Status: SHIPPED | OUTPATIENT
Start: 2022-05-11 | End: 2022-08-03

## 2022-05-11 ASSESSMENT — FIBROSIS 4 INDEX: FIB4 SCORE: 1.94

## 2022-05-11 NOTE — PROGRESS NOTES
Subjective     Chrissie Dueñas is a 89 y.o. female who presents with Depression (Follow up) and Nasal Congestion (Stuffy/clogged constantly)          Chief Complaint   Patient presents with   • Depression     Follow up   • Nasal Congestion     Stuffy/clogged constantly       HPI  Patient presents initially to discuss nasal congestion and f/u chronic conditions.     Unfortunately she tripped in the office and fell forward on her face, hitting her nose. She states it was not dizziness or imbalance. She sometimes forgets to pick her feet up and trips. She had some nose bleeding from the inside and also from a small cut on the nose which stopped by the time I entered the exam room.    Nasal congestion for a few weeks. Starting to affect her sense of taste. No meds or treatments tried. No headache, sinus pain, fever/chills, sore throat or ear pain. No fever/chills. Not feeling sick.     Otherwise doing well. No concerns with BP or mood. Eating and sleeping well. No falls at home recently.    ROSno headache/dizziness. No rash or skin lesion. No neck or back pain. No chest pain, SOB or difficulty breathing. No n/v/d/c or abdominal pain. No rash. No joint redness or swelling.        Active Ambulatory Problems     Diagnosis Date Noted   • History of macular degeneration 06/01/2020   • Benign essential HTN 06/01/2020   • Skin lesion 07/30/2021   • Contraindication to deep vein thrombosis (DVT) prophylaxis 10/08/2021   • Mild episode of recurrent major depressive disorder (HCC) 12/22/2021   • Acute hyponatremia 12/24/2021   • History of fall 12/28/2021     Resolved Ambulatory Problems     Diagnosis Date Noted   • Acute blood loss anemia 10/08/2021   • Trauma 10/08/2021   • Encounter for screening for COVID-19 10/08/2021   • Scalp laceration, initial encounter 10/08/2021   • Anorexia 12/24/2021   • Failure to thrive in adult 12/25/2021   • Dizziness 12/28/2021   • Primary insomnia 12/28/2021     Past Medical History:    Diagnosis Date   • Hypertension      Current Outpatient Medications   Medication Sig Dispense Refill   • fluticasone (FLONASE) 50 MCG/ACT nasal spray Administer 1 Spray into affected nostril(S) every day. 16 g 1   • losartan (COZAAR) 50 MG Tab Take 1 Tablet by mouth every day. 90 Tablet 1   • escitalopram (LEXAPRO) 5 MG tablet Take 1 Tablet by mouth every day. 90 Tablet 1   • sodium chloride (SALT) 1 GM Tab Take 1 Tablet by mouth 2 times a day with meals. 90 Tablet 3   • acetaminophen (TYLENOL) 325 MG Tab Take 325 mg by mouth every four hours as needed for Mild Pain.       No current facility-administered medications for this visit.   nkda  Non-smoker      Objective     /66 (BP Location: Right arm, Patient Position: Sitting, BP Cuff Size: Adult)   Pulse 89   Temp 36.8 °C (98.2 °F) (Temporal)   Ht 1.524 m (5')   Wt 56.2 kg (124 lb)   SpO2 95%   Breastfeeding No   BMI 24.22 kg/m²      Physical Exam  Vitals and nursing note reviewed.   Constitutional:       General: She is not in acute distress.     Appearance: Normal appearance. She is normal weight. She is not ill-appearing, toxic-appearing or diaphoretic.   HENT:      Head: Normocephalic.      Right Ear: Tympanic membrane, ear canal and external ear normal.      Left Ear: Tympanic membrane, ear canal and external ear normal.      Nose:        Comments: Mild swelling and bruising, no crepitus or obvious deformity, small cut on nose not bleeding at time of exam, bilat nares patent without active bleeding at time of exam     Mouth/Throat:      Mouth: Mucous membranes are moist.      Pharynx: Oropharynx is clear.   Eyes:      Extraocular Movements: Extraocular movements intact.      Conjunctiva/sclera: Conjunctivae normal.      Pupils: Pupils are equal, round, and reactive to light.   Cardiovascular:      Rate and Rhythm: Regular rhythm. Tachycardia present.   Pulmonary:      Effort: Pulmonary effort is normal.      Breath sounds: Normal breath sounds.    Skin:     General: Skin is warm and dry.      Coloration: Skin is not pale.      Findings: No rash.   Neurological:      General: No focal deficit present.      Mental Status: She is alert and oriented to person, place, and time.   Psychiatric:         Mood and Affect: Mood normal.         Behavior: Behavior normal.         Thought Content: Thought content normal.         Judgment: Judgment normal.                             Assessment & Plan        1. Fall, initial encounter  After care instructions given, ER with repeated fall, dizziness, uncontrolled bleeding, etc.    2. Benign essential HTN    - losartan (COZAAR) 50 MG Tab; Take 1 Tablet by mouth every day.  Dispense: 90 Tablet; Refill: 1    3. Nasal congestion    - fluticasone (FLONASE) 50 MCG/ACT nasal spray; Administer 1 Spray into affected nostril(S) every day.  Dispense: 16 g; Refill: 1        patient will establish with new PCP as I am leaving [facility]

## 2022-05-11 NOTE — PATIENT INSTRUCTIONS
Start Flonase prescription once daily for nasal congestion. Wait 2-3 days for nose to heal first.  Options for new provider: wait a month and then establish with new provider here at [facility]; establish with a provider at [facility]; establish with a provider [facility].  Continue current medicines

## 2022-06-29 ENCOUNTER — APPOINTMENT (RX ONLY)
Dept: URBAN - METROPOLITAN AREA CLINIC 20 | Facility: CLINIC | Age: 87
Setting detail: DERMATOLOGY
End: 2022-06-29

## 2022-06-29 PROBLEM — D48.5 NEOPLASM OF UNCERTAIN BEHAVIOR OF SKIN: Status: ACTIVE | Noted: 2022-06-29

## 2022-06-29 PROCEDURE — 11102 TANGNTL BX SKIN SINGLE LES: CPT

## 2022-06-29 PROCEDURE — ? BIOPSY BY SHAVE METHOD

## 2022-06-29 NOTE — PROCEDURE: MIPS QUALITY
Quality 130: Documentation Of Current Medications In The Medical Record: Current Medications Documented
Quality 111:Pneumonia Vaccination Status For Older Adults: Pneumococcal Vaccination Previously Received
Detail Level: Detailed
Quality 431: Preventive Care And Screening: Unhealthy Alcohol Use - Screening: Patient screened for unhealthy alcohol use using a single question and scores less than 2 times per year
Quality 226: Preventive Care And Screening: Tobacco Use: Screening And Cessation Intervention: Patient screened for tobacco use and is an ex/non-smoker
Quality 402: Tobacco Use And Help With Quitting Among Adolescents: Patient screened for tobacco and never smoked

## 2022-07-15 ENCOUNTER — APPOINTMENT (RX ONLY)
Dept: URBAN - METROPOLITAN AREA CLINIC 36 | Facility: CLINIC | Age: 87
Setting detail: DERMATOLOGY
End: 2022-07-15

## 2022-07-15 DIAGNOSIS — L57.0 ACTINIC KERATOSIS: ICD-10-CM

## 2022-07-15 PROBLEM — C44.319 BASAL CELL CARCINOMA OF SKIN OF OTHER PARTS OF FACE: Status: ACTIVE | Noted: 2022-07-15

## 2022-07-15 PROCEDURE — 17311 MOHS 1 STAGE H/N/HF/G: CPT

## 2022-07-15 PROCEDURE — 13132 CMPLX RPR F/C/C/M/N/AX/G/H/F: CPT

## 2022-07-15 PROCEDURE — ? PRESCRIPTION

## 2022-07-15 PROCEDURE — 17312 MOHS ADDL STAGE: CPT

## 2022-07-15 PROCEDURE — ? COUNSELING

## 2022-07-15 PROCEDURE — ? MOHS SURGERY

## 2022-07-15 PROCEDURE — 99213 OFFICE O/P EST LOW 20 MIN: CPT | Mod: 25

## 2022-07-15 RX ORDER — FLUOROURACIL 5 MG/G
1 CREAM TOPICAL QD
Qty: 40 | Refills: 3 | Status: ERX | COMMUNITY
Start: 2022-07-15

## 2022-07-15 RX ADMIN — FLUOROURACIL 1: 5 CREAM TOPICAL at 00:00

## 2022-07-15 ASSESSMENT — LOCATION DETAILED DESCRIPTION DERM: LOCATION DETAILED: LEFT INFERIOR CENTRAL MALAR CHEEK

## 2022-07-15 ASSESSMENT — LOCATION SIMPLE DESCRIPTION DERM: LOCATION SIMPLE: LEFT CHEEK

## 2022-07-15 ASSESSMENT — LOCATION ZONE DERM: LOCATION ZONE: FACE

## 2022-07-15 NOTE — PROCEDURE: MOHS SURGERY
Postop Diagnosis: same
Tarsorrhaphy Performed?: No
S Plasty Text: Given the location and shape of the defect, and the orientation of relaxed skin tension lines, an S-plasty was deemed most appropriate for repair.  Using a sterile surgical marker, the appropriate outline of the S-plasty was drawn, incorporating the defect and placing the expected incisions within the relaxed skin tension lines where possible.  The area thus outlined was incised deep to adipose tissue with a #15 scalpel blade.  The skin margins were undermined to an appropriate distance in all directions utilizing iris scissors. The skin flaps were advanced over the defect.  The opposing margins were then approximated with interrupted buried subcutaneous sutures.
Deep Sutures: 5-0 Polysorb
Posterior Auricular Interpolation Flap Text: A decision was made to reconstruct the defect utilizing an interpolation axial flap and a staged reconstruction.  A telfa template was made of the defect.  This telfa template was then used to outline the posterior auricular interpolation flap.  The donor area for the pedicle flap was then injected with anesthesia.  The flap was excised through the skin and subcutaneous tissue down to the layer of the underlying musculature.  The pedicle flap was carefully excised within this deep plane to maintain its blood supply.  The edges of the donor site were undermined.   The donor site was closed in a primary fashion.  The pedicle was then rotated into position and sutured.  Once the tube was sutured into place, adequate blood supply was confirmed with blanching and refill.  The pedicle was then wrapped with xeroform gauze and dressed appropriately with a telfa and gauze bandage to ensure continued blood supply and protect the attached pedicle.
Repair Anesthesia Type: 1% lidocaine with epinephrine and 0.25% bupivacaine in a 2:3 ration buffered with 8.4% sodium bicarbonate
Epidermal Closure Graft Donor Site (Optional): running
Validate Note Data (See Information Below): Yes
Mohs Case Number: [id number]
Mid-Level Procedure Text (A): After obtaining clear surgical margins the patient was sent to a mid-level provider for surgical repair.  The patient understands they will receive post-surgical care and follow-up from the mid-level provider.
Spiral Flap Text: The defect edges were debeveled with a #15 scalpel blade.  Given the location of the defect, shape of the defect and the proximity to free margins a spiral flap was deemed most appropriate.  Using a sterile surgical marker, an appropriate rotation flap was drawn incorporating the defect and placing the expected incisions within the relaxed skin tension lines where possible. The area thus outlined was incised deep to adipose tissue with a #15 scalpel blade.  The skin margins were undermined to an appropriate distance in all directions utilizing iris scissors.
Stage 5: Number Of Blocks?: 0
M-Plasty Intermediate Repair Preamble Text (Leave Blank If You Do Not Want): Undermining was performed with blunt dissection.
Melolabial Interpolation Flap Text: A decision was made to reconstruct the defect utilizing an interpolation axial flap and a staged reconstruction.  A telfa template was made of the defect.  This telfa template was then used to outline the melolabial interpolation flap.  The donor area for the pedicle flap was then injected with anesthesia.  The flap was excised through the skin and subcutaneous tissue down to the layer of the underlying musculature.  The pedicle flap was carefully excised within this deep plane to maintain its blood supply.  The edges of the donor site were undermined.   The donor site was closed in a primary fashion.  The pedicle was then rotated into position and sutured.  Once the tube was sutured into place, adequate blood supply was confirmed with blanching and refill.  The pedicle was then wrapped with xeroform gauze and dressed appropriately with a telfa and gauze bandage to ensure continued blood supply and protect the attached pedicle.
Epidermal Closure: running and interrupted
Hatchet Flap Text: The defect edges were debeveled with a #15 scalpel blade.  Given the location of the defect, shape of the defect and the proximity to free margins a hatchet flap was deemed most appropriate.  Using a sterile surgical marker, an appropriate hatchet flap was drawn incorporating the defect and placing the expected incisions within the relaxed skin tension lines where possible.    The area thus outlined was incised deep to adipose tissue with a #15 scalpel blade.  The skin margins were undermined to an appropriate distance in all directions utilizing iris scissors.
Mart-1 - Positive Histology Text: MART-1 staining demonstrates areas of higher density and clustering of melanocytes with Pagetoid spread upwards within the epidermis. The surgical margins are positive for tumor cells.
Consent (Ear)/Introductory Paragraph: The rationale for Mohs was explained to the patient and consent was obtained. The risks, benefits and alternatives to therapy were discussed in detail. Specifically, the risks of ear deformity, infection, scarring, bleeding, prolonged wound healing, incomplete removal, allergy to anesthesia, nerve injury and recurrence were addressed. Prior to the procedure, the treatment site was clearly identified and confirmed by the patient. All components of Universal Protocol/PAUSE Rule completed.
Oculoplastic Surgeon Procedure Text (D): After obtaining clear surgical margins the patient was sent to oculoplastics for surgical repair.  The patient understands they will receive post-surgical care and follow-up from the referring physician's office.
Stage 15: Additional Anesthesia Type: 1% lidocaine with epinephrine
Rhomboid Transposition Flap Text: The defect edges were debeveled with a #15 scalpel blade.  Given the location of the defect and the proximity to free margins a rhomboid transposition flap was deemed most appropriate.  Using a sterile surgical marker, an appropriate rhomboid flap was drawn incorporating the defect.    The area thus outlined was incised deep to adipose tissue with a #15 scalpel blade.  The skin margins were undermined to an appropriate distance in all directions utilizing iris scissors.
Area L Indication Text: Tumors in this location are included in Area L (trunk and extremities).  Mohs surgery is indicated for larger tumors, or tumors with aggressive histologic features, in these anatomic locations.
Tumor Depth: Less than 6mm from granular layer and no invasion beyond the subcutaneous fat
Otolaryngologist Procedure Text (D): After obtaining clear surgical margins the patient was sent to otolaryngology for surgical repair.  The patient understands they will receive post-surgical care and follow-up from the referring physician's office.
Dermal Autograft Text: The defect edges were debeveled with a #15 scalpel blade.  Given the location of the defect, shape of the defect and the proximity to free margins a dermal autograft was deemed most appropriate.  Using a sterile surgical marker, the primary defect shape was transferred to the donor site. The area thus outlined was incised deep to adipose tissue with a #15 scalpel blade.  The harvested graft was then trimmed of adipose and epidermal tissue until only dermis was left.  The skin graft was then placed in the primary defect and oriented appropriately.
Provider Procedure Text (B): After obtaining clear surgical margins the defect was repaired by another provider.
Cartilage Graft Text: The defect edges were debeveled with a #15 scalpel blade.  Given the location of the defect, shape of the defect, the fact the defect involved a full thickness cartilage defect a cartilage graft was deemed most appropriate.  An appropriate donor site was identified, cleansed, and anesthetized. The cartilage graft was then harvested and transferred to the recipient site, oriented appropriately and then sutured into place.  The secondary defect was then repaired using a primary closure.
M-Plasty Complex Repair Preamble Text (Leave Blank If You Do Not Want): Extensive wide undermining was performed.
Consent (Near Eyelid Margin)/Introductory Paragraph: The rationale for Mohs was explained to the patient and consent was obtained. The risks, benefits and alternatives to therapy were discussed in detail. Specifically, the risks of ectropion or eyelid deformity, infection, scarring, bleeding, prolonged wound healing, incomplete removal, allergy to anesthesia, nerve injury and recurrence were addressed. Prior to the procedure, the treatment site was clearly identified and confirmed by the patient. All components of Universal Protocol/PAUSE Rule completed.
Closure 4 Information: This tab is for additional flaps and grafts above and beyond our usual structured repairs.  Please note if you enter information here it will not currently bill and you will need to add the billing information manually.
Orbicularis Oris Muscle Flap Text: The defect edges were debeveled with a #15 scalpel blade.  Given that the defect affected the competency of the oral sphincter an orbicularis oris muscle flap was deemed most appropriate to restore this competency and normal muscle function.  Using a sterile surgical marker, an appropriate flap was drawn incorporating the defect. The area thus outlined was incised with a #15 scalpel blade.
Helical Rim Advancement Flap Text: The defect edges were debeveled with a #15 blade scalpel.  Given the location of the defect and the proximity to free margins (helical rim) a double helical rim advancement flap was deemed most appropriate.  Using a sterile surgical marker, the appropriate advancement flaps were drawn incorporating the defect and placing the expected incisions between the helical rim and antihelix where possible.  The area thus outlined was incised through and through with a #15 scalpel blade.  With a skin hook and iris scissors, the flaps were gently and sharply undermined and freed up.
Closure 2 Information: This tab is for additional flaps and grafts, including complex repair and grafts and complex repair and flaps. You can also specify a different location for the additional defect, if the location is the same you do not need to select a new one. We will insert the automated text for the repair you select below just as we do for solitary flaps and grafts. Please note that at this time if you select a location with a different insurance zone you will need to override the ICD10 and CPT if appropriate.
Double Island Pedicle Flap Text: The defect edges were debeveled with a #15 scalpel blade.  Given the location of the defect, shape of the defect and the proximity to free margins a double island pedicle advancement flap was deemed most appropriate.  Using a sterile surgical marker, an appropriate advancement flap was drawn incorporating the defect, outlining the appropriate donor tissue and placing the expected incisions within the relaxed skin tension lines where possible.    The area thus outlined was incised deep to adipose tissue with a #15 scalpel blade.  The skin margins were undermined to an appropriate distance in all directions around the primary defect and laterally outward around the island pedicle utilizing iris scissors.  There was minimal undermining beneath the pedicle flap.
Suturegard Intro: Intraoperative tissue expansion was performed, utilizing the SUTUREGARD device, in order to reduce wound tension.
Nasalis-Muscle-Based Myocutaneous Island Pedicle Flap Text: Using a #15 blade, an incision was made around the donor flap to the level of the nasalis muscle. Wide lateral undermining was then performed in both the subcutaneous plane above the nasalis muscle, and in a submuscular plane just above periosteum. This allowed the formation of a free nasalis muscle axial pedicle (based on the angular artery) which was still attached to the actual cutaneous flap, increasing its mobility and vascular viability. Hemostasis was obtained with pinpoint electrocoagulation. The flap was mobilized into position and the pivotal anchor points positioned and stabilized with buried interrupted sutures. Subcutaneous and dermal tissues were closed in a multilayered fashion with sutures. Tissue redundancies were excised, and the epidermal edges were apposed without significant tension and sutured with sutures.
Z Plasty Text: The lesion was extirpated to the level of the fat with a #15 scalpel blade.  Given the location of the defect, shape of the defect and the proximity to free margins a Z-plasty was deemed most appropriate for repair.  Using a sterile surgical marker, the appropriate transposition arms of the Z-plasty were drawn incorporating the defect and placing the expected incisions within the relaxed skin tension lines where possible.    The area thus outlined was incised deep to adipose tissue with a #15 scalpel blade.  The skin margins were undermined to an appropriate distance in all directions utilizing iris scissors.  The opposing transposition arms were then transposed into place in opposite direction and anchored with interrupted buried subcutaneous sutures.
Double O-Z Flap Text: The defect edges were debeveled with a #15 scalpel blade.  Given the location of the defect, shape of the defect and the proximity to free margins a Double O-Z flap was deemed most appropriate.  Using a sterile surgical marker, an appropriate transposition flap was drawn incorporating the defect and placing the expected incisions within the relaxed skin tension lines where possible. The area thus outlined was incised deep to adipose tissue with a #15 scalpel blade.  The skin margins were undermined to an appropriate distance in all directions utilizing iris scissors.
Surgeon/Pathologist Verbiage (Will Incorporate Name Of Surgeon From Intro If Not Blank): operated in two distinct and integrated capacities as the surgeon and pathologist.
Mohs Rapid Report Verbiage: The area of clinically evident tumor was marked with skin marking ink and appropriately hatched.  The initial incision was made following the Mohs approach through the skin.  The specimen was taken to the lab, divided into the necessary number of pieces, chromacoded and processed according to the Mohs protocol.  This was repeated in successive stages until a tumor free defect was achieved.
Bi-Rhombic Flap Text: The defect edges were debeveled with a #15 scalpel blade.  Given the location of the defect and the proximity to free margins a bi-rhombic flap was deemed most appropriate.  Using a sterile surgical marker, an appropriate rhombic flap was drawn incorporating the defect. The area thus outlined was incised deep to adipose tissue with a #15 scalpel blade.  The skin margins were undermined to an appropriate distance in all directions utilizing iris scissors.
W Plasty Text: The lesion was extirpated to the level of the fat with a #15 scalpel blade.  Given the location of the defect, shape of the defect and the proximity to free margins a W-plasty was deemed most appropriate for repair.  Using a sterile surgical marker, the appropriate transposition arms of the W-plasty were drawn incorporating the defect and placing the expected incisions within the relaxed skin tension lines where possible.    The area thus outlined was incised deep to adipose tissue with a #15 scalpel blade.  The skin margins were undermined to an appropriate distance in all directions utilizing iris scissors.  The opposing transposition arms were then transposed into place in opposite direction and anchored with interrupted buried subcutaneous sutures.
Wound Care (No Sutures): Petrolatum
Subsequent Stages Histo Method Verbiage: Using a similar technique to that described above, a thin layer of tissue was removed from all areas where tumor was visible on the previous stage.  The tissue was again oriented, mapped, dyed, and processed as above.
Helical Rim Text: The closure involved the helical rim.
Composite Graft Text: The defect edges were debeveled with a #15 scalpel blade.  Given the location of the defect, shape of the defect, the proximity to free margins and the fact the defect was full thickness a composite graft was deemed most appropriate.  The defect was outline and then transferred to the donor site.  A full thickness graft was then excised from the donor site. The graft was then placed in the primary defect, oriented appropriately and then sutured into place.  The secondary defect was then repaired using a primary closure.
Consent 2/Introductory Paragraph: Mohs surgery was explained to the patient and consent was obtained. The risks, benefits and alternatives to therapy were discussed in detail. Specifically, the risks of infection, scarring, bleeding, prolonged wound healing, incomplete removal, allergy to anesthesia, nerve injury and recurrence were addressed. Prior to the procedure, the treatment site was clearly identified and confirmed by the patient. All components of Universal Protocol/PAUSE Rule completed.
Mohs Method Verbiage: An incision at a 45 degree angle following the standard Mohs approach was done and the specimen was harvested as a microscopic controlled layer.
Consent (Spinal Accessory)/Introductory Paragraph: The rationale for Mohs was explained to the patient and consent was obtained. The risks, benefits and alternatives to therapy were discussed in detail. Specifically, the risks of damage to the spinal accessory nerve, infection, scarring, bleeding, prolonged wound healing, incomplete removal, allergy to anesthesia, and recurrence were addressed. Prior to the procedure, the treatment site was clearly identified and confirmed by the patient. All components of Universal Protocol/PAUSE Rule completed.
Cheek Interpolation Flap Text: A decision was made to reconstruct the defect utilizing an interpolation axial flap and a staged reconstruction.  A telfa template was made of the defect.  This telfa template was then used to outline the Cheek Interpolation flap.  The donor area for the pedicle flap was then injected with anesthesia.  The flap was excised through the skin and subcutaneous tissue down to the layer of the underlying musculature.  The interpolation flap was carefully excised within this deep plane to maintain its blood supply.  The edges of the donor site were undermined.   The donor site was closed in a primary fashion.  The pedicle was then rotated into position and sutured.  Once the tube was sutured into place, adequate blood supply was confirmed with blanching and refill.  The pedicle was then wrapped with xeroform gauze and dressed appropriately with a telfa and gauze bandage to ensure continued blood supply and protect the attached pedicle.
Island Pedicle Flap-Requiring Vessel Identification Text: The defect edges were debeveled with a #15 scalpel blade.  Given the location of the defect, shape of the defect and the proximity to free margins an island pedicle advancement flap was deemed most appropriate.  Using a sterile surgical marker, an appropriate advancement flap was drawn, based on the axial vessel mentioned above, incorporating the defect, outlining the appropriate donor tissue and placing the expected incisions within the relaxed skin tension lines where possible.    The area thus outlined was incised deep to adipose tissue with a #15 scalpel blade.  The skin margins were undermined to an appropriate distance in all directions around the primary defect and laterally outward around the island pedicle utilizing iris scissors.  There was minimal undermining beneath the pedicle flap.
Is There Documentation In The Chart Showing Discussion Of Changes With Another Physician?: Please Select the Appropriate Response
O-T Plasty Text: The defect edges were debeveled with a #15 scalpel blade.  Given the location of the defect, shape of the defect and the proximity to free margins an O-T plasty was deemed most appropriate.  Using a sterile surgical marker, an appropriate O-T plasty was drawn incorporating the defect and placing the expected incisions within the relaxed skin tension lines where possible.    The area thus outlined was incised deep to adipose tissue with a #15 scalpel blade.  The skin margins were undermined to an appropriate distance in all directions utilizing iris scissors.
Dorsal Nasal Flap Text: The defect edges were debeveled with a #15 scalpel blade.  Given the location of the defect and the proximity to free margins a dorsal nasal flap was deemed most appropriate.  Using a sterile surgical marker, an appropriate dorsal nasal flap was drawn around the defect.    The area thus outlined was incised deep to adipose tissue with a #15 scalpel blade.  The skin margins were undermined to an appropriate distance in all directions utilizing iris scissors.
Partial Purse String (Simple) Text: Given the location of the defect and the characteristics of the surrounding skin a simple purse string closure was deemed most appropriate.  Undermining was performed circumfirentially around the surgical defect.  A purse string suture was then placed and tightened. Wound tension only allowed a partial closure of the circular defect.
Melolabial Transposition Flap Text: The defect edges were debeveled with a #15 scalpel blade.  Given the location of the defect and the proximity to free margins a melolabial flap was deemed most appropriate.  Using a sterile surgical marker, an appropriate melolabial transposition flap was drawn incorporating the defect.    The area thus outlined was incised deep to adipose tissue with a #15 scalpel blade.  The skin margins were undermined to an appropriate distance in all directions utilizing iris scissors.
Location Indication Override (Is Already Calculated Based On Selected Body Location): Area M
Plastic Surgeon Procedure Text (F): After obtaining clear surgical margins the patient was sent to plastics for surgical repair.  The patient understands they will receive post-surgical care and follow-up from the referring physician's office.
Suture Removal: 10 days
Width Of Defect Perpendicular To Closure In Cm (Required): 2
Nostril Rim Text: The closure involved the nostril rim.
Asc Procedure Text (A): After obtaining clear surgical margins the patient was sent to an ASC for surgical repair.  The patient understands they will receive post-surgical care and follow-up from the ASC physician.
Special Stains Stage 6 - Results: Base On Clearance Noted Above
Date Of Previous Biopsy (Optional): 6/29/22
Donor Site Anesthesia Type: same as repair anesthesia
Complex Repair And Flap Additional Text (Will Appearing After The Standard Complex Repair Text): The complex repair was not sufficient to completely close the primary defect. The remaining additional defect was repaired with the flap mentioned below.
Rhombic Flap Text: The defect edges were debeveled with a #15 scalpel blade.  Given the location of the defect and the proximity to free margins a rhombic flap was deemed most appropriate.  Using a sterile surgical marker, an appropriate rhombic flap was drawn incorporating the defect.    The area thus outlined was incised deep to adipose tissue with a #15 scalpel blade.  The skin margins were undermined to an appropriate distance in all directions utilizing iris scissors.
O-T Advancement Flap Text: The defect edges were debeveled with a #15 scalpel blade.  Given the location of the defect, shape of the defect and the proximity to free margins an O-T advancement flap was deemed most appropriate.  Using a sterile surgical marker, an appropriate advancement flap was drawn incorporating the defect and placing the expected incisions within the relaxed skin tension lines where possible.    The area thus outlined was incised deep to adipose tissue with a #15 scalpel blade.  The skin margins were undermined to an appropriate distance in all directions utilizing iris scissors.
Ear Star Wedge Flap Text: The defect edges were debeveled with a #15 blade scalpel.  Given the location of the defect and the proximity to free margins (helical rim) an ear star wedge flap was deemed most appropriate.  Using a sterile surgical marker, the appropriate flap was drawn incorporating the defect and placing the expected incisions between the helical rim and antihelix where possible.  The area thus outlined was incised through and through with a #15 scalpel blade.
Rotation Flap Text: The defect edges were debeveled with a #15 scalpel blade.  Given the location of the defect, shape of the defect and the proximity to free margins a rotation flap was deemed most appropriate.  Using a sterile surgical marker, an appropriate rotation flap was drawn incorporating the defect and placing the expected incisions within the relaxed skin tension lines where possible.    The area thus outlined was incised deep to adipose tissue with a #15 scalpel blade.  The skin margins were undermined to an appropriate distance in all directions utilizing iris scissors.
Complex Repair And Graft Additional Text (Will Appearing After The Standard Complex Repair Text): The complex repair was not sufficient to completely close the primary defect. The remaining additional defect was repaired with the graft mentioned below.
Pain Refusal Text: I offered to prescribe pain medication but the patient refused to take this medication.
Medical Necessity Statement: Based on my medical judgement, Mohs surgery is the most appropriate treatment for this cancer compared to other treatments.
Staging Info: By selecting yes to the question above you will include information on AJCC 8 tumor staging in your Mohs note. Information on tumor staging will be automatically added for SCCs on the head and neck. AJCC 8 includes tumor size, tumor depth, perineural involvement and bone invasion.
Graft Donor Site Bandage (Optional-Leave Blank If You Don't Want In Note): Aquaplast was fitted to the graft site and sewn into place. A pressure bandage were applied to the donor site and over the aquaplast bolster.
A-T Advancement Flap Text: The defect edges were debeveled with a #15 scalpel blade.  Given the location of the defect, shape of the defect and the proximity to free margins an A-T advancement flap was deemed most appropriate.  Using a sterile surgical marker, an appropriate advancement flap was drawn incorporating the defect and placing the expected incisions within the relaxed skin tension lines where possible.    The area thus outlined was incised deep to adipose tissue with a #15 scalpel blade.  The skin margins were undermined to an appropriate distance in all directions utilizing iris scissors.
X Size Of Lesion In Cm (Optional): 0.8
Where Do You Want The Question To Include Opioid Counseling Located?: Case Summary Tab
Modified Advancement Flap Text: The defect edges were debeveled with a #15 scalpel blade.  Given the location of the defect, shape of the defect and the proximity to free margins a modified advancement flap was deemed most appropriate.  Using a sterile surgical marker, an appropriate advancement flap was drawn incorporating the defect and placing the expected incisions within the relaxed skin tension lines where possible.    The area thus outlined was incised deep to adipose tissue with a #15 scalpel blade.  The skin margins were undermined to an appropriate distance in all directions utilizing iris scissors.
Graft Donor Site Epidermal Sutures (Optional): 5-0 Surgipro
Consent (Scalp)/Introductory Paragraph: The rationale for Mohs was explained to the patient and consent was obtained. The risks, benefits and alternatives to therapy were discussed in detail. Specifically, the risks of changes in hair growth pattern secondary to repair, infection, scarring, bleeding, prolonged wound healing, incomplete removal, allergy to anesthesia, nerve injury and recurrence were addressed. Prior to the procedure, the treatment site was clearly identified and confirmed by the patient. All components of Universal Protocol/PAUSE Rule completed.
Burow's Advancement Flap Text: The defect edges were debeveled with a #15 scalpel blade.  Given the location of the defect and the proximity to free margins a Burow's advancement flap was deemed most appropriate.  Using a sterile surgical marker, the appropriate advancement flap was drawn incorporating the defect and placing the expected incisions within the relaxed skin tension lines where possible.    The area thus outlined was incised deep to adipose tissue with a #15 scalpel blade.  The skin margins were undermined to an appropriate distance in all directions utilizing iris scissors.
Eye Protection Verbiage: Before proceeding with the stage, a plastic scleral shield was inserted. The globe was anesthetized with a few drops of 1% lidocaine with 1:100,000 epinephrine. Then, an appropriate sized scleral shield was chosen and coated with lacrilube ointment. The shield was gently inserted and left in place for the duration of each stage. After the stage was completed, the shield was gently removed.
Full Thickness Lip Wedge Repair (Flap) Text: Given the location of the defect and the proximity to free margins a full thickness wedge repair was deemed most appropriate.  Using a sterile surgical marker, the appropriate repair was drawn incorporating the defect and placing the expected incisions perpendicular to the vermilion border.  The vermilion border was also meticulously outlined to ensure appropriate reapproximation during the repair.  The area thus outlined was incised through and through with a #15 scalpel blade.  The muscularis and dermis were reaproximated with deep sutures following hemostasis. Care was taken to realign the vermilion border before proceeding with the superficial closure.  Once the vermilion was realigned the superfical and mucosal closure was finished.
Brow Lift Text: A midfrontal incision was made medially to the defect to allow access to the tissues just superior to the left eyebrow. Following careful dissection inferiorly in a supraperiosteal plane to the level of the left eyebrow, several 3-0 monocryl sutures were used to resuspend the eyebrow orbicularis oculi muscular unit to the superior frontal bone periosteum. This resulted in an appropriate reapproximation of static eyebrow symmetry and correction of the left brow ptosis.
Hemigard Retention Suture: 0-0 Nylon
Mohs Histo Method Verbiage: Each section was then chromacoded and processed in the Mohs lab using the Mohs protocol and submitted for frozen section.
No Residual Tumor Seen Histology Text: There were no malignant cells seen in the sections examined.
Repair Anesthesia Method: local infiltration
Stage 2: Number Of Blocks?: 1
H Plasty Text: Given the location of the defect, shape of the defect and the proximity to free margins a H-plasty was deemed most appropriate for repair.  Using a sterile surgical marker, the appropriate advancement arms of the H-plasty were drawn incorporating the defect and placing the expected incisions within the relaxed skin tension lines where possible. The area thus outlined was incised deep to adipose tissue with a #15 scalpel blade. The skin margins were undermined to an appropriate distance in all directions utilizing iris scissors.  The opposing advancement arms were then advanced into place in opposite direction and anchored with interrupted buried subcutaneous sutures.
Tissue Cultured Epidermal Autograft Text: The defect edges were debeveled with a #15 scalpel blade.  Given the location of the defect, shape of the defect and the proximity to free margins a tissue cultured epidermal autograft was deemed most appropriate.  The graft was then trimmed to fit the size of the defect.  The graft was then placed in the primary defect and oriented appropriately.
O-Z Flap Text: The defect edges were debeveled with a #15 scalpel blade.  Given the location of the defect, shape of the defect and the proximity to free margins an O-Z flap was deemed most appropriate.  Using a sterile surgical marker, an appropriate transposition flap was drawn incorporating the defect and placing the expected incisions within the relaxed skin tension lines where possible. The area thus outlined was incised deep to adipose tissue with a #15 scalpel blade.  The skin margins were undermined to an appropriate distance in all directions utilizing iris scissors.
Unna Boot Text: An Unna boot was placed to help immobilize the limb and facilitate more rapid healing.
Dressing (No Sutures): pressure dressing with telfa
Split-Thickness Skin Graft Text: The defect edges were debeveled with a #15 scalpel blade.  Given the location of the defect, shape of the defect and the proximity to free margins a split thickness skin graft was deemed most appropriate.  Using a sterile surgical marker, the primary defect shape was transferred to the donor site. The split thickness graft was then harvested.  The skin graft was then placed in the primary defect and oriented appropriately.
Crescentic Advancement Flap Text: The defect edges were debeveled with a #15 scalpel blade.  Given the location of the defect and the proximity to free margins a crescentic advancement flap was deemed most appropriate.  Using a sterile surgical marker, the appropriate advancement flap was drawn incorporating the defect and placing the expected incisions within the relaxed skin tension lines where possible.    The area thus outlined was incised deep to adipose tissue with a #15 scalpel blade.  The skin margins were undermined to an appropriate distance in all directions utilizing iris scissors.
Purse String (Intermediate) Text: Given the location of the defect and the characteristics of the surrounding skin a purse string intermediate closure was deemed most appropriate.  Undermining was performed circumfirentially around the surgical defect.  A purse string suture was then placed and tightened.
Mercedes Flap Text: The defect edges were debeveled with a #15 scalpel blade.  Given the location of the defect, shape of the defect and the proximity to free margins a Mercedes flap was deemed most appropriate.  Using a sterile surgical marker, an appropriate advancement flap was drawn incorporating the defect and placing the expected incisions within the relaxed skin tension lines where possible. The area thus outlined was incised deep to adipose tissue with a #15 scalpel blade.  The skin margins were undermined to an appropriate distance in all directions utilizing iris scissors.
Debridement Text: The wound edges were debrided prior to proceeding with the closure to facilitate wound healing.
Surgical Defect Length In Cm (Optional): 2.5
Consent (Marginal Mandibular)/Introductory Paragraph: The rationale for Mohs was explained to the patient and consent was obtained. The risks, benefits and alternatives to therapy were discussed in detail. Specifically, the risks of damage to the marginal mandibular branch of the facial nerve, infection, scarring, bleeding, prolonged wound healing, incomplete removal, allergy to anesthesia, and recurrence were addressed. Prior to the procedure, the treatment site was clearly identified and confirmed by the patient. All components of Universal Protocol/PAUSE Rule completed.
Alar Island Pedicle Flap Text: The defect edges were debeveled with a #15 scalpel blade.  Given the location of the defect, shape of the defect and the proximity to the alar rim an island pedicle advancement flap was deemed most appropriate.  Using a sterile surgical marker, an appropriate advancement flap was drawn incorporating the defect, outlining the appropriate donor tissue and placing the expected incisions within the nasal ala running parallel to the alar rim. The area thus outlined was incised with a #15 scalpel blade.  The skin margins were undermined minimally to an appropriate distance in all directions around the primary defect and laterally outward around the island pedicle utilizing iris scissors.  There was minimal undermining beneath the pedicle flap.
Area M Indication Text: Tumors in this location are included in Area M (cheek, forehead, scalp, neck, jawline and pretibial skin).  Mohs surgery is indicated for tumors in these anatomic locations.
Retention Suture Text: Retention sutures were placed to support the closure and prevent dehiscence.
Information: Selecting Yes will display possible errors in your note based on the variables you have selected. This validation is only offered as a suggestion for you. PLEASE NOTE THAT THE VALIDATION TEXT WILL BE REMOVED WHEN YOU FINALIZE YOUR NOTE. IF YOU WANT TO FAX A PRELIMINARY NOTE YOU WILL NEED TO TOGGLE THIS TO 'NO' IF YOU DO NOT WANT IT IN YOUR FAXED NOTE.
Previous Accession (Optional): [id number]
Banner Transposition Flap Text: The defect edges were debeveled with a #15 scalpel blade.  Given the location of the defect and the proximity to free margins a Banner transposition flap was deemed most appropriate.  Using a sterile surgical marker, an appropriate flap drawn around the defect. The area thus outlined was incised deep to adipose tissue with a #15 scalpel blade.  The skin margins were undermined to an appropriate distance in all directions utilizing iris scissors.
Depth Of Tumor Invasion (For Histology): dermis
Consent 3/Introductory Paragraph: I gave the patient a chance to ask questions they had about the procedure.  Following this I explained the Mohs procedure and consent was obtained. The risks, benefits and alternatives to therapy were discussed in detail. Specifically, the risks of infection, scarring, bleeding, prolonged wound healing, incomplete removal, allergy to anesthesia, nerve injury and recurrence were addressed. Prior to the procedure, the treatment site was clearly identified and confirmed by the patient. All components of Universal Protocol/PAUSE Rule completed.
Advancement Flap (Double) Text: The defect edges were debeveled with a #15 scalpel blade.  Given the location of the defect and the proximity to free margins a double advancement flap was deemed most appropriate.  Using a sterile surgical marker, the appropriate advancement flaps were drawn incorporating the defect and placing the expected incisions within the relaxed skin tension lines where possible.    The area thus outlined was incised deep to adipose tissue with a #15 scalpel blade.  The skin margins were undermined to an appropriate distance in all directions utilizing iris scissors.
Alternatives Discussed Intro (Do Not Add Period): I discussed alternative treatments to Mohs surgery and specifically discussed the risks and benefits of
Island Pedicle Flap With Canthal Suspension Text: The defect edges were debeveled with a #15 scalpel blade.  Given the location of the defect, shape of the defect and the proximity to free margins an island pedicle advancement flap was deemed most appropriate.  Using a sterile surgical marker, an appropriate advancement flap was drawn incorporating the defect, outlining the appropriate donor tissue and placing the expected incisions within the relaxed skin tension lines where possible. The area thus outlined was incised deep to adipose tissue with a #15 scalpel blade.  The skin margins were undermined to an appropriate distance in all directions around the primary defect and laterally outward around the island pedicle utilizing iris scissors.  There was minimal undermining beneath the pedicle flap. A suspension suture was placed in the canthal tendon to prevent tension and prevent ectropion.
Bcc Infiltrative Histology Text: There were numerous aggregates of basaloid cells demonstrating an infiltrative pattern.
Consent (Nose)/Introductory Paragraph: The rationale for Mohs was explained to the patient and consent was obtained. The risks, benefits and alternatives to therapy were discussed in detail. Specifically, the risks of nasal deformity, changes in the flow of air through the nose, infection, scarring, bleeding, prolonged wound healing, incomplete removal, allergy to anesthesia, nerve injury and recurrence were addressed. Prior to the procedure, the treatment site was clearly identified and confirmed by the patient. All components of Universal Protocol/PAUSE Rule completed.
Mastoid Interpolation Flap Text: A decision was made to reconstruct the defect utilizing an interpolation axial flap and a staged reconstruction.  A telfa template was made of the defect.  This telfa template was then used to outline the mastoid interpolation flap.  The donor area for the pedicle flap was then injected with anesthesia.  The flap was excised through the skin and subcutaneous tissue down to the layer of the underlying musculature.  The pedicle flap was carefully excised within this deep plane to maintain its blood supply.  The edges of the donor site were undermined.   The donor site was closed in a primary fashion.  The pedicle was then rotated into position and sutured.  Once the tube was sutured into place, adequate blood supply was confirmed with blanching and refill.  The pedicle was then wrapped with xeroform gauze and dressed appropriately with a telfa and gauze bandage to ensure continued blood supply and protect the attached pedicle.
Keystone Flap Text: The defect edges were debeveled with a #15 scalpel blade.  Given the location of the defect, shape of the defect a keystone flap was deemed most appropriate.  Using a sterile surgical marker, an appropriate keystone flap was drawn incorporating the defect, outlining the appropriate donor tissue and placing the expected incisions within the relaxed skin tension lines where possible. The area thus outlined was incised deep to adipose tissue with a #15 scalpel blade.  The skin margins were undermined to an appropriate distance in all directions around the primary defect and laterally outward around the flap utilizing iris scissors.
V-Y Flap Text: The defect edges were debeveled with a #15 scalpel blade.  Given the location of the defect, shape of the defect and the proximity to free margins a V-Y flap was deemed most appropriate.  Using a sterile surgical marker, an appropriate advancement flap was drawn incorporating the defect and placing the expected incisions within the relaxed skin tension lines where possible.    The area thus outlined was incised deep to adipose tissue with a #15 scalpel blade.  The skin margins were undermined to an appropriate distance in all directions utilizing iris scissors.
Surgical Defect Width In Cm (Optional): 2.0
Staged Advancement Flap Text: The defect edges were debeveled with a #15 scalpel blade.  Given the location of the defect, shape of the defect and the proximity to free margins a staged advancement flap was deemed most appropriate.  Using a sterile surgical marker, an appropriate advancement flap was drawn incorporating the defect and placing the expected incisions within the relaxed skin tension lines where possible. The area thus outlined was incised deep to adipose tissue with a #15 scalpel blade.  The skin margins were undermined to an appropriate distance in all directions utilizing iris scissors.
Bilobed Transposition Flap Text: The defect edges were debeveled with a #15 scalpel blade.  Given the location of the defect and the proximity to free margins a bilobed transposition flap was deemed most appropriate.  Using a sterile surgical marker, an appropriate bilobe flap drawn around the defect.    The area thus outlined was incised deep to adipose tissue with a #15 scalpel blade.  The skin margins were undermined to an appropriate distance in all directions utilizing iris scissors.
Cheek-To-Nose Interpolation Flap Text: A decision was made to reconstruct the defect utilizing an interpolation axial flap and a staged reconstruction.  A telfa template was made of the defect.  This telfa template was then used to outline the Cheek-To-Nose Interpolation flap.  The donor area for the pedicle flap was then injected with anesthesia.  The flap was excised through the skin and subcutaneous tissue down to the layer of the underlying musculature.  The interpolation flap was carefully excised within this deep plane to maintain its blood supply.  The edges of the donor site were undermined.   The donor site was closed in a primary fashion.  The pedicle was then rotated into position and sutured.  Once the tube was sutured into place, adequate blood supply was confirmed with blanching and refill.  The pedicle was then wrapped with xeroform gauze and dressed appropriately with a telfa and gauze bandage to ensure continued blood supply and protect the attached pedicle.
Repair Hemostasis (Optional): Pinpoint electrocautery
Area H Indication Text: Tumors in this location are included in Area H (eyelids, eyebrows, nose, lips, chin, ear, pre-auricular, post-auricular, temple, genitalia, hands, feet, ankles and areola).  Tissue conservation is critical in these anatomic locations.
Transposition Flap Text: The defect edges were debeveled with a #15 scalpel blade.  Given the location of the defect and the proximity to free margins a transposition flap was deemed most appropriate.  Using a sterile surgical marker, an appropriate transposition flap was drawn incorporating the defect.    The area thus outlined was incised deep to adipose tissue with a #15 scalpel blade.  The skin margins were undermined to an appropriate distance in all directions utilizing iris scissors.
Undermining Location (Optional): in the superficial subcutaneous fat
O-L Flap Text: The defect edges were debeveled with a #15 scalpel blade.  Given the location of the defect, shape of the defect and the proximity to free margins an O-L flap was deemed most appropriate.  Using a sterile surgical marker, an appropriate advancement flap was drawn incorporating the defect and placing the expected incisions within the relaxed skin tension lines where possible.    The area thus outlined was incised deep to adipose tissue with a #15 scalpel blade.  The skin margins were undermined to an appropriate distance in all directions utilizing iris scissors.
Simple / Intermediate / Complex Repair - Final Wound Length In Cm: 6
No Repair - Repaired With Adjacent Surgical Defect Text (Leave Blank If You Do Not Want): After obtaining clear surgical margins the defect was repaired concurrently with another surgical defect which was in close approximation.
Peng Advancement Flap Text: The defect edges were debeveled with a #15 scalpel blade.  Given the location of the defect, shape of the defect and the proximity to free margins a Peng advancement flap was deemed most appropriate.  Using a sterile surgical marker, an appropriate advancement flap was drawn incorporating the defect and placing the expected incisions within the relaxed skin tension lines where possible. The area thus outlined was incised deep to adipose tissue with a #15 scalpel blade.  The skin margins were undermined to an appropriate distance in all directions utilizing iris scissors.
Estimated Blood Loss (Cc): minimal
V-Y Plasty Text: The defect edges were debeveled with a #15 scalpel blade.  Given the location of the defect, shape of the defect and the proximity to free margins an V-Y advancement flap was deemed most appropriate.  Using a sterile surgical marker, an appropriate advancement flap was drawn incorporating the defect and placing the expected incisions within the relaxed skin tension lines where possible.    The area thus outlined was incised deep to adipose tissue with a #15 scalpel blade.  The skin margins were undermined to an appropriate distance in all directions utilizing iris scissors.
Epidermal Autograft Text: The defect edges were debeveled with a #15 scalpel blade.  Given the location of the defect, shape of the defect and the proximity to free margins an epidermal autograft was deemed most appropriate.  Using a sterile surgical marker, the primary defect shape was transferred to the donor site. The epidermal graft was then harvested.  The skin graft was then placed in the primary defect and oriented appropriately.
Secondary Intention Text (Leave Blank If You Do Not Want): The defect will heal with secondary intention.
Manual Repair Warning Statement: We plan on removing the manually selected variable below in favor of our much easier automatic structured text blocks found in the previous tab. We decided to do this to help make the flow better and give you the full power of structured data. Manual selection is never going to be ideal in our platform and I would encourage you to avoid using manual selection from this point on, especially since I will be sunsetting this feature. It is important that you do one of two things with the customized text below. First, you can save all of the text in a word file so you can have it for future reference. Second, transfer the text to the appropriate area in the Library tab. Lastly, if there is a flap or graft type which we do not have you need to let us know right away so I can add it in before the variable is hidden. No need to panic, we plan to give you roughly 6 months to make the change.
Mauc Instructions: By selecting yes to the question below the MAUC number will be added into the note.  This will be calculated automatically based on the diagnosis chosen, the size entered, the body zone selected (H,M,L) and the specific indications you chose. You will also have the option to override the Mohs AUC if you disagree with the automatically calculated number and this option is found in the Case Summary tab.
Advancement Flap (Single) Text: The defect edges were debeveled with a #15 scalpel blade.  Given the location of the defect and the proximity to free margins a single advancement flap was deemed most appropriate.  Using a sterile surgical marker, an appropriate advancement flap was drawn incorporating the defect and placing the expected incisions within the relaxed skin tension lines where possible.    The area thus outlined was incised deep to adipose tissue with a #15 scalpel blade.  The skin margins were undermined to an appropriate distance in all directions utilizing iris scissors.
Consent 1/Introductory Paragraph: The rationale for Mohs was explained to the patient and consent was obtained. The risks, benefits and alternatives to therapy were discussed in detail. Specifically, the risks of infection, scarring, bleeding, prolonged wound healing, incomplete removal, allergy to anesthesia, nerve injury and recurrence were addressed. Prior to the procedure, the treatment site was clearly identified and confirmed by the patient. All components of Universal Protocol/PAUSE Rule completed.
Consent (Temporal Branch)/Introductory Paragraph: The rationale for Mohs was explained to the patient and consent was obtained. The risks, benefits and alternatives to therapy were discussed in detail. Specifically, the risks of damage to the temporal branch of the facial nerve, infection, scarring, bleeding, prolonged wound healing, incomplete removal, allergy to anesthesia, and recurrence were addressed. Prior to the procedure, the treatment site was clearly identified and confirmed by the patient. All components of Universal Protocol/PAUSE Rule completed.
Interpolation Flap Text: A decision was made to reconstruct the defect utilizing an interpolation axial flap and a staged reconstruction.  A telfa template was made of the defect.  This telfa template was then used to outline the interpolation flap.  The donor area for the pedicle flap was then injected with anesthesia.  The flap was excised through the skin and subcutaneous tissue down to the layer of the underlying musculature.  The interpolation flap was carefully excised within this deep plane to maintain its blood supply.  The edges of the donor site were undermined.   The donor site was closed in a primary fashion.  The pedicle was then rotated into position and sutured.  Once the tube was sutured into place, adequate blood supply was confirmed with blanching and refill.  The pedicle was then wrapped with xeroform gauze and dressed appropriately with a telfa and gauze bandage to ensure continued blood supply and protect the attached pedicle.
Referring Physician (Optional): PADMINI Valencia
Home Suture Removal Text: Patient was provided instructions on removing sutures and will remove their sutures at home.  If they have any questions or difficulties they will call the office.
Double O-Z Plasty Text: The defect edges were debeveled with a #15 scalpel blade.  Given the location of the defect, shape of the defect and the proximity to free margins a Double O-Z plasty (double transposition flap) was deemed most appropriate.  Using a sterile surgical marker, the appropriate transposition flaps were drawn incorporating the defect and placing the expected incisions within the relaxed skin tension lines where possible. The area thus outlined was incised deep to adipose tissue with a #15 scalpel blade.  The skin margins were undermined to an appropriate distance in all directions utilizing iris scissors.  Hemostasis was achieved with electrocautery.  The flaps were then transposed into place, one clockwise and the other counterclockwise, and anchored with interrupted buried subcutaneous sutures.
Bcc Histology Text: There were numerous aggregates of basaloid cells.
Nasal Turnover Hinge Flap Text: The defect edges were debeveled with a #15 scalpel blade.  Given the size, depth, location of the defect and the defect being full thickness a nasal turnover hinge flap was deemed most appropriate.  Using a sterile surgical marker, an appropriate hinge flap was drawn incorporating the defect. The area thus outlined was incised with a #15 scalpel blade. The flap was designed to recreate the nasal mucosal lining and the alar rim. The skin margins were undermined to an appropriate distance in all directions utilizing iris scissors.
Post-Care Instructions: I reviewed with the patient in detail post-care instructions. Patient is not to engage in any heavy lifting, exercise, or swimming for the next 14 days. Should the patient develop any fevers, chills, bleeding, severe pain patient will contact the office immediately.
Graft Cartilage Fenestration Text: The cartilage was fenestrated with a 2mm punch biopsy to help facilitate graft survival and healing.
Consent Type: Consent 1 (Standard)
Trilobed Flap Text: The defect edges were debeveled with a #15 scalpel blade.  Given the location of the defect and the proximity to free margins a trilobed flap was deemed most appropriate.  Using a sterile surgical marker, an appropriate trilobed flap drawn around the defect.    The area thus outlined was incised deep to adipose tissue with a #15 scalpel blade.  The skin margins were undermined to an appropriate distance in all directions utilizing iris scissors.
Mustarde Flap Text: The defect edges were debeveled with a #15 scalpel blade.  Given the size, depth and location of the defect and the proximity to free margins a Mustarde flap was deemed most appropriate.  Using a sterile surgical marker, an appropriate flap was drawn incorporating the defect. The area thus outlined was incised with a #15 scalpel blade.  The skin margins were undermined to an appropriate distance in all directions utilizing iris scissors.
Zygomaticofacial Flap Text: Given the location of the defect, shape of the defect and the proximity to free margins a zygomaticofacial flap was deemed most appropriate for repair.  Using a sterile surgical marker, the appropriate flap was drawn incorporating the defect and placing the expected incisions within the relaxed skin tension lines where possible. The area thus outlined was incised deep to adipose tissue with a #15 scalpel blade with preservation of a vascular pedicle.  The skin margins were undermined to an appropriate distance in all directions utilizing iris scissors.  The flap was then placed into the defect and anchored with interrupted buried subcutaneous sutures.
Hemigard Intro: Due to skin fragility and wound tension, it was decided to use HEMIGARD adhesive retention suture devices to permit a linear closure. The skin was cleaned and dried for a 6cm distance away from the wound. Excessive hair, if present, was removed to allow for adhesion.
Mucosal Advancement Flap Text: Given the location of the defect, shape of the defect and the proximity to free margins a mucosal advancement flap was deemed most appropriate. Incisions were made with a 15 blade scalpel in the appropriate fashion along the cutaneous vermilion border and the mucosal lip. The remaining actinically damaged mucosal tissue was excised.  The mucosal advancement flap was then elevated to the gingival sulcus with care taken to preserve the neurovascular structures and advanced into the primary defect. Care was taken to ensure that precise realignment of the vermilion border was achieved.
Hemostasis: Electrocautery
Star Wedge Flap Text: The defect edges were debeveled with a #15 scalpel blade.  Given the location of the defect, shape of the defect and the proximity to free margins a star wedge flap was deemed most appropriate.  Using a sterile surgical marker, an appropriate rotation flap was drawn incorporating the defect and placing the expected incisions within the relaxed skin tension lines where possible. The area thus outlined was incised deep to adipose tissue with a #15 scalpel blade.  The skin margins were undermined to an appropriate distance in all directions utilizing iris scissors.
Hemigard Postcare Instructions: The HEMIGARD strips are to remain completely dry for at least 5-7 days.
Repair Type: Complex Repair
Paramedian Forehead Flap Text: A decision was made to reconstruct the defect utilizing an interpolation axial flap and a staged reconstruction.  A telfa template was made of the defect.  This telfa template was then used to outline the paramedian forehead pedicle flap.  The donor area for the pedicle flap was then injected with anesthesia.  The flap was excised through the skin and subcutaneous tissue down to the layer of the underlying musculature.  The pedicle flap was carefully excised within this deep plane to maintain its blood supply.  The edges of the donor site were undermined.   The donor site was closed in a primary fashion.  The pedicle was then rotated into position and sutured.  Once the tube was sutured into place, adequate blood supply was confirmed with blanching and refill.  The pedicle was then wrapped with xeroform gauze and dressed appropriately with a telfa and gauze bandage to ensure continued blood supply and protect the attached pedicle.
Consent (Lip)/Introductory Paragraph: The rationale for Mohs was explained to the patient and consent was obtained. The risks, benefits and alternatives to therapy were discussed in detail. Specifically, the risks of lip deformity, changes in the oral aperture, infection, scarring, bleeding, prolonged wound healing, incomplete removal, allergy to anesthesia, nerve injury and recurrence were addressed. Prior to the procedure, the treatment site was clearly identified and confirmed by the patient. All components of Universal Protocol/PAUSE Rule completed.
Same Histology In Subsequent Stages Text: The pattern and morphology of the tumor is as described in the first stage.
Partial Purse String (Intermediate) Text: Given the location of the defect and the characteristics of the surrounding skin an intermediate purse string closure was deemed most appropriate.  Undermining was performed circumfirentially around the surgical defect.  A purse string suture was then placed and tightened. Wound tension only allowed a partial closure of the circular defect.
Localized Dermabrasion With Wire Brush Text: The patient was draped in routine manner.  Localized dermabrasion using 3 x 17 mm wire brush was performed in routine manner to papillary dermis. This spot dermabrasion is being performed to complete skin cancer reconstruction. It also will eliminate the other sun damaged precancerous cells that are known to be part of the regional effect of a lifetime's worth of sun exposure. This localized dermabrasion is therapeutic and should not be considered cosmetic in any regard.
Bilateral Helical Rim Advancement Flap Text: The defect edges were debeveled with a #15 blade scalpel.  Given the location of the defect and the proximity to free margins (helical rim) a bilateral helical rim advancement flap was deemed most appropriate.  Using a sterile surgical marker, the appropriate advancement flaps were drawn incorporating the defect and placing the expected incisions between the helical rim and antihelix where possible.  The area thus outlined was incised through and through with a #15 scalpel blade.  With a skin hook and iris scissors, the flaps were gently and sharply undermined and freed up.
Non-Graft Cartilage Fenestration Text: The cartilage was fenestrated with a 2mm punch biopsy to help facilitate healing.
Tarsorrhaphy Text: A tarsorrhaphy was performed using Frost sutures.
Skin Substitute Text: The defect edges were debeveled with a #15 scalpel blade.  Given the location of the defect, shape of the defect and the proximity to free margins a skin substitute graft was deemed most appropriate.  The graft material was trimmed to fit the size of the defect. The graft was then placed in the primary defect and oriented appropriately.
Detail Level: Detailed
Cheiloplasty (Less Than 50%) Text: A decision was made to reconstruct the defect with a  cheiloplasty.  The defect was undermined extensively.  Additional obicularis oris muscle was excised with a 15 blade scalpel.  The defect was converted into a full thickness wedge, of less than 50% of the vertical height of the lip, to facilite a better cosmetic result.  Small vessels were then tied off with 5-0 monocyrl. The obicularis oris, superficial fascia, adipose and dermis were then reapproximated.  After the deeper layers were approximated the epidermis was reapproximated with particular care given to realign the vermilion border.
Bilobed Flap Text: The defect edges were debeveled with a #15 scalpel blade.  Given the location of the defect and the proximity to free margins a bilobe flap was deemed most appropriate.  Using a sterile surgical marker, an appropriate bilobe flap drawn around the defect.    The area thus outlined was incised deep to adipose tissue with a #15 scalpel blade.  The skin margins were undermined to an appropriate distance in all directions utilizing iris scissors.
Inflammation Suggestive Of Cancer Camouflage Histology Text: There was a dense lymphocytic infiltrate which prevented adequate histologic evaluation of adjacent structures.
Surgeon Performing Repair (Optional): Chivo Rivero MD
Mart-1 - Negative Histology Text: MART-1 staining demonstrates a normal density and pattern of melanocytes along the dermal-epidermal junction. The surgical margins are negative for tumor cells.
Adjacent Tissue Transfer Text: The defect edges were debeveled with a #15 scalpel blade.  Given the location of the defect and the proximity to free margins an adjacent tissue transfer was deemed most appropriate.  Using a sterile surgical marker, an appropriate flap was drawn incorporating the defect and placing the expected incisions within the relaxed skin tension lines where possible.    The area thus outlined was incised deep to adipose tissue with a #15 scalpel blade.  The skin margins were undermined to an appropriate distance in all directions utilizing iris scissors.
Ftsg Text: The defect edges were debeveled with a #15 scalpel blade.  Given the location of the defect, shape of the defect and the proximity to free margins a full thickness skin graft was deemed most appropriate.  Using a sterile surgical marker, the primary defect shape was transferred to the donor site. The area thus outlined was incised deep to adipose tissue with a #15 scalpel blade.  The harvested graft was then trimmed of adipose tissue until only dermis and epidermis was left.  The skin margins of the secondary defect were undermined to an appropriate distance in all directions utilizing iris scissors.  The secondary defect was closed with interrupted buried subcutaneous sutures.  The skin edges were then re-apposed with running  sutures.  The skin graft was then placed in the primary defect and oriented appropriately.
Vermilion Border Text: The closure involved the vermilion border.
Cheiloplasty (Complex) Text: A decision was made to reconstruct the defect with a  cheiloplasty.  The defect was undermined extensively.  Additional obicularis oris muscle was excised with a 15 blade scalpel.  The defect was converted into a full thickness wedge to facilite a better cosmetic result.  Small vessels were then tied off with 5-0 monocyrl. The obicularis oris, superficial fascia, adipose and dermis were then reapproximated.  After the deeper layers were approximated the epidermis was reapproximated with particular care given to realign the vermilion border.
Advancement-Rotation Flap Text: The defect edges were debeveled with a #15 scalpel blade.  Given the location of the defect, shape of the defect and the proximity to free margins an advancement-rotation flap was deemed most appropriate.  Using a sterile surgical marker, an appropriate flap was drawn incorporating the defect and placing the expected incisions within the relaxed skin tension lines where possible. The area thus outlined was incised deep to adipose tissue with a #15 scalpel blade.  The skin margins were undermined to an appropriate distance in all directions utilizing iris scissors.
Suturegard Body: The suture ends were repeatedly re-tightened and re-clamped to achieve the desired tissue expansion.
Xenograft Text: The defect edges were debeveled with a #15 scalpel blade.  Given the location of the defect, shape of the defect and the proximity to free margins a xenograft was deemed most appropriate.  The graft was then trimmed to fit the size of the defect.  The graft was then placed in the primary defect and oriented appropriately.
Retention Suture Bite Size: 1 mm
Undermining Type: Entire Wound
Chonodrocutaneous Helical Advancement Flap Text: The defect edges were debeveled with a #15 scalpel blade.  Given the location of the defect and the proximity to free margins a chondrocutaneous helical advancement flap was deemed most appropriate.  Using a sterile surgical marker, the appropriate advancement flap was drawn incorporating the defect and placing the expected incisions within the relaxed skin tension lines where possible.    The area thus outlined was incised deep to adipose tissue with a #15 scalpel blade.  The skin margins were undermined to an appropriate distance in all directions utilizing iris scissors.
Ear Wedge Repair Text: A wedge excision was completed by carrying down an excision through the full thickness of the ear and cartilage with an inward facing Burow's triangle. The wound was then closed in a layered fashion.
Burow's Graft Text: The defect edges were debeveled with a #15 scalpel blade.  Given the location of the defect, shape of the defect, the proximity to free margins and the presence of a standing cone deformity a Burow's skin graft was deemed most appropriate. The standing cone was removed and this tissue was then trimmed to the shape of the primary defect. The adipose tissue was also removed until only dermis and epidermis were left.  The skin margins of the secondary defect were undermined to an appropriate distance in all directions utilizing iris scissors.  The secondary defect was closed with interrupted buried subcutaneous sutures.  The skin edges were then re-apposed with running  sutures.  The skin graft was then placed in the primary defect and oriented appropriately.
Island Pedicle Flap Text: The defect edges were debeveled with a #15 scalpel blade.  Given the location of the defect, shape of the defect and the proximity to free margins an island pedicle advancement flap was deemed most appropriate.  Using a sterile surgical marker, an appropriate advancement flap was drawn incorporating the defect, outlining the appropriate donor tissue and placing the expected incisions within the relaxed skin tension lines where possible.    The area thus outlined was incised deep to adipose tissue with a #15 scalpel blade.  The skin margins were undermined to an appropriate distance in all directions around the primary defect and laterally outward around the island pedicle utilizing iris scissors.  There was minimal undermining beneath the pedicle flap.
Muscle Hinge Flap Text: The defect edges were debeveled with a #15 scalpel blade.  Given the size, depth and location of the defect and the proximity to free margins a muscle hinge flap was deemed most appropriate.  Using a sterile surgical marker, an appropriate hinge flap was drawn incorporating the defect. The area thus outlined was incised with a #15 scalpel blade.  The skin margins were undermined to an appropriate distance in all directions utilizing iris scissors.
O-Z Plasty Text: The defect edges were debeveled with a #15 scalpel blade.  Given the location of the defect, shape of the defect and the proximity to free margins an O-Z plasty (double transposition flap) was deemed most appropriate.  Using a sterile surgical marker, the appropriate transposition flaps were drawn incorporating the defect and placing the expected incisions within the relaxed skin tension lines where possible.    The area thus outlined was incised deep to adipose tissue with a #15 scalpel blade.  The skin margins were undermined to an appropriate distance in all directions utilizing iris scissors.  Hemostasis was achieved with electrocautery.  The flaps were then transposed into place, one clockwise and the other counterclockwise, and anchored with interrupted buried subcutaneous sutures.
Wound Care: Vaseline
Purse String (Simple) Text: Given the location of the defect and the characteristics of the surrounding skin a purse string closure was deemed most appropriate.  Undermining was performed circumfirentially around the surgical defect.  A purse string suture was then placed and tightened.
Estlander Flap (Lower To Upper Lip) Text: The defect of the lower lip was assessed and measured.  Given the location and size of the defect, an Estlander flap was deemed most appropriate.  Using a sterile surgical marker, an appropriate Estlander flap was measured and drawn on the upper lip. Local anesthesia was then infiltrated. A scalpel was then used to incise the lateral aspect of the flap, through skin, muscle and mucosa, leaving the flap pedicled medially.  The flap was then rotated and positioned to fill the lower lip defect.  The flap was then sutured into place with a three layer technique, closing the orbicularis oris muscle layer with subcutaneous buried sutures, followed by a mucosal layer and an epidermal layer.
Abbe Flap (Lower To Upper Lip) Text: The defect of the upper lip was assessed and measured.  Given the location and size of the defect, an Abbe flap was deemed most appropriate.  Using a sterile surgical marker, an appropriate Abbe flap was measured and drawn on the lower lip. Local anesthesia was then infiltrated. A scalpel was then used to incise the upper lip through and through the skin, vermilion, muscle and mucosa, leaving the flap pedicled on the opposite side.  The flap was then rotated and transferred to the lower lip defect.  The flap was then sutured into place with a three layer technique, closing the orbicularis oris muscle layer with subcutaneous buried sutures, followed by a mucosal layer and an epidermal layer.
Abbe Flap (Upper To Lower Lip) Text: The defect of the lower lip was assessed and measured.  Given the location and size of the defect, an Abbe flap was deemed most appropriate.  Using a sterile surgical marker, an appropriate Abbe flap was measured and drawn on the upper lip. Local anesthesia was then infiltrated.  A scalpel was then used to incise the upper lip through and through the skin, vermilion, muscle and mucosa, leaving the flap pedicled on the opposite side.  The flap was then rotated and transferred to the lower lip defect.  The flap was then sutured into place with a three layer technique, closing the orbicularis oris muscle layer with subcutaneous buried sutures, followed by a mucosal layer and an [phone] [id number]

## 2022-07-15 NOTE — PROCEDURE: MOHS SURGERY
Dressing: pressure dressing with telfa
Quadrant Reporting?: no
Show Repair Assistants Variable: Yes
Ear Star Wedge Flap Text: The defect edges were debeveled with a #15 blade scalpel.  Given the location of the defect and the proximity to free margins (helical rim) an ear star wedge flap was deemed most appropriate.  Using a sterile surgical marker, the appropriate flap was drawn incorporating the defect and placing the expected incisions between the helical rim and antihelix where possible.  The area thus outlined was incised through and through with a #15 scalpel blade.
Graft Donor Site Bandage (Optional-Leave Blank If You Don't Want In Note): Aquaplast was fitted to the graft site and sewn into place. A pressure bandage were applied to the donor site and over the aquaplast bolster.
Complex Repair Preamble Text (Leave Blank If You Do Not Want): Extensive wide undermining was performed.
Helical Rim Advancement Flap Text: The defect edges were debeveled with a #15 blade scalpel.  Given the location of the defect and the proximity to free margins (helical rim) a double helical rim advancement flap was deemed most appropriate.  Using a sterile surgical marker, the appropriate advancement flaps were drawn incorporating the defect and placing the expected incisions between the helical rim and antihelix where possible.  The area thus outlined was incised through and through with a #15 scalpel blade.  With a skin hook and iris scissors, the flaps were gently and sharply undermined and freed up.
Otolaryngologist Procedure Text (D): After obtaining clear surgical margins the patient was sent to otolaryngology for surgical repair.  The patient understands they will receive post-surgical care and follow-up from the referring physician's office.
Stage 2: Additional Anesthesia Volume In Cc: 0
Bilobed Transposition Flap Text: The defect edges were debeveled with a #15 scalpel blade.  Given the location of the defect and the proximity to free margins a bilobed transposition flap was deemed most appropriate.  Using a sterile surgical marker, an appropriate bilobe flap drawn around the defect.    The area thus outlined was incised deep to adipose tissue with a #15 scalpel blade.  The skin margins were undermined to an appropriate distance in all directions utilizing iris scissors.
Z Plasty Text: The lesion was extirpated to the level of the fat with a #15 scalpel blade.  Given the location of the defect, shape of the defect and the proximity to free margins a Z-plasty was deemed most appropriate for repair.  Using a sterile surgical marker, the appropriate transposition arms of the Z-plasty were drawn incorporating the defect and placing the expected incisions within the relaxed skin tension lines where possible.    The area thus outlined was incised deep to adipose tissue with a #15 scalpel blade.  The skin margins were undermined to an appropriate distance in all directions utilizing iris scissors.  The opposing transposition arms were then transposed into place in opposite direction and anchored with interrupted buried subcutaneous sutures.
Advancement Flap (Single) Text: The defect edges were debeveled with a #15 scalpel blade.  Given the location of the defect and the proximity to free margins a single advancement flap was deemed most appropriate.  Using a sterile surgical marker, an appropriate advancement flap was drawn incorporating the defect and placing the expected incisions within the relaxed skin tension lines where possible.    The area thus outlined was incised deep to adipose tissue with a #15 scalpel blade.  The skin margins were undermined to an appropriate distance in all directions utilizing iris scissors.
Consent (Marginal Mandibular)/Introductory Paragraph: The rationale for Mohs was explained to the patient and consent was obtained. The risks, benefits and alternatives to therapy were discussed in detail. Specifically, the risks of damage to the marginal mandibular branch of the facial nerve, infection, scarring, bleeding, prolonged wound healing, incomplete removal, allergy to anesthesia, and recurrence were addressed. Prior to the procedure, the treatment site was clearly identified and confirmed by the patient. All components of Universal Protocol/PAUSE Rule completed.
Why Was The Change Made?: Please Select the Appropriate Response
O-T Plasty Text: The defect edges were debeveled with a #15 scalpel blade.  Given the location of the defect, shape of the defect and the proximity to free margins an O-T plasty was deemed most appropriate.  Using a sterile surgical marker, an appropriate O-T plasty was drawn incorporating the defect and placing the expected incisions within the relaxed skin tension lines where possible.    The area thus outlined was incised deep to adipose tissue with a #15 scalpel blade.  The skin margins were undermined to an appropriate distance in all directions utilizing iris scissors.
Plastic Surgeon Procedure Text (D): After obtaining clear surgical margins the patient was sent to plastics for surgical repair.  The patient understands they will receive post-surgical care and follow-up from the referring physician's office.
Crescentic Intermediate Repair Preamble Text (Leave Blank If You Do Not Want): Undermining was performed with blunt dissection.
Epidermal Autograft Text: The defect edges were debeveled with a #15 scalpel blade.  Given the location of the defect, shape of the defect and the proximity to free margins an epidermal autograft was deemed most appropriate.  Using a sterile surgical marker, the primary defect shape was transferred to the donor site. The epidermal graft was then harvested.  The skin graft was then placed in the primary defect and oriented appropriately.
Island Pedicle Flap-Requiring Vessel Identification Text: The defect edges were debeveled with a #15 scalpel blade.  Given the location of the defect, shape of the defect and the proximity to free margins an island pedicle advancement flap was deemed most appropriate.  Using a sterile surgical marker, an appropriate advancement flap was drawn, based on the axial vessel mentioned above, incorporating the defect, outlining the appropriate donor tissue and placing the expected incisions within the relaxed skin tension lines where possible.    The area thus outlined was incised deep to adipose tissue with a #15 scalpel blade.  The skin margins were undermined to an appropriate distance in all directions around the primary defect and laterally outward around the island pedicle utilizing iris scissors.  There was minimal undermining beneath the pedicle flap.
Melolabial Interpolation Flap Text: A decision was made to reconstruct the defect utilizing an interpolation axial flap and a staged reconstruction.  A telfa template was made of the defect.  This telfa template was then used to outline the melolabial interpolation flap.  The donor area for the pedicle flap was then injected with anesthesia.  The flap was excised through the skin and subcutaneous tissue down to the layer of the underlying musculature.  The pedicle flap was carefully excised within this deep plane to maintain its blood supply.  The edges of the donor site were undermined.   The donor site was closed in a primary fashion.  The pedicle was then rotated into position and sutured.  Once the tube was sutured into place, adequate blood supply was confirmed with blanching and refill.  The pedicle was then wrapped with xeroform gauze and dressed appropriately with a telfa and gauze bandage to ensure continued blood supply and protect the attached pedicle.
Oculoplastic Surgeon Procedure Text (D): After obtaining clear surgical margins the patient was sent to oculoplastics for surgical repair.  The patient understands they will receive post-surgical care and follow-up from the referring physician's office.
Medical Necessity Statement: Based on my medical judgement, Mohs surgery is the most appropriate treatment for this cancer compared to other treatments.
Brow Lift Text: A midfrontal incision was made medially to the defect to allow access to the tissues just superior to the left eyebrow. Following careful dissection inferiorly in a supraperiosteal plane to the level of the left eyebrow, several 3-0 monocryl sutures were used to resuspend the eyebrow orbicularis oculi muscular unit to the superior frontal bone periosteum. This resulted in an appropriate reapproximation of static eyebrow symmetry and correction of the left brow ptosis.
Consent (Temporal Branch)/Introductory Paragraph: The rationale for Mohs was explained to the patient and consent was obtained. The risks, benefits and alternatives to therapy were discussed in detail. Specifically, the risks of damage to the temporal branch of the facial nerve, infection, scarring, bleeding, prolonged wound healing, incomplete removal, allergy to anesthesia, and recurrence were addressed. Prior to the procedure, the treatment site was clearly identified and confirmed by the patient. All components of Universal Protocol/PAUSE Rule completed.
Special Stains Stage 2 - Results: Base On Clearance Noted Above
Information: Selecting Yes will display possible errors in your note based on the variables you have selected. This validation is only offered as a suggestion for you. PLEASE NOTE THAT THE VALIDATION TEXT WILL BE REMOVED WHEN YOU FINALIZE YOUR NOTE. IF YOU WANT TO FAX A PRELIMINARY NOTE YOU WILL NEED TO TOGGLE THIS TO 'NO' IF YOU DO NOT WANT IT IN YOUR FAXED NOTE.
Stage 4: Additional Anesthesia Type: 1% lidocaine with epinephrine
Zygomaticofacial Flap Text: Given the location of the defect, shape of the defect and the proximity to free margins a zygomaticofacial flap was deemed most appropriate for repair.  Using a sterile surgical marker, the appropriate flap was drawn incorporating the defect and placing the expected incisions within the relaxed skin tension lines where possible. The area thus outlined was incised deep to adipose tissue with a #15 scalpel blade with preservation of a vascular pedicle.  The skin margins were undermined to an appropriate distance in all directions utilizing iris scissors.  The flap was then placed into the defect and anchored with interrupted buried subcutaneous sutures.
Nasalis-Muscle-Based Myocutaneous Island Pedicle Flap Text: Using a #15 blade, an incision was made around the donor flap to the level of the nasalis muscle. Wide lateral undermining was then performed in both the subcutaneous plane above the nasalis muscle, and in a submuscular plane just above periosteum. This allowed the formation of a free nasalis muscle axial pedicle (based on the angular artery) which was still attached to the actual cutaneous flap, increasing its mobility and vascular viability. Hemostasis was obtained with pinpoint electrocoagulation. The flap was mobilized into position and the pivotal anchor points positioned and stabilized with buried interrupted sutures. Subcutaneous and dermal tissues were closed in a multilayered fashion with sutures. Tissue redundancies were excised, and the epidermal edges were apposed without significant tension and sutured with sutures.
Secondary Intention Text (Leave Blank If You Do Not Want): The defect will heal with secondary intention.
Wound Care: Vaseline
Modified Advancement Flap Text: The defect edges were debeveled with a #15 scalpel blade.  Given the location of the defect, shape of the defect and the proximity to free margins a modified advancement flap was deemed most appropriate.  Using a sterile surgical marker, an appropriate advancement flap was drawn incorporating the defect and placing the expected incisions within the relaxed skin tension lines where possible.    The area thus outlined was incised deep to adipose tissue with a #15 scalpel blade.  The skin margins were undermined to an appropriate distance in all directions utilizing iris scissors.
Purse String (Simple) Text: Given the location of the defect and the characteristics of the surrounding skin a purse string closure was deemed most appropriate.  Undermining was performed circumfirentially around the surgical defect.  A purse string suture was then placed and tightened.
No Residual Tumor Seen Histology Text: There were no malignant cells seen in the sections examined.
Transposition Flap Text: The defect edges were debeveled with a #15 scalpel blade.  Given the location of the defect and the proximity to free margins a transposition flap was deemed most appropriate.  Using a sterile surgical marker, an appropriate transposition flap was drawn incorporating the defect.    The area thus outlined was incised deep to adipose tissue with a #15 scalpel blade.  The skin margins were undermined to an appropriate distance in all directions utilizing iris scissors.
W Plasty Text: The lesion was extirpated to the level of the fat with a #15 scalpel blade.  Given the location of the defect, shape of the defect and the proximity to free margins a W-plasty was deemed most appropriate for repair.  Using a sterile surgical marker, the appropriate transposition arms of the W-plasty were drawn incorporating the defect and placing the expected incisions within the relaxed skin tension lines where possible.    The area thus outlined was incised deep to adipose tissue with a #15 scalpel blade.  The skin margins were undermined to an appropriate distance in all directions utilizing iris scissors.  The opposing transposition arms were then transposed into place in opposite direction and anchored with interrupted buried subcutaneous sutures.
Hemostasis: Electrocautery
Referred To Asc For Closure Text (Leave Blank If You Do Not Want): After obtaining clear surgical margins the patient was sent to an ASC for surgical repair.  The patient understands they will receive post-surgical care and follow-up from the ASC physician.
Split-Thickness Skin Graft Text: The defect edges were debeveled with a #15 scalpel blade.  Given the location of the defect, shape of the defect and the proximity to free margins a split thickness skin graft was deemed most appropriate.  Using a sterile surgical marker, the primary defect shape was transferred to the donor site. The split thickness graft was then harvested.  The skin graft was then placed in the primary defect and oriented appropriately.
Skin Substitute Text: The defect edges were debeveled with a #15 scalpel blade.  Given the location of the defect, shape of the defect and the proximity to free margins a skin substitute graft was deemed most appropriate.  The graft material was trimmed to fit the size of the defect. The graft was then placed in the primary defect and oriented appropriately.
Area M Indication Text: Tumors in this location are included in Area M (cheek, forehead, scalp, neck, jawline and pretibial skin).  Mohs surgery is indicated for tumors in these anatomic locations.
Cheek Interpolation Flap Text: A decision was made to reconstruct the defect utilizing an interpolation axial flap and a staged reconstruction.  A telfa template was made of the defect.  This telfa template was then used to outline the Cheek Interpolation flap.  The donor area for the pedicle flap was then injected with anesthesia.  The flap was excised through the skin and subcutaneous tissue down to the layer of the underlying musculature.  The interpolation flap was carefully excised within this deep plane to maintain its blood supply.  The edges of the donor site were undermined.   The donor site was closed in a primary fashion.  The pedicle was then rotated into position and sutured.  Once the tube was sutured into place, adequate blood supply was confirmed with blanching and refill.  The pedicle was then wrapped with xeroform gauze and dressed appropriately with a telfa and gauze bandage to ensure continued blood supply and protect the attached pedicle.
Consent (Lip)/Introductory Paragraph: The rationale for Mohs was explained to the patient and consent was obtained. The risks, benefits and alternatives to therapy were discussed in detail. Specifically, the risks of lip deformity, changes in the oral aperture, infection, scarring, bleeding, prolonged wound healing, incomplete removal, allergy to anesthesia, nerve injury and recurrence were addressed. Prior to the procedure, the treatment site was clearly identified and confirmed by the patient. All components of Universal Protocol/PAUSE Rule completed.
Tumor Depth: Less than 6mm from granular layer and no invasion beyond the subcutaneous fat
Posterior Auricular Interpolation Flap Text: A decision was made to reconstruct the defect utilizing an interpolation axial flap and a staged reconstruction.  A telfa template was made of the defect.  This telfa template was then used to outline the posterior auricular interpolation flap.  The donor area for the pedicle flap was then injected with anesthesia.  The flap was excised through the skin and subcutaneous tissue down to the layer of the underlying musculature.  The pedicle flap was carefully excised within this deep plane to maintain its blood supply.  The edges of the donor site were undermined.   The donor site was closed in a primary fashion.  The pedicle was then rotated into position and sutured.  Once the tube was sutured into place, adequate blood supply was confirmed with blanching and refill.  The pedicle was then wrapped with xeroform gauze and dressed appropriately with a telfa and gauze bandage to ensure continued blood supply and protect the attached pedicle.
Debridement Text: The wound edges were debrided prior to proceeding with the closure to facilitate wound healing.
Deep Sutures: 5-0 Polysorb
Eye Protection Verbiage: Before proceeding with the stage, a plastic scleral shield was inserted. The globe was anesthetized with a few drops of 1% lidocaine with 1:100,000 epinephrine. Then, an appropriate sized scleral shield was chosen and coated with lacrilube ointment. The shield was gently inserted and left in place for the duration of each stage. After the stage was completed, the shield was gently removed.
Rhombic Flap Text: The defect edges were debeveled with a #15 scalpel blade.  Given the location of the defect and the proximity to free margins a rhombic flap was deemed most appropriate.  Using a sterile surgical marker, an appropriate rhombic flap was drawn incorporating the defect.    The area thus outlined was incised deep to adipose tissue with a #15 scalpel blade.  The skin margins were undermined to an appropriate distance in all directions utilizing iris scissors.
Staged Advancement Flap Text: The defect edges were debeveled with a #15 scalpel blade.  Given the location of the defect, shape of the defect and the proximity to free margins a staged advancement flap was deemed most appropriate.  Using a sterile surgical marker, an appropriate advancement flap was drawn incorporating the defect and placing the expected incisions within the relaxed skin tension lines where possible. The area thus outlined was incised deep to adipose tissue with a #15 scalpel blade.  The skin margins were undermined to an appropriate distance in all directions utilizing iris scissors.
Suturegard Retention Suture: 0-0 Nylon
O-L Flap Text: The defect edges were debeveled with a #15 scalpel blade.  Given the location of the defect, shape of the defect and the proximity to free margins an O-L flap was deemed most appropriate.  Using a sterile surgical marker, an appropriate advancement flap was drawn incorporating the defect and placing the expected incisions within the relaxed skin tension lines where possible.    The area thus outlined was incised deep to adipose tissue with a #15 scalpel blade.  The skin margins were undermined to an appropriate distance in all directions utilizing iris scissors.
Mid-Level Procedure Text (C): After obtaining clear surgical margins the patient was sent to a mid-level provider for surgical repair.  The patient understands they will receive post-surgical care and follow-up from the mid-level provider.
Melolabial Transposition Flap Text: The defect edges were debeveled with a #15 scalpel blade.  Given the location of the defect and the proximity to free margins a melolabial flap was deemed most appropriate.  Using a sterile surgical marker, an appropriate melolabial transposition flap was drawn incorporating the defect.    The area thus outlined was incised deep to adipose tissue with a #15 scalpel blade.  The skin margins were undermined to an appropriate distance in all directions utilizing iris scissors.
Wound Care (No Sutures): Petrolatum
Closure 4 Information: This tab is for additional flaps and grafts above and beyond our usual structured repairs.  Please note if you enter information here it will not currently bill and you will need to add the billing information manually.
No Repair - Repaired With Adjacent Surgical Defect Text (Leave Blank If You Do Not Want): After obtaining clear surgical margins the defect was repaired concurrently with another surgical defect which was in close approximation.
Consent (Nose)/Introductory Paragraph: The rationale for Mohs was explained to the patient and consent was obtained. The risks, benefits and alternatives to therapy were discussed in detail. Specifically, the risks of nasal deformity, changes in the flow of air through the nose, infection, scarring, bleeding, prolonged wound healing, incomplete removal, allergy to anesthesia, nerve injury and recurrence were addressed. Prior to the procedure, the treatment site was clearly identified and confirmed by the patient. All components of Universal Protocol/PAUSE Rule completed.
Where Do You Want The Question To Include Opioid Counseling Located?: Case Summary Tab
Complex Repair And Graft Additional Text (Will Appearing After The Standard Complex Repair Text): The complex repair was not sufficient to completely close the primary defect. The remaining additional defect was repaired with the graft mentioned below.
H Plasty Text: Given the location of the defect, shape of the defect and the proximity to free margins a H-plasty was deemed most appropriate for repair.  Using a sterile surgical marker, the appropriate advancement arms of the H-plasty were drawn incorporating the defect and placing the expected incisions within the relaxed skin tension lines where possible. The area thus outlined was incised deep to adipose tissue with a #15 scalpel blade. The skin margins were undermined to an appropriate distance in all directions utilizing iris scissors.  The opposing advancement arms were then advanced into place in opposite direction and anchored with interrupted buried subcutaneous sutures.
Hemigard Postcare Instructions: The HEMIGARD strips are to remain completely dry for at least 5-7 days.
Crescentic Advancement Flap Text: The defect edges were debeveled with a #15 scalpel blade.  Given the location of the defect and the proximity to free margins a crescentic advancement flap was deemed most appropriate.  Using a sterile surgical marker, the appropriate advancement flap was drawn incorporating the defect and placing the expected incisions within the relaxed skin tension lines where possible.    The area thus outlined was incised deep to adipose tissue with a #15 scalpel blade.  The skin margins were undermined to an appropriate distance in all directions utilizing iris scissors.
O-Z Flap Text: The defect edges were debeveled with a #15 scalpel blade.  Given the location of the defect, shape of the defect and the proximity to free margins an O-Z flap was deemed most appropriate.  Using a sterile surgical marker, an appropriate transposition flap was drawn incorporating the defect and placing the expected incisions within the relaxed skin tension lines where possible. The area thus outlined was incised deep to adipose tissue with a #15 scalpel blade.  The skin margins were undermined to an appropriate distance in all directions utilizing iris scissors.
Orbicularis Oris Muscle Flap Text: The defect edges were debeveled with a #15 scalpel blade.  Given that the defect affected the competency of the oral sphincter an orbicularis oris muscle flap was deemed most appropriate to restore this competency and normal muscle function.  Using a sterile surgical marker, an appropriate flap was drawn incorporating the defect. The area thus outlined was incised with a #15 scalpel blade.
Estimated Blood Loss (Cc): minimal
Adjacent Tissue Transfer Text: The defect edges were debeveled with a #15 scalpel blade.  Given the location of the defect and the proximity to free margins an adjacent tissue transfer was deemed most appropriate.  Using a sterile surgical marker, an appropriate flap was drawn incorporating the defect and placing the expected incisions within the relaxed skin tension lines where possible.    The area thus outlined was incised deep to adipose tissue with a #15 scalpel blade.  The skin margins were undermined to an appropriate distance in all directions utilizing iris scissors.
Same Histology In Subsequent Stages Text: The pattern and morphology of the tumor is as described in the first stage.
Repair Anesthesia Type: 1% lidocaine with epinephrine and 0.25% bupivacaine in a 2:3 ration buffered with 8.4% sodium bicarbonate
Undermining Location (Optional): in the superficial subcutaneous fat
Inflammation Suggestive Of Cancer Camouflage Histology Text: There was a dense lymphocytic infiltrate which prevented adequate histologic evaluation of adjacent structures.
Area L Indication Text: Tumors in this location are included in Area L (trunk and extremities).  Mohs surgery is indicated for larger tumors, or tumors with aggressive histologic features, in these anatomic locations.
V-Y Flap Text: The defect edges were debeveled with a #15 scalpel blade.  Given the location of the defect, shape of the defect and the proximity to free margins a V-Y flap was deemed most appropriate.  Using a sterile surgical marker, an appropriate advancement flap was drawn incorporating the defect and placing the expected incisions within the relaxed skin tension lines where possible.    The area thus outlined was incised deep to adipose tissue with a #15 scalpel blade.  The skin margins were undermined to an appropriate distance in all directions utilizing iris scissors.
Provider Procedure Text (C): After obtaining clear surgical margins the defect was repaired by another provider.
Mohs Method Verbiage: An incision at a 45 degree angle following the standard Mohs approach was done and the specimen was harvested as a microscopic controlled layer.
Double O-Z Plasty Text: The defect edges were debeveled with a #15 scalpel blade.  Given the location of the defect, shape of the defect and the proximity to free margins a Double O-Z plasty (double transposition flap) was deemed most appropriate.  Using a sterile surgical marker, the appropriate transposition flaps were drawn incorporating the defect and placing the expected incisions within the relaxed skin tension lines where possible. The area thus outlined was incised deep to adipose tissue with a #15 scalpel blade.  The skin margins were undermined to an appropriate distance in all directions utilizing iris scissors.  Hemostasis was achieved with electrocautery.  The flaps were then transposed into place, one clockwise and the other counterclockwise, and anchored with interrupted buried subcutaneous sutures.
Burow's Graft Text: The defect edges were debeveled with a #15 scalpel blade.  Given the location of the defect, shape of the defect, the proximity to free margins and the presence of a standing cone deformity a Burow's skin graft was deemed most appropriate. The standing cone was removed and this tissue was then trimmed to the shape of the primary defect. The adipose tissue was also removed until only dermis and epidermis were left.  The skin margins of the secondary defect were undermined to an appropriate distance in all directions utilizing iris scissors.  The secondary defect was closed with interrupted buried subcutaneous sutures.  The skin edges were then re-apposed with running  sutures.  The skin graft was then placed in the primary defect and oriented appropriately.
Graft Donor Site Epidermal Sutures (Optional): 5-0 Surgipro
Complex Repair And Flap Additional Text (Will Appearing After The Standard Complex Repair Text): The complex repair was not sufficient to completely close the primary defect. The remaining additional defect was repaired with the flap mentioned below.
Hatchet Flap Text: The defect edges were debeveled with a #15 scalpel blade.  Given the location of the defect, shape of the defect and the proximity to free margins a hatchet flap was deemed most appropriate.  Using a sterile surgical marker, an appropriate hatchet flap was drawn incorporating the defect and placing the expected incisions within the relaxed skin tension lines where possible.    The area thus outlined was incised deep to adipose tissue with a #15 scalpel blade.  The skin margins were undermined to an appropriate distance in all directions utilizing iris scissors.
Manual Repair Warning Statement: We plan on removing the manually selected variable below in favor of our much easier automatic structured text blocks found in the previous tab. We decided to do this to help make the flow better and give you the full power of structured data. Manual selection is never going to be ideal in our platform and I would encourage you to avoid using manual selection from this point on, especially since I will be sunsetting this feature. It is important that you do one of two things with the customized text below. First, you can save all of the text in a word file so you can have it for future reference. Second, transfer the text to the appropriate area in the Library tab. Lastly, if there is a flap or graft type which we do not have you need to let us know right away so I can add it in before the variable is hidden. No need to panic, we plan to give you roughly 6 months to make the change.
Previous Accession (Optional): [id number]
Burow's Advancement Flap Text: The defect edges were debeveled with a #15 scalpel blade.  Given the location of the defect and the proximity to free margins a Burow's advancement flap was deemed most appropriate.  Using a sterile surgical marker, the appropriate advancement flap was drawn incorporating the defect and placing the expected incisions within the relaxed skin tension lines where possible.    The area thus outlined was incised deep to adipose tissue with a #15 scalpel blade.  The skin margins were undermined to an appropriate distance in all directions utilizing iris scissors.
Graft Cartilage Fenestration Text: The cartilage was fenestrated with a 2mm punch biopsy to help facilitate graft survival and healing.
Mauc Instructions: By selecting yes to the question below the MAUC number will be added into the note.  This will be calculated automatically based on the diagnosis chosen, the size entered, the body zone selected (H,M,L) and the specific indications you chose. You will also have the option to override the Mohs AUC if you disagree with the automatically calculated number and this option is found in the Case Summary tab.
Bcc Histology Text: There were numerous aggregates of basaloid cells.
Ftsg Text: The defect edges were debeveled with a #15 scalpel blade.  Given the location of the defect, shape of the defect and the proximity to free margins a full thickness skin graft was deemed most appropriate.  Using a sterile surgical marker, the primary defect shape was transferred to the donor site. The area thus outlined was incised deep to adipose tissue with a #15 scalpel blade.  The harvested graft was then trimmed of adipose tissue until only dermis and epidermis was left.  The skin margins of the secondary defect were undermined to an appropriate distance in all directions utilizing iris scissors.  The secondary defect was closed with interrupted buried subcutaneous sutures.  The skin edges were then re-apposed with running  sutures.  The skin graft was then placed in the primary defect and oriented appropriately.
O-T Advancement Flap Text: The defect edges were debeveled with a #15 scalpel blade.  Given the location of the defect, shape of the defect and the proximity to free margins an O-T advancement flap was deemed most appropriate.  Using a sterile surgical marker, an appropriate advancement flap was drawn incorporating the defect and placing the expected incisions within the relaxed skin tension lines where possible.    The area thus outlined was incised deep to adipose tissue with a #15 scalpel blade.  The skin margins were undermined to an appropriate distance in all directions utilizing iris scissors.
Rotation Flap Text: The defect edges were debeveled with a #15 scalpel blade.  Given the location of the defect, shape of the defect and the proximity to free margins a rotation flap was deemed most appropriate.  Using a sterile surgical marker, an appropriate rotation flap was drawn incorporating the defect and placing the expected incisions within the relaxed skin tension lines where possible.    The area thus outlined was incised deep to adipose tissue with a #15 scalpel blade.  The skin margins were undermined to an appropriate distance in all directions utilizing iris scissors.
Consent 1/Introductory Paragraph: The rationale for Mohs was explained to the patient and consent was obtained. The risks, benefits and alternatives to therapy were discussed in detail. Specifically, the risks of infection, scarring, bleeding, prolonged wound healing, incomplete removal, allergy to anesthesia, nerve injury and recurrence were addressed. Prior to the procedure, the treatment site was clearly identified and confirmed by the patient. All components of Universal Protocol/PAUSE Rule completed.
Alternatives Discussed Intro (Do Not Add Period): I discussed alternative treatments to Mohs surgery and specifically discussed the risks and benefits of
Non-Graft Cartilage Fenestration Text: The cartilage was fenestrated with a 2mm punch biopsy to help facilitate healing.
Consent (Spinal Accessory)/Introductory Paragraph: The rationale for Mohs was explained to the patient and consent was obtained. The risks, benefits and alternatives to therapy were discussed in detail. Specifically, the risks of damage to the spinal accessory nerve, infection, scarring, bleeding, prolonged wound healing, incomplete removal, allergy to anesthesia, and recurrence were addressed. Prior to the procedure, the treatment site was clearly identified and confirmed by the patient. All components of Universal Protocol/PAUSE Rule completed.
Vermilion Border Text: The closure involved the vermilion border.
Purse String (Intermediate) Text: Given the location of the defect and the characteristics of the surrounding skin a purse string intermediate closure was deemed most appropriate.  Undermining was performed circumfirentially around the surgical defect.  A purse string suture was then placed and tightened.
Dorsal Nasal Flap Text: The defect edges were debeveled with a #15 scalpel blade.  Given the location of the defect and the proximity to free margins a dorsal nasal flap was deemed most appropriate.  Using a sterile surgical marker, an appropriate dorsal nasal flap was drawn around the defect.    The area thus outlined was incised deep to adipose tissue with a #15 scalpel blade.  The skin margins were undermined to an appropriate distance in all directions utilizing iris scissors.
Dermal Autograft Text: The defect edges were debeveled with a #15 scalpel blade.  Given the location of the defect, shape of the defect and the proximity to free margins a dermal autograft was deemed most appropriate.  Using a sterile surgical marker, the primary defect shape was transferred to the donor site. The area thus outlined was incised deep to adipose tissue with a #15 scalpel blade.  The harvested graft was then trimmed of adipose and epidermal tissue until only dermis was left.  The skin graft was then placed in the primary defect and oriented appropriately.
Advancement Flap (Double) Text: The defect edges were debeveled with a #15 scalpel blade.  Given the location of the defect and the proximity to free margins a double advancement flap was deemed most appropriate.  Using a sterile surgical marker, the appropriate advancement flaps were drawn incorporating the defect and placing the expected incisions within the relaxed skin tension lines where possible.    The area thus outlined was incised deep to adipose tissue with a #15 scalpel blade.  The skin margins were undermined to an appropriate distance in all directions utilizing iris scissors.
Consent 3/Introductory Paragraph: I gave the patient a chance to ask questions they had about the procedure.  Following this I explained the Mohs procedure and consent was obtained. The risks, benefits and alternatives to therapy were discussed in detail. Specifically, the risks of infection, scarring, bleeding, prolonged wound healing, incomplete removal, allergy to anesthesia, nerve injury and recurrence were addressed. Prior to the procedure, the treatment site was clearly identified and confirmed by the patient. All components of Universal Protocol/PAUSE Rule completed.
Consent Type: Consent 1 (Standard)
Consent (Scalp)/Introductory Paragraph: The rationale for Mohs was explained to the patient and consent was obtained. The risks, benefits and alternatives to therapy were discussed in detail. Specifically, the risks of changes in hair growth pattern secondary to repair, infection, scarring, bleeding, prolonged wound healing, incomplete removal, allergy to anesthesia, nerve injury and recurrence were addressed. Prior to the procedure, the treatment site was clearly identified and confirmed by the patient. All components of Universal Protocol/PAUSE Rule completed.
Mustarde Flap Text: The defect edges were debeveled with a #15 scalpel blade.  Given the size, depth and location of the defect and the proximity to free margins a Mustarde flap was deemed most appropriate.  Using a sterile surgical marker, an appropriate flap was drawn incorporating the defect. The area thus outlined was incised with a #15 scalpel blade.  The skin margins were undermined to an appropriate distance in all directions utilizing iris scissors.
Mart-1 - Negative Histology Text: MART-1 staining demonstrates a normal density and pattern of melanocytes along the dermal-epidermal junction. The surgical margins are negative for tumor cells.
Repair Type: None (only Mohs)
Interpolation Flap Text: A decision was made to reconstruct the defect utilizing an interpolation axial flap and a staged reconstruction.  A telfa template was made of the defect.  This telfa template was then used to outline the interpolation flap.  The donor area for the pedicle flap was then injected with anesthesia.  The flap was excised through the skin and subcutaneous tissue down to the layer of the underlying musculature.  The interpolation flap was carefully excised within this deep plane to maintain its blood supply.  The edges of the donor site were undermined.   The donor site was closed in a primary fashion.  The pedicle was then rotated into position and sutured.  Once the tube was sutured into place, adequate blood supply was confirmed with blanching and refill.  The pedicle was then wrapped with xeroform gauze and dressed appropriately with a telfa and gauze bandage to ensure continued blood supply and protect the attached pedicle.
Surgeon/Pathologist Verbiage (Will Incorporate Name Of Surgeon From Intro If Not Blank): operated in two distinct and integrated capacities as the surgeon and pathologist.
Tissue Cultured Epidermal Autograft Text: The defect edges were debeveled with a #15 scalpel blade.  Given the location of the defect, shape of the defect and the proximity to free margins a tissue cultured epidermal autograft was deemed most appropriate.  The graft was then trimmed to fit the size of the defect.  The graft was then placed in the primary defect and oriented appropriately.
Alar Island Pedicle Flap Text: The defect edges were debeveled with a #15 scalpel blade.  Given the location of the defect, shape of the defect and the proximity to the alar rim an island pedicle advancement flap was deemed most appropriate.  Using a sterile surgical marker, an appropriate advancement flap was drawn incorporating the defect, outlining the appropriate donor tissue and placing the expected incisions within the nasal ala running parallel to the alar rim. The area thus outlined was incised with a #15 scalpel blade.  The skin margins were undermined minimally to an appropriate distance in all directions around the primary defect and laterally outward around the island pedicle utilizing iris scissors.  There was minimal undermining beneath the pedicle flap.
Date Of Previous Biopsy (Optional): 6/29/22
Referring Physician (Optional): PADMINI Walden
Advancement-Rotation Flap Text: The defect edges were debeveled with a #15 scalpel blade.  Given the location of the defect, shape of the defect and the proximity to free margins an advancement-rotation flap was deemed most appropriate.  Using a sterile surgical marker, an appropriate flap was drawn incorporating the defect and placing the expected incisions within the relaxed skin tension lines where possible. The area thus outlined was incised deep to adipose tissue with a #15 scalpel blade.  The skin margins were undermined to an appropriate distance in all directions utilizing iris scissors.
Subsequent Stages Histo Method Verbiage: Using a similar technique to that described above, a thin layer of tissue was removed from all areas where tumor was visible on the previous stage.  The tissue was again oriented, mapped, dyed, and processed as above.
Depth Of Tumor Invasion (For Histology): dermis
Cheek-To-Nose Interpolation Flap Text: A decision was made to reconstruct the defect utilizing an interpolation axial flap and a staged reconstruction.  A telfa template was made of the defect.  This telfa template was then used to outline the Cheek-To-Nose Interpolation flap.  The donor area for the pedicle flap was then injected with anesthesia.  The flap was excised through the skin and subcutaneous tissue down to the layer of the underlying musculature.  The interpolation flap was carefully excised within this deep plane to maintain its blood supply.  The edges of the donor site were undermined.   The donor site was closed in a primary fashion.  The pedicle was then rotated into position and sutured.  Once the tube was sutured into place, adequate blood supply was confirmed with blanching and refill.  The pedicle was then wrapped with xeroform gauze and dressed appropriately with a telfa and gauze bandage to ensure continued blood supply and protect the attached pedicle.
Cheiloplasty (Less Than 50%) Text: A decision was made to reconstruct the defect with a  cheiloplasty.  The defect was undermined extensively.  Additional obicularis oris muscle was excised with a 15 blade scalpel.  The defect was converted into a full thickness wedge, of less than 50% of the vertical height of the lip, to facilite a better cosmetic result.  Small vessels were then tied off with 5-0 monocyrl. The obicularis oris, superficial fascia, adipose and dermis were then reapproximated.  After the deeper layers were approximated the epidermis was reapproximated with particular care given to realign the vermilion border.
Trilobed Flap Text: The defect edges were debeveled with a #15 scalpel blade.  Given the location of the defect and the proximity to free margins a trilobed flap was deemed most appropriate.  Using a sterile surgical marker, an appropriate trilobed flap drawn around the defect.    The area thus outlined was incised deep to adipose tissue with a #15 scalpel blade.  The skin margins were undermined to an appropriate distance in all directions utilizing iris scissors.
Bi-Rhombic Flap Text: The defect edges were debeveled with a #15 scalpel blade.  Given the location of the defect and the proximity to free margins a bi-rhombic flap was deemed most appropriate.  Using a sterile surgical marker, an appropriate rhombic flap was drawn incorporating the defect. The area thus outlined was incised deep to adipose tissue with a #15 scalpel blade.  The skin margins were undermined to an appropriate distance in all directions utilizing iris scissors.
Tarsorrhaphy Text: A tarsorrhaphy was performed using Frost sutures.
Bilobed Flap Text: The defect edges were debeveled with a #15 scalpel blade.  Given the location of the defect and the proximity to free margins a bilobe flap was deemed most appropriate.  Using a sterile surgical marker, an appropriate bilobe flap drawn around the defect.    The area thus outlined was incised deep to adipose tissue with a #15 scalpel blade.  The skin margins were undermined to an appropriate distance in all directions utilizing iris scissors.
Home Suture Removal Text: Patient was provided instructions on removing sutures and will remove their sutures at home.  If they have any questions or difficulties they will call the office.
Bilateral Helical Rim Advancement Flap Text: The defect edges were debeveled with a #15 blade scalpel.  Given the location of the defect and the proximity to free margins (helical rim) a bilateral helical rim advancement flap was deemed most appropriate.  Using a sterile surgical marker, the appropriate advancement flaps were drawn incorporating the defect and placing the expected incisions between the helical rim and antihelix where possible.  The area thus outlined was incised through and through with a #15 scalpel blade.  With a skin hook and iris scissors, the flaps were gently and sharply undermined and freed up.
Mart-1 - Positive Histology Text: MART-1 staining demonstrates areas of higher density and clustering of melanocytes with Pagetoid spread upwards within the epidermis. The surgical margins are positive for tumor cells.
Nasal Turnover Hinge Flap Text: The defect edges were debeveled with a #15 scalpel blade.  Given the size, depth, location of the defect and the defect being full thickness a nasal turnover hinge flap was deemed most appropriate.  Using a sterile surgical marker, an appropriate hinge flap was drawn incorporating the defect. The area thus outlined was incised with a #15 scalpel blade. The flap was designed to recreate the nasal mucosal lining and the alar rim. The skin margins were undermined to an appropriate distance in all directions utilizing iris scissors.
Epidermal Closure: running and interrupted
Localized Dermabrasion With Wire Brush Text: The patient was draped in routine manner.  Localized dermabrasion using 3 x 17 mm wire brush was performed in routine manner to papillary dermis. This spot dermabrasion is being performed to complete skin cancer reconstruction. It also will eliminate the other sun damaged precancerous cells that are known to be part of the regional effect of a lifetime's worth of sun exposure. This localized dermabrasion is therapeutic and should not be considered cosmetic in any regard.
Mastoid Interpolation Flap Text: A decision was made to reconstruct the defect utilizing an interpolation axial flap and a staged reconstruction.  A telfa template was made of the defect.  This telfa template was then used to outline the mastoid interpolation flap.  The donor area for the pedicle flap was then injected with anesthesia.  The flap was excised through the skin and subcutaneous tissue down to the layer of the underlying musculature.  The pedicle flap was carefully excised within this deep plane to maintain its blood supply.  The edges of the donor site were undermined.   The donor site was closed in a primary fashion.  The pedicle was then rotated into position and sutured.  Once the tube was sutured into place, adequate blood supply was confirmed with blanching and refill.  The pedicle was then wrapped with xeroform gauze and dressed appropriately with a telfa and gauze bandage to ensure continued blood supply and protect the attached pedicle.
Suture Removal: 7 days
O-Z Plasty Text: The defect edges were debeveled with a #15 scalpel blade.  Given the location of the defect, shape of the defect and the proximity to free margins an O-Z plasty (double transposition flap) was deemed most appropriate.  Using a sterile surgical marker, the appropriate transposition flaps were drawn incorporating the defect and placing the expected incisions within the relaxed skin tension lines where possible.    The area thus outlined was incised deep to adipose tissue with a #15 scalpel blade.  The skin margins were undermined to an appropriate distance in all directions utilizing iris scissors.  Hemostasis was achieved with electrocautery.  The flaps were then transposed into place, one clockwise and the other counterclockwise, and anchored with interrupted buried subcutaneous sutures.
Unna Boot Text: An Unna boot was placed to help immobilize the limb and facilitate more rapid healing.
Consent 2/Introductory Paragraph: Mohs surgery was explained to the patient and consent was obtained. The risks, benefits and alternatives to therapy were discussed in detail. Specifically, the risks of infection, scarring, bleeding, prolonged wound healing, incomplete removal, allergy to anesthesia, nerve injury and recurrence were addressed. Prior to the procedure, the treatment site was clearly identified and confirmed by the patient. All components of Universal Protocol/PAUSE Rule completed.
Cartilage Graft Text: The defect edges were debeveled with a #15 scalpel blade.  Given the location of the defect, shape of the defect, the fact the defect involved a full thickness cartilage defect a cartilage graft was deemed most appropriate.  An appropriate donor site was identified, cleansed, and anesthetized. The cartilage graft was then harvested and transferred to the recipient site, oriented appropriately and then sutured into place.  The secondary defect was then repaired using a primary closure.
Repair Hemostasis (Optional): Pinpoint electrocautery
Retention Suture Text: Retention sutures were placed to support the closure and prevent dehiscence.
Cheiloplasty (Complex) Text: A decision was made to reconstruct the defect with a  cheiloplasty.  The defect was undermined extensively.  Additional obicularis oris muscle was excised with a 15 blade scalpel.  The defect was converted into a full thickness wedge to facilite a better cosmetic result.  Small vessels were then tied off with 5-0 monocyrl. The obicularis oris, superficial fascia, adipose and dermis were then reapproximated.  After the deeper layers were approximated the epidermis was reapproximated with particular care given to realign the vermilion border.
Repair Anesthesia Method: local infiltration
Muscle Hinge Flap Text: The defect edges were debeveled with a #15 scalpel blade.  Given the size, depth and location of the defect and the proximity to free margins a muscle hinge flap was deemed most appropriate.  Using a sterile surgical marker, an appropriate hinge flap was drawn incorporating the defect. The area thus outlined was incised with a #15 scalpel blade.  The skin margins were undermined to an appropriate distance in all directions utilizing iris scissors.
V-Y Plasty Text: The defect edges were debeveled with a #15 scalpel blade.  Given the location of the defect, shape of the defect and the proximity to free margins an V-Y advancement flap was deemed most appropriate.  Using a sterile surgical marker, an appropriate advancement flap was drawn incorporating the defect and placing the expected incisions within the relaxed skin tension lines where possible.    The area thus outlined was incised deep to adipose tissue with a #15 scalpel blade.  The skin margins were undermined to an appropriate distance in all directions utilizing iris scissors.
Staging Info: By selecting yes to the question above you will include information on AJCC 8 tumor staging in your Mohs note. Information on tumor staging will be automatically added for SCCs on the head and neck. AJCC 8 includes tumor size, tumor depth, perineural involvement and bone invasion.
Closure 2 Information: This tab is for additional flaps and grafts, including complex repair and grafts and complex repair and flaps. You can also specify a different location for the additional defect, if the location is the same you do not need to select a new one. We will insert the automated text for the repair you select below just as we do for solitary flaps and grafts. Please note that at this time if you select a location with a different insurance zone you will need to override the ICD10 and CPT if appropriate.
Nostril Rim Text: The closure involved the nostril rim.
Spiral Flap Text: The defect edges were debeveled with a #15 scalpel blade.  Given the location of the defect, shape of the defect and the proximity to free margins a spiral flap was deemed most appropriate.  Using a sterile surgical marker, an appropriate rotation flap was drawn incorporating the defect and placing the expected incisions within the relaxed skin tension lines where possible. The area thus outlined was incised deep to adipose tissue with a #15 scalpel blade.  The skin margins were undermined to an appropriate distance in all directions utilizing iris scissors.
Donor Site Anesthesia Type: same as repair anesthesia
Partial Purse String (Simple) Text: Given the location of the defect and the characteristics of the surrounding skin a simple purse string closure was deemed most appropriate.  Undermining was performed circumfirentially around the surgical defect.  A purse string suture was then placed and tightened. Wound tension only allowed a partial closure of the circular defect.
Pain Refusal Text: I offered to prescribe pain medication but the patient refused to take this medication.
Keystone Flap Text: The defect edges were debeveled with a #15 scalpel blade.  Given the location of the defect, shape of the defect a keystone flap was deemed most appropriate.  Using a sterile surgical marker, an appropriate keystone flap was drawn incorporating the defect, outlining the appropriate donor tissue and placing the expected incisions within the relaxed skin tension lines where possible. The area thus outlined was incised deep to adipose tissue with a #15 scalpel blade.  The skin margins were undermined to an appropriate distance in all directions around the primary defect and laterally outward around the flap utilizing iris scissors.
Mucosal Advancement Flap Text: Given the location of the defect, shape of the defect and the proximity to free margins a mucosal advancement flap was deemed most appropriate. Incisions were made with a 15 blade scalpel in the appropriate fashion along the cutaneous vermilion border and the mucosal lip. The remaining actinically damaged mucosal tissue was excised.  The mucosal advancement flap was then elevated to the gingival sulcus with care taken to preserve the neurovascular structures and advanced into the primary defect. Care was taken to ensure that precise realignment of the vermilion border was achieved.
Suturegard Body: The suture ends were repeatedly re-tightened and re-clamped to achieve the desired tissue expansion.
Peng Advancement Flap Text: The defect edges were debeveled with a #15 scalpel blade.  Given the location of the defect, shape of the defect and the proximity to free margins a Peng advancement flap was deemed most appropriate.  Using a sterile surgical marker, an appropriate advancement flap was drawn incorporating the defect and placing the expected incisions within the relaxed skin tension lines where possible. The area thus outlined was incised deep to adipose tissue with a #15 scalpel blade.  The skin margins were undermined to an appropriate distance in all directions utilizing iris scissors.
Double O-Z Flap Text: The defect edges were debeveled with a #15 scalpel blade.  Given the location of the defect, shape of the defect and the proximity to free margins a Double O-Z flap was deemed most appropriate.  Using a sterile surgical marker, an appropriate transposition flap was drawn incorporating the defect and placing the expected incisions within the relaxed skin tension lines where possible. The area thus outlined was incised deep to adipose tissue with a #15 scalpel blade.  The skin margins were undermined to an appropriate distance in all directions utilizing iris scissors.
Suturegard Intro: Intraoperative tissue expansion was performed, utilizing the SUTUREGARD device, in order to reduce wound tension.
Ear Wedge Repair Text: A wedge excision was completed by carrying down an excision through the full thickness of the ear and cartilage with an inward facing Burow's triangle. The wound was then closed in a layered fashion.
Bcc Infiltrative Histology Text: There were numerous aggregates of basaloid cells demonstrating an infiltrative pattern.
Mohs Case Number: [id number]
Mohs Rapid Report Verbiage: The area of clinically evident tumor was marked with skin marking ink and appropriately hatched.  The initial incision was made following the Mohs approach through the skin.  The specimen was taken to the lab, divided into the necessary number of pieces, chromacoded and processed according to the Mohs protocol.  This was repeated in successive stages until a tumor free defect was achieved.
Island Pedicle Flap With Canthal Suspension Text: The defect edges were debeveled with a #15 scalpel blade.  Given the location of the defect, shape of the defect and the proximity to free margins an island pedicle advancement flap was deemed most appropriate.  Using a sterile surgical marker, an appropriate advancement flap was drawn incorporating the defect, outlining the appropriate donor tissue and placing the expected incisions within the relaxed skin tension lines where possible. The area thus outlined was incised deep to adipose tissue with a #15 scalpel blade.  The skin margins were undermined to an appropriate distance in all directions around the primary defect and laterally outward around the island pedicle utilizing iris scissors.  There was minimal undermining beneath the pedicle flap. A suspension suture was placed in the canthal tendon to prevent tension and prevent ectropion.
Hemigard Intro: Due to skin fragility and wound tension, it was decided to use HEMIGARD adhesive retention suture devices to permit a linear closure. The skin was cleaned and dried for a 6cm distance away from the wound. Excessive hair, if present, was removed to allow for adhesion.
Area H Indication Text: Tumors in this location are included in Area H (eyelids, eyebrows, nose, lips, chin, ear, pre-auricular, post-auricular, temple, genitalia, hands, feet, ankles and areola).  Tissue conservation is critical in these anatomic locations.
Retention Suture Bite Size: 1 mm
S Plasty Text: Given the location and shape of the defect, and the orientation of relaxed skin tension lines, an S-plasty was deemed most appropriate for repair.  Using a sterile surgical marker, the appropriate outline of the S-plasty was drawn, incorporating the defect and placing the expected incisions within the relaxed skin tension lines where possible.  The area thus outlined was incised deep to adipose tissue with a #15 scalpel blade.  The skin margins were undermined to an appropriate distance in all directions utilizing iris scissors. The skin flaps were advanced over the defect.  The opposing margins were then approximated with interrupted buried subcutaneous sutures.
Full Thickness Lip Wedge Repair (Flap) Text: Given the location of the defect and the proximity to free margins a full thickness wedge repair was deemed most appropriate.  Using a sterile surgical marker, the appropriate repair was drawn incorporating the defect and placing the expected incisions perpendicular to the vermilion border.  The vermilion border was also meticulously outlined to ensure appropriate reapproximation during the repair.  The area thus outlined was incised through and through with a #15 scalpel blade.  The muscularis and dermis were reaproximated with deep sutures following hemostasis. Care was taken to realign the vermilion border before proceeding with the superficial closure.  Once the vermilion was realigned the superfical and mucosal closure was finished.
Island Pedicle Flap Text: The defect edges were debeveled with a #15 scalpel blade.  Given the location of the defect, shape of the defect and the proximity to free margins an island pedicle advancement flap was deemed most appropriate.  Using a sterile surgical marker, an appropriate advancement flap was drawn incorporating the defect, outlining the appropriate donor tissue and placing the expected incisions within the relaxed skin tension lines where possible.    The area thus outlined was incised deep to adipose tissue with a #15 scalpel blade.  The skin margins were undermined to an appropriate distance in all directions around the primary defect and laterally outward around the island pedicle utilizing iris scissors.  There was minimal undermining beneath the pedicle flap.
Chonodrocutaneous Helical Advancement Flap Text: The defect edges were debeveled with a #15 scalpel blade.  Given the location of the defect and the proximity to free margins a chondrocutaneous helical advancement flap was deemed most appropriate.  Using a sterile surgical marker, the appropriate advancement flap was drawn incorporating the defect and placing the expected incisions within the relaxed skin tension lines where possible.    The area thus outlined was incised deep to adipose tissue with a #15 scalpel blade.  The skin margins were undermined to an appropriate distance in all directions utilizing iris scissors.
Surgeon Performing Repair (Optional): Chivo Rivero MD
Detail Level: Detailed
Composite Graft Text: The defect edges were debeveled with a #15 scalpel blade.  Given the location of the defect, shape of the defect, the proximity to free margins and the fact the defect was full thickness a composite graft was deemed most appropriate.  The defect was outline and then transferred to the donor site.  A full thickness graft was then excised from the donor site. The graft was then placed in the primary defect, oriented appropriately and then sutured into place.  The secondary defect was then repaired using a primary closure.
Undermining Type: Entire Wound
Mohs Histo Method Verbiage: Each section was then chromacoded and processed in the Mohs lab using the Mohs protocol and submitted for frozen section.
Consent (Ear)/Introductory Paragraph: The rationale for Mohs was explained to the patient and consent was obtained. The risks, benefits and alternatives to therapy were discussed in detail. Specifically, the risks of ear deformity, infection, scarring, bleeding, prolonged wound healing, incomplete removal, allergy to anesthesia, nerve injury and recurrence were addressed. Prior to the procedure, the treatment site was clearly identified and confirmed by the patient. All components of Universal Protocol/PAUSE Rule completed.
Paramedian Forehead Flap Text: A decision was made to reconstruct the defect utilizing an interpolation axial flap and a staged reconstruction.  A telfa template was made of the defect.  This telfa template was then used to outline the paramedian forehead pedicle flap.  The donor area for the pedicle flap was then injected with anesthesia.  The flap was excised through the skin and subcutaneous tissue down to the layer of the underlying musculature.  The pedicle flap was carefully excised within this deep plane to maintain its blood supply.  The edges of the donor site were undermined.   The donor site was closed in a primary fashion.  The pedicle was then rotated into position and sutured.  Once the tube was sutured into place, adequate blood supply was confirmed with blanching and refill.  The pedicle was then wrapped with xeroform gauze and dressed appropriately with a telfa and gauze bandage to ensure continued blood supply and protect the attached pedicle.
Mercedes Flap Text: The defect edges were debeveled with a #15 scalpel blade.  Given the location of the defect, shape of the defect and the proximity to free margins a Mercedes flap was deemed most appropriate.  Using a sterile surgical marker, an appropriate advancement flap was drawn incorporating the defect and placing the expected incisions within the relaxed skin tension lines where possible. The area thus outlined was incised deep to adipose tissue with a #15 scalpel blade.  The skin margins were undermined to an appropriate distance in all directions utilizing iris scissors.
Double Island Pedicle Flap Text: The defect edges were debeveled with a #15 scalpel blade.  Given the location of the defect, shape of the defect and the proximity to free margins a double island pedicle advancement flap was deemed most appropriate.  Using a sterile surgical marker, an appropriate advancement flap was drawn incorporating the defect, outlining the appropriate donor tissue and placing the expected incisions within the relaxed skin tension lines where possible.    The area thus outlined was incised deep to adipose tissue with a #15 scalpel blade.  The skin margins were undermined to an appropriate distance in all directions around the primary defect and laterally outward around the island pedicle utilizing iris scissors.  There was minimal undermining beneath the pedicle flap.
Helical Rim Text: The closure involved the helical rim.
Xenograft Text: The defect edges were debeveled with a #15 scalpel blade.  Given the location of the defect, shape of the defect and the proximity to free margins a xenograft was deemed most appropriate.  The graft was then trimmed to fit the size of the defect.  The graft was then placed in the primary defect and oriented appropriately.
Epidermal Closure Graft Donor Site (Optional): running
Consent (Near Eyelid Margin)/Introductory Paragraph: The rationale for Mohs was explained to the patient and consent was obtained. The risks, benefits and alternatives to therapy were discussed in detail. Specifically, the risks of ectropion or eyelid deformity, infection, scarring, bleeding, prolonged wound healing, incomplete removal, allergy to anesthesia, nerve injury and recurrence were addressed. Prior to the procedure, the treatment site was clearly identified and confirmed by the patient. All components of Universal Protocol/PAUSE Rule completed.
A-T Advancement Flap Text: The defect edges were debeveled with a #15 scalpel blade.  Given the location of the defect, shape of the defect and the proximity to free margins an A-T advancement flap was deemed most appropriate.  Using a sterile surgical marker, an appropriate advancement flap was drawn incorporating the defect and placing the expected incisions within the relaxed skin tension lines where possible.    The area thus outlined was incised deep to adipose tissue with a #15 scalpel blade.  The skin margins were undermined to an appropriate distance in all directions utilizing iris scissors.
Number Of Hemigard Strips Per Side: 1
Rhomboid Transposition Flap Text: The defect edges were debeveled with a #15 scalpel blade.  Given the location of the defect and the proximity to free margins a rhomboid transposition flap was deemed most appropriate.  Using a sterile surgical marker, an appropriate rhomboid flap was drawn incorporating the defect.    The area thus outlined was incised deep to adipose tissue with a #15 scalpel blade.  The skin margins were undermined to an appropriate distance in all directions utilizing iris scissors.
Post-Care Instructions: I reviewed with the patient in detail post-care instructions. Patient is not to engage in any heavy lifting, exercise, or swimming for the next 14 days. Should the patient develop any fevers, chills, bleeding, severe pain patient will contact the office immediately.
Postop Diagnosis: same
Partial Purse String (Intermediate) Text: Given the location of the defect and the characteristics of the surrounding skin an intermediate purse string closure was deemed most appropriate.  Undermining was performed circumfirentially around the surgical defect.  A purse string suture was then placed and tightened. Wound tension only allowed a partial closure of the circular defect.
Star Wedge Flap Text: The defect edges were debeveled with a #15 scalpel blade.  Given the location of the defect, shape of the defect and the proximity to free margins a star wedge flap was deemed most appropriate.  Using a sterile surgical marker, an appropriate rotation flap was drawn incorporating the defect and placing the expected incisions within the relaxed skin tension lines where possible. The area thus outlined was incised deep to adipose tissue with a #15 scalpel blade.  The skin margins were undermined to an appropriate distance in all directions utilizing iris scissors.
Banner Transposition Flap Text: The defect edges were debeveled with a #15 scalpel blade.  Given the location of the defect and the proximity to free margins a Banner transposition flap was deemed most appropriate.  Using a sterile surgical marker, an appropriate flap drawn around the defect. The area thus outlined was incised deep to adipose tissue with a #15 scalpel blade.  The skin margins were undermined to an appropriate distance in all directions utilizing iris scissors.
Abbe Flap (Lower To Upper Lip) Text: The defect of the upper lip was assessed and measured.  Given the location and size of the defect, an Abbe flap was deemed most appropriate.  Using a sterile surgical marker, an appropriate Abbe flap was measured and drawn on the lower lip. Local anesthesia was then infiltrated. A scalpel was then used to incise the upper lip through and through the skin, vermilion, muscle and mucosa, leaving the flap pedicled on the opposite side.  The flap was then rotated and transferred to the lower lip defect.  The flap was then sutured into place with a three layer technique, closing the orbicularis oris muscle layer with subcutaneous buried sutures, followed by a mucosal layer and an epidermal layer.
Estlander Flap (Lower To Upper Lip) Text: The defect of the lower lip was assessed and measured.  Given the location and size of the defect, an Estlander flap was deemed most appropriate.  Using a sterile surgical marker, an appropriate Estlander flap was measured and drawn on the upper lip. Local anesthesia was then infiltrated. A scalpel was then used to incise the lateral aspect of the flap, through skin, muscle and mucosa, leaving the flap pedicled medially.  The flap was then rotated and positioned to fill the lower lip defect.  The flap was then sutured into place with a three layer technique, closing the orbicularis oris muscle layer with subcutaneous buried sutures, followed by a mucosal layer and an epidermal layer.
Abbe Flap (Upper To Lower Lip) Text: The defect of the lower lip was assessed and measured.  Given the location and size of the defect, an Abbe flap was deemed most appropriate.  Using a sterile surgical marker, an appropriate Abbe flap was measured and drawn on the upper lip. Local anesthesia was then infiltrated.  A scalpel was then used to incise the upper lip through and through the skin, vermilion, muscle and mucosa, leaving the flap pedicled on the opposite side.  The flap was then rotated and transferred to the lower lip defect.  The flap was then sutured into place with a three layer technique, closing the orbicularis oris muscle layer with subcutaneous buried sutures, followed by a mucosal layer and an epidermal layer.

## 2022-07-25 ENCOUNTER — APPOINTMENT (RX ONLY)
Dept: URBAN - METROPOLITAN AREA CLINIC 6 | Facility: CLINIC | Age: 87
Setting detail: DERMATOLOGY
End: 2022-07-25

## 2022-07-25 DIAGNOSIS — Z48.02 ENCOUNTER FOR REMOVAL OF SUTURES: ICD-10-CM

## 2022-07-25 PROCEDURE — ? SUTURE REMOVAL (GLOBAL PERIOD)

## 2022-07-25 PROCEDURE — ? COUNSELING

## 2022-07-25 ASSESSMENT — LOCATION SIMPLE DESCRIPTION DERM: LOCATION SIMPLE: LEFT CHEEK

## 2022-07-25 ASSESSMENT — LOCATION ZONE DERM: LOCATION ZONE: FACE

## 2022-07-25 ASSESSMENT — LOCATION DETAILED DESCRIPTION DERM: LOCATION DETAILED: LEFT INFERIOR CENTRAL MALAR CHEEK

## 2022-07-25 NOTE — PROCEDURE: SUTURE REMOVAL (GLOBAL PERIOD)
Body Location Override (Optional - Billing Will Still Be Based On Selected Body Map Location If Applicable): left inferior central malar cheek
Detail Level: Detailed
Add 06307 Cpt? (Important Note: In 2017 The Use Of 67726 Is Being Tracked By Cms To Determine Future Global Period Reimbursement For Global Periods): no

## 2022-08-03 ENCOUNTER — PATIENT OUTREACH (OUTPATIENT)
Dept: HEALTH INFORMATION MANAGEMENT | Facility: OTHER | Age: 87
End: 2022-08-03

## 2022-08-03 ENCOUNTER — OFFICE VISIT (OUTPATIENT)
Dept: MEDICAL GROUP | Facility: PHYSICIAN GROUP | Age: 87
End: 2022-08-03
Payer: MEDICARE

## 2022-08-03 VITALS
OXYGEN SATURATION: 96 % | HEART RATE: 65 BPM | DIASTOLIC BLOOD PRESSURE: 64 MMHG | WEIGHT: 126.2 LBS | BODY MASS INDEX: 24.77 KG/M2 | TEMPERATURE: 98.2 F | HEIGHT: 60 IN | SYSTOLIC BLOOD PRESSURE: 136 MMHG

## 2022-08-03 DIAGNOSIS — I10 BENIGN ESSENTIAL HTN: Chronic | ICD-10-CM

## 2022-08-03 DIAGNOSIS — I10 BENIGN ESSENTIAL HTN: ICD-10-CM

## 2022-08-03 DIAGNOSIS — Z85.828 HISTORY OF SKIN CANCER IN ADULTHOOD: ICD-10-CM

## 2022-08-03 DIAGNOSIS — Z91.81 HISTORY OF FALL: ICD-10-CM

## 2022-08-03 DIAGNOSIS — N18.31 STAGE 3A CHRONIC KIDNEY DISEASE: ICD-10-CM

## 2022-08-03 DIAGNOSIS — Z23 NEED FOR VACCINATION: ICD-10-CM

## 2022-08-03 PROBLEM — N18.9 CHRONIC KIDNEY DISEASE: Status: ACTIVE | Noted: 2022-08-03

## 2022-08-03 PROBLEM — F33.0 MILD EPISODE OF RECURRENT MAJOR DEPRESSIVE DISORDER (HCC): Chronic | Status: RESOLVED | Noted: 2021-12-22 | Resolved: 2022-08-03

## 2022-08-03 PROCEDURE — G0009 ADMIN PNEUMOCOCCAL VACCINE: HCPCS

## 2022-08-03 PROCEDURE — 90677 PCV20 VACCINE IM: CPT

## 2022-08-03 PROCEDURE — 99213 OFFICE O/P EST LOW 20 MIN: CPT | Mod: 25

## 2022-08-03 RX ORDER — MV-MIN/FA/VIT K/LUTEIN/ZEAXANT 200MCG-5MG
1 CAPSULE ORAL DAILY
COMMUNITY
End: 2023-08-04 | Stop reason: SDUPTHER

## 2022-08-03 RX ORDER — FLUOROURACIL 50 MG/G
CREAM TOPICAL
COMMUNITY
Start: 2022-07-15 | End: 2022-08-03

## 2022-08-03 RX ORDER — LOSARTAN POTASSIUM 50 MG/1
50 TABLET ORAL DAILY
Qty: 90 NOT SPECIFIED | Refills: 2 | Status: SHIPPED | OUTPATIENT
Start: 2022-08-03 | End: 2023-02-23 | Stop reason: SDUPTHER

## 2022-08-03 ASSESSMENT — FIBROSIS 4 INDEX: FIB4 SCORE: 1.96

## 2022-08-03 NOTE — ASSESSMENT & PLAN NOTE
This is a new condition  - Recommend adequate hydration of at least 64 ounces of water per day  - Avoid nephrotoxic substances such as NSAIDs  - We will recheck CMP in March

## 2022-08-03 NOTE — ASSESSMENT & PLAN NOTE
Condition resolved with well-healing incision  - Continue to follow with dermatology for annual skin checks and postoperative checkups

## 2022-08-03 NOTE — PROGRESS NOTES
Introduced PCM program to patient & her caregiver prior to her PCP visit, provided pamphlet of program, patient wants to give it some thought, will reach out to her by phone in one week for decision.

## 2022-08-03 NOTE — ASSESSMENT & PLAN NOTE
Chronic, controlled.  Blood pressure is at goal under 140/90 in the office today.   Medications: losartan 50 mg daily, no adverse side effects noted.  We will continue the current regimen.  Recommend home blood pressure monitoring:  - check your blood pressure every day and put it in a log  - pick a different time each day so we can see how it varies throughout the day  - when you check your blood pressure: make sure you sit quietly for 5-10 minutes beforehand, keep both feet flat on the ground, and make sure you use an arm cuff at heart height    Lifestyle factors to help manage blood pressure:  1) reduce stress with daily activity, consider yoga, or meditation  2) reduce Na to <2000 mg/day-avoid putting extra salt on food, avoid packaged/processed foods, avoid excessive sugar intake  3) Mediterranean diet   4) 30 minutes of cardio and resistance training most days of the week, which you can build up to at least 150 min./week

## 2022-08-03 NOTE — PROGRESS NOTES
Subjective:     CC:  Diagnoses of Benign essential HTN, Need for vaccination, History of skin cancer in adulthood, and Stage 3a chronic kidney disease (HCC) were pertinent to this visit.    HISTORY OF THE PRESENT ILLNESS: Patient is a 89 y.o. female. This pleasant patient is here today to establish care and discuss the following problems:    Problem   History of Skin Cancer in Adulthood    History of basal cell carcinoma to left lateral cheek which was recently found by her dermatologist.  She underwent extensive excision of this lesion.  Surgery was done in June 2022.  Patient presents today with clinic with a well-healing incision to her left lateral cheek approximately 2 inches long with edges well approximated without evidence of infection or dehiscence.  Wound appears to be healing well no swelling noted.     Chronic Kidney Disease    This is a new condition  -Most recent GFR 59  -Patient denies knowledge of this condition.  She does say that she is not good at drinking water or staying hydrated.  She does not take any nephrotoxic substances such as over-the-counter NSAIDs.  Past medical history significant for hypertension.     Benign Essential Htn    Chronic condition well-controlled on losartan 50 mg daily.  Patient reports she has been on this medication many years no reported side effects.  Blood pressure today in clinic 136/64 which she states is elevated for her but she feels perhaps whitecoat syndrome.     Mild Episode of Recurrent Major Depressive Disorder (Hcc) (Resolved)       Health Maintenance: Completed pneumonia vaccine given today in clinic    ROS:   Review of Systems   All other systems reviewed and are negative.        Objective:     Exam: /64 (BP Location: Left arm, Patient Position: Sitting, BP Cuff Size: Adult)   Pulse 65   Temp 36.8 °C (98.2 °F) (Temporal)   Ht 1.524 m (5')   Wt 57.2 kg (126 lb 3.2 oz)   SpO2 96%  Body mass index is 24.65 kg/m².    Physical Exam  Constitutional:        General: She is not in acute distress.     Appearance: Normal appearance. She is not ill-appearing or toxic-appearing.   HENT:      Head: Normocephalic.      Right Ear: Ear canal and external ear normal.      Left Ear: Ear canal and external ear normal.      Nose: Nose normal.      Mouth/Throat:      Mouth: Mucous membranes are moist.      Pharynx: Oropharynx is clear.   Eyes:      Extraocular Movements: Extraocular movements intact.      Conjunctiva/sclera: Conjunctivae normal.      Pupils: Pupils are equal, round, and reactive to light.   Cardiovascular:      Rate and Rhythm: Normal rate and regular rhythm.      Pulses: Normal pulses.      Heart sounds: Murmur (Harsh 4/6) heard.   Pulmonary:      Effort: Pulmonary effort is normal. No respiratory distress.      Breath sounds: Normal breath sounds.   Musculoskeletal:         General: Normal range of motion.      Cervical back: Normal range of motion and neck supple.   Lymphadenopathy:      Cervical: No cervical adenopathy.   Skin:     General: Skin is warm and dry.      Capillary Refill: Capillary refill takes less than 2 seconds.   Neurological:      General: No focal deficit present.      Mental Status: She is alert and oriented to person, place, and time.   Psychiatric:         Mood and Affect: Mood normal.         Behavior: Behavior normal.     Labs: CMP done 3/2/2022 within normal limits with exception of decreased GFR at 59    Assessment & Plan: Medical Decision Making   89 y.o. female with the following -    Problem List Items Addressed This Visit     Benign essential HTN (Chronic)     Chronic, controlled.  Blood pressure is at goal under 140/90 in the office today.   Medications: losartan 50 mg daily, no adverse side effects noted.  We will continue the current regimen.  Recommend home blood pressure monitoring:  - check your blood pressure every day and put it in a log  - pick a different time each day so we can see how it varies throughout the  day  - when you check your blood pressure: make sure you sit quietly for 5-10 minutes beforehand, keep both feet flat on the ground, and make sure you use an arm cuff at heart height    Lifestyle factors to help manage blood pressure:  1) reduce stress with daily activity, consider yoga, or meditation  2) reduce Na to <2000 mg/day-avoid putting extra salt on food, avoid packaged/processed foods, avoid excessive sugar intake  3) Mediterranean diet   4) 30 minutes of cardio and resistance training most days of the week, which you can build up to at least 150 min./week                   Relevant Medications    losartan (COZAAR) 50 MG Tab    History of skin cancer in adulthood     Condition resolved with well-healing incision  - Continue to follow with dermatology for annual skin checks and postoperative checkups           Chronic kidney disease     This is a new condition  - Recommend adequate hydration of at least 64 ounces of water per day  - Avoid nephrotoxic substances such as NSAIDs  - We will recheck CMP in March             Other Visit Diagnoses     Need for vaccination        Relevant Orders    Pneumococcal Conjugate Vaccine 20-Valent (19 yrs+) (Completed)          Differential diagnosis, natural history, supportive care, and indications for immediate follow-up discussed.  Shared decision making approach was utilized, and patient is amendable with plan of care.  Patient understands to return to clinic or go to the emergency department if symptoms worsen. All questions and concerns addressed.      Return in about 6 months (around 2/3/2023) for Health Maintanence.    Please note that this dictation was created using voice recognition software. I have made every reasonable attempt to correct obvious errors, but I expect that there are errors of grammar and possibly content that I did not discover before finalizing the note.

## 2022-08-10 ENCOUNTER — PATIENT OUTREACH (OUTPATIENT)
Dept: HEALTH INFORMATION MANAGEMENT | Facility: OTHER | Age: 87
End: 2022-08-10
Payer: MEDICARE

## 2022-08-10 DIAGNOSIS — N18.31 STAGE 3A CHRONIC KIDNEY DISEASE: ICD-10-CM

## 2022-08-10 NOTE — PROGRESS NOTES
Called & left voicemail message with contact information for patient, calling to determine if patient has decided about enrolling in PCM program.     negative

## 2022-08-12 NOTE — PROGRESS NOTES
Patient called on 8/11/22 & left phone that she is not interested in participating in the PCM program.

## 2022-08-24 ENCOUNTER — APPOINTMENT (RX ONLY)
Dept: URBAN - METROPOLITAN AREA CLINIC 6 | Facility: CLINIC | Age: 87
Setting detail: DERMATOLOGY
End: 2022-08-24

## 2022-08-24 DIAGNOSIS — Z48.817 ENCOUNTER FOR SURGICAL AFTERCARE FOLLOWING SURGERY ON THE SKIN AND SUBCUTANEOUS TISSUE: ICD-10-CM

## 2022-08-24 PROCEDURE — ? POST-OP WOUND CHECK

## 2022-08-24 ASSESSMENT — LOCATION DETAILED DESCRIPTION DERM: LOCATION DETAILED: LEFT INFERIOR CENTRAL MALAR CHEEK

## 2022-08-24 ASSESSMENT — LOCATION ZONE DERM: LOCATION ZONE: FACE

## 2022-08-24 ASSESSMENT — LOCATION SIMPLE DESCRIPTION DERM: LOCATION SIMPLE: LEFT CHEEK

## 2022-08-24 NOTE — PROCEDURE: POST-OP WOUND CHECK
Body Location Override (Optional - Billing Will Still Be Based On Selected Body Map Location If Applicable): left inferior malar cheek
Detail Level: Detailed
Add 79944 Cpt? (Important Note: In 2017 The Use Of 83999 Is Being Tracked By Cms To Determine Future Global Period Reimbursement For Global Periods): no

## 2022-11-09 ENCOUNTER — PATIENT MESSAGE (OUTPATIENT)
Dept: HEALTH INFORMATION MANAGEMENT | Facility: OTHER | Age: 87
End: 2022-11-09

## 2022-12-07 ENCOUNTER — TELEPHONE (OUTPATIENT)
Dept: HEALTH INFORMATION MANAGEMENT | Facility: OTHER | Age: 87
End: 2022-12-07
Payer: MEDICARE

## 2022-12-07 DIAGNOSIS — H91.8X9 OTHER SPECIFIED FORMS OF HEARING LOSS, UNSPECIFIED LATERALITY: ICD-10-CM

## 2022-12-07 NOTE — TELEPHONE ENCOUNTER
1. Caller Name: Chrissie Dueñas                         Call Back Number: [phone]       How would the patient prefer to be contacted with a response: MyChart message    2. SPECIFIC Action To Be Taken: Referral pending, please sign.    3. Diagnosis/Clinical Reason for Request: Annual Hearing Test    4. Specialty & Provider Name/Lab/Imaging Location: [facility]    5. Is appointment scheduled for requested order/referral: yes - 1/4/23    Patient was not informed they will receive a return phone call from the office ONLY if there are any questions before processing their request. Advised to call back if they haven't received a call from the referral department in 5 days.

## 2023-02-17 ENCOUNTER — HOSPITAL ENCOUNTER (OUTPATIENT)
Facility: MEDICAL CENTER | Age: 88
End: 2023-02-18
Attending: EMERGENCY MEDICINE | Admitting: HOSPITALIST
Payer: MEDICARE

## 2023-02-17 ENCOUNTER — APPOINTMENT (OUTPATIENT)
Dept: RADIOLOGY | Facility: MEDICAL CENTER | Age: 88
End: 2023-02-17
Attending: EMERGENCY MEDICINE
Payer: MEDICARE

## 2023-02-17 DIAGNOSIS — L98.9 SKIN LESION: ICD-10-CM

## 2023-02-17 DIAGNOSIS — Z53.09 CONTRAINDICATION TO DEEP VEIN THROMBOSIS (DVT) PROPHYLAXIS: ICD-10-CM

## 2023-02-17 DIAGNOSIS — E87.1 ACUTE HYPONATREMIA: Chronic | ICD-10-CM

## 2023-02-17 DIAGNOSIS — Z86.69 HISTORY OF MACULAR DEGENERATION: Chronic | ICD-10-CM

## 2023-02-17 DIAGNOSIS — R55 SYNCOPE AND COLLAPSE: ICD-10-CM

## 2023-02-17 DIAGNOSIS — Z91.81 HISTORY OF FALL: ICD-10-CM

## 2023-02-17 DIAGNOSIS — I10 BENIGN ESSENTIAL HTN: Chronic | ICD-10-CM

## 2023-02-17 LAB
ALBUMIN SERPL BCP-MCNC: 4.5 G/DL (ref 3.2–4.9)
ALBUMIN/GLOB SERPL: 1.4 G/DL
ALP SERPL-CCNC: 125 U/L (ref 30–99)
ALT SERPL-CCNC: 17 U/L (ref 2–50)
ANION GAP SERPL CALC-SCNC: 18 MMOL/L (ref 7–16)
AST SERPL-CCNC: 27 U/L (ref 12–45)
BASOPHILS # BLD AUTO: 0.2 % (ref 0–1.8)
BASOPHILS # BLD: 0.02 K/UL (ref 0–0.12)
BILIRUB SERPL-MCNC: 0.4 MG/DL (ref 0.1–1.5)
BUN SERPL-MCNC: 12 MG/DL (ref 8–22)
CALCIUM ALBUM COR SERPL-MCNC: 9.1 MG/DL (ref 8.5–10.5)
CALCIUM SERPL-MCNC: 9.5 MG/DL (ref 8.4–10.2)
CHLORIDE SERPL-SCNC: 92 MMOL/L (ref 96–112)
CO2 SERPL-SCNC: 19 MMOL/L (ref 20–33)
CREAT SERPL-MCNC: 0.77 MG/DL (ref 0.5–1.4)
EKG IMPRESSION: NORMAL
EOSINOPHIL # BLD AUTO: 0.01 K/UL (ref 0–0.51)
EOSINOPHIL NFR BLD: 0.1 % (ref 0–6.9)
ERYTHROCYTE [DISTWIDTH] IN BLOOD BY AUTOMATED COUNT: 40.2 FL (ref 35.9–50)
FLUAV RNA SPEC QL NAA+PROBE: NEGATIVE
FLUBV RNA SPEC QL NAA+PROBE: NEGATIVE
GFR SERPLBLD CREATININE-BSD FMLA CKD-EPI: 72 ML/MIN/1.73 M 2
GLOBULIN SER CALC-MCNC: 3.3 G/DL (ref 1.9–3.5)
GLUCOSE SERPL-MCNC: 98 MG/DL (ref 65–99)
HCT VFR BLD AUTO: 45.2 % (ref 37–47)
HGB BLD-MCNC: 15.9 G/DL (ref 12–16)
IMM GRANULOCYTES # BLD AUTO: 0.03 K/UL (ref 0–0.11)
IMM GRANULOCYTES NFR BLD AUTO: 0.3 % (ref 0–0.9)
LYMPHOCYTES # BLD AUTO: 2.19 K/UL (ref 1–4.8)
LYMPHOCYTES NFR BLD: 23.4 % (ref 22–41)
MCH RBC QN AUTO: 32.5 PG (ref 27–33)
MCHC RBC AUTO-ENTMCNC: 35.2 G/DL (ref 33.6–35)
MCV RBC AUTO: 92.4 FL (ref 81.4–97.8)
MONOCYTES # BLD AUTO: 0.6 K/UL (ref 0–0.85)
MONOCYTES NFR BLD AUTO: 6.4 % (ref 0–13.4)
NEUTROPHILS # BLD AUTO: 6.5 K/UL (ref 2–7.15)
NEUTROPHILS NFR BLD: 69.6 % (ref 44–72)
NRBC # BLD AUTO: 0 K/UL
NRBC BLD-RTO: 0 /100 WBC
PLATELET # BLD AUTO: 255 K/UL (ref 164–446)
PMV BLD AUTO: 9.2 FL (ref 9–12.9)
POTASSIUM SERPL-SCNC: 4.5 MMOL/L (ref 3.6–5.5)
PROT SERPL-MCNC: 7.8 G/DL (ref 6–8.2)
RBC # BLD AUTO: 4.89 M/UL (ref 4.2–5.4)
RSV RNA SPEC QL NAA+PROBE: NEGATIVE
SARS-COV-2 RNA RESP QL NAA+PROBE: NOTDETECTED
SODIUM SERPL-SCNC: 129 MMOL/L (ref 135–145)
SPECIMEN SOURCE: NORMAL
TROPONIN T SERPL-MCNC: 21 NG/L (ref 6–19)
WBC # BLD AUTO: 9.4 K/UL (ref 4.8–10.8)

## 2023-02-17 PROCEDURE — 80053 COMPREHEN METABOLIC PANEL: CPT

## 2023-02-17 PROCEDURE — G0378 HOSPITAL OBSERVATION PER HR: HCPCS

## 2023-02-17 PROCEDURE — C9803 HOPD COVID-19 SPEC COLLECT: HCPCS | Performed by: EMERGENCY MEDICINE

## 2023-02-17 PROCEDURE — 99223 1ST HOSP IP/OBS HIGH 75: CPT | Performed by: HOSPITALIST

## 2023-02-17 PROCEDURE — 70450 CT HEAD/BRAIN W/O DYE: CPT

## 2023-02-17 PROCEDURE — 93005 ELECTROCARDIOGRAM TRACING: CPT | Performed by: EMERGENCY MEDICINE

## 2023-02-17 PROCEDURE — 99285 EMERGENCY DEPT VISIT HI MDM: CPT

## 2023-02-17 PROCEDURE — 700105 HCHG RX REV CODE 258: Performed by: EMERGENCY MEDICINE

## 2023-02-17 PROCEDURE — 0241U HCHG SARS-COV-2 COVID-19 NFCT DS RESP RNA 4 TRGT MIC: CPT

## 2023-02-17 PROCEDURE — 36415 COLL VENOUS BLD VENIPUNCTURE: CPT

## 2023-02-17 PROCEDURE — 84484 ASSAY OF TROPONIN QUANT: CPT

## 2023-02-17 PROCEDURE — 85025 COMPLETE CBC W/AUTO DIFF WBC: CPT

## 2023-02-17 RX ORDER — POLYETHYLENE GLYCOL 3350 17 G/17G
1 POWDER, FOR SOLUTION ORAL
Status: DISCONTINUED | OUTPATIENT
Start: 2023-02-17 | End: 2023-02-18 | Stop reason: HOSPADM

## 2023-02-17 RX ORDER — ONDANSETRON 2 MG/ML
4 INJECTION INTRAMUSCULAR; INTRAVENOUS EVERY 4 HOURS PRN
Status: DISCONTINUED | OUTPATIENT
Start: 2023-02-17 | End: 2023-02-18 | Stop reason: HOSPADM

## 2023-02-17 RX ORDER — ONDANSETRON 4 MG/1
4 TABLET, ORALLY DISINTEGRATING ORAL EVERY 4 HOURS PRN
Status: DISCONTINUED | OUTPATIENT
Start: 2023-02-17 | End: 2023-02-18 | Stop reason: HOSPADM

## 2023-02-17 RX ORDER — AMOXICILLIN 250 MG
2 CAPSULE ORAL 2 TIMES DAILY
Status: DISCONTINUED | OUTPATIENT
Start: 2023-02-17 | End: 2023-02-18 | Stop reason: HOSPADM

## 2023-02-17 RX ORDER — LOSARTAN POTASSIUM 25 MG/1
50 TABLET ORAL DAILY
Status: DISCONTINUED | OUTPATIENT
Start: 2023-02-18 | End: 2023-02-18 | Stop reason: HOSPADM

## 2023-02-17 RX ORDER — SODIUM CHLORIDE 9 MG/ML
1000 INJECTION, SOLUTION INTRAVENOUS ONCE
Status: COMPLETED | OUTPATIENT
Start: 2023-02-17 | End: 2023-02-18

## 2023-02-17 RX ORDER — ACETAMINOPHEN 325 MG/1
650 TABLET ORAL EVERY 6 HOURS PRN
Status: DISCONTINUED | OUTPATIENT
Start: 2023-02-17 | End: 2023-02-18 | Stop reason: HOSPADM

## 2023-02-17 RX ORDER — BISACODYL 10 MG
10 SUPPOSITORY, RECTAL RECTAL
Status: DISCONTINUED | OUTPATIENT
Start: 2023-02-17 | End: 2023-02-18 | Stop reason: HOSPADM

## 2023-02-17 RX ADMIN — SODIUM CHLORIDE 1000 ML: 9 INJECTION, SOLUTION INTRAVENOUS at 23:15

## 2023-02-17 ASSESSMENT — FIBROSIS 4 INDEX: FIB4 SCORE: 1.96

## 2023-02-18 ENCOUNTER — APPOINTMENT (OUTPATIENT)
Dept: RADIOLOGY | Facility: MEDICAL CENTER | Age: 88
End: 2023-02-18
Attending: INTERNAL MEDICINE
Payer: MEDICARE

## 2023-02-18 ENCOUNTER — APPOINTMENT (OUTPATIENT)
Dept: CARDIOLOGY | Facility: MEDICAL CENTER | Age: 88
End: 2023-02-18
Attending: INTERNAL MEDICINE
Payer: MEDICARE

## 2023-02-18 VITALS
WEIGHT: 124.34 LBS | OXYGEN SATURATION: 97 % | HEART RATE: 82 BPM | BODY MASS INDEX: 24.41 KG/M2 | TEMPERATURE: 97.4 F | DIASTOLIC BLOOD PRESSURE: 66 MMHG | HEIGHT: 60 IN | SYSTOLIC BLOOD PRESSURE: 137 MMHG | RESPIRATION RATE: 18 BRPM

## 2023-02-18 PROBLEM — I24.89 DEMAND ISCHEMIA (HCC): Status: ACTIVE | Noted: 2023-02-18

## 2023-02-18 LAB
ANION GAP SERPL CALC-SCNC: 18 MMOL/L (ref 7–16)
APPEARANCE UR: CLEAR
BACTERIA #/AREA URNS HPF: ABNORMAL /HPF
BILIRUB UR QL STRIP.AUTO: NEGATIVE
BUN SERPL-MCNC: 11 MG/DL (ref 8–22)
CALCIUM SERPL-MCNC: 8.9 MG/DL (ref 8.4–10.2)
CHLORIDE SERPL-SCNC: 99 MMOL/L (ref 96–112)
CO2 SERPL-SCNC: 15 MMOL/L (ref 20–33)
COLOR UR: YELLOW
CREAT SERPL-MCNC: 0.65 MG/DL (ref 0.5–1.4)
EKG IMPRESSION: NORMAL
ERYTHROCYTE [DISTWIDTH] IN BLOOD BY AUTOMATED COUNT: 43.1 FL (ref 35.9–50)
GFR SERPLBLD CREATININE-BSD FMLA CKD-EPI: 82 ML/MIN/1.73 M 2
GLUCOSE SERPL-MCNC: 81 MG/DL (ref 65–99)
GLUCOSE UR STRIP.AUTO-MCNC: NEGATIVE MG/DL
HCT VFR BLD AUTO: 47.6 % (ref 37–47)
HGB BLD-MCNC: 16.1 G/DL (ref 12–16)
KETONES UR STRIP.AUTO-MCNC: NEGATIVE MG/DL
LEUKOCYTE ESTERASE UR QL STRIP.AUTO: ABNORMAL
LV EJECT FRACT  99904: 75
MAGNESIUM SERPL-MCNC: 1.8 MG/DL (ref 1.5–2.5)
MCH RBC QN AUTO: 32.7 PG (ref 27–33)
MCHC RBC AUTO-ENTMCNC: 33.8 G/DL (ref 33.6–35)
MCV RBC AUTO: 96.7 FL (ref 81.4–97.8)
MICRO URNS: ABNORMAL
NITRITE UR QL STRIP.AUTO: NEGATIVE
PH UR STRIP.AUTO: 5.5 [PH] (ref 5–8)
PHOSPHATE SERPL-MCNC: 3.2 MG/DL (ref 2.5–4.5)
PLATELET # BLD AUTO: 204 K/UL (ref 164–446)
PMV BLD AUTO: 9.1 FL (ref 9–12.9)
POTASSIUM SERPL-SCNC: 4.3 MMOL/L (ref 3.6–5.5)
PROT UR QL STRIP: NEGATIVE MG/DL
RBC # BLD AUTO: 4.92 M/UL (ref 4.2–5.4)
RBC UR QL AUTO: NEGATIVE
SODIUM SERPL-SCNC: 132 MMOL/L (ref 135–145)
SP GR UR STRIP.AUTO: <=1.005
TROPONIN T SERPL-MCNC: 18 NG/L (ref 6–19)
TROPONIN T SERPL-MCNC: 22 NG/L (ref 6–19)
TROPONIN T SERPL-MCNC: 23 NG/L (ref 6–19)
WBC # BLD AUTO: 8.7 K/UL (ref 4.8–10.8)
WBC #/AREA URNS HPF: ABNORMAL /HPF

## 2023-02-18 PROCEDURE — 99239 HOSP IP/OBS DSCHRG MGMT >30: CPT | Performed by: INTERNAL MEDICINE

## 2023-02-18 PROCEDURE — 84484 ASSAY OF TROPONIN QUANT: CPT | Mod: 91

## 2023-02-18 PROCEDURE — 93306 TTE W/DOPPLER COMPLETE: CPT

## 2023-02-18 PROCEDURE — 83735 ASSAY OF MAGNESIUM: CPT

## 2023-02-18 PROCEDURE — 81001 URINALYSIS AUTO W/SCOPE: CPT

## 2023-02-18 PROCEDURE — 93010 ELECTROCARDIOGRAM REPORT: CPT | Performed by: INTERNAL MEDICINE

## 2023-02-18 PROCEDURE — G0378 HOSPITAL OBSERVATION PER HR: HCPCS

## 2023-02-18 PROCEDURE — 84100 ASSAY OF PHOSPHORUS: CPT

## 2023-02-18 PROCEDURE — 93306 TTE W/DOPPLER COMPLETE: CPT | Mod: 26 | Performed by: INTERNAL MEDICINE

## 2023-02-18 PROCEDURE — A9270 NON-COVERED ITEM OR SERVICE: HCPCS | Performed by: HOSPITALIST

## 2023-02-18 PROCEDURE — 93005 ELECTROCARDIOGRAM TRACING: CPT | Performed by: HOSPITALIST

## 2023-02-18 PROCEDURE — 36415 COLL VENOUS BLD VENIPUNCTURE: CPT

## 2023-02-18 PROCEDURE — 93880 EXTRACRANIAL BILAT STUDY: CPT

## 2023-02-18 PROCEDURE — 97161 PT EVAL LOW COMPLEX 20 MIN: CPT

## 2023-02-18 PROCEDURE — 85027 COMPLETE CBC AUTOMATED: CPT

## 2023-02-18 PROCEDURE — 700102 HCHG RX REV CODE 250 W/ 637 OVERRIDE(OP): Performed by: HOSPITALIST

## 2023-02-18 PROCEDURE — 80048 BASIC METABOLIC PNL TOTAL CA: CPT

## 2023-02-18 PROCEDURE — 94760 N-INVAS EAR/PLS OXIMETRY 1: CPT

## 2023-02-18 RX ORDER — CHOLECALCIFEROL (VITAMIN D3) 125 MCG
5 CAPSULE ORAL NIGHTLY
Status: DISCONTINUED | OUTPATIENT
Start: 2023-02-18 | End: 2023-02-18 | Stop reason: HOSPADM

## 2023-02-18 RX ADMIN — SENNOSIDES AND DOCUSATE SODIUM 2 TABLET: 50; 8.6 TABLET ORAL at 05:08

## 2023-02-18 RX ADMIN — LOSARTAN POTASSIUM 50 MG: 25 TABLET, FILM COATED ORAL at 05:08

## 2023-02-18 RX ADMIN — Medication 5 MG: at 01:08

## 2023-02-18 ASSESSMENT — COGNITIVE AND FUNCTIONAL STATUS - GENERAL
WALKING IN HOSPITAL ROOM: A LITTLE
SUGGESTED CMS G CODE MODIFIER MOBILITY: CH
MOBILITY SCORE: 21
TOILETING: A LITTLE
DRESSING REGULAR LOWER BODY CLOTHING: A LITTLE
SUGGESTED CMS G CODE MODIFIER MOBILITY: CJ
CLIMB 3 TO 5 STEPS WITH RAILING: A LITTLE
SUGGESTED CMS G CODE MODIFIER DAILY ACTIVITY: CJ
DAILY ACTIVITIY SCORE: 22
MOBILITY SCORE: 24
STANDING UP FROM CHAIR USING ARMS: A LITTLE

## 2023-02-18 ASSESSMENT — ENCOUNTER SYMPTOMS
HEADACHES: 0
NECK PAIN: 0
BRUISES/BLEEDS EASILY: 0
PALPITATIONS: 0
LOSS OF CONSCIOUSNESS: 1
DOUBLE VISION: 0
DIZZINESS: 0
BLURRED VISION: 0
NAUSEA: 0
MYALGIAS: 0
VOMITING: 0
WEAKNESS: 0
INSOMNIA: 0
COUGH: 0
DEPRESSION: 0
FEVER: 0
SORE THROAT: 0
SHORTNESS OF BREATH: 0

## 2023-02-18 ASSESSMENT — GAIT ASSESSMENTS
ASSISTIVE DEVICE: FRONT WHEEL WALKER
DEVIATION: STEP TO
GAIT LEVEL OF ASSIST: SUPERVISED
DISTANCE (FEET): 200

## 2023-02-18 ASSESSMENT — FIBROSIS 4 INDEX
FIB4 SCORE: 2.36
FIB4 SCORE: 2.95

## 2023-02-18 ASSESSMENT — LIFESTYLE VARIABLES
TOTAL SCORE: 0
EVER FELT BAD OR GUILTY ABOUT YOUR DRINKING: NO
TOTAL SCORE: 0
EVER HAD A DRINK FIRST THING IN THE MORNING TO STEADY YOUR NERVES TO GET RID OF A HANGOVER: NO
HAVE PEOPLE ANNOYED YOU BY CRITICIZING YOUR DRINKING: NO
HAVE YOU EVER FELT YOU SHOULD CUT DOWN ON YOUR DRINKING: NO
TOTAL SCORE: 0
ON A TYPICAL DAY WHEN YOU DRINK ALCOHOL HOW MANY DRINKS DO YOU HAVE: 1
HOW MANY TIMES IN THE PAST YEAR HAVE YOU HAD 5 OR MORE DRINKS IN A DAY: 0
AVERAGE NUMBER OF DAYS PER WEEK YOU HAVE A DRINK CONTAINING ALCOHOL: 7
CONSUMPTION TOTAL: NEGATIVE
ALCOHOL_USE: YES

## 2023-02-18 ASSESSMENT — PATIENT HEALTH QUESTIONNAIRE - PHQ9
2. FEELING DOWN, DEPRESSED, IRRITABLE, OR HOPELESS: NOT AT ALL
SUM OF ALL RESPONSES TO PHQ9 QUESTIONS 1 AND 2: 0
1. LITTLE INTEREST OR PLEASURE IN DOING THINGS: NOT AT ALL
1. LITTLE INTEREST OR PLEASURE IN DOING THINGS: NOT AT ALL
SUM OF ALL RESPONSES TO PHQ9 QUESTIONS 1 AND 2: 0
2. FEELING DOWN, DEPRESSED, IRRITABLE, OR HOPELESS: NOT AT ALL

## 2023-02-18 ASSESSMENT — PAIN DESCRIPTION - PAIN TYPE: TYPE: ACUTE PAIN;CHRONIC PAIN

## 2023-02-18 NOTE — PROGRESS NOTES
"Daughter at bedside, she is concerned about patient safety at home independently. Patient uses walker, but I did personally have to steady her earlier even with walker. She can be wobbly. PT eval/treat put in. Patient's daughter is also worried that the patient might be developing depression due to 3 close friends dying recently. Patient does NOT want to be put on \"pills\". Discharge planned for today.  "

## 2023-02-18 NOTE — DISCHARGE SUMMARY
"Discharge Summary    CHIEF COMPLAINT ON ADMISSION  Chief Complaint   Patient presents with    Dizziness     Dizzy spells on/off las week feels like \"she is spinning\"     Nausea       Reason for Admission  EMS     Admission Date  2/17/2023    CODE STATUS  DNAR/DNI    HPI & HOSPITAL COURSE  Per notes, \"89 y.o. female with h/o HTN but otherwise healthy and highly functional, who presented to the emergency department on 2/17/2023 after episode of loss of consciousness this afternoon.  She reports that was watching TV and passed out while sitting waking up confused not knowing really much what happened.  She denies head trauma given this happened when she was sitting down.  She denies being tired prior to this but was feeling like dizziness on and off for the last few weeks.  She also denies having chest pain or shortness of breath prior to the event.  No prior history of heart disease.\"    Patient was admitted and evaluated for episodes of dizziness, and syncopal episodes.  She states she has had 2 days episode of fast, sitting.  She is never followed up with a cardiologist in the outpatient setting and I recommend this patient follow-up with cardiology in the outpatient setting as she may benefit from a monitor.  She was admitted and underwent a echocardiogram which showed normal EF, dilated right atrium but otherwise no systolic or diastolic failure.  Carotid Doppler showing mild bilateral internal carotid arterial stenosis less than 50%.  Given resolution of patient's symptoms, normal work-up at this time ID to see for her to be discharged managed in the outpatient setting.  I recommended close follow-up with cardiology as she would likely benefit from a Holter monitor and further work-up/monitoring.  She should also follow-up with PCP within 1-2 weeks.    Therefore, she is discharged in good and stable condition to home with close outpatient follow-up.    The patient recovered much more quickly than anticipated on " admission.    Discharge Date  2/18/2023    FOLLOW UP ITEMS POST DISCHARGE  FU with PCP     DISCHARGE DIAGNOSES  Principal Problem:    Syncope and collapse POA: Unknown  Active Problems:    Benign essential HTN (Chronic) POA: Yes      Overview: Chronic condition well-controlled on losartan 50 mg daily.  Patient       reports she has been on this medication many years no reported side       effects.  Blood pressure today in clinic 136/64 which she states is       elevated for her but she feels perhaps whitecoat syndrome.    Acute hyponatremia (Chronic) POA: Yes    Demand ischemia (HCC) POA: Unknown  Resolved Problems:    * No resolved hospital problems. *      FOLLOW UP  Future Appointments   Date Time Provider Department Center   3/15/2023  9:40 AM ERIC [address]     No follow-up provider specified.    MEDICATIONS ON DISCHARGE     Medication List        ASK your doctor about these medications        Instructions   losartan 50 MG Tabs  Commonly known as: COZAAR   Take 1 Tablet by mouth every day.  Dose: 50 mg     PreserVision AREDS 2+Multi Vit Caps   Take 2 Capsules by mouth every day.  Dose: 2 Capsule              Allergies  No Known Allergies    DIET  Orders Placed This Encounter   Procedures    Diet Order Diet: Cardiac     Standing Status:   Standing     Number of Occurrences:   1     Order Specific Question:   Diet:     Answer:   Cardiac [6]       ACTIVITY  As tolerated.  Weight bearing as tolerated    CONSULTATIONS  None    PROCEDURES  None    LABORATORY  Lab Results   Component Value Date    SODIUM 132 (L) 02/18/2023    POTASSIUM 4.3 02/18/2023    CHLORIDE 99 02/18/2023    CO2 15 (L) 02/18/2023    GLUCOSE 81 02/18/2023    BUN 11 02/18/2023    CREATININE 0.65 02/18/2023        Lab Results   Component Value Date    WBC 8.7 02/18/2023    HEMOGLOBIN 16.1 (H) 02/18/2023    HEMATOCRIT 47.6 (H) 02/18/2023    PLATELETCT 204 02/18/2023        Total time of the discharge process exceeds 38 minutes.

## 2023-02-18 NOTE — ED PROVIDER NOTES
"ED Provider Note    CHIEF COMPLAINT  Chief Complaint   Patient presents with    Dizziness     Dizzy spells on/off las week feels like \"she is spinning\"     Nausea         HPI/ROS    OUTSIDE HISTORIAN(S):  Patient daughter    Chrissie Dueñas is a 89 y.o. female who presents with chief complaint of syncope.  Patient reports that she has had multiple episodes of sporadic dizziness that have come and gone over the last 3 days.  She reports they last for a few seconds to around a minute and resolve typically without intervention.  She reports they are random and sporadic without any provoking or relieving factors.  She reports she feels very dizzy with this, almost like she is going to pass out and has to hold onto something.  Patient denies any associated chest pain with these episodes but does report some mild shortness of breath.  Patient does report that this afternoon, she was sitting on her couch watching TV fully awake and the next second found her self on the edge of the couch lying down with her leg on a coffee table.  She does not recall how she got here.  She believes she likely fainted.  She reports she was short of breath when she woke up but this is since resolved.  She reports she is currently asymptomatic.  Patient denies any associated dizziness with change in position.  Patient denies any associated chest pain, she denies any associated nausea or diaphoresis but does report lack of appetite for the last 3 days.    PAST MEDICAL HISTORY   has a past medical history of Hypertension and Macular degeneration.    SURGICAL HISTORY   has a past surgical history that includes ovarian cystectomy and appendectomy.    FAMILY HISTORY  Family History   Problem Relation Age of Onset    Cancer Mother     Stroke Father        SOCIAL HISTORY  Social History     Tobacco Use    Smoking status: Former     Packs/day: 0.50     Years: 30.00     Pack years: 15.00     Types: Cigarettes     Quit date: 6/1/1974     Years since " "quittin.7    Smokeless tobacco: Never   Vaping Use    Vaping Use: Never used   Substance and Sexual Activity    Alcohol use: Yes     Alcohol/week: 4.2 oz     Types: 7 Glasses of wine per week     Comment: glass of wine everyday     Drug use: Not Currently    Sexual activity: Not on file       CURRENT MEDICATIONS  Home Medications       Reviewed by Wilma Cristobal R.N. (Registered Nurse) on 23 at 2117  Med List Status: Not Addressed     Medication Last Dose Status   losartan (COZAAR) 50 MG Tab 2023 Active   Multiple Vitamins-Minerals (PRESERVISION AREDS 2+MULTI VIT) Cap  Active                    ALLERGIES  No Known Allergies    PHYSICAL EXAM  VITAL SIGNS: BP (!) 172/107   Pulse 95   Temp 36.4 °C (97.6 °F) (Temporal)   Resp 13   Ht 1.585 m (5' 2.4\")   Wt 56.7 kg (125 lb)   SpO2 95%   BMI 22.57 kg/m²    Physical Exam  Constitutional:       Appearance: Normal appearance.   HENT:      Head: Normocephalic.      Right Ear: Tympanic membrane normal.      Left Ear: Tympanic membrane normal.      Mouth/Throat:      Mouth: Mucous membranes are moist.   Eyes:      Pupils: Pupils are equal, round, and reactive to light.   Cardiovascular:      Rate and Rhythm: Normal rate and regular rhythm.   Pulmonary:      Effort: Pulmonary effort is normal.      Breath sounds: Normal breath sounds. No wheezing.   Abdominal:      General: Abdomen is flat.      Palpations: Abdomen is soft.      Tenderness: There is no abdominal tenderness.   Skin:     General: Skin is warm.      Capillary Refill: Capillary refill takes less than 2 seconds.      Coloration: Skin is not pale.   Neurological:      General: No focal deficit present.      Mental Status: She is alert and oriented to person, place, and time.      Comments: Distal and proximal strength 5/5 in upper and lower extremities. No dysmetria. No sensation deficits. No visual field deficits. Cranial nerves intact.        Psychiatric:         Mood and Affect: " Mood normal.         Behavior: Behavior normal.         DIAGNOSTIC STUDIES / PROCEDURES  EKG  I have independently interpreted this EKG  See below    LABS  Results for orders placed or performed during the hospital encounter of 02/17/23   CBC WITH DIFFERENTIAL   Result Value Ref Range    WBC 9.4 4.8 - 10.8 K/uL    RBC 4.89 4.20 - 5.40 M/uL    Hemoglobin 15.9 12.0 - 16.0 g/dL    Hematocrit 45.2 37.0 - 47.0 %    MCV 92.4 81.4 - 97.8 fL    MCH 32.5 27.0 - 33.0 pg    MCHC 35.2 (H) 33.6 - 35.0 g/dL    RDW 40.2 35.9 - 50.0 fL    Platelet Count 255 164 - 446 K/uL    MPV 9.2 9.0 - 12.9 fL    Neutrophils-Polys 69.60 44.00 - 72.00 %    Lymphocytes 23.40 22.00 - 41.00 %    Monocytes 6.40 0.00 - 13.40 %    Eosinophils 0.10 0.00 - 6.90 %    Basophils 0.20 0.00 - 1.80 %    Immature Granulocytes 0.30 0.00 - 0.90 %    Nucleated RBC 0.00 /100 WBC    Neutrophils (Absolute) 6.50 2.00 - 7.15 K/uL    Lymphs (Absolute) 2.19 1.00 - 4.80 K/uL    Monos (Absolute) 0.60 0.00 - 0.85 K/uL    Eos (Absolute) 0.01 0.00 - 0.51 K/uL    Baso (Absolute) 0.02 0.00 - 0.12 K/uL    Immature Granulocytes (abs) 0.03 0.00 - 0.11 K/uL    NRBC (Absolute) 0.00 K/uL   CMP   Result Value Ref Range    Sodium 129 (L) 135 - 145 mmol/L    Potassium 4.5 3.6 - 5.5 mmol/L    Chloride 92 (L) 96 - 112 mmol/L    Co2 19 (L) 20 - 33 mmol/L    Anion Gap 18.0 (H) 7.0 - 16.0    Glucose 98 65 - 99 mg/dL    Bun 12 8 - 22 mg/dL    Creatinine 0.77 0.50 - 1.40 mg/dL    Calcium 9.5 8.4 - 10.2 mg/dL    AST(SGOT) 27 12 - 45 U/L    ALT(SGPT) 17 2 - 50 U/L    Alkaline Phosphatase 125 (H) 30 - 99 U/L    Total Bilirubin 0.4 0.1 - 1.5 mg/dL    Albumin 4.5 3.2 - 4.9 g/dL    Total Protein 7.8 6.0 - 8.2 g/dL    Globulin 3.3 1.9 - 3.5 g/dL    A-G Ratio 1.4 g/dL   TROPONIN   Result Value Ref Range    Troponin T 21 (H) 6 - 19 ng/L   CoV-2, FLU A/B, and RSV by PCR (2-4 Hours CEPHEID) : Collect NP swab in VTM    Specimen: Respirate   Result Value Ref Range    Influenza virus A RNA Negative Negative     Influenza virus B, PCR Negative Negative    RSV, PCR Negative Negative    SARS-CoV-2 by PCR NotDetected     SARS-CoV-2 Source NP Swab    CORRECTED CALCIUM   Result Value Ref Range    Correct Calcium 9.1 8.5 - 10.5 mg/dL   ESTIMATED GFR   Result Value Ref Range    GFR (CKD-EPI) 72 >60 mL/min/1.73 m 2   EKG   Result Value Ref Range    Report       [facility] Emergency Dept.    Test Date:  2023  Pt Name:    MICHELLE JIANG                Department: [facility]  MRN:        [id number]                      [facility]  Gender:     Female                       Technician: [id number]  :        [id number]                   Requested By:ER TRIAGE PROTOCOL  Order #:    [id number]                    Reading MD: Alonso Mason MD    Measurements  Intervals                                Axis  Rate:       77                           P:          173  CT:         276                          QRS:        -91  QRSD:       122                          T:          -37  QT:         411  QTc:        466    Interpretive Statements  EKG is first-degree AV block with sinus rhythm, right bundle branch block is  present, these are both chronic findings.  No overt ST changes compared to  EKG  done 12/10/2021  Electronically Signed On 2023 21:52:49 PST by Alonso Mason MD           RADIOLOGY  I have independently interpreted the diagnostic imaging associated with this visit and am waiting the final reading from the radiologist.   My preliminary interpretation is a follows: No obvious acute abnormalities  Radiologist interpretation:   CT-HEAD W/O   Final Result         1.  No acute intracranial abnormality is identified, there are nonspecific white matter changes, commonly associated with small vessel ischemic disease.  Associated mild cerebral atrophy is noted.   2.  Mild bilateral maxillary and sphenoid sinusitis changes   3.  Atherosclerosis.         EC-ECHOCARDIOGRAM COMPLETE W/ CONT    (Results Pending)          COURSE & MEDICAL DECISION MAKING    ED Observation Status? Yes; I am placing the patient in to an observation status due to a diagnostic uncertainty as well as therapeutic intensity. Patient placed in observation status at 2130PM, 2/17/2023.     Observation plan is as follows:     Upon Reevaluation, the patient's condition has: Not improved and patient will be admitted    Patient discharged from ED Observation status at 12:15 AM (Time) 2/18/23 (Date).     INITIAL ASSESSMENT, COURSE AND PLAN  Care Narrative:     Patient here with symptoms that appear most consistent with near syncope and syncope.  Given this we will escalate care patient placed on telemetry.  Checking basic labs to evaluate for possible anemia though the concerning nature of the sporadic and random onset is concerning for possible cardiogenic cause.  Patient EKG is notable for a first-degree heart block and right bundle branch block however this is unchanged from prior.  Patient basic labs are very reassuring.  Patient did have a troponin checked, it is minimally elevated, it is likely more reflection of patient's age and the high-sensitivity troponin then obstructive myocardial ischemia.  Patient will be admitted for ongoing work-up, including possible echo and ongoing telemetry monitoring.  Patient symptoms do not appear consistent with vertigo or CVA at this point.  Certainly a peripheral  paroxysmal vertigo is possible though I believe this would be a diagnosis of exclusion at this point.        DISPOSITION AND DISCUSSIONS  I have discussed management of the patient with the following physicians and VIRGILIO's:  hospitalist      Decision tools and prescription drugs considered including, but not limited to: Sepsis of syncope rule employed to help justify admission    FINAL DIAGNOSIS  Syncope

## 2023-02-18 NOTE — ASSESSMENT & PLAN NOTE
-Troponin on admission was 21 g/L.  She is not having chest pain and there is no acute ischemia on EKG.  -Cardiac enzymes are far sensory.

## 2023-02-18 NOTE — THERAPY
Physical Therapy   Initial Evaluation     Patient Name: Chrissie Dueñas  Age:  89 y.o., Sex:  female  Medical Record #: [id number]  Today's Date: 2/18/2023          Assessment  Patient is 89 y.o. female with a diagnosis of  syncope Pt lives at independent living and is active.Pt is safe with bed mob,transfers and ambulation,she has no equipment needs and  appears safe for home    Plan     DC Equipment Recommendations: (P) None  Discharge Recommendations: (P) Anticipate that the patient will have no further physical therapy needs after discharge from the hospital       Objective       02/18/23 1500   Charge Group   PT Evaluation PT Evaluation Low   Total Time Spent   PT Total Time Yes   PT Evaluation Time Spent (Mins) 30   Initial Contact Note    Initial Contact Note Order Received and Verified, Physical Therapy Evaluation in Progress with Full Report to Follow.   Prior Living Situation   Prior Services None   Housing / Facility Assisted Living Residence   Steps Into Home 0   Steps In Home 0   Equipment Owned Front-Wheel Walker   Lives with - Patient's Self Care Capacity Alone and Able to Care For Self   Prior Level of Functional Mobility   Bed Mobility Independent   Transfer Status Independent   Ambulation Independent   Distance Ambulation (Feet)   (limited community)   Assistive Devices Used Front-Wheel Walker   History of Falls   History of Falls No   Cognition    Cognition / Consciousness WDL   Passive ROM Lower Body   Passive ROM Lower Body WDL   Active ROM Lower Body    Active ROM Lower Body  WDL   Strength Lower Body   Comments mild general weakness   Coordination Lower Body    Coordination Lower Body  WDL   Balance Assessment   Sitting Balance (Static) Good   Sitting Balance (Dynamic) Good   Standing Balance (Static) Fair +   Standing Balance (Dynamic) Fair   Weight Shift Sitting Good   Weight Shift Standing Good   Bed Mobility    Supine to Sit Modified Independent   Sit to Supine Modified Independent   Scooting  Modified Independent   Gait Analysis   Gait Level Of Assist Supervised   Assistive Device Front Wheel Walker   Distance (Feet) 200   # of Times Distance was Traveled 1   Deviation Step To   # of Stairs Climbed 0   Weight Bearing Status Full   Functional Mobility   Sit to Stand Supervised   Bed, Chair, Wheelchair Transfer Supervised   Transfer Method Stand Step   How much difficulty does the patient currently have...   Turning over in bed (including adjusting bedclothes, sheets and blankets)? 4   Sitting down on and standing up from a chair with arms (e.g., wheelchair, bedside commode, etc.) 4   Moving from lying on back to sitting on the side of the bed? 4   How much help from another person does the patient currently need...   Moving to and from a bed to a chair (including a wheelchair)? 4   Need to walk in a hospital room? 4   Climbing 3-5 steps with a railing? 4   6 clicks Mobility Score 24   Activity Tolerance   Sitting Edge of Bed 10   Standing 10   Patient / Family Goals    Patient / Family Goal #1 Home   Anticipated Discharge Equipment and Recommendations   DC Equipment Recommendations None   Discharge Recommendations Anticipate that the patient will have no further physical therapy needs after discharge from the hospital   Interdisciplinary Plan of Care Collaboration   IDT Collaboration with  Nursing   Session Information   Date / Session Number  2/18   Priority 0

## 2023-02-18 NOTE — ASSESSMENT & PLAN NOTE
-Patient takes losartan.  Her blood pressure now is 133/68.  Patient has known history of whitecoat syndrome.  Closely monitor this.

## 2023-02-18 NOTE — ED TRIAGE NOTES
BIBA from assisted living pt reports dizziness for the last week +N/-V denies CP/-SOB  Only recent change was hearing aids  A/ox4

## 2023-02-18 NOTE — PROGRESS NOTES
4 Eyes Skin Assessment Completed by Natalie RN and Milton RN.    Head WDL  Ears WDL  Nose WDL  Mouth WDL  Neck WDL  Breast/Chest WDL  Shoulder Blades WDL  Spine WDL bump on spine  (R) Arm/Elbow/Hand WDL  (L) Arm/Elbow/Hand WDL  Abdomen WDL  Groin WDL  Scrotum/Coccyx/Buttocks WDL  (R) Leg Abrasion  (L) Leg WDL  (R) Heel/Foot/Toe WDL  (L) Heel/Foot/Toe WDL          Devices In Places Tele Box      Interventions In Place Pillows and Low Air Loss Mattress    Possible Skin Injury No    Pictures Uploaded Into Epic N/A  Wound Consult Placed N/A  RN Wound Prevention Protocol Ordered No

## 2023-02-18 NOTE — CARE PLAN
The patient is Stable - Low risk of patient condition declining or worsening    Shift Goals  Clinical Goals: fall prevention and safety  Patient Goals: comfort    Progress made toward(s) clinical / shift goals:      Problem: Knowledge Deficit - Standard  Goal: Patient and family/care givers will demonstrate understanding of plan of care, disease process/condition, diagnostic tests and medications  Description: Target End Date:  1-3 days or as soon as patient condition allows    Document in Patient Education    1.  Patient and family/caregiver oriented to unit, equipment, visitation policy and means for communicating concern  2.  Complete/review Learning Assessment  3.  Assess knowledge level of disease process/condition, treatment plan, diagnostic tests and medications  4.  Explain disease process/condition, treatment plan, diagnostic tests and medications  Outcome: Progressing  Note: Pt educated on plan of care as well as EKG and labs being drawn     Problem: Fall Risk  Goal: Patient will remain free from falls  Description: Target End Date:  Prior to discharge or change in level of care    Document interventions on the Benita Vaughn Fall Risk Assessment    1.  Assess for fall risk factors  2.  Implement fall precautions  Outcome: Progressing  Note: Pt bed low and locked, call light in reach, bed alarm on       Patient is not progressing towards the following goals:

## 2023-02-18 NOTE — ED NOTES
Attempted to get urine pt missed hat. Pt aware that urine sample is needed.     Pt returned from CT at this time.

## 2023-02-18 NOTE — H&P
"Hospital Medicine History & Physical Note    Date of Service  2/17/2023    Primary Care Physician  Felix Farias D.N.P.    Consultants  None    Code Status  Full Code    Chief Complaint  Chief Complaint   Patient presents with    Dizziness     Dizzy spells on/off las week feels like \"she is spinning\"     Nausea       History of Presenting Illness  Chrissie Dueñas is a 89 y.o. female with h/o HTN but otherwise healthy and highly functional, who presented to the emergency department on 2/17/2023 after episode of loss of consciousness this afternoon.  She reports that was watching TV and passed out while sitting waking up confused not knowing really much what happened.  She denies head trauma given this happened when she was sitting down.  She denies being tired prior to this but was feeling like dizziness on and off for the last few weeks.  She also denies having chest pain or shortness of breath prior to the event.  No prior history of heart disease.    I discussed the plan of care with patient.    Review of Systems  Review of Systems   Constitutional:  Negative for fever.   HENT:  Negative for congestion and sore throat.    Eyes:  Negative for blurred vision and double vision.   Respiratory:  Negative for cough and shortness of breath.    Cardiovascular:  Negative for chest pain and palpitations.   Gastrointestinal:  Negative for nausea and vomiting.   Genitourinary:  Negative for dysuria and urgency.   Musculoskeletal:  Negative for myalgias and neck pain.   Skin:  Negative for itching and rash.   Neurological:  Positive for loss of consciousness. Negative for dizziness, weakness and headaches.   Endo/Heme/Allergies:  Does not bruise/bleed easily.   Psychiatric/Behavioral:  Negative for depression. The patient does not have insomnia.      Past Medical History   has a past medical history of Hypertension and Macular degeneration.    Surgical History   has a past surgical history that includes ovarian cystectomy and " appendectomy.     Family History  family history includes Cancer in her mother; Stroke in her father.   Family history reviewed with patient. There is no family history that is pertinent to the chief complaint.     Social History   reports that she quit smoking about 48 years ago. She has a 15.00 pack-year smoking history. She has never used smokeless tobacco. She reports current alcohol use of about 4.2 oz per week. She reports that she does not currently use drugs.    Allergies  No Known Allergies    Medications  Prior to Admission Medications   Prescriptions Last Dose Informant Patient Reported? Taking?   Multiple Vitamins-Minerals (PRESERVISION AREDS 2+MULTI VIT) Cap   Yes No   Sig: Take 2 Capsules by mouth every day.   losartan (COZAAR) 50 MG Tab 2/17/2023 at 0900  No No   Sig: Take 1 Tablet by mouth every day.      Facility-Administered Medications: None       Physical Exam  Temp:  [36.4 °C (97.6 °F)] 36.4 °C (97.6 °F)  Pulse:  [78-95] 78  Resp:  [13-23] 22  BP: (133-172)/() 133/68  SpO2:  [94 %-95 %] 94 %  Blood Pressure : 133/68   Temperature: 36.4 °C (97.6 °F)   Pulse: 78   Respiration: (!) 22   Pulse Oximetry: 94 %       Physical Exam  Constitutional:       Appearance: Normal appearance.   HENT:      Head: Normocephalic and atraumatic.      Mouth/Throat:      Mouth: Mucous membranes are moist.      Pharynx: Oropharynx is clear.   Eyes:      Extraocular Movements: Extraocular movements intact.      Pupils: Pupils are equal, round, and reactive to light.   Cardiovascular:      Rate and Rhythm: Normal rate and regular rhythm.      Heart sounds: Normal heart sounds.   Pulmonary:      Effort: Pulmonary effort is normal.      Breath sounds: Normal breath sounds.   Abdominal:      General: Abdomen is flat. Bowel sounds are normal.      Palpations: Abdomen is soft.   Musculoskeletal:      Cervical back: Normal range of motion and neck supple.   Skin:     General: Skin is warm and dry.   Neurological:       General: No focal deficit present.      Mental Status: She is alert and oriented to person, place, and time.   Psychiatric:         Mood and Affect: Mood normal.         Behavior: Behavior normal.       Laboratory:  Recent Labs     02/17/23  2147   WBC 9.4   RBC 4.89   HEMOGLOBIN 15.9   HEMATOCRIT 45.2   MCV 92.4   MCH 32.5   MCHC 35.2*   RDW 40.2   PLATELETCT 255   MPV 9.2     Recent Labs     02/17/23  2222   SODIUM 129*   POTASSIUM 4.5   CHLORIDE 92*   CO2 19*   GLUCOSE 98   BUN 12   CREATININE 0.77   CALCIUM 9.5     Recent Labs     02/17/23  2222   ALTSGPT 17   ASTSGOT 27   ALKPHOSPHAT 125*   TBILIRUBIN 0.4   GLUCOSE 98         No results for input(s): NTPROBNP in the last 72 hours.      Recent Labs     02/17/23 2222   TROPONINT 21*       Imaging:  CT-HEAD W/O   Final Result         1.  No acute intracranial abnormality is identified, there are nonspecific white matter changes, commonly associated with small vessel ischemic disease.  Associated mild cerebral atrophy is noted.   2.  Mild bilateral maxillary and sphenoid sinusitis changes   3.  Atherosclerosis.         EC-ECHOCARDIOGRAM COMPLETE W/ CONT    (Results Pending)       EKG:  My impression is: Normal sinus rhythm at 77 bpm.  Right bundle branch block with first-degree AV block and KS interval 276.  No acute ST elevation.    Assessment/Plan:  Justification for Admission Status  I anticipate this patient is appropriate for observation status at this time because 89-year-old female with syncope.         * Syncope and collapse  Assessment & Plan  -Observation status on telemetry unit.  -Syncope and collapse without clear etiology.  -Serial cardiac enzymes.  Her initial troponin was 21 ng/L, likely reactive.  -She does not have any chest pain and there is no significant acute ischemia on EKG.  -Ordered echocardiogram in the morning and carotid duplex.    Acute hyponatremia- (present on admission)  Assessment & Plan  -Her sodium on admission is 129.  -She had  prior history of hyponatremia in the past and at some point was taking salt tablets (2021)  -Her sodium in March 2022 was 138.  -I will monitor morning chemistry panel and if worsening or persistent hyponatremia would recommend adding urine and serum studies.    Demand ischemia (HCC)  Assessment & Plan  -Troponin on admission was 21 g/L.  She is not having chest pain and there is no acute ischemia on EKG.  -Cardiac enzymes are far sensory.    Benign essential HTN- (present on admission)  Assessment & Plan  -Patient takes losartan.  Her blood pressure now is 133/68.  Patient has known history of whitecoat syndrome.  Closely monitor this.        VTE prophylaxis: SCDs/TEDs

## 2023-02-18 NOTE — ASSESSMENT & PLAN NOTE
-Observation status on telemetry unit.  -Syncope and collapse without clear etiology.  -Serial cardiac enzymes.  Her initial troponin was 21 ng/L, likely reactive.  -She does not have any chest pain and there is no significant acute ischemia on EKG.  -Ordered echocardiogram in the morning and carotid duplex.

## 2023-02-18 NOTE — PROGRESS NOTES
Telemetry Shift Summary     Rhythm: SR   HR: 71  Ectopy: NA  Measurements: 0.30/0.12/0.46       Normal Values  Rhythm: SR  HR:   Measurements: 0.12-0.20 / 0.04-0.10 / 0.30-0.52

## 2023-02-18 NOTE — ASSESSMENT & PLAN NOTE
-Her sodium on admission is 129.  -She had prior history of hyponatremia in the past and at some point was taking salt tablets (2021)  -Her sodium in March 2022 was 138.  -I will monitor morning chemistry panel and if worsening or persistent hyponatremia would recommend adding urine and serum studies.

## 2023-02-19 NOTE — PROGRESS NOTES
Patient education and discharge instructions provided with daughter at bedside. All questions answered, no concerns. Patient knows to call and schedule follow up appointment with PCP, [facility] schedulers will call patient to schedule follow up appointment with cardiologist. Patient escorted out safely via wheelchair by RN. All belongings with patient.

## 2023-02-19 NOTE — PROGRESS NOTES
Called [facility] discharge schedulers at [phone], left a message. They should be calling the patient back asap to schedule follow up cardiology appointment.

## 2023-02-21 ENCOUNTER — PATIENT OUTREACH (OUTPATIENT)
Dept: MEDICAL GROUP | Facility: PHYSICIAN GROUP | Age: 88
End: 2023-02-21
Payer: MEDICARE

## 2023-02-21 NOTE — PROGRESS NOTES
Patient outreach for TCM follow up.  Patient states she is doing really well.  Reports no further episodes of dizziness or syncope. Reviewed discharge instructions and new and current medications.  Patient verbalized understanding.  Confirmed follow up appointment for 02/23/23 with PCP .

## 2023-02-21 NOTE — PROGRESS NOTES
Subjective:     Chrissie Dueñas is a 89 y.o. female who presents for Hospital Follow-up.      POST DISCHARGE CALL:  Discharge Date:2/18/2023   Date of Outreach Call: 2/21/2023  1:59 PM  Now that you're home, how are you doing? Very Good  Did you receive any new prescriptions? No  Were you able to fill those medications? *  How did you fill those prescriptions? *  If not able to fill prescriptions, why? *    Do you have questions about your medications? No  Do you have a follow-up appointment scheduled?Yes  Any issues or paperwork you wish to discuss with your PCP? none  Does patient qualify for CCM Program? No  If patient qualifies, was CCM Program Introduced? *  If patient does not qualify, comment? *  Number of Attempts: 1  Current or previous attempts completed within two business days of discharge? Yes  Provided education regarding treatment plan, medication, self-management, ADLs? Yes  Has patient completed Advance Directive? If yes, advise them to bring to appointment. No  Care Manager phone number provided? Yes  Is there anything else I can help you with? No  Chief Complaint? Dizziness; Nausea  Admitting Dx? EMS  Discharge Diagnosis? Syncope and collapse  Additional Comments? *    HPI:   Recently hospitalized for Had syncopal episode 2/17/23, went by ambulance and was in the hospital for 24 hours.  She does not recall why she passed, she was sitting down watching TV.  She briefly lost consciousness.  This was unwitnessed. She denies CP, SOB, or loss of bowel/bladder function, and no focal deficits. She feels it could be related to dehydration as she does not like to drink water. The only significant findings were low sodium and 1st degree heart block for which patient will see cardiology on 4/11/23.  She is feeling well and has not had any concerning symptoms.      Current medicines (including reconciliation performed today)  Current Outpatient Medications   Medication Sig Dispense Refill    losartan (COZAAR)  50 MG Tab Take 1 Tablet by mouth every day. 90 Tablet 1    Multiple Vitamins-Minerals (PRESERVISION AREDS 2+MULTI VIT) Cap Take 2 Capsules by mouth every day.       No current facility-administered medications for this visit.       Allergies:   Patient has no known allergies.    Social History     Tobacco Use    Smoking status: Former     Packs/day: 0.50     Years: 30.00     Pack years: 15.00     Types: Cigarettes     Quit date: 1974     Years since quittin.7    Smokeless tobacco: Never   Vaping Use    Vaping Use: Never used   Substance Use Topics    Alcohol use: Yes     Alcohol/week: 4.2 oz     Types: 7 Glasses of wine per week     Comment: glass of wine everyday     Drug use: Not Currently       ROS:  Review of Systems   Constitutional:  Negative for chills, fever, malaise/fatigue and weight loss.   Eyes:  Negative for blurred vision.   Respiratory:  Negative for cough and shortness of breath.    Cardiovascular:  Negative for chest pain.   Gastrointestinal:  Negative for abdominal pain, constipation, diarrhea, nausea and vomiting.   Musculoskeletal:  Negative for myalgias.   Neurological:  Negative for dizziness, sensory change, speech change, focal weakness, loss of consciousness, weakness and headaches.       Objective:     Vitals:    23 1647   BP: 126/70   BP Location: Left arm   Patient Position: Sitting   BP Cuff Size: Adult   Pulse: 89   Temp: 36.3 °C (97.4 °F)   TempSrc: Temporal   SpO2: 94%   Weight: 56.7 kg (125 lb)   Height: 1.524 m (5')     Body mass index is 24.41 kg/m².    Physical Exam:  Physical Exam  Constitutional:       General: She is not in acute distress.     Appearance: Normal appearance. She is not ill-appearing or toxic-appearing.   HENT:      Head: Normocephalic.   Eyes:      Conjunctiva/sclera: Conjunctivae normal.   Cardiovascular:      Rate and Rhythm: Normal rate and regular rhythm.      Pulses: Normal pulses.      Heart sounds: Murmur heard.   Pulmonary:      Effort:  Pulmonary effort is normal. No respiratory distress.      Breath sounds: Normal breath sounds.   Skin:     General: Skin is warm and dry.   Neurological:      General: No focal deficit present.      Mental Status: She is alert and oriented to person, place, and time. Mental status is at baseline.      Cranial Nerves: No cranial nerve deficit.      Sensory: No sensory deficit.      Motor: No weakness.      Coordination: Coordination normal.      Gait: Gait normal.   Psychiatric:         Mood and Affect: Mood normal.         Behavior: Behavior normal.         Thought Content: Thought content normal.         Judgment: Judgment normal.         Assessment and Plan:   1. Benign essential HTN  - losartan (COZAAR) 50 MG Tab; Take 1 Tablet by mouth every day.  Dispense: 90 Tablet; Refill: 1    Problem List Items Addressed This Visit       Benign essential HTN (Chronic)    Relevant Medications    losartan (COZAAR) 50 MG Tab    Syncope and collapse     Isolated incident that has since resolved  -Imaging and lab studies performed at hospital do not point to any specific etiology however, should symptoms recur patient has been advised to call 911 and probably return to hospital  -Patient advised to watch for symptoms of dysuria or any abdominal or flank pain as there was some bacteria in her urine however she is asymptomatic at this time.  -Advised to keep hydrated drinking at least 48 ounces of water per day as she does not like to drink water and dehydration may have been a concerning factor  -Daughter with her during the visit.  Shared decision making, patient does not need any services at this time and agrees to current treatment regimen.  She will follow-up with cardiology as scheduled.  She is also scheduled to see me again in 3 weeks for routine medical visit.              - Chart and discharge summary were reviewed.   - Hospitalization and results reviewed with patient.   - Medications reviewed including instructions  regarding high risk medications, dosing and side effects.  - Recommended Services: No services needed at this time  - Advance directive/POLST on file?  Yes    Follow-up:Return in about 4 weeks (around 3/23/2023).    Face-to-face transitional care management services with HIGH (today's visit is within days post discharge & LACE+ score 59+) medical decision complexity were provided.     LACE+ Historical Score Over Time (0-28: Low, 29-58: Medium, 59+: High): 23

## 2023-02-23 ENCOUNTER — OFFICE VISIT (OUTPATIENT)
Dept: MEDICAL GROUP | Facility: PHYSICIAN GROUP | Age: 88
End: 2023-02-23
Payer: MEDICARE

## 2023-02-23 VITALS
DIASTOLIC BLOOD PRESSURE: 70 MMHG | HEIGHT: 60 IN | TEMPERATURE: 97.4 F | WEIGHT: 125 LBS | HEART RATE: 89 BPM | SYSTOLIC BLOOD PRESSURE: 126 MMHG | BODY MASS INDEX: 24.54 KG/M2 | OXYGEN SATURATION: 94 %

## 2023-02-23 DIAGNOSIS — I10 BENIGN ESSENTIAL HTN: Chronic | ICD-10-CM

## 2023-02-23 DIAGNOSIS — R55 SYNCOPE AND COLLAPSE: ICD-10-CM

## 2023-02-23 PROCEDURE — 99213 OFFICE O/P EST LOW 20 MIN: CPT

## 2023-02-23 RX ORDER — LOSARTAN POTASSIUM 50 MG/1
50 TABLET ORAL DAILY
Qty: 90 TABLET | Refills: 1 | Status: SHIPPED | OUTPATIENT
Start: 2023-02-23 | End: 2023-03-15 | Stop reason: SDUPTHER

## 2023-02-23 ASSESSMENT — ENCOUNTER SYMPTOMS
SENSORY CHANGE: 0
SHORTNESS OF BREATH: 0
WEIGHT LOSS: 0
DIARRHEA: 0
LOSS OF CONSCIOUSNESS: 0
WEAKNESS: 0
BLURRED VISION: 0
VOMITING: 0
ABDOMINAL PAIN: 0
NAUSEA: 0
HEADACHES: 0
COUGH: 0
CONSTIPATION: 0
CHILLS: 0
SPEECH CHANGE: 0
FEVER: 0
MYALGIAS: 0
DIZZINESS: 0
FOCAL WEAKNESS: 0

## 2023-02-23 ASSESSMENT — FIBROSIS 4 INDEX: FIB4 SCORE: 2.95

## 2023-02-24 NOTE — ASSESSMENT & PLAN NOTE
Isolated incident that has since resolved  -Imaging and lab studies performed at hospital do not point to any specific etiology however, should symptoms recur patient has been advised to call 911 and probably return to hospital  -Patient advised to watch for symptoms of dysuria or any abdominal or flank pain as there was some bacteria in her urine however she is asymptomatic at this time.  -Advised to keep hydrated drinking at least 48 ounces of water per day as she does not like to drink water and dehydration may have been a concerning factor  -Daughter with her during the visit.  Shared decision making, patient does not need any services at this time and agrees to current treatment regimen.  She will follow-up with cardiology as scheduled.  She is also scheduled to see me again in 3 weeks for routine medical visit.

## 2023-03-13 ENCOUNTER — HOSPITAL ENCOUNTER (EMERGENCY)
Facility: MEDICAL CENTER | Age: 88
End: 2023-03-14
Attending: EMERGENCY MEDICINE
Payer: MEDICARE

## 2023-03-13 ENCOUNTER — APPOINTMENT (OUTPATIENT)
Dept: RADIOLOGY | Facility: MEDICAL CENTER | Age: 88
End: 2023-03-13
Payer: MEDICARE

## 2023-03-13 ENCOUNTER — APPOINTMENT (OUTPATIENT)
Dept: RADIOLOGY | Facility: MEDICAL CENTER | Age: 88
End: 2023-03-13
Attending: EMERGENCY MEDICINE
Payer: MEDICARE

## 2023-03-13 DIAGNOSIS — E87.1 HYPONATREMIA: ICD-10-CM

## 2023-03-13 DIAGNOSIS — R55 SYNCOPE, UNSPECIFIED SYNCOPE TYPE: ICD-10-CM

## 2023-03-13 DIAGNOSIS — S09.90XA CLOSED HEAD INJURY, INITIAL ENCOUNTER: ICD-10-CM

## 2023-03-13 LAB
ALBUMIN SERPL BCP-MCNC: 4.5 G/DL (ref 3.2–4.9)
ALBUMIN/GLOB SERPL: 1.4 G/DL
ALP SERPL-CCNC: 104 U/L (ref 30–99)
ALT SERPL-CCNC: 12 U/L (ref 2–50)
ANION GAP SERPL CALC-SCNC: 16 MMOL/L (ref 7–16)
AST SERPL-CCNC: 20 U/L (ref 12–45)
BASOPHILS # BLD AUTO: 0.3 % (ref 0–1.8)
BASOPHILS # BLD: 0.03 K/UL (ref 0–0.12)
BILIRUB SERPL-MCNC: 0.7 MG/DL (ref 0.1–1.5)
BUN SERPL-MCNC: 11 MG/DL (ref 8–22)
CALCIUM ALBUM COR SERPL-MCNC: 9 MG/DL (ref 8.5–10.5)
CALCIUM SERPL-MCNC: 9.4 MG/DL (ref 8.4–10.2)
CHLORIDE SERPL-SCNC: 93 MMOL/L (ref 96–112)
CO2 SERPL-SCNC: 19 MMOL/L (ref 20–33)
CREAT SERPL-MCNC: 0.69 MG/DL (ref 0.5–1.4)
EKG IMPRESSION: NORMAL
EOSINOPHIL # BLD AUTO: 0 K/UL (ref 0–0.51)
EOSINOPHIL NFR BLD: 0 % (ref 0–6.9)
ERYTHROCYTE [DISTWIDTH] IN BLOOD BY AUTOMATED COUNT: 41.5 FL (ref 35.9–50)
GFR SERPLBLD CREATININE-BSD FMLA CKD-EPI: 81 ML/MIN/1.73 M 2
GLOBULIN SER CALC-MCNC: 3.3 G/DL (ref 1.9–3.5)
GLUCOSE SERPL-MCNC: 106 MG/DL (ref 65–99)
HCT VFR BLD AUTO: 44.8 % (ref 37–47)
HGB BLD-MCNC: 15.7 G/DL (ref 12–16)
IMM GRANULOCYTES # BLD AUTO: 0.04 K/UL (ref 0–0.11)
IMM GRANULOCYTES NFR BLD AUTO: 0.4 % (ref 0–0.9)
LYMPHOCYTES # BLD AUTO: 1.63 K/UL (ref 1–4.8)
LYMPHOCYTES NFR BLD: 16.4 % (ref 22–41)
MAGNESIUM SERPL-MCNC: 2 MG/DL (ref 1.5–2.5)
MCH RBC QN AUTO: 32.6 PG (ref 27–33)
MCHC RBC AUTO-ENTMCNC: 35 G/DL (ref 33.6–35)
MCV RBC AUTO: 92.9 FL (ref 81.4–97.8)
MONOCYTES # BLD AUTO: 0.55 K/UL (ref 0–0.85)
MONOCYTES NFR BLD AUTO: 5.5 % (ref 0–13.4)
NEUTROPHILS # BLD AUTO: 7.69 K/UL (ref 2–7.15)
NEUTROPHILS NFR BLD: 77.4 % (ref 44–72)
NRBC # BLD AUTO: 0 K/UL
NRBC BLD-RTO: 0 /100 WBC
PLATELET # BLD AUTO: 284 K/UL (ref 164–446)
PMV BLD AUTO: 9.1 FL (ref 9–12.9)
POTASSIUM SERPL-SCNC: 4.7 MMOL/L (ref 3.6–5.5)
PROT SERPL-MCNC: 7.8 G/DL (ref 6–8.2)
RBC # BLD AUTO: 4.82 M/UL (ref 4.2–5.4)
SODIUM SERPL-SCNC: 128 MMOL/L (ref 135–145)
T4 FREE SERPL-MCNC: 1.2 NG/DL (ref 0.93–1.7)
TROPONIN T SERPL-MCNC: 23 NG/L (ref 6–19)
TSH SERPL DL<=0.005 MIU/L-ACNC: 5.4 UIU/ML (ref 0.38–5.33)
WBC # BLD AUTO: 9.9 K/UL (ref 4.8–10.8)

## 2023-03-13 PROCEDURE — 71045 X-RAY EXAM CHEST 1 VIEW: CPT

## 2023-03-13 PROCEDURE — 84439 ASSAY OF FREE THYROXINE: CPT

## 2023-03-13 PROCEDURE — 36415 COLL VENOUS BLD VENIPUNCTURE: CPT

## 2023-03-13 PROCEDURE — 72125 CT NECK SPINE W/O DYE: CPT

## 2023-03-13 PROCEDURE — 93005 ELECTROCARDIOGRAM TRACING: CPT | Performed by: EMERGENCY MEDICINE

## 2023-03-13 PROCEDURE — 84484 ASSAY OF TROPONIN QUANT: CPT

## 2023-03-13 PROCEDURE — 70450 CT HEAD/BRAIN W/O DYE: CPT

## 2023-03-13 PROCEDURE — 83735 ASSAY OF MAGNESIUM: CPT

## 2023-03-13 PROCEDURE — 85025 COMPLETE CBC W/AUTO DIFF WBC: CPT

## 2023-03-13 PROCEDURE — 99285 EMERGENCY DEPT VISIT HI MDM: CPT

## 2023-03-13 PROCEDURE — 84443 ASSAY THYROID STIM HORMONE: CPT

## 2023-03-13 PROCEDURE — 93005 ELECTROCARDIOGRAM TRACING: CPT

## 2023-03-13 PROCEDURE — 80053 COMPREHEN METABOLIC PANEL: CPT

## 2023-03-13 ASSESSMENT — LIFESTYLE VARIABLES
TOTAL SCORE: 0
TOTAL SCORE: 0
EVER FELT BAD OR GUILTY ABOUT YOUR DRINKING: NO
CONSUMPTION TOTAL: NEGATIVE
AVERAGE NUMBER OF DAYS PER WEEK YOU HAVE A DRINK CONTAINING ALCOHOL: 0
HOW MANY TIMES IN THE PAST YEAR HAVE YOU HAD 5 OR MORE DRINKS IN A DAY: 0
ON A TYPICAL DAY WHEN YOU DRINK ALCOHOL HOW MANY DRINKS DO YOU HAVE: 0
HAVE YOU EVER FELT YOU SHOULD CUT DOWN ON YOUR DRINKING: NO
HAVE PEOPLE ANNOYED YOU BY CRITICIZING YOUR DRINKING: NO
EVER HAD A DRINK FIRST THING IN THE MORNING TO STEADY YOUR NERVES TO GET RID OF A HANGOVER: NO
TOTAL SCORE: 0
DO YOU DRINK ALCOHOL: NO

## 2023-03-13 ASSESSMENT — FIBROSIS 4 INDEX: FIB4 SCORE: 2.95

## 2023-03-14 VITALS
OXYGEN SATURATION: 89 % | SYSTOLIC BLOOD PRESSURE: 125 MMHG | TEMPERATURE: 97.4 F | RESPIRATION RATE: 20 BRPM | BODY MASS INDEX: 24.11 KG/M2 | HEART RATE: 71 BPM | WEIGHT: 122.8 LBS | HEIGHT: 60 IN | DIASTOLIC BLOOD PRESSURE: 72 MMHG

## 2023-03-14 LAB — TROPONIN T SERPL-MCNC: 23 NG/L (ref 6–19)

## 2023-03-14 NOTE — ED TRIAGE NOTES
"Pt was BIB daughter  c/o multiple falls and near falls today so far 6   was seen here a couple of weeks ago for same  they did not find out this issues she is having   pt c/o \"spells of near syncope\" or Sz like episodes just prior to collapsing  pt has become afraid due to today's 6 episodes of near injuries  I just \"go out\" for a few seconds  remains A, A and O X 3 after wards pt with abrasion on forehead from today's fall   mildly tender   "

## 2023-03-14 NOTE — ED PROVIDER NOTES
"                                                        ED Provider Note    CHIEF COMPLAINT  Chief Complaint   Patient presents with    Fall    Other     Has been having periods of possible near syncope or sz activity  was in hospital  for this  since being d/c'ed has been having these spells daily now  does not last for a few seconds  pt very concerned  today has been the worst day   has happened 6 time today         [facility]    Primary care provider: Felix Farias D.N.P.   History obtained from: Patient and daughter  History limited by: None     Chrissie Dueñas is a 89 y.o. female who presents to the ED with daughter complaining of multiple near syncopal or syncopal episodes.  Daughter reports patient was admitted to this hospital last month for work-up of her symptoms and has follow-up appointment with cardiology next month.  Patient reports that it seems like the episodes are more frequent now.  She describes the episode as feeling a \"shudder\" followed by momentary \"1 to 2 seconds\" of blackout.  Daughter reports that patient appears disoriented during these episodes but quickly reuturns to baseline.  Patient did fall and hit her forehead last night but denies loss of consciousness.  She currently denies any pain including headache/neck pain/chest pain/abdominal pain.  She denies any palpitations.  She denies shortness of breath/difficulty breathing although daughter reports that patient appears to have labored breathing during these episodes.  No reported fever.  Patient denies nausea/vomiting/diarrhea/dysuria.  She denies visual change/new weakness or sensory change.  Daughter also is concerned that patient may not be eating or drinking enough.    REVIEW OF SYSTEMS  Please see HPI for pertinent positives/negatives.  All other systems reviewed and are negative.     PAST MEDICAL HISTORY  Past Medical History:   Diagnosis Date    Hypertension     Macular degeneration         SURGICAL HISTORY  Past Surgical History: "   Procedure Laterality Date    APPENDECTOMY      OVARIAN CYSTECTOMY          SOCIAL HISTORY  Social History     Tobacco Use    Smoking status: Former     Packs/day: 0.50     Years: 30.00     Pack years: 15.00     Types: Cigarettes     Quit date: 1974     Years since quittin.8    Smokeless tobacco: Never   Vaping Use    Vaping Use: Never used   Substance and Sexual Activity    Alcohol use: Yes     Alcohol/week: 4.2 oz     Types: 7 Glasses of wine per week     Comment: glass of wine everyday     Drug use: Not Currently    Sexual activity: Not on file        FAMILY HISTORY  Family History   Problem Relation Age of Onset    Cancer Mother     Stroke Father         CURRENT MEDICATIONS  Home Medications       Reviewed by Ashwini Smith R.N. (Registered Nurse) on 23 at 1916  Med List Status: Partial     Medication Last Dose Status   losartan (COZAAR) 50 MG Tab  Active   Multiple Vitamins-Minerals (PRESERVISION AREDS 2+MULTI VIT) Cap  Active                     ALLERGIES  No Known Allergies     PHYSICAL EXAM  VITAL SIGNS: /72   Pulse 71   Temp 36.3 °C (97.4 °F) (Temporal)   Resp 20   Ht 1.524 m (5')   Wt 55.7 kg (122 lb 12.7 oz)   SpO2 89%   BMI 23.98 kg/m²  @SHERIF[[id number]::@     Pulse ox interpretation: 94% I interpret this pulse ox as normal     Cardiac monitor interpretation: Sinus rhythm with heart rate in the 70s as interpreted by me.  The patient presented with syncope/near syncope and cardiac monitor was ordered to monitor for dysrhythmia.    Constitutional: Well developed, well nourished, alert in no apparent distress, nontoxic appearance    HENT: No external signs of trauma, normocephalic, oropharynx moist and clear, nose normal     Eyes: PERRL, EOMI, vision and visual fields are grossly intact bilaterally, conjunctiva without erythema, no discharge, no icterus    Neck: Soft and supple, trachea midline, no stridor, no tenderness, no LAD, no JVD, good ROM    Cardiovascular: Regular rate  and rhythm, 2/6 SM, strong distal pulses and good perfusion    Thorax & Lungs: No respiratory distress, CTAB    Abdomen: Soft, nontender, nondistended, no guarding, no rebound, normal BS    Back: No CVAT     Extremities: No cyanosis, minimal bilateral lower extremity edema, no gross deformity, good ROM, no tenderness, intact distal pulses with brisk cap refill    Skin: Warm, dry, no pallor/cyanosis, no rash noted      Lymphatic: No lymphadenopathy noted     Neuro: Alert and oriented to person, place, and time.  GCS 15.  CN II-XII grossly intact.  Normal speech.  Equal strength bilateral UE/LE.  Sensation intact to touch.  No cerebellar signs.  Normal gait.    Psychiatric: Cooperative, normal mood and affect, normal judgement, appropriate for clinical situation        DIAGNOSTIC STUDIES / PROCEDURES    EKG  12 Lead EKG obtained at 1920 and interpreted by me:   Rate: 76   Rhythm: Sinus rhythm   Ectopy: None  Intervals: First-degree block  Axis: LAD  QRS: RBBB    Clinical Impression: Sinus rhythm with first-degree block, RBBB with associated repolarization abnormalities  Compared to February 18, 2023 without significant change      LABS  All labs reviewed by me.     Results for orders placed or performed during the hospital encounter of 03/13/23   CBC with Differential   Result Value Ref Range    WBC 9.9 4.8 - 10.8 K/uL    RBC 4.82 4.20 - 5.40 M/uL    Hemoglobin 15.7 12.0 - 16.0 g/dL    Hematocrit 44.8 37.0 - 47.0 %    MCV 92.9 81.4 - 97.8 fL    MCH 32.6 27.0 - 33.0 pg    MCHC 35.0 33.6 - 35.0 g/dL    RDW 41.5 35.9 - 50.0 fL    Platelet Count 284 164 - 446 K/uL    MPV 9.1 9.0 - 12.9 fL    Neutrophils-Polys 77.40 (H) 44.00 - 72.00 %    Lymphocytes 16.40 (L) 22.00 - 41.00 %    Monocytes 5.50 0.00 - 13.40 %    Eosinophils 0.00 0.00 - 6.90 %    Basophils 0.30 0.00 - 1.80 %    Immature Granulocytes 0.40 0.00 - 0.90 %    Nucleated RBC 0.00 /100 WBC    Neutrophils (Absolute) 7.69 (H) 2.00 - 7.15 K/uL    Lymphs (Absolute) 1.63  1.00 - 4.80 K/uL    Monos (Absolute) 0.55 0.00 - 0.85 K/uL    Eos (Absolute) 0.00 0.00 - 0.51 K/uL    Baso (Absolute) 0.03 0.00 - 0.12 K/uL    Immature Granulocytes (abs) 0.04 0.00 - 0.11 K/uL    NRBC (Absolute) 0.00 K/uL   Complete Metabolic Panel (CMP)   Result Value Ref Range    Sodium 128 (L) 135 - 145 mmol/L    Potassium 4.7 3.6 - 5.5 mmol/L    Chloride 93 (L) 96 - 112 mmol/L    Co2 19 (L) 20 - 33 mmol/L    Anion Gap 16.0 7.0 - 16.0    Glucose 106 (H) 65 - 99 mg/dL    Bun 11 8 - 22 mg/dL    Creatinine 0.69 0.50 - 1.40 mg/dL    Calcium 9.4 8.4 - 10.2 mg/dL    AST(SGOT) 20 12 - 45 U/L    ALT(SGPT) 12 2 - 50 U/L    Alkaline Phosphatase 104 (H) 30 - 99 U/L    Total Bilirubin 0.7 0.1 - 1.5 mg/dL    Albumin 4.5 3.2 - 4.9 g/dL    Total Protein 7.8 6.0 - 8.2 g/dL    Globulin 3.3 1.9 - 3.5 g/dL    A-G Ratio 1.4 g/dL   Troponins NOW   Result Value Ref Range    Troponin T 23 (H) 6 - 19 ng/L   CORRECTED CALCIUM   Result Value Ref Range    Correct Calcium 9.0 8.5 - 10.5 mg/dL   ESTIMATED GFR   Result Value Ref Range    GFR (CKD-EPI) 81 >60 mL/min/1.73 m 2   TSH WITH REFLEX TO FT4   Result Value Ref Range    TSH 5.400 (H) 0.380 - 5.330 uIU/mL   MAGNESIUM   Result Value Ref Range    Magnesium 2.0 1.5 - 2.5 mg/dL   FREE THYROXINE   Result Value Ref Range    Free T-4 1.20 0.93 - 1.70 ng/dL   TROPONIN   Result Value Ref Range    Troponin T 23 (H) 6 - 19 ng/L   EKG   Result Value Ref Range    Report       [facility]    Test Date:  2023  Pt Name:    MICHELLE JIANG                Department: [facility]  MRN:        [id number]                      Room:  Gender:     Female                       Technician: SHENA  :        [id number]                   Requested By:ER TRIAGE PROTOCOL  Order #:    [id number]                    Reading MD:    Measurements  Intervals                                Axis  Rate:       76                           P:          -45  KS:         285                           QRS:        -99  QRSD:       123                          T:          -11  QT:         403  QTc:        454    Interpretive Statements  Sinus or ectopic atrial rhythm  Prolonged MS interval  Right bundle branch block  Compared to ECG 02/18/2023 02:01:19  Ectopic atrial rhythm now present  Sinus rhythm no longer present          RADIOLOGY  I have independently interpreted the diagnostic imaging associated with this visit and am waiting the final reading from the radiologist.     CT-CSPINE WITHOUT PLUS RECONS   Final Result         1.  Multilevel degenerative changes of the cervical spine limit diagnostic sensitivity of this examination, otherwise no acute traumatic bony injury of the cervical spine is apparent.   2.  Multilevel facet arthrosis with associated anterior listhesis, overall appears stable since prior study, most compatible with degenerative listhesis.   3.  Atherosclerosis      CT-HEAD W/O   Final Result         1.  No acute intracranial abnormality is identified, there are nonspecific white matter changes, commonly associated with small vessel ischemic disease.  Associated mild cerebral atrophy is noted.   2.  Radiodensities in the left globe medial and inferior to the lens, stable since prior study, of uncertain significance.   3.  Atherosclerosis.         DX-CHEST-PORTABLE (1 VIEW)   Final Result         1.  Left basilar atelectasis, no focal infiltrate   2.  Cardiomegaly   3.  Atherosclerosis             COURSE & MEDICAL DECISION MAKING  Nursing notes, VS, PMSFHx reviewed in chart.     Review of past medical records shows the patient was last seen in the office on February 23, 2023 for hypertension as well as follow-up regarding recent hospitalization for syncope.  Patient was admitted to this hospital on February 17, 2023 for syncope and work-up during her hospitalization was unrevealing and patient was discharged on February 18, 2023.      Differential diagnoses considered include but are not  limited to: Syncope, near syncope, hypoglycemia, Sz, vasovagal episode, dysrhythmia, CVA/TIA, dehydration, electrolyte abnormality      ED Observation Status? Yes; I am placing the patient in to an observation status due to a diagnostic uncertainty as well as therapeutic intensity. Patient placed in observation status at 9:37 PM, 3/13/2023.     Observation plan is as follows: We will obtain EKG, laboratory and imaging studies and monitor patient in the ED.    Upon Reevaluation, the patient's condition has: Improved; and will be discharged.    Patient discharged from ED Observation status at 0020 on March 14, 2023.      INITIAL ASSESSMENT AND PLAN  Care Narrative: This is a 89-year-old female patient with history of hypertension and macular degeneration who presents with multiple apparent near syncope or syncope episodes.  She had recent admission to this hospital for work-up of her symptoms and was unrevealing.  Will obtain EKG, laboratory and imaging studies and monitor patient in the ED.      Discussion of management with other [facility] or appropriate source(s): None     Escalation of care considered, and ultimately not performed: acute inpatient care management, however at this time, the patient is most appropriate for outpatient management.       History of physical exam as above.  Syncope work-up was initiated.  EKG without significant change compared to prior.  Patient with chronic mild troponin elevation unchanged today and also without increase on delta troponin.  This is unlikely to be ACS.  Other lab abnormalities including her hyponatremia also appear to be fairly stable and chronic compared to her prior results.  Chest x-ray without evidence for acute abnormality.  Given her age and head trauma, CT head and C-spine were obtained and without evidence for acute traumatic findings.  Patient was monitored in the ED and remained clinically stable.  No further episodes noted in the ED.  No focal neurological  findings.  No seizure-like activity.  No worrisome dysrhythmia noted.  I discussed the findings with patient and daughter.  Patient is alert, in no acute distress and nontoxic in appearance.  Patient has appointment with primary provider tomorrow and daughter would like patient to be checked for possible seizures.  I discussed with them outpatient EEG testing for seizures.  Patient's episodes are short in duration, less than 1 minute with return to baseline, and atypical for seizure.  Patient also has appointment with cardiology next month for evaluation of her recurrent syncope/near syncope episodes.  At this time, no clear etiology but also no convincing evidence for emergent pathology.  I discussed with them outpatient follow-up and return to ED precautions.  Patient was also advised on fall precautions due to her near syncope/syncope episodes.  Patient and daughter verbalized understanding and agreed with plan of care with no further questions or concerns.        The patient is referred to a primary physician for blood pressure management, diabetic screening, and for all other preventative health concerns.       FINAL IMPRESSION  1. Syncope, unspecified syncope type Acute   2. Closed head injury, initial encounter Acute   3. Hyponatremia Chronic          DISPOSITION  Patient will be discharged home in stable condition.       FOLLOW UP  MICHAEL Patel.P.  [address]  [place] [id number] [phone]    Call today      [facility], Emergency Dept  [address]  [place] [id number] [phone]    If symptoms worsen         OUTPATIENT MEDICATIONS  Discharge Medication List as of 3/14/2023 12:39 AM             Electronically signed by: Jose Mayer D.O., 3/13/2023 8:39 PM      Portions of this record were made with voice recognition software.  Despite my review, spelling/grammar/context errors may still remain.  Interpretation of this chart should be taken in this  context.

## 2023-03-14 NOTE — ED NOTES
Vital signs taken and recorded. IV removed. Discharge in stable condition via wheelchair accompanied by daughter. Health teachings given to patient and family with full understanding of the information given. No personal belongings left.    normal rate and rhythm, no chest pain and no edema.

## 2023-03-14 NOTE — ED NOTES
Pt wheeled back to ED 8A with daughter, pt changed into gown, and attached to the monitor. Pt updated on POC, wilfrido in the lowest position, side rails are up, call light within reach.

## 2023-03-14 NOTE — ED NOTES
Helped pt ambulated to the bathroom with walker. Pt is steady on her feet but goes a little fast with the walker. RN reminds pt to slow down a little and look up when using walker.

## 2023-03-15 ENCOUNTER — OFFICE VISIT (OUTPATIENT)
Dept: MEDICAL GROUP | Facility: PHYSICIAN GROUP | Age: 88
End: 2023-03-15
Payer: MEDICARE

## 2023-03-15 VITALS
HEART RATE: 78 BPM | HEIGHT: 60 IN | DIASTOLIC BLOOD PRESSURE: 60 MMHG | BODY MASS INDEX: 24.46 KG/M2 | SYSTOLIC BLOOD PRESSURE: 110 MMHG | OXYGEN SATURATION: 96 % | WEIGHT: 124.6 LBS | TEMPERATURE: 97.6 F

## 2023-03-15 DIAGNOSIS — R79.89 ELEVATED TSH: ICD-10-CM

## 2023-03-15 DIAGNOSIS — I10 BENIGN ESSENTIAL HTN: Chronic | ICD-10-CM

## 2023-03-15 DIAGNOSIS — E03.8 OTHER SPECIFIED HYPOTHYROIDISM: ICD-10-CM

## 2023-03-15 DIAGNOSIS — E87.1 CHRONIC HYPONATREMIA: ICD-10-CM

## 2023-03-15 DIAGNOSIS — R55 BLACK-OUT (NOT AMNESIA): ICD-10-CM

## 2023-03-15 DIAGNOSIS — R55 NEAR SYNCOPE: ICD-10-CM

## 2023-03-15 DIAGNOSIS — F33.1 MODERATE EPISODE OF RECURRENT MAJOR DEPRESSIVE DISORDER (HCC): ICD-10-CM

## 2023-03-15 DIAGNOSIS — E87.1 LOW SODIUM LEVELS: ICD-10-CM

## 2023-03-15 PROBLEM — F32.9 MAJOR DEPRESSIVE DISORDER: Status: ACTIVE | Noted: 2023-03-15

## 2023-03-15 PROCEDURE — 99214 OFFICE O/P EST MOD 30 MIN: CPT

## 2023-03-15 RX ORDER — LOSARTAN POTASSIUM 50 MG/1
50 TABLET ORAL DAILY
Qty: 90 TABLET | Refills: 1 | Status: SHIPPED | OUTPATIENT
Start: 2023-03-15 | End: 2023-03-27 | Stop reason: SDUPTHER

## 2023-03-15 RX ORDER — SERTRALINE HYDROCHLORIDE 25 MG/1
25 TABLET, FILM COATED ORAL DAILY
Qty: 30 TABLET | Refills: 11 | Status: SHIPPED | OUTPATIENT
Start: 2023-03-15 | End: 2023-03-27 | Stop reason: SDUPTHER

## 2023-03-15 ASSESSMENT — PATIENT HEALTH QUESTIONNAIRE - PHQ9
SUM OF ALL RESPONSES TO PHQ QUESTIONS 1-9: 16
5. POOR APPETITE OR OVEREATING: 3 - NEARLY EVERY DAY
CLINICAL INTERPRETATION OF PHQ2 SCORE: 6

## 2023-03-15 ASSESSMENT — FIBROSIS 4 INDEX: FIB4 SCORE: 1.87

## 2023-03-15 NOTE — ASSESSMENT & PLAN NOTE
Chronic, uncertain etiology/prognosis  -Recommend to keep upcoming appointment with cardiology   -Recommend electrolyte replacement with water as discussed  -ED precautions for worsening

## 2023-03-15 NOTE — ASSESSMENT & PLAN NOTE
Chronic stable condition therefore we will continue losartan at 50 mg daily however I have asked patient and her daughter to do blood pressures twice daily and hold losartan should pressures be less than 110/50  -Patient to see Dr. Horne cardiology April 11, 2023

## 2023-03-15 NOTE — PROGRESS NOTES
"Subjective:     CC: Follow up for near syncopal events    HPI:   Chrissie presents today with    Problem   Major Depressive Disorder    PHQ9=  16 She is requesting antidepressant   as she has been feeling depressed and anxious on/off for the past 10 years. Has used medication in the past but does not recall the name.  -Denies SI     Other Specified Hypothyroidism    This is a relatively new condition as patient does not take medication for hypothyroidism for history of or been diagnosed with this condition.  Most recent TSH level reveals the following:   Latest Reference Range & Units 03/13/23 20:10   TSH 0.380 - 5.330 uIU/mL 5.400 (H)   Free T-4 0.93 - 1.70 ng/dL 1.20        Near Syncope    Patient reports to be having \"black out\" spells for about the past 2 months.  She reports momentary spells in which she feels disorientation and has to shake her head to bring herself back to normal.  She denies syncope but has fallen due to these episodes.  She reports this only occurs in her apartment and nowhere else-thinks it could be related to a factor such as mold (ceiling and carpet had to be replaced). Has been seen in the ER for this condition on 2 occassions with unrevealing etiology, other than electrolyte imbalance chiefly low sodium at 128 which has been chronically low since 10/21. Seeing cardiology on 4/11/23 for this condition.      Chronic Hyponatremia    Present since 2021  -Most recently 128  -Uncertain etiology: On Losartan which could be a contributing factor.    -Does report to drink a lot of water   -Recently having multiple presyncopal events.     Benign Essential Htn    Chronic condition well-controlled on losartan 50 mg daily.  Patient reports she has been on this medication many years no reported side effects.  Blood pressure today in clinic 110/60   -Has been having near syncopal events lately uncertain if it is due to low blood pressure or possibly hyponatremia as per ED work-up has been " unrevealing         Health Maintenance: Completed    ROS:  ROS See HPI    Objective:     Exam:  /60 (BP Location: Left arm, Patient Position: Sitting, BP Cuff Size: Adult)   Pulse 78   Temp 36.4 °C (97.6 °F) (Temporal)   Ht 1.524 m (5')   Wt 56.5 kg (124 lb 9.6 oz)   SpO2 96%   BMI 24.33 kg/m²  Body mass index is 24.33 kg/m².    Physical Exam  Constitutional:       General: She is not in acute distress.     Appearance: Normal appearance. She is not ill-appearing or toxic-appearing.   HENT:      Head: Normocephalic.   Eyes:      Conjunctiva/sclera: Conjunctivae normal.   Cardiovascular:      Rate and Rhythm: Normal rate and regular rhythm.      Pulses: Normal pulses.      Heart sounds: Murmur heard.   Pulmonary:      Effort: Pulmonary effort is normal. No respiratory distress.      Breath sounds: Normal breath sounds.   Skin:     General: Skin is warm and dry.   Neurological:      General: No focal deficit present.      Mental Status: She is alert and oriented to person, place, and time.   Psychiatric:         Mood and Affect: Mood normal.         Behavior: Behavior normal.       Labs:   Lab Results   Component Value Date/Time    CHOLSTRLTOT 246 (H) 06/23/2020 06:43 AM     (H) 06/23/2020 06:43 AM    HDL 69 06/23/2020 06:43 AM    TRIGLYCERIDE 132 06/23/2020 06:43 AM       Lab Results   Component Value Date/Time    SODIUM 128 (L) 03/13/2023 08:10 PM    POTASSIUM 4.7 03/13/2023 08:10 PM    CHLORIDE 93 (L) 03/13/2023 08:10 PM    CO2 19 (L) 03/13/2023 08:10 PM    GLUCOSE 106 (H) 03/13/2023 08:10 PM    BUN 11 03/13/2023 08:10 PM    CREATININE 0.69 03/13/2023 08:10 PM     Lab Results   Component Value Date/Time    ALKPHOSPHAT 104 (H) 03/13/2023 08:10 PM    ASTSGOT 20 03/13/2023 08:10 PM    ALTSGPT 12 03/13/2023 08:10 PM    TBILIRUBIN 0.7 03/13/2023 08:10 PM      No results found for: HBA1C  No results found for: TSH  Lab Results   Component Value Date/Time    FREET4 1.20 03/13/2023 08:10 PM    FREET4 1.01  05/12/2021 09:20 AM         Assessment & Plan: Medical Decision Making     89 y.o. female with the following -     Problem List Items Addressed This Visit       Benign essential HTN (Chronic)     Chronic stable condition therefore we will continue losartan at 50 mg daily however I have asked patient and her daughter to do blood pressures twice daily and hold losartan should pressures be less than 110/50  -Patient to see Dr. Horne cardiology April 11, 2023         Relevant Medications    losartan (COZAAR) 50 MG Tab    Chronic hyponatremia     Chronic, uncertain etiology/prognosis  -Recommend to keep upcoming appointment with cardiology   -Recommend electrolyte replacement with water as discussed  -ED precautions for worsening         Near syncope     Undiagnosed condition of uncertain prognosis  -Strict ER precautions should condition persist with syncopal event   -Recommend electrolyte replacement as discussed  -Recommend not overhydrating with water alone, adding electrolyte tablets to water.  -Daily BP both am and pm.  Hold losartan for BP <110/50 or symptomatic such as light headedness or dizzyness         Relevant Medications    losartan (COZAAR) 50 MG Tab    Other Relevant Orders    Basic Metabolic Panel    T3 FREE    FREE THYROXINE    TSH    Major depressive disorder     -Chronic, uncontrolled  -Start Zoloft 25 mg per day as prescribed daily    Patient Education:    Discussed the following:   Concept of biochemical imbalance of neurotransmitters and rationale for treatment with daily medicine  Improved therapeutic response with counseling and medicine combined compared to either alone.  Rx dose and dosing schedule common side effects.    Lack of immediate symptom relief and need to keep taking medicine .   Relapse or recurrence of symptoms if therapeutic course of treatment not completed.    SSRI Side Effects and Black Box Warnings reviewed with patient.   Side Effects: medicine may cause headaches, nausea,  diarrhea, insomnia, sexual side effects, irritability,  Black Box Warning: medicine may rarely cause worsening mood, depression, suicidal ideation in children and young adults. If worsening depression, anxiety or other worrisome symptoms occur, contact PCP immediately,   and consider calling the National Suicide Prevention Lifeline (514) or (1211.844.4379)   or 985, or transporting patient to the emergency department for immediate evaluation.            Relevant Medications    sertraline (ZOLOFT) 25 MG tablet    Other Relevant Orders    Patient has been identified as having a positive depression screening. Appropriate orders and counseling have been given. (Completed)    Basic Metabolic Panel    T3 FREE    FREE THYROXINE    TSH    Other specified hypothyroidism     -New condition we will recheck values in 6 weeks  -Labs as follows:           Relevant Orders    T3 FREE    FREE THYROXINE    TSH     Other Visit Diagnoses       Elevated TSH        Low sodium levels        Relevant Orders    Basic Metabolic Panel            Differential diagnosis, natural history, supportive care, and indications for immediate follow-up discussed.  Shared decision making approach utilized, and patient is amendable with plan of care.  Patient understands to return to clinic or go to the emergency department if symptoms worsen. All questions and concerns addressed to the best of my knowledge.    Return in about 6 weeks (around 4/26/2023).    Please note that this dictation was created using voice recognition software. I have made every reasonable attempt to correct obvious errors, but I expect that there are errors of grammar and possibly content that I did not discover before finalizing the note.

## 2023-03-15 NOTE — ASSESSMENT & PLAN NOTE
-Chronic, uncontrolled  -Start Zoloft 25 mg per day as prescribed daily    Patient Education:    Discussed the following:   Concept of biochemical imbalance of neurotransmitters and rationale for treatment with daily medicine  Improved therapeutic response with counseling and medicine combined compared to either alone.  Rx dose and dosing schedule common side effects.    Lack of immediate symptom relief and need to keep taking medicine .   Relapse or recurrence of symptoms if therapeutic course of treatment not completed.    SSRI Side Effects and Black Box Warnings reviewed with patient.   Side Effects: medicine may cause headaches, nausea, diarrhea, insomnia, sexual side effects, irritability,  Black Box Warning: medicine may rarely cause worsening mood, depression, suicidal ideation in children and young adults. If worsening depression, anxiety or other worrisome symptoms occur, contact PCP immediately,   and consider calling the National Suicide Prevention Lifeline (603) or (1459.991.6458)   or 571, or transporting patient to the emergency department for immediate evaluation.

## 2023-03-15 NOTE — ASSESSMENT & PLAN NOTE
Undiagnosed condition of uncertain prognosis  -Strict ER precautions should condition persist with syncopal event   -Recommend electrolyte replacement as discussed  -Recommend not overhydrating with water alone, adding electrolyte tablets to water.  -Daily BP both am and pm.  Hold losartan for BP <110/50 or symptomatic such as light headedness or dizzyness

## 2023-03-27 DIAGNOSIS — F33.1 MODERATE EPISODE OF RECURRENT MAJOR DEPRESSIVE DISORDER (HCC): ICD-10-CM

## 2023-03-27 DIAGNOSIS — I10 BENIGN ESSENTIAL HTN: Chronic | ICD-10-CM

## 2023-03-27 RX ORDER — SERTRALINE HYDROCHLORIDE 25 MG/1
25 TABLET, FILM COATED ORAL DAILY
Qty: 30 TABLET | Refills: 11 | Status: ON HOLD | OUTPATIENT
Start: 2023-03-27 | End: 2023-04-11 | Stop reason: SDUPTHER

## 2023-03-27 RX ORDER — LOSARTAN POTASSIUM 50 MG/1
50 TABLET ORAL DAILY
Qty: 90 TABLET | Refills: 1 | Status: ON HOLD | OUTPATIENT
Start: 2023-03-27 | End: 2023-04-11

## 2023-03-28 ENCOUNTER — OFFICE VISIT (OUTPATIENT)
Dept: URGENT CARE | Facility: CLINIC | Age: 88
End: 2023-03-28
Payer: MEDICARE

## 2023-03-28 VITALS
BODY MASS INDEX: 24.74 KG/M2 | TEMPERATURE: 98.3 F | WEIGHT: 126 LBS | OXYGEN SATURATION: 94 % | SYSTOLIC BLOOD PRESSURE: 122 MMHG | HEART RATE: 90 BPM | HEIGHT: 60 IN | DIASTOLIC BLOOD PRESSURE: 70 MMHG | RESPIRATION RATE: 20 BRPM

## 2023-03-28 DIAGNOSIS — R09.81 NASAL CONGESTION: ICD-10-CM

## 2023-03-28 DIAGNOSIS — J06.9 VIRAL URI WITH COUGH: ICD-10-CM

## 2023-03-28 LAB
FLUAV RNA SPEC QL NAA+PROBE: NEGATIVE
FLUBV RNA SPEC QL NAA+PROBE: NEGATIVE
RSV RNA SPEC QL NAA+PROBE: NEGATIVE
SARS-COV-2 RNA RESP QL NAA+PROBE: NEGATIVE

## 2023-03-28 PROCEDURE — 0241U POCT CEPHEID COV-2, FLU A/B, RSV - PCR: CPT | Performed by: NURSE PRACTITIONER

## 2023-03-28 PROCEDURE — 99213 OFFICE O/P EST LOW 20 MIN: CPT | Performed by: NURSE PRACTITIONER

## 2023-03-28 ASSESSMENT — ENCOUNTER SYMPTOMS
DIARRHEA: 0
ORTHOPNEA: 0
HEADACHES: 0
EYE DISCHARGE: 0
FEVER: 0
SORE THROAT: 1
SHORTNESS OF BREATH: 0
CHILLS: 0
COUGH: 1
WHEEZING: 0
NAUSEA: 0
MYALGIAS: 0
SPUTUM PRODUCTION: 0

## 2023-03-28 ASSESSMENT — FIBROSIS 4 INDEX: FIB4 SCORE: 1.87

## 2023-03-28 NOTE — PROGRESS NOTES
Subjective     Chrissie Dueñas is a 89 y.o. female who presents with Sinus Problem (X yesterday, nasal congestion, stuffy and runny nose, ear fullness) and Cough (X 3 days, cough off / on)            HPI  New problem.  Patient is a very pleasant 89-year-old female who presents with her daughter with sinus congestion and runny nose that began yesterday as well as bilateral ear fullness.  She has had a dry cough for the past 3 days that has been intermittent.  Her daughter denies fever, chills.  Patient denies body aches, headache, nausea, or diarrhea.  She is experiencing low appetite which is problematic for her as she has chronic low sodium.  She is fully vaccinated for COVID.  She has been given Benadryl for her runny nose.    Patient has no known allergies.  Current Outpatient Medications on File Prior to Visit   Medication Sig Dispense Refill    sertraline (ZOLOFT) 25 MG tablet Take 1 Tablet by mouth every day. 30 Tablet 11    losartan (COZAAR) 50 MG Tab Take 1 Tablet by mouth every day. 90 Tablet 1    Multiple Vitamins-Minerals (PRESERVISION AREDS 2+MULTI VIT) Cap Take 2 Capsules by mouth every day.       No current facility-administered medications on file prior to visit.     Social History     Socioeconomic History    Marital status: Single     Spouse name: Not on file    Number of children: Not on file    Years of education: Not on file    Highest education level: Not on file   Occupational History    Not on file   Tobacco Use    Smoking status: Former     Packs/day: 0.50     Years: 30.00     Pack years: 15.00     Types: Cigarettes     Quit date: 1974     Years since quittin.8    Smokeless tobacco: Never   Vaping Use    Vaping Use: Never used   Substance and Sexual Activity    Alcohol use: Yes     Alcohol/week: 4.2 oz     Types: 7 Glasses of wine per week     Comment: glass of wine everyday     Drug use: Not Currently    Sexual activity: Not on file   Other Topics Concern    Not on file   Social  History Narrative    Not on file     Social Determinants of Health     Financial Resource Strain: Not on file   Food Insecurity: Not on file   Transportation Needs: Not on file   Physical Activity: Not on file   Stress: Not on file   Social Connections: Not on file   Intimate Partner Violence: Not on file   Housing Stability: Not on file     Breast Cancer-related family history is not on file.    Review of Systems   Constitutional:  Positive for malaise/fatigue. Negative for chills and fever.   HENT:  Positive for congestion and sore throat.         Ear fullness   Eyes:  Negative for discharge.   Respiratory:  Positive for cough. Negative for sputum production, shortness of breath and wheezing.    Cardiovascular:  Negative for chest pain and orthopnea.   Gastrointestinal:  Negative for diarrhea and nausea.   Musculoskeletal:  Negative for myalgias.   Neurological:  Negative for headaches.   Endo/Heme/Allergies:  Negative for environmental allergies.            Objective     /70 (BP Location: Left arm, Patient Position: Sitting, BP Cuff Size: Adult)   Pulse 90   Temp 36.8 °C (98.3 °F) (Temporal)   Resp 20   Ht 1.524 m (5')   Wt 57.2 kg (126 lb)   SpO2 94%   BMI 24.61 kg/m²      Physical Exam  Vitals and nursing note reviewed.   Constitutional:       General: She is not in acute distress.     Appearance: She is well-developed.   HENT:      Head: Normocephalic.      Right Ear: External ear normal.      Left Ear: External ear normal.      Ears:      Comments: Ear exam deferred-patient has hearing aids that cannot be removed so exam cannot be completed.     Nose: Mucosal edema, congestion and rhinorrhea present.      Mouth/Throat:      Pharynx: No posterior oropharyngeal erythema.   Eyes:      General:         Right eye: No discharge.         Left eye: No discharge.      Conjunctiva/sclera: Conjunctivae normal.   Cardiovascular:      Rate and Rhythm: Normal rate and regular rhythm.      Heart sounds: Normal  heart sounds.   Pulmonary:      Effort: Pulmonary effort is normal.      Breath sounds: Normal breath sounds.   Musculoskeletal:         General: Normal range of motion.      Cervical back: Normal range of motion and neck supple.   Lymphadenopathy:      Cervical: No cervical adenopathy.   Skin:     General: Skin is warm and dry.   Neurological:      Mental Status: She is alert and oriented to person, place, and time.   Psychiatric:         Behavior: Behavior normal.         Thought Content: Thought content normal.                           Assessment & Plan        1. Viral URI with cough        2. Nasal congestion  POCT CEPHEID COV-2, FLU A/B, RSV - PCR        Covid negative.  Viral illness at this time with no indication for antibiotics. Reviewed with patient expected course of illness and also reviewed OTC medications that may be used for symptom relief. Follow up 7-10 days if not improving.  Suggest small frequent meals for her low appetite.

## 2023-03-29 ENCOUNTER — APPOINTMENT (OUTPATIENT)
Dept: RADIOLOGY | Facility: MEDICAL CENTER | Age: 88
DRG: 193 | End: 2023-03-29
Attending: EMERGENCY MEDICINE
Payer: MEDICARE

## 2023-03-29 ENCOUNTER — HOSPITAL ENCOUNTER (INPATIENT)
Facility: MEDICAL CENTER | Age: 88
LOS: 2 days | DRG: 193 | End: 2023-03-31
Attending: EMERGENCY MEDICINE | Admitting: INTERNAL MEDICINE
Payer: MEDICARE

## 2023-03-29 ENCOUNTER — APPOINTMENT (OUTPATIENT)
Dept: CARDIOLOGY | Facility: MEDICAL CENTER | Age: 88
DRG: 193 | End: 2023-03-29
Attending: NURSE PRACTITIONER
Payer: MEDICARE

## 2023-03-29 DIAGNOSIS — F33.1 MODERATE EPISODE OF RECURRENT MAJOR DEPRESSIVE DISORDER (HCC): ICD-10-CM

## 2023-03-29 DIAGNOSIS — I24.89 DEMAND ISCHEMIA (HCC): ICD-10-CM

## 2023-03-29 DIAGNOSIS — E87.1 CHRONIC HYPONATREMIA: ICD-10-CM

## 2023-03-29 DIAGNOSIS — R79.89 ELEVATED BRAIN NATRIURETIC PEPTIDE (BNP) LEVEL: ICD-10-CM

## 2023-03-29 DIAGNOSIS — J96.01 ACUTE HYPOXEMIC RESPIRATORY FAILURE (HCC): ICD-10-CM

## 2023-03-29 DIAGNOSIS — R65.20 SEPSIS WITH ACUTE HYPOXIC RESPIRATORY FAILURE WITHOUT SEPTIC SHOCK, DUE TO UNSPECIFIED ORGANISM (HCC): ICD-10-CM

## 2023-03-29 DIAGNOSIS — N18.31 STAGE 3A CHRONIC KIDNEY DISEASE: ICD-10-CM

## 2023-03-29 DIAGNOSIS — R79.89 ELEVATED TROPONIN: ICD-10-CM

## 2023-03-29 DIAGNOSIS — J18.9 PNEUMONIA DUE TO INFECTIOUS ORGANISM, UNSPECIFIED LATERALITY, UNSPECIFIED PART OF LUNG: ICD-10-CM

## 2023-03-29 DIAGNOSIS — R19.7 DIARRHEA OF PRESUMED INFECTIOUS ORIGIN: ICD-10-CM

## 2023-03-29 DIAGNOSIS — R49.0 HOARSE VOICE QUALITY: ICD-10-CM

## 2023-03-29 DIAGNOSIS — R55 NEAR SYNCOPE: ICD-10-CM

## 2023-03-29 DIAGNOSIS — Z91.81 HISTORY OF FALL: ICD-10-CM

## 2023-03-29 DIAGNOSIS — R19.7 DIARRHEA, UNSPECIFIED TYPE: ICD-10-CM

## 2023-03-29 DIAGNOSIS — E87.1 HYPONATREMIA: ICD-10-CM

## 2023-03-29 DIAGNOSIS — Z85.828 HISTORY OF SKIN CANCER IN ADULTHOOD: ICD-10-CM

## 2023-03-29 DIAGNOSIS — L98.9 SKIN LESION: ICD-10-CM

## 2023-03-29 DIAGNOSIS — Z86.69 HISTORY OF MACULAR DEGENERATION: Chronic | ICD-10-CM

## 2023-03-29 DIAGNOSIS — A41.9 SEPSIS WITH ACUTE HYPOXIC RESPIRATORY FAILURE WITHOUT SEPTIC SHOCK, DUE TO UNSPECIFIED ORGANISM (HCC): ICD-10-CM

## 2023-03-29 DIAGNOSIS — Z53.09 CONTRAINDICATION TO DEEP VEIN THROMBOSIS (DVT) PROPHYLAXIS: ICD-10-CM

## 2023-03-29 DIAGNOSIS — I10 ESSENTIAL HYPERTENSION: ICD-10-CM

## 2023-03-29 DIAGNOSIS — J96.01 SEPSIS WITH ACUTE HYPOXIC RESPIRATORY FAILURE WITHOUT SEPTIC SHOCK, DUE TO UNSPECIFIED ORGANISM (HCC): ICD-10-CM

## 2023-03-29 DIAGNOSIS — J18.9 MULTIFOCAL PNEUMONIA: ICD-10-CM

## 2023-03-29 DIAGNOSIS — E03.9 HYPOTHYROIDISM, UNSPECIFIED TYPE: ICD-10-CM

## 2023-03-29 DIAGNOSIS — E03.8 OTHER SPECIFIED HYPOTHYROIDISM: ICD-10-CM

## 2023-03-29 LAB
ALBUMIN SERPL BCP-MCNC: 3.7 G/DL (ref 3.2–4.9)
ALBUMIN/GLOB SERPL: 1.2 G/DL
ALP SERPL-CCNC: 123 U/L (ref 30–99)
ALT SERPL-CCNC: 19 U/L (ref 2–50)
ANION GAP SERPL CALC-SCNC: 14 MMOL/L (ref 7–16)
AST SERPL-CCNC: 24 U/L (ref 12–45)
BASOPHILS # BLD AUTO: 0.2 % (ref 0–1.8)
BASOPHILS # BLD: 0.03 K/UL (ref 0–0.12)
BILIRUB SERPL-MCNC: 1 MG/DL (ref 0.1–1.5)
BLOOD CULTURE HOLD CXBCH: NORMAL
BUN SERPL-MCNC: 17 MG/DL (ref 8–22)
CALCIUM ALBUM COR SERPL-MCNC: 9.1 MG/DL (ref 8.5–10.5)
CALCIUM SERPL-MCNC: 8.9 MG/DL (ref 8.4–10.2)
CHLORIDE SERPL-SCNC: 87 MMOL/L (ref 96–112)
CO2 SERPL-SCNC: 23 MMOL/L (ref 20–33)
CREAT SERPL-MCNC: 1.02 MG/DL (ref 0.5–1.4)
D DIMER PPP IA.FEU-MCNC: 1.31 UG/ML (FEU) (ref 0–0.5)
EKG IMPRESSION: NORMAL
EOSINOPHIL # BLD AUTO: 0 K/UL (ref 0–0.51)
EOSINOPHIL NFR BLD: 0 % (ref 0–6.9)
ERYTHROCYTE [DISTWIDTH] IN BLOOD BY AUTOMATED COUNT: 41.1 FL (ref 35.9–50)
GFR SERPLBLD CREATININE-BSD FMLA CKD-EPI: 51 ML/MIN/1.73 M 2
GLOBULIN SER CALC-MCNC: 3.1 G/DL (ref 1.9–3.5)
GLUCOSE SERPL-MCNC: 113 MG/DL (ref 65–99)
HCT VFR BLD AUTO: 36.6 % (ref 37–47)
HGB BLD-MCNC: 12.8 G/DL (ref 12–16)
IMM GRANULOCYTES # BLD AUTO: 0.09 K/UL (ref 0–0.11)
IMM GRANULOCYTES NFR BLD AUTO: 0.5 % (ref 0–0.9)
LACTATE SERPL-SCNC: 1 MMOL/L (ref 0.5–2)
LACTATE SERPL-SCNC: 1.9 MMOL/L (ref 0.5–2)
LIPASE SERPL-CCNC: 23 U/L (ref 7–58)
LYMPHOCYTES # BLD AUTO: 0.43 K/UL (ref 1–4.8)
LYMPHOCYTES NFR BLD: 2.5 % (ref 22–41)
MCH RBC QN AUTO: 32.9 PG (ref 27–33)
MCHC RBC AUTO-ENTMCNC: 35 G/DL (ref 33.6–35)
MCV RBC AUTO: 94.1 FL (ref 81.4–97.8)
MONOCYTES # BLD AUTO: 1.1 K/UL (ref 0–0.85)
MONOCYTES NFR BLD AUTO: 6.3 % (ref 0–13.4)
NEUTROPHILS # BLD AUTO: 15.71 K/UL (ref 2–7.15)
NEUTROPHILS NFR BLD: 90.5 % (ref 44–72)
NRBC # BLD AUTO: 0 K/UL
NRBC BLD-RTO: 0 /100 WBC
NT-PROBNP SERPL IA-MCNC: 4747 PG/ML (ref 0–125)
PLATELET # BLD AUTO: 258 K/UL (ref 164–446)
PMV BLD AUTO: 9.9 FL (ref 9–12.9)
POTASSIUM SERPL-SCNC: 4.7 MMOL/L (ref 3.6–5.5)
PROCALCITONIN SERPL-MCNC: 0.25 NG/ML
PROT SERPL-MCNC: 6.8 G/DL (ref 6–8.2)
RBC # BLD AUTO: 3.89 M/UL (ref 4.2–5.4)
SODIUM SERPL-SCNC: 124 MMOL/L (ref 135–145)
TROPONIN T SERPL-MCNC: 53 NG/L (ref 6–19)
TROPONIN T SERPL-MCNC: 59 NG/L (ref 6–19)
WBC # BLD AUTO: 17.4 K/UL (ref 4.8–10.8)

## 2023-03-29 PROCEDURE — A9270 NON-COVERED ITEM OR SERVICE: HCPCS | Performed by: EMERGENCY MEDICINE

## 2023-03-29 PROCEDURE — 71275 CT ANGIOGRAPHY CHEST: CPT

## 2023-03-29 PROCEDURE — 85025 COMPLETE CBC W/AUTO DIFF WBC: CPT

## 2023-03-29 PROCEDURE — A9270 NON-COVERED ITEM OR SERVICE: HCPCS | Performed by: NURSE PRACTITIONER

## 2023-03-29 PROCEDURE — 36415 COLL VENOUS BLD VENIPUNCTURE: CPT

## 2023-03-29 PROCEDURE — 83605 ASSAY OF LACTIC ACID: CPT

## 2023-03-29 PROCEDURE — 700111 HCHG RX REV CODE 636 W/ 250 OVERRIDE (IP): Performed by: NURSE PRACTITIONER

## 2023-03-29 PROCEDURE — 70360 X-RAY EXAM OF NECK: CPT

## 2023-03-29 PROCEDURE — 87040 BLOOD CULTURE FOR BACTERIA: CPT

## 2023-03-29 PROCEDURE — 93005 ELECTROCARDIOGRAM TRACING: CPT | Performed by: EMERGENCY MEDICINE

## 2023-03-29 PROCEDURE — 96367 TX/PROPH/DG ADDL SEQ IV INF: CPT

## 2023-03-29 PROCEDURE — 700105 HCHG RX REV CODE 258: Performed by: NURSE PRACTITIONER

## 2023-03-29 PROCEDURE — 80053 COMPREHEN METABOLIC PANEL: CPT

## 2023-03-29 PROCEDURE — 700111 HCHG RX REV CODE 636 W/ 250 OVERRIDE (IP): Performed by: EMERGENCY MEDICINE

## 2023-03-29 PROCEDURE — 85379 FIBRIN DEGRADATION QUANT: CPT

## 2023-03-29 PROCEDURE — 700105 HCHG RX REV CODE 258: Performed by: EMERGENCY MEDICINE

## 2023-03-29 PROCEDURE — 84145 PROCALCITONIN (PCT): CPT

## 2023-03-29 PROCEDURE — 83690 ASSAY OF LIPASE: CPT

## 2023-03-29 PROCEDURE — 99223 1ST HOSP IP/OBS HIGH 75: CPT | Mod: FS | Performed by: INTERNAL MEDICINE

## 2023-03-29 PROCEDURE — 96375 TX/PRO/DX INJ NEW DRUG ADDON: CPT

## 2023-03-29 PROCEDURE — 770020 HCHG ROOM/CARE - TELE (206)

## 2023-03-29 PROCEDURE — 700102 HCHG RX REV CODE 250 W/ 637 OVERRIDE(OP): Performed by: INTERNAL MEDICINE

## 2023-03-29 PROCEDURE — 96365 THER/PROPH/DIAG IV INF INIT: CPT

## 2023-03-29 PROCEDURE — 83880 ASSAY OF NATRIURETIC PEPTIDE: CPT

## 2023-03-29 PROCEDURE — A9270 NON-COVERED ITEM OR SERVICE: HCPCS | Performed by: INTERNAL MEDICINE

## 2023-03-29 PROCEDURE — 700102 HCHG RX REV CODE 250 W/ 637 OVERRIDE(OP): Performed by: EMERGENCY MEDICINE

## 2023-03-29 PROCEDURE — 700102 HCHG RX REV CODE 250 W/ 637 OVERRIDE(OP): Performed by: NURSE PRACTITIONER

## 2023-03-29 PROCEDURE — 700117 HCHG RX CONTRAST REV CODE 255: Performed by: EMERGENCY MEDICINE

## 2023-03-29 PROCEDURE — 84484 ASSAY OF TROPONIN QUANT: CPT | Mod: 91

## 2023-03-29 PROCEDURE — 99285 EMERGENCY DEPT VISIT HI MDM: CPT

## 2023-03-29 PROCEDURE — 71045 X-RAY EXAM CHEST 1 VIEW: CPT

## 2023-03-29 PROCEDURE — 94760 N-INVAS EAR/PLS OXIMETRY 1: CPT

## 2023-03-29 RX ORDER — LOSARTAN POTASSIUM 25 MG/1
50 TABLET ORAL DAILY
Status: DISCONTINUED | OUTPATIENT
Start: 2023-03-30 | End: 2023-03-29

## 2023-03-29 RX ORDER — ONDANSETRON 2 MG/ML
4 INJECTION INTRAMUSCULAR; INTRAVENOUS ONCE
Status: COMPLETED | OUTPATIENT
Start: 2023-03-29 | End: 2023-03-29

## 2023-03-29 RX ORDER — AZITHROMYCIN 500 MG/1
500 INJECTION, POWDER, LYOPHILIZED, FOR SOLUTION INTRAVENOUS ONCE
Status: COMPLETED | OUTPATIENT
Start: 2023-03-29 | End: 2023-03-29

## 2023-03-29 RX ORDER — SODIUM CHLORIDE 1 G/1
1 TABLET ORAL
Status: DISCONTINUED | OUTPATIENT
Start: 2023-03-29 | End: 2023-03-31 | Stop reason: HOSPADM

## 2023-03-29 RX ORDER — SODIUM CHLORIDE 9 MG/ML
INJECTION, SOLUTION INTRAVENOUS CONTINUOUS
Status: DISCONTINUED | OUTPATIENT
Start: 2023-03-29 | End: 2023-03-30

## 2023-03-29 RX ORDER — ACETAMINOPHEN 325 MG/1
650 TABLET ORAL EVERY 6 HOURS PRN
Status: DISCONTINUED | OUTPATIENT
Start: 2023-03-29 | End: 2023-03-31 | Stop reason: HOSPADM

## 2023-03-29 RX ORDER — SODIUM CHLORIDE 9 MG/ML
1000 INJECTION, SOLUTION INTRAVENOUS ONCE
Status: COMPLETED | OUTPATIENT
Start: 2023-03-29 | End: 2023-03-29

## 2023-03-29 RX ORDER — AZITHROMYCIN 250 MG/1
500 TABLET, FILM COATED ORAL
Status: COMPLETED | OUTPATIENT
Start: 2023-03-30 | End: 2023-03-31

## 2023-03-29 RX ORDER — CHOLECALCIFEROL (VITAMIN D3) 125 MCG
10 CAPSULE ORAL NIGHTLY
Status: DISCONTINUED | OUTPATIENT
Start: 2023-03-29 | End: 2023-03-31 | Stop reason: HOSPADM

## 2023-03-29 RX ORDER — ONDANSETRON 4 MG/1
4 TABLET, ORALLY DISINTEGRATING ORAL EVERY 4 HOURS PRN
Status: DISCONTINUED | OUTPATIENT
Start: 2023-03-29 | End: 2023-03-31 | Stop reason: HOSPADM

## 2023-03-29 RX ORDER — GUAIFENESIN/DEXTROMETHORPHAN 100-10MG/5
10 SYRUP ORAL EVERY 6 HOURS PRN
Status: DISCONTINUED | OUTPATIENT
Start: 2023-03-29 | End: 2023-03-31 | Stop reason: HOSPADM

## 2023-03-29 RX ORDER — ONDANSETRON 2 MG/ML
4 INJECTION INTRAMUSCULAR; INTRAVENOUS EVERY 4 HOURS PRN
Status: DISCONTINUED | OUTPATIENT
Start: 2023-03-29 | End: 2023-03-31 | Stop reason: HOSPADM

## 2023-03-29 RX ORDER — ASPIRIN 81 MG/1
324 TABLET, CHEWABLE ORAL ONCE
Status: COMPLETED | OUTPATIENT
Start: 2023-03-29 | End: 2023-03-29

## 2023-03-29 RX ADMIN — ASPIRIN 81 MG 324 MG: 81 TABLET ORAL at 10:16

## 2023-03-29 RX ADMIN — SODIUM CHLORIDE 1 G: 1 TABLET ORAL at 14:42

## 2023-03-29 RX ADMIN — Medication 10 MG: at 20:48

## 2023-03-29 RX ADMIN — IOHEXOL 75 ML: 350 INJECTION, SOLUTION INTRAVENOUS at 11:02

## 2023-03-29 RX ADMIN — AMPICILLIN AND SULBACTAM 3 G: 1; 2 INJECTION, POWDER, FOR SOLUTION INTRAMUSCULAR; INTRAVENOUS at 12:12

## 2023-03-29 RX ADMIN — AZITHROMYCIN MONOHYDRATE 500 MG: 500 INJECTION, POWDER, LYOPHILIZED, FOR SOLUTION INTRAVENOUS at 13:36

## 2023-03-29 RX ADMIN — SODIUM CHLORIDE: 9 INJECTION, SOLUTION INTRAVENOUS at 13:36

## 2023-03-29 RX ADMIN — SODIUM CHLORIDE 1 G: 1 TABLET ORAL at 18:20

## 2023-03-29 RX ADMIN — SODIUM CHLORIDE: 9 INJECTION, SOLUTION INTRAVENOUS at 18:18

## 2023-03-29 RX ADMIN — SODIUM CHLORIDE 1000 ML: 9 INJECTION, SOLUTION INTRAVENOUS at 08:47

## 2023-03-29 RX ADMIN — SERTRALINE HYDROCHLORIDE 25 MG: 50 TABLET ORAL at 14:42

## 2023-03-29 RX ADMIN — RIVAROXABAN 10 MG: 10 TABLET, FILM COATED ORAL at 18:20

## 2023-03-29 RX ADMIN — AMPICILLIN AND SULBACTAM 3 G: 1; 2 INJECTION, POWDER, FOR SOLUTION INTRAMUSCULAR; INTRAVENOUS at 23:02

## 2023-03-29 RX ADMIN — AMPICILLIN AND SULBACTAM 3 G: 1; 2 INJECTION, POWDER, FOR SOLUTION INTRAMUSCULAR; INTRAVENOUS at 18:20

## 2023-03-29 RX ADMIN — ONDANSETRON 4 MG: 2 INJECTION INTRAMUSCULAR; INTRAVENOUS at 10:15

## 2023-03-29 ASSESSMENT — LIFESTYLE VARIABLES
HOW MANY TIMES IN THE PAST YEAR HAVE YOU HAD 5 OR MORE DRINKS IN A DAY: 0
TOTAL SCORE: 0
CONSUMPTION TOTAL: NEGATIVE
EVER FELT BAD OR GUILTY ABOUT YOUR DRINKING: NO
HAVE PEOPLE ANNOYED YOU BY CRITICIZING YOUR DRINKING: NO
AVERAGE NUMBER OF DAYS PER WEEK YOU HAVE A DRINK CONTAINING ALCOHOL: 7
EVER HAD A DRINK FIRST THING IN THE MORNING TO STEADY YOUR NERVES TO GET RID OF A HANGOVER: NO
ON A TYPICAL DAY WHEN YOU DRINK ALCOHOL HOW MANY DRINKS DO YOU HAVE: 1
TOTAL SCORE: 0
TOTAL SCORE: 0
HAVE YOU EVER FELT YOU SHOULD CUT DOWN ON YOUR DRINKING: NO
ALCOHOL_USE: YES

## 2023-03-29 ASSESSMENT — COPD QUESTIONNAIRES
COPD SCREENING SCORE: 5
HAVE YOU SMOKED AT LEAST 100 CIGARETTES IN YOUR ENTIRE LIFE: YES
DO YOU EVER COUGH UP ANY MUCUS OR PHLEGM?: NO/ONLY WITH OCCASIONAL COLDS OR INFECTIONS
DURING THE PAST 4 WEEKS HOW MUCH DID YOU FEEL SHORT OF BREATH: SOME OF THE TIME

## 2023-03-29 ASSESSMENT — ENCOUNTER SYMPTOMS
SPUTUM PRODUCTION: 1
DIAPHORESIS: 0
HEADACHES: 0
SORE THROAT: 1
NAUSEA: 1
NECK PAIN: 0
NERVOUS/ANXIOUS: 0
FEVER: 0
BLOOD IN STOOL: 0
FLANK PAIN: 0
WHEEZING: 0
MYALGIAS: 0
BACK PAIN: 0
DIARRHEA: 1
VOMITING: 0
PALPITATIONS: 0
ABDOMINAL PAIN: 0
WEAKNESS: 0
CONSTIPATION: 0
DIZZINESS: 1
CHILLS: 0
HEARTBURN: 0
SINUS PAIN: 0
COUGH: 1
SHORTNESS OF BREATH: 0

## 2023-03-29 ASSESSMENT — COGNITIVE AND FUNCTIONAL STATUS - GENERAL
WALKING IN HOSPITAL ROOM: A LOT
MOBILITY SCORE: 18
DAILY ACTIVITIY SCORE: 22
SUGGESTED CMS G CODE MODIFIER DAILY ACTIVITY: CJ
STANDING UP FROM CHAIR USING ARMS: A LITTLE
CLIMB 3 TO 5 STEPS WITH RAILING: A LOT
MOVING TO AND FROM BED TO CHAIR: A LITTLE
DRESSING REGULAR LOWER BODY CLOTHING: A LITTLE
SUGGESTED CMS G CODE MODIFIER MOBILITY: CK
TOILETING: A LITTLE

## 2023-03-29 ASSESSMENT — PATIENT HEALTH QUESTIONNAIRE - PHQ9
SUM OF ALL RESPONSES TO PHQ9 QUESTIONS 1 AND 2: 0
1. LITTLE INTEREST OR PLEASURE IN DOING THINGS: NOT AT ALL
2. FEELING DOWN, DEPRESSED, IRRITABLE, OR HOPELESS: NOT AT ALL

## 2023-03-29 ASSESSMENT — FIBROSIS 4 INDEX
FIB4 SCORE: 1.87
FIB4 SCORE: 1.96

## 2023-03-29 NOTE — ED NOTES
Up to commode.  Pt has watery stool. Urine may have contaminated the specimen.  Unable to obtain a pure stool specimen at this time.

## 2023-03-29 NOTE — ASSESSMENT & PLAN NOTE
Chronic  124 on admission, 130 this morning  - Continue home salt tabs  - Recheck in am  -A.m. cortisol within normal limits  -DC IV fluids

## 2023-03-29 NOTE — ED PROVIDER NOTES
ED Provider Note        CHIEF COMPLAINT  Chief Complaint   Patient presents with    Nausea/Vomiting/Diarrhea     X 3 days    Hoarse     X 3 days         HPI    OUTSIDE HISTORIAN(S):  EMS reviewing the EMS run sheet: Right bundle branch block, left axis deviation on their twelve-lead    Chrissie Dueñas is a 89 y.o. female who presents to the Emergency Department with 1 month of decreased eating.  She reports that she is nauseated every time she looks at food and has not been eating or drinking much.  She also has been having over the past 4 days and dry cough, and hoarse voice over the past few days.  She reports recent onset of watery diarrhea as well.  Denies any abdominal pain.  Denies any productive cough, fevers.    REVIEW OF SYSTEMS  See HPI for further details. All other systems are negative.     PAST MEDICAL HISTORY     Past Medical History:   Diagnosis Date    Hypertension     Macular degeneration        SURGICAL HISTORY  Past Surgical History:   Procedure Laterality Date    APPENDECTOMY      OVARIAN CYSTECTOMY         FAMILY HISTORY  Family History   Problem Relation Age of Onset    Cancer Mother     Stroke Father        SOCIAL HISTORY    reports that she quit smoking about 48 years ago. Her smoking use included cigarettes. She has a 15.00 pack-year smoking history. She has never used smokeless tobacco. She reports current alcohol use of about 4.2 oz per week. She reports that she does not currently use drugs.    CURRENT MEDICATIONS  Home Medications       Reviewed by Dre Dorado (Pharmacy Tech) on 03/29/23 at 0812  Med List Status: Complete     Medication Last Dose Status   DM-APAP-CPM (CORICIDIN HBP PO) 3/28/2023 Active   losartan (COZAAR) 50 MG Tab 3/28/2023 Active   Multiple Vitamins-Minerals (PRESERVISION AREDS 2+MULTI VIT) Cap 3/28/2023 Active   SALT SUBSTITUTES PO 3/28/2023 Active   sertraline (ZOLOFT) 25 MG tablet 3/28/2023 Active                    ALLERGIES  No Known  Allergies    PHYSICAL EXAM  VITAL SIGNS: /57   Pulse 75   Temp 36.8 °C (98.3 °F) (Temporal)   Resp 20   Ht 1.524 m (5')   Wt 57.2 kg (126 lb)   SpO2 98%   BMI 24.61 kg/m²   Gen: Alert, no acute distress  HEENT: ATNC, dry mucous membranes  Eyes: PERRL, EOMI, normal conjunctiva  Neck: trachea midline  Resp: no respiratory distress, bilateral crackles  CV: No JVD, 2/6 systolic ejection murmur  Abd: non-distended, soft, nontender  Ext: No deformities, no pedal edema  Psych: normal mood  Neuro: speech fluent, moves all extremities    DIAGNOSTIC STUDIES / PROCEDURES  EKG  Results for orders placed or performed during the hospital encounter of 23   EKG (Now)   Result Value Ref Range    Report       [facility] Emergency Dept.    Test Date:  2023  Pt Name:    MICHELLE JIANG                Department: [facility]  MRN:        [id number]                      [facility]  Gender:     Female                       Technician: [id number]  :        [id number]                   Requested By:DAVID MERLOS  Order #:    [id number]                    Reading MD: David Merlos    Measurements  Intervals                                Axis  Rate:       76                           P:          -13  GA:         295                          QRS:        -92  QRSD:       123                          T:          -27  QT:         428  QTc:        482    Interpretive Statements  Sinus rhythm  Prolonged GA interval  RBBB and LAFB  Compared to ECG 2023 19:20:32  Left anterior fascicular block now present  Ectopic atrial rhythm no longer present  Electronically Signed On 3- 10:53:24 PDT by David Merlos         LABS  Labs Reviewed   CBC WITH DIFFERENTIAL - Abnormal; Notable for the following components:       Result Value    WBC 17.4 (*)     RBC 3.89 (*)     Hematocrit 36.6 (*)     Neutrophils-Polys 90.50 (*)     Lymphocytes 2.50 (*)     Neutrophils (Absolute) 15.71 (*)     Lymphs (Absolute) 0.43  (*)     Monos (Absolute) 1.10 (*)     All other components within normal limits   COMP METABOLIC PANEL - Abnormal; Notable for the following components:    Sodium 124 (*)     Chloride 87 (*)     Glucose 113 (*)     Alkaline Phosphatase 123 (*)     All other components within normal limits   PROBRAIN NATRIURETIC PEPTIDE, NT - Abnormal; Notable for the following components:    NT-proBNP 4747 (*)     All other components within normal limits    Narrative:     Special Contact Isolation  Biotin intake of greater than 5 mg per day may interfere with  troponin levels, causing false low values.   TROPONIN - Abnormal; Notable for the following components:    Troponin T 59 (*)     All other components within normal limits    Narrative:     Special Contact Isolation  Biotin intake of greater than 5 mg per day may interfere with  troponin levels, causing false low values.   ESTIMATED GFR - Abnormal; Notable for the following components:    GFR (CKD-EPI) 51 (*)     All other components within normal limits   D-DIMER - Abnormal; Notable for the following components:    D-Dimer 1.31 (*)     All other components within normal limits    Narrative:     Special Contact Isolation   LIPASE   BLOOD CULTURE,HOLD    Narrative:     RFA   CORRECTED CALCIUM   CDIFF BY PCR RFLX TOXIN   BLOOD CULTURE   BLOOD CULTURE         RADIOLOGY  I have independently interpreted the diagnostic imaging associated with this visit:  Chest x-ray: Cardiomegaly with no infiltrates noted    CT-CTA CHEST PULMONARY ARTERY W/ RECONS   Final Result         1. No CT evidence of pulmonary embolism.      2. Airspace opacities in the bilateral upper lobes and lower lobes, most in the left lower lobe, in keeping with multifocal pneumonia         DX-NECK FOR SOFT TISSUE   Final Result      No thickening of the epiglottis.      No prevertebral soft tissue thickening      DX-CHEST-PORTABLE (1 VIEW)   Final Result         1. No acute cardiopulmonary abnormalities are  identified.          COURSE & MEDICAL DECISION MAKING  Pertinent Labs & Imaging studies were reviewed. (See chart for details)      EXTERNAL RECORDS REVIEWED  Outpatient Notes patient seen in urgent care yesterday with reported 3 days of symptoms, negative influenza, RSV, COVID PCR      INITIAL ASSESSMENT AND PLAN  Care Narrative: Patient arrives with multiple symptoms including poor oral intake over the past month, new hoarse voice, and was found to have a new oxygen requirement in the emergency department.  Patient had viral testing performed yesterday, which was negative.  She is given IV fluids for dehydration.  She was noted to have new low sodium of 124, which is down from 130 to 1-month ago.  Patient also has elevated NT proBNP and troponin levels from her baseline of unclear etiology.  EKG demonstrates no obvious ischemic changes to suggest ACS with the patient was given empiric aspirin.  The patient has crackles on lung exam, however negative chest x-ray, will obtain CAT scan.  Will obtain CTA given elevated D-dimer to evaluate for pulmonary embolism in addition to the possibility of either occult pulmonary infiltrates or multifocal pneumonia.    CTA demonstrates no signs of blood clot but the patient does have evidence of multifocal pneumonia.  We will treat with Unasyn and azithromycin for possible atypical pneumonia.  We will also send C. difficile testing given the patient's diarrhea, advanced age.  Patient will be hospitalized    I have concern for potential fluid overload for the patient with extensive IV fluids.  Will provide 1 L normal saline instead of the 30 cc/kg bolus      I have discussed management of the patient with the following physicians and VIRGILIO's:  MALINA Snow      HYDRATION: Based on the patient's presentation of Acute Diarrhea the patient was given IV fluids. IV Hydration was used because oral hydration was not adequate alone. Upon recheck following hydration, the patient  was improved.        FINAL IMPRESSION  1. Acute hypoxemic respiratory failure (HCC)    2. Pneumonia due to infectious organism, unspecified laterality, unspecified part of lung    3. Diarrhea, unspecified type    4. Hoarse voice quality    5. Sepsis with acute hypoxic respiratory failure without septic shock, due to unspecified organism (HCC)             Case discussed with MALINA Snow , who will evaluate the patient for hospitalization. Patient will be hospitalized in guarded condition.

## 2023-03-29 NOTE — ED NOTES
Cough noted, though pt states non productive. Also denies fever. Rm air sat=87%, therefore returned to 2L n/c. Pt denies sob. Dtr at bedside. Pt is aao, able to participate in triage

## 2023-03-29 NOTE — ED NOTES
Pt to er via [facility] from independent living with c/o n/v/d x 3 days and hoarseness yesterday. Seen at urgent care ysterday, with reported - covid test, and prescibed coricidin for same. Recvd iv zofran en route for nausea w/o chg in s/s. Also reported rm air sat=88%, with application of n/c at 2L. Pt aao, denies c/o, no distress

## 2023-03-29 NOTE — ED NOTES
Med rec updated and complete, per pt   Allergies reviewed, per pt  Interviewed pt with daughter at bedside with permission from pt.  Pts daughter reports that pt is to take her salt tablets that are over the counter 6 tablets a day, pt reports that she only take 3 tablets a day with meals.

## 2023-03-29 NOTE — ASSESSMENT & PLAN NOTE
multifocal opacity on CTA  Elevated procalcitonin  Crackles in bilateral lung bases, new cough  Negative for COVID, RSV and flu at urgent care prior to admission  -Continue with Unasyn and azithromycin, antiemetics.  -Sputum sample  -IV fluid resuscitation.  Given 1 L of IV fluid in ED. Continued maintenance fluids.  -regular diet  -Continue supplemental oxygen and wean as tolerated  Incentive spirometer

## 2023-03-29 NOTE — ASSESSMENT & PLAN NOTE
Could be related to current pneumonia  - No loose stools since admission, DC cdiff precautions because very unlikely at this time  - holding stool softeners  - fluid resuscitation

## 2023-03-29 NOTE — H&P
Hospital Medicine History & Physical Note    Date of Service  3/29/2023    Primary Care Physician  Felix Farias D.N.P.    Consultants  none    Specialist Names:     Code Status  Prior    Chief Complaint  Chief Complaint   Patient presents with    Nausea/Vomiting/Diarrhea     X 3 days    Hoarse     X 3 days       History of Presenting Illness  Chrissie Dueñas is a 89 y.o. female who presented 3/29/2023 with a past medical history of MDD, hypothyroidism, recent near syncope, chronic hyponatremia, HTN, skin cancer, CKD.  The patient has had decreased appetite and nausea for the last month and recently developed a new cough and hoarse, dry throat. She has had watery diarrhea without red or black over the last 3 days. She went to an urgent care yesterday and tested negative for Covid-19, RSV and flu. She lives at [facility].    In the ER the patient was found to have a white blood cell count of 17.4, sodium of 124, and troponin of 59, BNP of 4747 and d-dimer of 1.31 (within normal limits for age). A CTA showed multifocal pneumonia and was negative for embolism. The patient was given a bolus of fluids, Unasyn and azithromycin and referred for admission.    On exam, the patient is fully alert and oriented. Her nausea is improved after Zofran. She denies chills, fevers, and headache. She is amenable to admission and treatment for pneumonia.    I discussed the plan of care with patient and Tory Merlos and Nat .    Review of Systems  Review of Systems   Constitutional:  Positive for malaise/fatigue. Negative for chills, diaphoresis and fever.   HENT:  Positive for sore throat. Negative for sinus pain.    Respiratory:  Positive for cough and sputum production. Negative for shortness of breath and wheezing.    Cardiovascular:  Negative for chest pain, palpitations and leg swelling.   Gastrointestinal:  Positive for diarrhea and nausea. Negative for abdominal pain, blood in stool, constipation, heartburn,  melena and vomiting.   Genitourinary:  Negative for dysuria, flank pain, frequency, hematuria and urgency.   Musculoskeletal:  Negative for back pain, myalgias and neck pain.   Neurological:  Positive for dizziness (on standing). Negative for weakness and headaches.   Psychiatric/Behavioral:  The patient is not nervous/anxious.      Past Medical History   has a past medical history of Hypertension and Macular degeneration.    Surgical History   has a past surgical history that includes ovarian cystectomy and appendectomy.     Family History  family history includes Cancer in her mother; Stroke in her father.   Family history reviewed with patient. There is no family history that is pertinent to the chief complaint.     Social History   reports that she quit smoking about 48 years ago. Her smoking use included cigarettes. She has a 15.00 pack-year smoking history. She has never used smokeless tobacco. She reports current alcohol use of about 4.2 oz per week. She reports that she does not currently use drugs.    Allergies  No Known Allergies    Medications  Prior to Admission Medications   Prescriptions Last Dose Informant Patient Reported? Taking?   DM-APAP-CPM (CORICIDIN HBP PO) 3/28/2023 at 2130 Patient Yes Yes   Sig: Take 1 Capsule by mouth 4 times a day as needed (For cough and cold symptoms).   Multiple Vitamins-Minerals (PRESERVISION AREDS 2+MULTI VIT) Cap 3/28/2023 at 0800 Patient Yes No   Sig: Take 1 Capsule by mouth every day.   SALT SUBSTITUTES PO 3/28/2023 at 0800 Patient Yes Yes   Sig: Take 1 Tablet by mouth 3 times a day with meals. OTC   losartan (COZAAR) 50 MG Tab 3/28/2023 at 0800 Patient No No   Sig: Take 1 Tablet by mouth every day.   sertraline (ZOLOFT) 25 MG tablet 3/28/2023 at 0800 Patient No No   Sig: Take 1 Tablet by mouth every day.      Facility-Administered Medications: None       Physical Exam  Temp:  [36.8 °C (98.3 °F)] 36.8 °C (98.3 °F)  Pulse:  [73-98] 73  Resp:  [18-23] 18  BP:  (113-148)/(52-72) 113/52  SpO2:  [93 %-98 %] 95 %  Blood Pressure : 118/57   Temperature: 36.8 °C (98.3 °F)   Pulse: 75   Respiration: 20   Pulse Oximetry: 98 %       Physical Exam  Vitals and nursing note reviewed.   Constitutional:       General: She is awake. She is not in acute distress.     Appearance: She is normal weight. She is ill-appearing.   HENT:      Head: Normocephalic and atraumatic.      Nose: No congestion.      Mouth/Throat:      Mouth: Mucous membranes are moist.   Eyes:      Conjunctiva/sclera: Conjunctivae normal.   Cardiovascular:      Rate and Rhythm: Normal rate and regular rhythm.      Pulses: Normal pulses.      Heart sounds: Murmur heard.   Systolic murmur is present with a grade of 3/6.   Pulmonary:      Effort: Pulmonary effort is normal.      Breath sounds: Examination of the right-lower field reveals rales. Examination of the left-lower field reveals rales. Rales present.   Abdominal:      General: Abdomen is flat. Bowel sounds are normal. There is no distension.      Palpations: Abdomen is soft.      Tenderness: There is no abdominal tenderness. There is no guarding.   Musculoskeletal:         General: No swelling or tenderness.   Skin:     General: Skin is warm and dry.      Coloration: Skin is pale.   Neurological:      Mental Status: She is alert and oriented to person, place, and time.      Motor: No weakness.   Psychiatric:         Attention and Perception: Attention normal.         Mood and Affect: Mood normal.         Speech: Speech normal.         Behavior: Behavior normal. Behavior is cooperative.         Thought Content: Thought content normal.         Cognition and Memory: Cognition normal.         Judgment: Judgment normal.       Laboratory:  Recent Labs     03/29/23  0743   WBC 17.4*   RBC 3.89*   HEMOGLOBIN 12.8   HEMATOCRIT 36.6*   MCV 94.1   MCH 32.9   MCHC 35.0   RDW 41.1   PLATELETCT 258   MPV 9.9     Recent Labs     03/29/23  0743   SODIUM 124*   POTASSIUM 4.7    CHLORIDE 87*   CO2 23   GLUCOSE 113*   BUN 17   CREATININE 1.02   CALCIUM 8.9     Recent Labs     03/29/23  0743   ALTSGPT 19   ASTSGOT 24   ALKPHOSPHAT 123*   TBILIRUBIN 1.0   LIPASE 23   GLUCOSE 113*         Recent Labs     03/29/23  0743   NTPROBNP 4747*         Recent Labs     03/29/23  0743   TROPONINT 59*       Imaging:  CT-CTA CHEST PULMONARY ARTERY W/ RECONS   Final Result         1. No CT evidence of pulmonary embolism.      2. Airspace opacities in the bilateral upper lobes and lower lobes, most in the left lower lobe, in keeping with multifocal pneumonia         DX-NECK FOR SOFT TISSUE   Final Result      No thickening of the epiglottis.      No prevertebral soft tissue thickening      DX-CHEST-PORTABLE (1 VIEW)   Final Result         1. No acute cardiopulmonary abnormalities are identified.          X-Ray:  I have personally reviewed the images and compared with prior images.  EKG:  I have personally reviewed the images and compared with prior images.    Assessment/Plan:  Justification for Admission Status  I anticipate this patient will require at least two midnights for appropriate medical management, necessitating inpatient admission because IV antibiotic administration, multifocal pneumonia    Patient will need a Telemetry bed on MEDICAL service .  The need is secondary to IV antibiotics and cardiac monitoring.    * Multifocal pneumonia- (present on admission)  Assessment & Plan  multifocal opacity on CTA  Elevated procalcitonin  Crackles in bilateral lung bases, new cough  Negative for COVID, RSV and flu at urgent care prior to admission    -Unasyn and azithromycin, antiemetics.  -Sputum sample  -IV fluid resuscitation.  Given 1 L of IV fluid in ED. Continued maintenance fluids.  -regular diet  -monitor.      Hyponatremia- (present on admission)  Assessment & Plan  Chronic  124 on admission  - Continue home salt tabs  - Recheck in am    Elevated brain natriuretic peptide (BNP) level- (present on  admission)  Assessment & Plan  Recent Labs     03/29/23  0743   NTPROBNP 4747*     - echocardiogram    Diarrhea  Assessment & Plan  Could be related to current pneumonia  - rule out c dif, stool study pending  - holding stool softeners  - fluid resuscitation    Essential hypertension- (present on admission)  Assessment & Plan  Within normal limits on admission  - Continue home Losartan  - monitor    Elevated troponin- (present on admission)  Assessment & Plan  Trending down  - Cardiac monitoring    Hypothyroidism- (present on admission)  Assessment & Plan  - TSH just checked this month, acceptable for age         VTE prophylaxis: SCDs/TEDs and Xarelto 10 mg daily as prophylaxis      My total time spent caring for the patient on the day of the encounter was 18 minutes.   This does not include time spent on separately billable procedures/tests. This time is exclusive of the time spent with collaborating physicians.

## 2023-03-29 NOTE — ED NOTES
Seen and examined by erp-orders recvd and reviewed with pt and dtr. Aware of npo and need for stool spec. Call light in reach

## 2023-03-29 NOTE — ED NOTES
Med per order, oxygen via n/c at 1L. Per dtr, pt for admit. Pt denies pain /difficulty swallowing. Aware of pending ct

## 2023-03-30 ENCOUNTER — APPOINTMENT (OUTPATIENT)
Dept: CARDIOLOGY | Facility: MEDICAL CENTER | Age: 88
DRG: 193 | End: 2023-03-30
Attending: NURSE PRACTITIONER
Payer: MEDICARE

## 2023-03-30 LAB
ALBUMIN SERPL BCP-MCNC: 2.8 G/DL (ref 3.2–4.9)
ALBUMIN/GLOB SERPL: 1 G/DL
ALP SERPL-CCNC: 151 U/L (ref 30–99)
ALT SERPL-CCNC: 25 U/L (ref 2–50)
ANION GAP SERPL CALC-SCNC: 11 MMOL/L (ref 7–16)
AST SERPL-CCNC: 32 U/L (ref 12–45)
BILIRUB SERPL-MCNC: 0.7 MG/DL (ref 0.1–1.5)
BUN SERPL-MCNC: 20 MG/DL (ref 8–22)
CALCIUM ALBUM COR SERPL-MCNC: 8.9 MG/DL (ref 8.5–10.5)
CALCIUM SERPL-MCNC: 7.9 MG/DL (ref 8.4–10.2)
CHLORIDE SERPL-SCNC: 98 MMOL/L (ref 96–112)
CO2 SERPL-SCNC: 21 MMOL/L (ref 20–33)
CORTIS SERPL-MCNC: 20.1 UG/DL (ref 0–23)
CREAT SERPL-MCNC: 1.07 MG/DL (ref 0.5–1.4)
ERYTHROCYTE [DISTWIDTH] IN BLOOD BY AUTOMATED COUNT: 42.5 FL (ref 35.9–50)
GFR SERPLBLD CREATININE-BSD FMLA CKD-EPI: 49 ML/MIN/1.73 M 2
GLOBULIN SER CALC-MCNC: 2.7 G/DL (ref 1.9–3.5)
GLUCOSE SERPL-MCNC: 113 MG/DL (ref 65–99)
HCT VFR BLD AUTO: 33.4 % (ref 37–47)
HGB BLD-MCNC: 11.4 G/DL (ref 12–16)
LACTATE SERPL-SCNC: 0.7 MMOL/L (ref 0.5–2)
LACTATE SERPL-SCNC: 0.8 MMOL/L (ref 0.5–2)
LV EJECT FRACT  99904: 75
MCH RBC QN AUTO: 32.9 PG (ref 27–33)
MCHC RBC AUTO-ENTMCNC: 34.1 G/DL (ref 33.6–35)
MCV RBC AUTO: 96.3 FL (ref 81.4–97.8)
PLATELET # BLD AUTO: 238 K/UL (ref 164–446)
PMV BLD AUTO: 9.7 FL (ref 9–12.9)
POTASSIUM SERPL-SCNC: 4.1 MMOL/L (ref 3.6–5.5)
PROCALCITONIN SERPL-MCNC: 0.34 NG/ML
PROT SERPL-MCNC: 5.5 G/DL (ref 6–8.2)
RBC # BLD AUTO: 3.47 M/UL (ref 4.2–5.4)
SODIUM SERPL-SCNC: 130 MMOL/L (ref 135–145)
WBC # BLD AUTO: 13.9 K/UL (ref 4.8–10.8)

## 2023-03-30 PROCEDURE — 80053 COMPREHEN METABOLIC PANEL: CPT

## 2023-03-30 PROCEDURE — 94760 N-INVAS EAR/PLS OXIMETRY 1: CPT

## 2023-03-30 PROCEDURE — 93308 TTE F-UP OR LMTD: CPT | Mod: 26 | Performed by: INTERNAL MEDICINE

## 2023-03-30 PROCEDURE — 700111 HCHG RX REV CODE 636 W/ 250 OVERRIDE (IP): Performed by: NURSE PRACTITIONER

## 2023-03-30 PROCEDURE — 85027 COMPLETE CBC AUTOMATED: CPT

## 2023-03-30 PROCEDURE — 700105 HCHG RX REV CODE 258: Performed by: INTERNAL MEDICINE

## 2023-03-30 PROCEDURE — 700111 HCHG RX REV CODE 636 W/ 250 OVERRIDE (IP): Performed by: INTERNAL MEDICINE

## 2023-03-30 PROCEDURE — 83605 ASSAY OF LACTIC ACID: CPT | Mod: 91

## 2023-03-30 PROCEDURE — A9270 NON-COVERED ITEM OR SERVICE: HCPCS | Performed by: INTERNAL MEDICINE

## 2023-03-30 PROCEDURE — 36415 COLL VENOUS BLD VENIPUNCTURE: CPT

## 2023-03-30 PROCEDURE — 770020 HCHG ROOM/CARE - TELE (206)

## 2023-03-30 PROCEDURE — 97535 SELF CARE MNGMENT TRAINING: CPT

## 2023-03-30 PROCEDURE — 93308 TTE F-UP OR LMTD: CPT

## 2023-03-30 PROCEDURE — 99233 SBSQ HOSP IP/OBS HIGH 50: CPT | Performed by: INTERNAL MEDICINE

## 2023-03-30 PROCEDURE — 700105 HCHG RX REV CODE 258: Performed by: NURSE PRACTITIONER

## 2023-03-30 PROCEDURE — 84145 PROCALCITONIN (PCT): CPT

## 2023-03-30 PROCEDURE — A9270 NON-COVERED ITEM OR SERVICE: HCPCS | Performed by: NURSE PRACTITIONER

## 2023-03-30 PROCEDURE — 700102 HCHG RX REV CODE 250 W/ 637 OVERRIDE(OP): Performed by: INTERNAL MEDICINE

## 2023-03-30 PROCEDURE — 97163 PT EVAL HIGH COMPLEX 45 MIN: CPT

## 2023-03-30 PROCEDURE — 82533 TOTAL CORTISOL: CPT

## 2023-03-30 PROCEDURE — 700102 HCHG RX REV CODE 250 W/ 637 OVERRIDE(OP): Performed by: NURSE PRACTITIONER

## 2023-03-30 RX ADMIN — RIVAROXABAN 10 MG: 10 TABLET, FILM COATED ORAL at 17:27

## 2023-03-30 RX ADMIN — AMPICILLIN AND SULBACTAM 3 G: 1; 2 INJECTION, POWDER, FOR SOLUTION INTRAMUSCULAR; INTRAVENOUS at 05:13

## 2023-03-30 RX ADMIN — Medication 10 MG: at 22:20

## 2023-03-30 RX ADMIN — AMPICILLIN AND SULBACTAM 3 G: 1; 2 INJECTION, POWDER, FOR SOLUTION INTRAMUSCULAR; INTRAVENOUS at 17:39

## 2023-03-30 RX ADMIN — SODIUM CHLORIDE: 9 INJECTION, SOLUTION INTRAVENOUS at 12:47

## 2023-03-30 RX ADMIN — AZITHROMYCIN 500 MG: 250 TABLET, FILM COATED ORAL at 14:53

## 2023-03-30 RX ADMIN — SODIUM CHLORIDE 1 G: 1 TABLET ORAL at 12:45

## 2023-03-30 RX ADMIN — SERTRALINE HYDROCHLORIDE 25 MG: 50 TABLET ORAL at 05:12

## 2023-03-30 RX ADMIN — SODIUM CHLORIDE 1 G: 1 TABLET ORAL at 17:27

## 2023-03-30 RX ADMIN — SODIUM CHLORIDE: 9 INJECTION, SOLUTION INTRAVENOUS at 02:04

## 2023-03-30 RX ADMIN — SODIUM CHLORIDE 1 G: 1 TABLET ORAL at 08:36

## 2023-03-30 ASSESSMENT — COGNITIVE AND FUNCTIONAL STATUS - GENERAL
MOVING TO AND FROM BED TO CHAIR: A LITTLE
MOVING TO AND FROM BED TO CHAIR: A LITTLE
SUGGESTED CMS G CODE MODIFIER MOBILITY: CL
MOVING FROM LYING ON BACK TO SITTING ON SIDE OF FLAT BED: A LITTLE
WALKING IN HOSPITAL ROOM: TOTAL
MOBILITY SCORE: 14
SUGGESTED CMS G CODE MODIFIER MOBILITY: CK
STANDING UP FROM CHAIR USING ARMS: A LITTLE
WALKING IN HOSPITAL ROOM: A LITTLE
CLIMB 3 TO 5 STEPS WITH RAILING: A LOT
CLIMB 3 TO 5 STEPS WITH RAILING: A LOT
MOBILITY SCORE: 18
MOBILITY SCORE: 17
MOVING FROM LYING ON BACK TO SITTING ON SIDE OF FLAT BED: A LITTLE
STANDING UP FROM CHAIR USING ARMS: A LOT
SUGGESTED CMS G CODE MODIFIER MOBILITY: CK
WALKING IN HOSPITAL ROOM: A LOT
STANDING UP FROM CHAIR USING ARMS: A LOT
MOVING FROM LYING ON BACK TO SITTING ON SIDE OF FLAT BED: A LITTLE
CLIMB 3 TO 5 STEPS WITH RAILING: TOTAL

## 2023-03-30 ASSESSMENT — ENCOUNTER SYMPTOMS
SPUTUM PRODUCTION: 1
COUGH: 1
SHORTNESS OF BREATH: 1

## 2023-03-30 ASSESSMENT — GAIT ASSESSMENTS
DISTANCE (FEET): 10
ASSISTIVE DEVICE: FRONT WHEEL WALKER
GAIT LEVEL OF ASSIST: MINIMAL ASSIST
DEVIATION: SHUFFLED GAIT;BRADYKINETIC;DECREASED BASE OF SUPPORT

## 2023-03-30 ASSESSMENT — FIBROSIS 4 INDEX: FIB4 SCORE: 2.47

## 2023-03-30 ASSESSMENT — PAIN DESCRIPTION - PAIN TYPE: TYPE: ACUTE PAIN

## 2023-03-30 NOTE — PROGRESS NOTES
4 Eyes Skin Assessment Completed by SANGEETHA Castillo and SANGEETHA Bueno.    Head WDL  Ears WDL  Nose WDL  Mouth WDL  Neck WDL  Breast/Chest WDL  Shoulder Blades WDL  Spine Lump on spine  (R) Arm/Elbow/Hand WDL, arthritis  (L) Arm/Elbow/Hand WDL, arthritis  Abdomen WDL  Groin WDL  Scrotum/Coccyx/Buttocks WDL  (R) Leg WDL  (L) Leg WDL  (R) Heel/Foot/Toe Dry/Flaky  (L) Heel/Foot/Toe Dry/Flaky          Devices In Places Tele Box      Interventions In Place N/A    Possible Skin Injury No    Pictures Uploaded Into Epic N/A  Wound Consult Placed N/A  RN Wound Prevention Protocol Ordered No

## 2023-03-30 NOTE — HOSPITAL COURSE
"Per notes, \"89 y.o. female who presented 3/29/2023 with a past medical history of MDD, hypothyroidism, recent near syncope, chronic hyponatremia, HTN, skin cancer, CKD.  The patient has had decreased appetite and nausea for the last month and recently developed a new cough and hoarse, dry throat. She has had watery diarrhea without red or black over the last 3 days. She went to an urgent care yesterday and tested negative for Covid-19, RSV and flu. She lives at [facility].     In the ER the patient was found to have a white blood cell count of 17.4, sodium of 124, and troponin of 59, BNP of 4747 and d-dimer of 1.31 (within normal limits for age). A CTA showed multifocal pneumonia and was negative for embolism. The patient was given a bolus of fluids, Unasyn and azithromycin and referred for admission.     On exam, the patient is fully alert and oriented. Her nausea is improved after Zofran. She denies chills, fevers, and headache. She is amenable to admission and treatment for pneumonia.\"  "

## 2023-03-30 NOTE — THERAPY
Physical Therapy   Initial Evaluation     Patient Name: Chrissie Dueñas  Age:  89 y.o., Sex:  female  Medical Record #: [id number]  Today's Date: 3/30/2023          Assessment     Chrissie Dueñas is a 89 y.o. female who presented 3/29/2023 with a past medical history of MDD, hypothyroidism, recent near syncope, chronic hyponatremia, HTN, skin cancer, CKD.  The patient has had decreased appetite and nausea for the last month and recently developed a new cough and hoarse, dry throat. She has had watery diarrhea without red or black over the last 3 days.  She is being treated for multi focal pna and low  Sodium.    For the PT evaluation, she presents with impairments in strength, balance, decreased activity tolerance and benefits from 1 person up to Juliana for simple mobility.  She was able to walk to the sink in her room, but had 2 balance losses needing Juliana, even when using the walker.  She typically lives in hospitals, is ind. with ADL, mobility with the 4WW(when out of apartment) and currently is below her functional baseline.  She also is requiring supplemental O2 at this time, she is not using at baseline.  At discharge, she may benefit from post acute placement, unless substantial progress can be made with her functional mobility while acute - she is receptive to post acute placement and has been to one in the past.    She will be followed by PT while acute and progress towards functional goals as able.      Plan    Physical Therapy Initial Treatment Plan   Treatment Plan : Gait Training, Neuro Re-Education / Balance, Self Care / Home Evaluation, Therapeutic Activities, Therapeutic Exercise  Treatment Frequency: 4 Times per Week  Duration: Until Therapy Goals Met    DC Equipment Recommendations: Unable to determine at this time  Discharge Recommendations: Recommend post-acute placement for additional physical therapy services prior to discharge home       Subjective    Pt agreeable to participate, states she is not  "typically on Oxygen, has felt \"wobbly\" when getting up to the commode earlier     Objective     03/30/23 0846    Services   Is patient using  services for this encounter? No   Initial Contact Note    Initial Contact Note Order Received and Verified, Physical Therapy Evaluation in Progress with Full Report to Follow.   Vitals   Vitals Comments SpO2 on room air will not rise above 85% at rest, and maintains 90-94% on 2 L/min with light activity and sitting in recliner   Pain 0 - 10 Group   Therapist Pain Assessment Post Activity Pain Same as Prior to Activity;0   Prior Living Situation   Prior Services None   Housing / Facility Independent Living Facility   Steps Into Home 0   Steps In Home 0   Bathroom Set up Walk In Shower;Shower Chair   Equipment Owned 4-Wheel Walker   Lives with - Patient's Self Care Capacity Alone and Able to Care For Self   Comments Pt uses 4WW when walking to dining mccall, not when in her apartment.   Prior Level of Functional Mobility   Bed Mobility Independent   Transfer Status Independent   Ambulation Independent   Ambulation Distance limited community distances   Assistive Devices Used 4-Wheel Walker   Cognition    Comments Pleasant, good safety awareness   Strength Lower Body   Comments mild general weakness bilaterally   Other Treatments   Other Treatments Provided Time spent with discharge conversation patient has attended post acute rehab in the past, feels she is not at a level now for safe DC to ILF due to \"wobbliness\" she states her daughter is also encouraging transition to retirement - despite this, I believe and patient agreeable a short rehab stay could help increase strength, balance and safety - and levels of assist available to her could then be considered - this is an acute illness, and she has good potential for progress.   Balance Assessment   Sitting Balance (Static) Fair +   Sitting Balance (Dynamic) Fair   Standing Balance (Static) Fair   Standing Balance " (Dynamic) Fair -   Weight Shift Sitting Fair   Weight Shift Standing Fair   Comments with FWW in stand, needs close CGA and up to min A for balance losses   Bed Mobility    Supine to Sit Standby Assist   Sit to Supine   (lef tup in chair)   Scooting Standby Assist  (extra time)   Gait Analysis   Gait Level Of Assist Minimal Assist   Assistive Device Front Wheel Walker   Distance (Feet) 10   # of Times Distance was Traveled 2   Deviation Shuffled Gait;Bradykinetic;Decreased Base Of Support   Comments 2 LOB on wy to sink and at the sink during ADL, needed brief Colt for balance recovery   Functional Mobility   Sit to Stand Contact Guard Assist   Bed, Chair, Wheelchair Transfer Contact Guard Assist   Mobility bed mobility>transfer>gait to sink to brush teeth>then gait to recliner chair - agreeabel for upright ~30 mins and amendable to call light for back to bed   How much difficulty does the patient currently have...   Turning over in bed (including adjusting bedclothes, sheets and blankets)? 4   Sitting down on and standing up from a chair with arms (e.g., wheelchair, bedside commode, etc.) 3   Moving from lying on back to sitting on the side of the bed? 3   How much help from another person does the patient currently need...   Moving to and from a bed to a chair (including a wheelchair)? 3   Need to walk in a hospital room? 3   Climbing 3-5 steps with a railing? 2   6 clicks Mobility Score 18   Activity Tolerance   Sitting in Chair left up after session, plan for about 30 mins   Sitting Edge of Bed 10 mins   Standing total of about 5 mins   Comments desaturates on room air (needs 2L/min) and a couple loss of balance even with FWW   Patient / Family Goals    Patient / Family Goal #1 Better balance   Short Term Goals    Short Term Goal # 1 In 6 visits, patient will perform safe transfers with SBA and FWW to imprvove functional mobility   Short Term Goal # 2 In 6 visits, patient will ambulate 150 feet with FWW, and SBA,  to increase functional mobility   Education Group   Education Provided Role of Physical Therapist   Role of Physical Therapist Patient Response Patient;Acceptance;Explanation;Verbal Demonstration   Physical Therapy Initial Treatment Plan    Treatment Plan  Gait Training;Neuro Re-Education / Balance;Self Care / Home Evaluation;Therapeutic Activities;Therapeutic Exercise   Treatment Frequency 4 Times per Week   Duration Until Therapy Goals Met   Problem List    Problems Impaired Transfers;Impaired Ambulation;Functional Strength Deficit;Impaired Balance;Decreased Activity Tolerance   Anticipated Discharge Equipment and Recommendations   DC Equipment Recommendations Unable to determine at this time   Discharge Recommendations Recommend post-acute placement for additional physical therapy services prior to discharge home   Interdisciplinary Plan of Care Collaboration   IDT Collaboration with  Nursing   Patient Position at End of Therapy Seated;Chair Alarm On;Call Light within Reach;Tray Table within Reach;Phone within Reach   Collaboration Comments RN aware of session   Session Information   Date / Session Number  3/30 - 1(1/4, 4/5)

## 2023-03-30 NOTE — CARE PLAN
The patient is Stable - Low risk of patient condition declining or worsening    Shift Goals  Clinical Goals: ABX, monitor IVF, work with PT  Patient Goals: comfort    Progress made toward(s) clinical / shift goals:  abx continued, PT worked with patient. IVF continued.       Problem: Respiratory  Goal: Patient will achieve/maintain optimum respiratory ventilation and gas exchange  Outcome: Progressing     Problem: Risk for Aspiration  Goal: Patient's risk for aspiration will be absent or decrease  Outcome: Progressing     Problem: Discharge Planning - Pneumonia  Goal: Patient will verbalize understanding of lifestyle changes and therapeutic needs post discharge  Outcome: Progressing     Problem: Knowledge Deficit - Standard  Goal: Patient and family/care givers will demonstrate understanding of plan of care, disease process/condition, diagnostic tests and medications  Outcome: Progressing     Problem: Fall Risk  Goal: Patient will remain free from falls  Outcome: Progressing       Patient is not progressing towards the following goals:

## 2023-03-30 NOTE — ED NOTES
Pt oob-bsc prior to transfer to floor. Dtr lorenzo notified of room number as well as phone number of floor provided to same

## 2023-03-30 NOTE — PROGRESS NOTES
Telemetry Shift Summary    Rhythm SR, 1st deg HB, BBB  HR Range 73-91  Ectopy RO PVC, R-BIG/TRIG, R-PAC/BPAC  Measurements 0.26/0.14/0.38        Normal Values  Rhythm SR  HR Range    Measurements 0.12-0.20 / 0.06-0.10  / 0.30-0.52

## 2023-03-30 NOTE — PROGRESS NOTES
Pt up to commode x3 this shift, no bowel movement produced. Patient aware and educated on need of stool sample.

## 2023-03-30 NOTE — PROGRESS NOTES
Telemetry Shift Summary     Rhythm: SR/SA w/ 1st Degree, BBB  Rate: 60s-90s  Measurements: 0.28/0.12/0.40  Ectopy (reported by Monitor Tech): rare to occasional PAC     Normal Values  Rhythm: Sinus  HR:   Measurements: 0.12-0.20/0.06-0.10/0.30-0.52

## 2023-03-30 NOTE — PROGRESS NOTES
"Hospital Medicine Daily Progress Note    Date of Service  3/30/2023    Chief Complaint  Chrissie Dueñas is a 89 y.o. female admitted 3/29/2023 with cough    Hospital Course  Per notes, \"89 y.o. female who presented 3/29/2023 with a past medical history of MDD, hypothyroidism, recent near syncope, chronic hyponatremia, HTN, skin cancer, CKD.  The patient has had decreased appetite and nausea for the last month and recently developed a new cough and hoarse, dry throat. She has had watery diarrhea without red or black over the last 3 days. She went to an urgent care yesterday and tested negative for Covid-19, RSV and flu. She lives at [facility].     In the ER the patient was found to have a white blood cell count of 17.4, sodium of 124, and troponin of 59, BNP of 4747 and d-dimer of 1.31 (within normal limits for age). A CTA showed multifocal pneumonia and was negative for embolism. The patient was given a bolus of fluids, Unasyn and azithromycin and referred for admission.     On exam, the patient is fully alert and oriented. Her nausea is improved after Zofran. She denies chills, fevers, and headache. She is amenable to admission and treatment for pneumonia.\"    Interval Problem Update  Overall labs improving  Still requiring supplemental oxygen  Still with cough and some rhonchi on exam  Sodium improving at 130  Echocardiogram showing an EF of 75% hyperdynamic left ventricular systolic function  PT/OT recommending home vs snf-depending on progression    I have discussed this patient's plan of care and discharge plan at IDT rounds today with Case Management, Nursing, Nursing leadership, and other members of the IDT team.    Consultants/Specialty  None    Code Status  DNAR/DNI    Disposition  Patient is not medically cleared for discharge.   Anticipate discharge to to skilled nursing facility.  I have placed the appropriate orders for post-discharge needs.    Review of Systems  Review of Systems "   Constitutional:  Positive for malaise/fatigue.   Respiratory:  Positive for cough, sputum production and shortness of breath.    All other systems reviewed and are negative.     Physical Exam  Temp:  [36.4 °C (97.6 °F)-36.8 °C (98.3 °F)] 36.7 °C (98 °F)  Pulse:  [73-96] 73  Resp:  [16-20] 18  BP: (100-131)/(41-68) 128/44  SpO2:  [91 %-95 %] 93 %    Physical Exam  Vitals and nursing note reviewed.   Constitutional:       Appearance: Normal appearance. She is ill-appearing.   Cardiovascular:      Rate and Rhythm: Normal rate and regular rhythm.      Pulses: Normal pulses.      Heart sounds: Normal heart sounds.   Pulmonary:      Effort: Respiratory distress present.      Breath sounds: Rhonchi present. No wheezing.   Abdominal:      General: Abdomen is flat. Bowel sounds are normal.      Palpations: Abdomen is soft.   Musculoskeletal:      Right lower leg: No edema.      Left lower leg: No edema.   Skin:     General: Skin is warm and dry.   Neurological:      General: No focal deficit present.      Mental Status: She is alert and oriented to person, place, and time. Mental status is at baseline.       Fluids    Intake/Output Summary (Last 24 hours) at 3/30/2023 1544  Last data filed at 3/30/2023 1200  Gross per 24 hour   Intake 1361.89 ml   Output 250 ml   Net 1111.89 ml       Laboratory  Recent Labs     03/29/23  0743 03/30/23  0030   WBC 17.4* 13.9*   RBC 3.89* 3.47*   HEMOGLOBIN 12.8 11.4*   HEMATOCRIT 36.6* 33.4*   MCV 94.1 96.3   MCH 32.9 32.9   MCHC 35.0 34.1   RDW 41.1 42.5   PLATELETCT 258 238   MPV 9.9 9.7     Recent Labs     03/29/23  0743 03/30/23  0030   SODIUM 124* 130*   POTASSIUM 4.7 4.1   CHLORIDE 87* 98   CO2 23 21   GLUCOSE 113* 113*   BUN 17 20   CREATININE 1.02 1.07   CALCIUM 8.9 7.9*                   Imaging  EC-ECHOCARDIOGRAM LTD W/O CONT   Final Result      CT-CTA CHEST PULMONARY ARTERY W/ RECONS   Final Result         1. No CT evidence of pulmonary embolism.      2. Airspace opacities in the  bilateral upper lobes and lower lobes, most in the left lower lobe, in keeping with multifocal pneumonia         DX-NECK FOR SOFT TISSUE   Final Result      No thickening of the epiglottis.      No prevertebral soft tissue thickening      DX-CHEST-PORTABLE (1 VIEW)   Final Result         1. No acute cardiopulmonary abnormalities are identified.           Assessment/Plan  * Multifocal pneumonia- (present on admission)  Assessment & Plan  multifocal opacity on CTA  Elevated procalcitonin  Crackles in bilateral lung bases, new cough  Negative for COVID, RSV and flu at urgent care prior to admission  -Continue with Unasyn and azithromycin, antiemetics.  -Sputum sample  -IV fluid resuscitation.  Given 1 L of IV fluid in ED. Continued maintenance fluids.  -regular diet  -Continue supplemental oxygen and wean as tolerated  Incentive spirometer      Diarrhea  Assessment & Plan  Could be related to current pneumonia  - No loose stools since admission, DC cdiff precautions because very unlikely at this time  - holding stool softeners  - fluid resuscitation    Elevated brain natriuretic peptide (BNP) level- (present on admission)  Assessment & Plan  Recent Labs     03/29/23  0743   NTPROBNP 4747*     - echocardiogram    Elevated troponin- (present on admission)  Assessment & Plan  Trending down  - Cardiac monitoring    Hypothyroidism- (present on admission)  Assessment & Plan  - TSH just checked this month, acceptable for age     Hyponatremia- (present on admission)  Assessment & Plan  Chronic  124 on admission, 130 this morning  - Continue home salt tabs  - Recheck in am  -A.m. cortisol within normal limits  -DC IV fluids    Essential hypertension- (present on admission)  Assessment & Plan  Hypotension on admission, will hold osartan  - monitor         VTE prophylaxis: SCDs/TEDs    I have performed a physical exam and reviewed and updated ROS and Plan today (3/30/2023). In review of yesterday's note (3/29/2023), there are no  changes except as documented above.    Total time spent with patient over 52 minutes.  This included my review with nursing and ancillary staff, review of records, face to face interview, physical examination; my review of lab results (CBC, chemistry panel), imaging review (CXR) and ECG.   In addition, counseling patient and speaking with consultants.

## 2023-03-30 NOTE — CARE PLAN
The patient is Stable - Low risk of patient condition declining or worsening    Shift Goals  Clinical Goals: Labs, ABx  Patient Goals: Comfort    Progress made toward(s) clinical / shift goals:    Problem: Respiratory  Goal: Patient will achieve/maintain optimum respiratory ventilation and gas exchange  Outcome: Progressing     Problem: Knowledge Deficit - Standard  Goal: Patient and family/care givers will demonstrate understanding of plan of care, disease process/condition, diagnostic tests and medications  Outcome: Progressing     Problem: Fall Risk  Goal: Patient will remain free from falls  Outcome: Progressing       Patient is not progressing towards the following goals:

## 2023-03-30 NOTE — DOCUMENTATION QUERY
[facility]                                                                       Query Response Note      PATIENT:               MICHELLE JIANG  ACCT #:                  [id number]  MRN:                     [id number]  :                      1930  ADMIT DATE:       3/29/2023 7:35 AM  DISCH DATE:          RESPONDING  PROVIDER #:        [id number]           QUERY TEXT:    Sepsis is documented in the medical record, ER provider notes . The above clinical indicators are also present.  Please clarify if this patient has:    The patient's clinical indicators include:  Findings:  --Patient admitted for multifocal PNA and hyponatremia  --Per ER provider notes , ''Sepsis with acute hypoxic respiratory failure without septic shock'' stated  --Labs on admission :  wbc 17.4, absolute neutrophils 15.71, lactic acid 1.0, procalcitonin 0.25  --VS on admission :  T98.3 P86 RR 20 131/66 96% RA    Treatment:  --Unasyn and Zithromax  --IVF  --Blood cultures pending    Risk Factors:  --Atypical PNA  --Hyponatremia    Thank you,  Phyllis Hagan RN, BSN  Clinical   Connect via [employer]  Options provided:   -- Sepsis ruled in and POA, please provide additional clinical indicators and SIRS criteria   -- Sepsis ruled out   -- Other explanation, please specify   -- Unable to determine      Query created by: Phyllis Hagan on 3/30/2023 7:18 AM    RESPONSE TEXT:    Sepsis ruled out       QUERY TEXT:    Acute hypoxemic respiratory failure is documented in the ER provider notes from .  Please clarify status of this condition:       The patient's Clinical Indicators include:  Findings:  --Patient admitted with atypical PNA and hyponatremia  --Per ER provider notes , ''final impression: acute hypoxemic respiratory failure'' stated  --RR on this admission: 18-23  --SpO2 on admission :  96% on RA  --SpO2  ranging from 92-98% on 1-2L NC  --CXR 03/29:  No pulmonary infiltrates or consolidations are noted. No pleural effusion. No pneumothorax  --Chest CT 03/29:  Airspace opacities in the bilateral upper lobes and lower lobes, most in the left lower lobe,      in keeping with multifocal pneumonia  --Per ER provider notes 03/29, no respiratory distress, bilateral crackles    Treatment:  --CXR  --Chest CT  --Supplemental O2  --RT consult    Risk Factors:  --Atypical PNA  --Ex smoker    Thank you,  Phyllis Hagan RN, BSN  Clinical   Connect via [employer]  Options provided:   -- Acute hypoxemic respiratory failure is ruled in   -- Acute hypoxemic respiratory failure is  ruled out   -- Hypoxemia   -- Other explanation, please specify   -- Unable to determine      Query created by: Phyllis Hagan on 3/30/2023 7:30 AM    RESPONSE TEXT:    Acute hypoxemic respiratory failure is ruled in          Electronically signed by:  LARA MONTE MD 3/30/2023 3:41 PM

## 2023-03-31 ENCOUNTER — HOSPITAL ENCOUNTER (INPATIENT)
Facility: REHABILITATION | Age: 88
LOS: 12 days | DRG: 193 | End: 2023-04-12
Attending: PHYSICAL MEDICINE & REHABILITATION | Admitting: PHYSICAL MEDICINE & REHABILITATION
Payer: MEDICARE

## 2023-03-31 VITALS
TEMPERATURE: 97.5 F | BODY MASS INDEX: 26.06 KG/M2 | HEART RATE: 80 BPM | SYSTOLIC BLOOD PRESSURE: 123 MMHG | DIASTOLIC BLOOD PRESSURE: 64 MMHG | HEIGHT: 60 IN | OXYGEN SATURATION: 95 % | WEIGHT: 132.72 LBS | RESPIRATION RATE: 18 BRPM

## 2023-03-31 DIAGNOSIS — F33.1 MODERATE EPISODE OF RECURRENT MAJOR DEPRESSIVE DISORDER (HCC): ICD-10-CM

## 2023-03-31 DIAGNOSIS — J18.9 MULTIFOCAL PNEUMONIA: ICD-10-CM

## 2023-03-31 PROBLEM — H10.31 ACUTE BACTERIAL CONJUNCTIVITIS OF RIGHT EYE: Status: ACTIVE | Noted: 2023-03-31

## 2023-03-31 PROBLEM — R53.81 DEBILITY: Status: ACTIVE | Noted: 2023-03-31

## 2023-03-31 LAB
ANION GAP SERPL CALC-SCNC: 11 MMOL/L (ref 7–16)
BUN SERPL-MCNC: 24 MG/DL (ref 8–22)
CALCIUM SERPL-MCNC: 8.1 MG/DL (ref 8.4–10.2)
CHLORIDE SERPL-SCNC: 99 MMOL/L (ref 96–112)
CO2 SERPL-SCNC: 18 MMOL/L (ref 20–33)
CREAT SERPL-MCNC: 1.18 MG/DL (ref 0.5–1.4)
ERYTHROCYTE [DISTWIDTH] IN BLOOD BY AUTOMATED COUNT: 44.8 FL (ref 35.9–50)
GFR SERPLBLD CREATININE-BSD FMLA CKD-EPI: 43 ML/MIN/1.73 M 2
GLUCOSE SERPL-MCNC: 110 MG/DL (ref 65–99)
HCT VFR BLD AUTO: 33.3 % (ref 37–47)
HGB BLD-MCNC: 11 G/DL (ref 12–16)
MAGNESIUM SERPL-MCNC: 2.3 MG/DL (ref 1.5–2.5)
MCH RBC QN AUTO: 32.6 PG (ref 27–33)
MCHC RBC AUTO-ENTMCNC: 33 G/DL (ref 33.6–35)
MCV RBC AUTO: 98.8 FL (ref 81.4–97.8)
PLATELET # BLD AUTO: 246 K/UL (ref 164–446)
PMV BLD AUTO: 9.1 FL (ref 9–12.9)
POTASSIUM SERPL-SCNC: 4.3 MMOL/L (ref 3.6–5.5)
RBC # BLD AUTO: 3.37 M/UL (ref 4.2–5.4)
SODIUM SERPL-SCNC: 128 MMOL/L (ref 135–145)
WBC # BLD AUTO: 14.2 K/UL (ref 4.8–10.8)

## 2023-03-31 PROCEDURE — 700111 HCHG RX REV CODE 636 W/ 250 OVERRIDE (IP): Performed by: PHYSICAL MEDICINE & REHABILITATION

## 2023-03-31 PROCEDURE — 80048 BASIC METABOLIC PNL TOTAL CA: CPT

## 2023-03-31 PROCEDURE — 700111 HCHG RX REV CODE 636 W/ 250 OVERRIDE (IP): Performed by: INTERNAL MEDICINE

## 2023-03-31 PROCEDURE — A9270 NON-COVERED ITEM OR SERVICE: HCPCS | Performed by: NURSE PRACTITIONER

## 2023-03-31 PROCEDURE — 99223 1ST HOSP IP/OBS HIGH 75: CPT | Performed by: PHYSICAL MEDICINE & REHABILITATION

## 2023-03-31 PROCEDURE — 94760 N-INVAS EAR/PLS OXIMETRY 1: CPT

## 2023-03-31 PROCEDURE — 700105 HCHG RX REV CODE 258: Performed by: INTERNAL MEDICINE

## 2023-03-31 PROCEDURE — 700102 HCHG RX REV CODE 250 W/ 637 OVERRIDE(OP): Performed by: NURSE PRACTITIONER

## 2023-03-31 PROCEDURE — 83735 ASSAY OF MAGNESIUM: CPT

## 2023-03-31 PROCEDURE — 85027 COMPLETE CBC AUTOMATED: CPT

## 2023-03-31 PROCEDURE — 99239 HOSP IP/OBS DSCHRG MGMT >30: CPT | Performed by: INTERNAL MEDICINE

## 2023-03-31 PROCEDURE — 700105 HCHG RX REV CODE 258: Performed by: PHYSICAL MEDICINE & REHABILITATION

## 2023-03-31 PROCEDURE — 700101 HCHG RX REV CODE 250: Performed by: PHYSICAL MEDICINE & REHABILITATION

## 2023-03-31 PROCEDURE — 700102 HCHG RX REV CODE 250 W/ 637 OVERRIDE(OP): Performed by: PHYSICAL MEDICINE & REHABILITATION

## 2023-03-31 PROCEDURE — 36415 COLL VENOUS BLD VENIPUNCTURE: CPT

## 2023-03-31 PROCEDURE — A9270 NON-COVERED ITEM OR SERVICE: HCPCS | Performed by: PHYSICAL MEDICINE & REHABILITATION

## 2023-03-31 PROCEDURE — 770010 HCHG ROOM/CARE - REHAB SEMI PRIVAT*

## 2023-03-31 RX ORDER — AMOXICILLIN 250 MG
2 CAPSULE ORAL 2 TIMES DAILY
Status: DISCONTINUED | OUTPATIENT
Start: 2023-03-31 | End: 2023-04-12 | Stop reason: HOSPADM

## 2023-03-31 RX ORDER — POLYETHYLENE GLYCOL 3350 17 G/17G
1 POWDER, FOR SOLUTION ORAL
Status: DISCONTINUED | OUTPATIENT
Start: 2023-03-31 | End: 2023-04-12 | Stop reason: HOSPADM

## 2023-03-31 RX ORDER — CARBOXYMETHYLCELLULOSE SODIUM 5 MG/ML
1 SOLUTION/ DROPS OPHTHALMIC PRN
Status: DISCONTINUED | OUTPATIENT
Start: 2023-03-31 | End: 2023-04-12 | Stop reason: HOSPADM

## 2023-03-31 RX ORDER — LANOLIN ALCOHOL/MO/W.PET/CERES
3 CREAM (GRAM) TOPICAL NIGHTLY PRN
Status: DISCONTINUED | OUTPATIENT
Start: 2023-03-31 | End: 2023-04-12 | Stop reason: HOSPADM

## 2023-03-31 RX ORDER — SODIUM CHLORIDE 1 G/1
1 TABLET ORAL
Status: DISCONTINUED | OUTPATIENT
Start: 2023-03-31 | End: 2023-04-12 | Stop reason: HOSPADM

## 2023-03-31 RX ORDER — CIPROFLOXACIN HYDROCHLORIDE 3.5 MG/ML
1 SOLUTION/ DROPS TOPICAL
Status: COMPLETED | OUTPATIENT
Start: 2023-03-31 | End: 2023-04-05

## 2023-03-31 RX ORDER — GUAIFENESIN/DEXTROMETHORPHAN 100-10MG/5
10 SYRUP ORAL EVERY 6 HOURS PRN
Status: DISCONTINUED | OUTPATIENT
Start: 2023-03-31 | End: 2023-04-12 | Stop reason: HOSPADM

## 2023-03-31 RX ORDER — LOSARTAN POTASSIUM 25 MG/1
25 TABLET ORAL
Status: DISCONTINUED | OUTPATIENT
Start: 2023-04-01 | End: 2023-04-02

## 2023-03-31 RX ORDER — ACETAMINOPHEN 325 MG/1
650 TABLET ORAL EVERY 4 HOURS PRN
Status: DISCONTINUED | OUTPATIENT
Start: 2023-03-31 | End: 2023-04-12 | Stop reason: HOSPADM

## 2023-03-31 RX ORDER — SODIUM CHLORIDE 1 G/1
1 TABLET ORAL
Status: CANCELLED | OUTPATIENT
Start: 2023-03-31

## 2023-03-31 RX ORDER — GUAIFENESIN/DEXTROMETHORPHAN 100-10MG/5
10 SYRUP ORAL EVERY 6 HOURS PRN
Qty: 840 ML | Status: SHIPPED
Start: 2023-03-31 | End: 2023-04-18

## 2023-03-31 RX ORDER — HYDROXYZINE HYDROCHLORIDE 25 MG/1
50 TABLET, FILM COATED ORAL EVERY 6 HOURS PRN
Status: DISCONTINUED | OUTPATIENT
Start: 2023-03-31 | End: 2023-04-12 | Stop reason: HOSPADM

## 2023-03-31 RX ORDER — ONDANSETRON 2 MG/ML
4 INJECTION INTRAMUSCULAR; INTRAVENOUS 4 TIMES DAILY PRN
Status: DISCONTINUED | OUTPATIENT
Start: 2023-03-31 | End: 2023-04-12 | Stop reason: HOSPADM

## 2023-03-31 RX ORDER — ECHINACEA PURPUREA EXTRACT 125 MG
2 TABLET ORAL PRN
Status: DISCONTINUED | OUTPATIENT
Start: 2023-03-31 | End: 2023-04-12 | Stop reason: HOSPADM

## 2023-03-31 RX ORDER — GUAIFENESIN/DEXTROMETHORPHAN 100-10MG/5
10 SYRUP ORAL EVERY 6 HOURS PRN
Status: CANCELLED | OUTPATIENT
Start: 2023-03-31

## 2023-03-31 RX ORDER — ONDANSETRON 4 MG/1
4 TABLET, ORALLY DISINTEGRATING ORAL 4 TIMES DAILY PRN
Status: DISCONTINUED | OUTPATIENT
Start: 2023-03-31 | End: 2023-04-12 | Stop reason: HOSPADM

## 2023-03-31 RX ORDER — ALUMINA, MAGNESIA, AND SIMETHICONE 2400; 2400; 240 MG/30ML; MG/30ML; MG/30ML
20 SUSPENSION ORAL
Status: DISCONTINUED | OUTPATIENT
Start: 2023-03-31 | End: 2023-04-12 | Stop reason: HOSPADM

## 2023-03-31 RX ORDER — LACTULOSE 20 G/30ML
30 SOLUTION ORAL
Status: DISCONTINUED | OUTPATIENT
Start: 2023-03-31 | End: 2023-04-12 | Stop reason: HOSPADM

## 2023-03-31 RX ORDER — BISACODYL 10 MG
10 SUPPOSITORY, RECTAL RECTAL
Status: DISCONTINUED | OUTPATIENT
Start: 2023-03-31 | End: 2023-04-12 | Stop reason: HOSPADM

## 2023-03-31 RX ORDER — HYDROXYZINE HYDROCHLORIDE 25 MG/1
50 TABLET, FILM COATED ORAL EVERY 6 HOURS PRN
Status: DISCONTINUED | OUTPATIENT
Start: 2023-03-31 | End: 2023-03-31

## 2023-03-31 RX ADMIN — AMPICILLIN AND SULBACTAM 3 G: 1; 2 INJECTION, POWDER, FOR SOLUTION INTRAMUSCULAR; INTRAVENOUS at 21:21

## 2023-03-31 RX ADMIN — CIPROFLOXACIN 1 DROP: 3 SOLUTION OPHTHALMIC at 17:54

## 2023-03-31 RX ADMIN — Medication 3 MG: at 21:27

## 2023-03-31 RX ADMIN — SODIUM CHLORIDE 1 G: 1 TABLET ORAL at 10:58

## 2023-03-31 RX ADMIN — SODIUM CHLORIDE 1 G: 1 TABLET ORAL at 17:53

## 2023-03-31 RX ADMIN — AMPICILLIN AND SULBACTAM 3 G: 1; 2 INJECTION, POWDER, FOR SOLUTION INTRAMUSCULAR; INTRAVENOUS at 04:35

## 2023-03-31 RX ADMIN — RIVAROXABAN 10 MG: 10 TABLET, FILM COATED ORAL at 17:53

## 2023-03-31 RX ADMIN — AZITHROMYCIN 500 MG: 250 TABLET, FILM COATED ORAL at 13:53

## 2023-03-31 RX ADMIN — HYDROXYZINE HYDROCHLORIDE 50 MG: 25 TABLET, FILM COATED ORAL at 21:28

## 2023-03-31 RX ADMIN — GUAIFENESIN SYRUP AND DEXTROMETHORPHAN 10 ML: 100; 10 SYRUP ORAL at 05:39

## 2023-03-31 RX ADMIN — SODIUM CHLORIDE 1 G: 1 TABLET ORAL at 07:53

## 2023-03-31 RX ADMIN — CIPROFLOXACIN 1 DROP: 3 SOLUTION OPHTHALMIC at 21:33

## 2023-03-31 RX ADMIN — SERTRALINE HYDROCHLORIDE 25 MG: 50 TABLET ORAL at 04:35

## 2023-03-31 RX ADMIN — SENNOSIDES AND DOCUSATE SODIUM 2 TABLET: 50; 8.6 TABLET ORAL at 21:27

## 2023-03-31 ASSESSMENT — LIFESTYLE VARIABLES
CONSUMPTION TOTAL: NEGATIVE
AVERAGE NUMBER OF DAYS PER WEEK YOU HAVE A DRINK CONTAINING ALCOHOL: 7
EVER HAD A DRINK FIRST THING IN THE MORNING TO STEADY YOUR NERVES TO GET RID OF A HANGOVER: NO
TOTAL SCORE: 0
HAVE YOU EVER FELT YOU SHOULD CUT DOWN ON YOUR DRINKING: NO
TOTAL SCORE: 0
HOW MANY TIMES IN THE PAST YEAR HAVE YOU HAD 5 OR MORE DRINKS IN A DAY: 0
TOTAL SCORE: 0
HAVE PEOPLE ANNOYED YOU BY CRITICIZING YOUR DRINKING: NO
EVER_SMOKED: YES
EVER FELT BAD OR GUILTY ABOUT YOUR DRINKING: NO
ALCOHOL_USE: YES
ON A TYPICAL DAY WHEN YOU DRINK ALCOHOL HOW MANY DRINKS DO YOU HAVE: 1

## 2023-03-31 ASSESSMENT — FIBROSIS 4 INDEX
FIB4 SCORE: 2.39
FIB4 SCORE: 2.39

## 2023-03-31 ASSESSMENT — PAIN DESCRIPTION - PAIN TYPE: TYPE: ACUTE PAIN

## 2023-03-31 NOTE — PROGRESS NOTES
Report called to [facility]. Discharge instructions given and discussed, signed copy in chart. Pt verbalized understanding and all questions answered. New prescriptions will be managed by [facility]. Pt discharged to [facility] in stable condition on 2L O2 via NC escorted by GMT. Personal belongings with patient. IV removed and tolerated well. Tele box removed, monitor tech notified.

## 2023-03-31 NOTE — DISCHARGE PLANNING
Case Management Discharge Planning    Admission Date: 3/29/2023  GMLOS: 4  ALOS: 2    6-Clicks ADL Score: 22  6-Clicks Mobility Score: 18      Anticipated Discharge Dispo: Discharge Disposition: D/T to SNF with Medicare cert in anticipation of skilled care (03)    DME Needed: No    Action(s) Taken: Received call from pt's daughter Kailee. Per Kailee, pt currently lives at [facility] but she would like pt to DC to 1)[facility] 2)[facility] 3)[facility] 4)[facility]. Plan after post acute placement is [facility]. Kailee currently looking into establishing pt at [place] [facility].    RNERNIE spoke to pt at bedside. Pt agreeable to DC to IRF/SNF. Pt gave verbal approval for facilities of Kailee's choice. Choice form completed and faxed to [facility].    Per IDT rounds discussion, pt is medically cleared.    1330: Per Rodo, pt accepted at [facility] and can be picked up today at 1500. Evelio VIVAS and aNt LOCKE notified via Voal[employer]. Pt's daughter Kailee notified via phone call. Pt notified at bedside and agreeable to DC plan.    COBRA delivered to Evelio VIVAS.    Escalations Completed: None    Medically Clear: No    Next Steps: f/u with IRF/SNFs regarding acceptance    Barriers to Discharge: Medical clearance and Pending Placement

## 2023-03-31 NOTE — CARE PLAN
The patient is Stable - Low risk of patient condition declining or worsening    Shift Goals  Clinical Goals: ABx, DC in AM  Patient Goals: Comfort    Progress made toward(s) clinical / shift goals:    Problem: Self Care  Goal: Patient will have the ability to perform ADLs independently or with assistance (bathe, groom, dress, toilet and feed)  Outcome: Progressing     Problem: Discharge Planning - Pneumonia  Goal: Patient will verbalize understanding of lifestyle changes and therapeutic needs post discharge  Outcome: Progressing     Problem: Knowledge Deficit - Standard  Goal: Patient and family/care givers will demonstrate understanding of plan of care, disease process/condition, diagnostic tests and medications  Outcome: Progressing     Problem: Fall Risk  Goal: Patient will remain free from falls  Outcome: Progressing       Patient is not progressing towards the following goals:

## 2023-03-31 NOTE — DISCHARGE PLANNING
Approved transfer to [facility] today at 1500 Dr. Mallory is accepting.   GMT WC. Daughter Kailee notified via voice mail. Attending team notified via [employer]

## 2023-03-31 NOTE — CONSULTS
Medical chart review completed.     Patient is a 89 y.o. female  with a past medical history of hypertension, macular degeneration, admitted to [facility] on 3/29, with decreased appetite x1 month secondary to persistent nausea, dry cough over several days, hoarse voice, and diarrhea.    Labs with elevated D-dimer, BNP, leukocytosis to 17.4, hyponatremia with sodium of 124, and elevated troponin.  Patient was hypoxic requiring oxygen.  Patient had a CT of the chest which was negative for pulmonary emboli but did show multifocal pneumonia.  She was admitted for IV antibiotics and treatment of her pneumonia.  She had an echocardiogram that showed ejection fraction of 75% and hyperdynamic left ventricular systolic function.    Patient with multiple co-morbidities(including but not limited to hypertension, pneumonia, hyponatremia, leukocytosis, anemia, diarrhea); with cognitive deficits and functional deficits in mobility/self-cares, and Severe de-conditioning.     Pre-morbidly, this patient lived in a single level home at independent living, with plans to move to assisted living at discharge.  The patient was evaluated by acute care Physical Therapy; currently requiring contact-guard assistance for mobility and minimal assistance for ADLs (for eval by PT, patient had 2 losses of balance on the way to the sink and during her ADL of brushing her teeth requiring min assist for balance recovery).    The patient is a very good  candidate for an acute inpatient rehabilitation program with a coordinated program of care at an intensity and frequency not available at a lower level of care.     Note: if the patient continues to progress while waiting for medical clearance, and no longer requires 2 out of 3 therapy services (PT/OT/SLP), then the patient would no longer meet the criteria for acute inpatient rehabilitation.    This recommendation is substantiated by the patient's current medical condition  with intervention and assessment of medical issues requiring an acute level of care for patient's safety and maximum outcome. A coordinated program of care will be provided by an interdisciplinary team including physical therapy, occupational therapy, speech language pathology, physiatry, and rehab nursing. Rehab goals include improved cognition, mobility, self-care management, strength and conditioning/endurance, pain management, bowel and bladder management, mood and affect, and safety with independent home management including caregiver training. Estimated length of stay is approximately 7-10 days. Rehab potential: Very Good. Disposition: to pre-morbid independent living setting with supportive care of patient's community resources. We will continue to follow with you in anticipation of discharge to acute inpatient rehabilitation when medically stable to do so at the discretion of her attending physician.     Thank you for allowing us to participate in her care.    Sailaja Mallory M.D.  Physical Medicine and Rehabilitation

## 2023-03-31 NOTE — DISCHARGE PLANNING
PM&R referral from Dr. Johns. Medical debility with ongoing PT need. Physiatry to consult per protocol.

## 2023-03-31 NOTE — DISCHARGE SUMMARY
"Discharge Summary    CHIEF COMPLAINT ON ADMISSION  Chief Complaint   Patient presents with    Nausea/Vomiting/Diarrhea     X 3 days    Hoarse     X 3 days       Reason for Admission  EMS     Admission Date  3/29/2023    CODE STATUS  DNAR/DNI    HPI & HOSPITAL COURSE  Per notes, \"89 y.o. female who presented 3/29/2023 with a past medical history of MDD, hypothyroidism, recent near syncope, chronic hyponatremia, HTN, skin cancer, CKD.  The patient has had decreased appetite and nausea for the last month and recently developed a new cough and hoarse, dry throat. She has had watery diarrhea without red or black over the last 3 days. She went to an urgent care yesterday and tested negative for Covid-19, RSV and flu. She lives at [facility].     In the ER the patient was found to have a white blood cell count of 17.4, sodium of 124, and troponin of 59, BNP of 4747 and d-dimer of 1.31 (within normal limits for age). A CTA showed multifocal pneumonia and was negative for embolism. The patient was given a bolus of fluids, Unasyn and azithromycin and referred for admission.     On exam, the patient is fully alert and oriented. Her nausea is improved after Zofran. She denies chills, fevers, and headache. She is amenable to admission and treatment for pneumonia.\"    Patient was admitted and diagnosed with community-acquired pneumonia.  She was started on IV antibiotics and had significant proved in overall status and was able to wean down on oxygen.  She was evaluated by physical therapy while in the hospital, who recommended postacute care placement.  She has been accepted by [facility] rehab, where she will transfer for further recovery.  She will complete a course of antibiotics for pneumonia.  Additionally recommend she follow-up with PCP within 1 to 2 weeks of discharge from [facility] rehab.    Therefore, she is discharged in good and stable condition to an inpatient rehabilitation hospital.    The patient met " 2-midnight criteria for an inpatient stay at the time of discharge.    Discharge Date  3/31/2023    FOLLOW UP ITEMS POST DISCHARGE  FU with PCP    DISCHARGE DIAGNOSES  Principal Problem:    Multifocal pneumonia POA: Yes  Active Problems:    Essential hypertension POA: Yes      Overview: Chronic condition well-controlled on losartan 50 mg daily.  Patient       reports she has been on this medication many years no reported side       effects.  Blood pressure today in clinic 110/60       -Has been having near syncopal events lately uncertain if it is due to low       blood pressure or possibly hyponatremia as per ED work-up has been       unrevealing    Hyponatremia POA: Yes    Hypothyroidism POA: Yes    Elevated troponin POA: Yes    Elevated brain natriuretic peptide (BNP) level POA: Yes    Diarrhea POA: Unknown  Resolved Problems:    Sepsis (HCC) POA: Yes      FOLLOW UP  Future Appointments   Date Time Provider Department Center   4/11/2023  9:30 AM Reyes Damon M.D. [facility] None   4/26/2023  4:20 PM Felix Farias D.N.P. [facility]     No follow-up provider specified.    MEDICATIONS ON DISCHARGE     Medication List        START taking these medications        Instructions   guaiFENesin dextromethorphan 100-10 MG/5ML Syrp syrup  Commonly known as: ROBITUSSIN DM   Take 10 mL by mouth every 6 hours as needed for Cough.  Dose: 10 mL     NS SOLN 100 mL with ampicillin/sulbactam 3 (2-1) g SOLR 3 g   Infuse 3 g into a venous catheter every 12 hours for 3 days.  Dose: 3 g            CONTINUE taking these medications        Instructions   CORICIDIN HBP PO   Take 1 Capsule by mouth 4 times a day as needed (For cough and cold symptoms).  Dose: 1 Capsule     losartan 50 MG Tabs  Commonly known as: COZAAR   Take 1 Tablet by mouth every day.  Dose: 50 mg     PreserVision AREDS 2+Multi Vit Caps   Take 1 Capsule by mouth every day.  Dose: 1 Capsule     SALT SUBSTITUTES PO   Take 1 Tablet by mouth 3 times a day with meals. OTC  Dose:  1 Tablet     sertraline 25 MG tablet  Commonly known as: Zoloft   Take 1 Tablet by mouth every day.  Dose: 25 mg              Allergies  No Known Allergies    DIET  Orders Placed This Encounter   Procedures    Diet Order Diet: Regular     Standing Status:   Standing     Number of Occurrences:   1     Order Specific Question:   Diet:     Answer:   Regular [1]       ACTIVITY  As tolerated.  Weight bearing as tolerated    CONSULTATIONS  None    PROCEDURES  None    LABORATORY  Lab Results   Component Value Date    SODIUM 128 (L) 03/31/2023    POTASSIUM 4.3 03/31/2023    CHLORIDE 99 03/31/2023    CO2 18 (L) 03/31/2023    GLUCOSE 110 (H) 03/31/2023    BUN 24 (H) 03/31/2023    CREATININE 1.18 03/31/2023        Lab Results   Component Value Date    WBC 14.2 (H) 03/31/2023    HEMOGLOBIN 11.0 (L) 03/31/2023    HEMATOCRIT 33.3 (L) 03/31/2023    PLATELETCT 246 03/31/2023        Total time of the discharge process exceeds 45 minutes.

## 2023-03-31 NOTE — PREADMISSION SCREENING NOTE
Pre-Admission Screening Form    Patient Information:   Name: Chrissie Dueñas     MRN: [id number]       : 1930      Age: 89 y.o.   Gender: female      Race: White [7]       Marital Status: Single [1]  Family Contact: Kailee Ruiz        Relationship: Daughter [2]  Home Phone: [phone]           Cell Phone:   Advanced Directives: None  Code Status:  DNAR/DNI  Current Attending Provider: Atul Johns M.D.  Referring Physician: Dr. Johns       Physiatrist Consult: Dr. Mallory        Referral Date: 2023  Primary Payor Source:  MEDICARE  Secondary Payor Source:  [facility]    Medical Information:   Date of Admission to Acute Care Setting:3/29/2023  Room Number: 3301/01  Rehabilitation Diagnosis: 0010.9 - Pulmonary Disorders: Other Pulmonary  Immunization History   Administered Date(s) Administered    Influenza (IM) Preservative Free - HISTORICAL DATA 10/12/2015    Influenza Vaccine Adult HD 10/09/2010, 10/08/2011, 2014, 2016, 2017, 2018, 10/07/2019, 2020, 2022    Influenza Vaccine H1N1 - HISTORICAL DATA 12/15/2009    Influenza Vaccine Quad Recombinant 2021    Influenza, Unspecified - HISTORICAL DATA 10/15/2012, 10/06/2013    PFIZER BIVALENT BOOSTER SARS-COV-2 VACCINE (12+) 2022    PFIZER DUDLEY CAP SARS-COV-2 VACCINATION (12+) 2022    PFIZER PURPLE CAP SARS-COV-2 VACCINATION (12+) 2021, 2021, 11/10/2021    Pneumococcal Conjugate Vaccine (PCV20) 2022    Pneumococcal polysaccharide vaccine (PPSV-23) 2021    Tdap Vaccine 2019    Tuberculin Skin Test 2013    Zoster Vaccine Recombinant (RZV) (SHINGRIX) 2018     No Known Allergies  Past Medical History:   Diagnosis Date    Hypertension     Macular degeneration      Past Surgical History:   Procedure Laterality Date    APPENDECTOMY      OVARIAN CYSTECTOMY         History Leading to Admission, Conditions that Caused the Need for Rehab (CMS):   Hyacinth Carlos,  MALINA  Nurse Practitioner  [facility]  H&P     Attested  Date of Service:  3/29/2023 11:30 AM         Attestation signed by Atul Johns M.D. at 3/29/2023  4:26 PM     Patient was seen and evaluated at bedside, bilateral with crackles in lung bases and cough.       On admission,  elevated WBC, hyponatremia 124, which appears to be chronic in nature, elevated D-dimer and slightly elevated procalcitonin.  CT a on admission negative for PE but consistent with multifocal pneumonia.  Additionally in the ED was having some hypotension.  We will hold home hypertension medication.  Continue with antibiotics as discussed in H&P.     Patient is acutely ill, has multiple comorbidities, advanced age and is at high risk for worsening.  At that time she will require very close monitoring of blood pressure, work-up for hyponatremia, frequent labs and vital signs, and IV antibiotics for community-acquired pneumonia.  At this time this care cannot be provided in the outpatient setting and patient will need to be admitted.     I have discussed all treatment plans and findings in detail with APRN and agree with the treatment plan as detailed in H&P.     Total time spent on admission over 60 minutes.  This included my review with ER physician, review of ED events, patient's history, outside records, recent records, face to face interview, physical examination; my review of lab results (CBC, chemistry panel), imaging review (CXR) and ECG.   In addition, counseling patient and speaking with consultants.                [facility] History & Physical Note     Date of Service  3/29/2023     Primary Care Physician  Felix Farias D.N.P.     Consultants  none     Specialist Names:      Code Status  Prior     Chief Complaint    Chief Complaint  Patient presents with   Nausea/Vomiting/Diarrhea      X 3 days   Hoarse      X 3 days        History of Presenting Illness  Chrissie Dueñas is a 89 y.o. female who presented 3/29/2023  with a past medical history of MDD, hypothyroidism, recent near syncope, chronic hyponatremia, HTN, skin cancer, CKD.  The patient has had decreased appetite and nausea for the last month and recently developed a new cough and hoarse, dry throat. She has had watery diarrhea without red or black over the last 3 days. She went to an urgent care yesterday and tested negative for Covid-19, RSV and flu. She lives at [facility].     In the ER the patient was found to have a white blood cell count of 17.4, sodium of 124, and troponin of 59, BNP of 4747 and d-dimer of 1.31 (within normal limits for age). A CTA showed multifocal pneumonia and was negative for embolism. The patient was given a bolus of fluids, Unasyn and azithromycin and referred for admission.     On exam, the patient is fully alert and oriented. Her nausea is improved after Zofran. She denies chills, fevers, and headache. She is amenable to admission and treatment for pneumonia.     I discussed the plan of care with patient and Tory Merlos and Nat .        Assessment/Plan:  Justification for Admission Status  I anticipate this patient will require at least two midnights for appropriate medical management, necessitating inpatient admission because IV antibiotic administration, multifocal pneumonia     Patient will need a Telemetry bed on MEDICAL service .  The need is secondary to IV antibiotics and cardiac monitoring.     * Multifocal pneumonia- (present on admission)  Assessment & Plan  multifocal opacity on CTA  Elevated procalcitonin  Crackles in bilateral lung bases, new cough  Negative for COVID, RSV and flu at urgent care prior to admission     -Unasyn and azithromycin, antiemetics.  -Sputum sample  -IV fluid resuscitation.  Given 1 L of IV fluid in ED. Continued maintenance fluids.  -regular diet  -monitor.        Hyponatremia- (present on admission)  Assessment & Plan  Chronic  124 on admission  - Continue home salt tabs  -  Recheck in am     Elevated brain natriuretic peptide (BNP) level- (present on admission)  Assessment & Plan    Recent Labs    03/29/23  0743  NTPROBNP 4747*     - echocardiogram     Diarrhea  Assessment & Plan  Could be related to current pneumonia  - rule out c dif, stool study pending  - holding stool softeners  - fluid resuscitation     Essential hypertension- (present on admission)  Assessment & Plan  Within normal limits on admission  - Continue home Losartan  - monitor     Elevated troponin- (present on admission)  Assessment & Plan  Trending down  - Cardiac monitoring     Hypothyroidism- (present on admission)  Assessment & Plan  - TSH just checked this month, acceptable for age            VTE prophylaxis: SCDs/TEDs and Xarelto 10 mg daily as prophylaxis        My total time spent caring for the patient on the day of the encounter was 18 minutes.   This does not include time spent on separately billable procedures/tests. This time is exclusive of the time spent with collaborating physicians.         Cosigned by: Atul Johns M.D. at 3/29/2023  4:26 PM    Sailaja Mallory M.D.  Physician  Physical Medicine & Rehab  Consults     Signed  Date of Service:  3/31/2023 12:56 PM  Consult Orders  IP Consult For Physiatry [[id number]] ordered by Atul Johns M.D. at 03/31/23 1113           Medical chart review completed.      Patient is a 89 y.o. female  with a past medical history of hypertension, macular degeneration, admitted to [facility] on 3/29, with decreased appetite x1 month secondary to persistent nausea, dry cough over several days, hoarse voice, and diarrhea.     Labs with elevated D-dimer, BNP, leukocytosis to 17.4, hyponatremia with sodium of 124, and elevated troponin.  Patient was hypoxic requiring oxygen.  Patient had a CT of the chest which was negative for pulmonary emboli but did show multifocal pneumonia.  She was admitted for IV antibiotics and treatment of her  pneumonia.  She had an echocardiogram that showed ejection fraction of 75% and hyperdynamic left ventricular systolic function.     Patient with multiple co-morbidities(including but not limited to hypertension, pneumonia, hyponatremia, leukocytosis, anemia, diarrhea); with cognitive deficits and functional deficits in mobility/self-cares, and Severe de-conditioning.      Pre-morbidly, this patient lived in a single level home at independent living, with plans to move to assisted living at discharge.  The patient was evaluated by acute care Physical Therapy; currently requiring contact-guard assistance for mobility and minimal assistance for ADLs (for eval by PT, patient had 2 losses of balance on the way to the sink and during her ADL of brushing her teeth requiring min assist for balance recovery).     The patient is a very good  candidate for an acute inpatient rehabilitation program with a coordinated program of care at an intensity and frequency not available at a lower level of care.      Note: if the patient continues to progress while waiting for medical clearance, and no longer requires 2 out of 3 therapy services (PT/OT/SLP), then the patient would no longer meet the criteria for acute inpatient rehabilitation.     This recommendation is substantiated by the patient's current medical condition with intervention and assessment of medical issues requiring an acute level of care for patient's safety and maximum outcome. A coordinated program of care will be provided by an interdisciplinary team including physical therapy, occupational therapy, speech language pathology, physiatry, and rehab nursing. Rehab goals include improved cognition, mobility, self-care management, strength and conditioning/endurance, pain management, bowel and bladder management, mood and affect, and safety with independent home management including caregiver training. Estimated length of stay is approximately 7-10 days. Rehab  potential: Very Good. Disposition: to pre-morbid independent living setting with supportive care of patient's community resources. We will continue to follow with you in anticipation of discharge to acute inpatient rehabilitation when medically stable to do so at the discretion of her attending physician.      Thank you for allowing us to participate in her care.     Sailaja Mallory M.D.  Physical Medicine and Rehabilitation       Co-morbidities:  as listed above and below   Potential Risk - Complications: Cognitive Impairment, Deep Vein Thrombosis, Dysphagia, Malnutrition, Pain, Perceptual Impairment, Pneumonia, and Pressure Ulcer  Level of Risk: High    Ongoing Medical Management Needed (Medical/Nursing Needs):   Patient Active Problem List    Diagnosis Date Noted    Hyponatremia 03/29/2023    Hypothyroidism 03/29/2023    Elevated troponin 03/29/2023    Multifocal pneumonia 03/29/2023    Elevated brain natriuretic peptide (BNP) level 03/29/2023    Diarrhea 03/29/2023    Major depressive disorder 03/15/2023    Other specified hypothyroidism 03/15/2023    Demand ischemia (HCC) 02/18/2023    Near syncope 02/17/2023    History of skin cancer in adulthood 08/03/2022    Chronic kidney disease 08/03/2022    History of fall 12/28/2021    Chronic hyponatremia 12/24/2021    Contraindication to deep vein thrombosis (DVT) prophylaxis 10/08/2021    Skin lesion 07/30/2021    History of macular degeneration 06/01/2020    Essential hypertension 06/01/2020       Current Vital Signs:   Temperature: 36.4 °C (97.5 °F) Pulse: 80 Respiration: 18 Blood Pressure : 123/64  Weight: 60.2 kg (132 lb 11.5 oz) Height: 152.4 cm (5')  Pulse Oximetry: 95 % O2 (LPM): 2      Completed Laboratory Reports:  Recent Labs     03/29/23  0743 03/30/23  0030 03/31/23  0046   WBC 17.4* 13.9* 14.2*   HEMOGLOBIN 12.8 11.4* 11.0*   HEMATOCRIT 36.6* 33.4* 33.3*   PLATELETCT 258 238 246   SODIUM 124* 130* 128*   POTASSIUM 4.7 4.1 4.3   BUN 17 20 24*    CREATININE 1.02 1.07 1.18   ALBUMIN 3.7 2.8*  --    GLUCOSE 113* 113* 110*     Additional Labs: Not Applicable    Prior Living Situation:   Housing / Facility: Independent Living Facility  Steps Into Home: 0  Steps In Home: 0  Lives with - Patient's Self Care Capacity: Alone and Able to Care For Self  Equipment Owned: 4-Wheel Walker    Prior Level of Function / Living Situation:   Physical Therapy: Prior Services: None  Housing / Facility: Independent Living Facility  Steps Into Home: 0  Steps In Home: 0  Bathroom Set up: Walk In Shower, Shower Chair  Equipment Owned: 4-Wheel Walker  Lives with - Patient's Self Care Capacity: Alone and Able to Care For Self  Bed Mobility: Independent  Transfer Status: Independent  Ambulation: Independent  Assistive Devices Used: 4-Wheel Walker  Current Level of Function:   Gait Level Of Assist: Minimal Assist  Assistive Device: Front Wheel Walker  Distance (Feet): 10  Deviation: Shuffled Gait, Bradykinetic, Decreased Base Of Support  Supine to Sit: Standby Assist  Sit to Supine:  (lef tup in chair)  Scooting: Standby Assist (extra time)  Sit to Stand: Contact Guard Assist  Bed, Chair, Wheelchair Transfer: Contact Guard Assist  Sitting in Chair: left up after session, plan for about 30 mins  Sitting Edge of Bed: 10 mins  Standing: total of about 5 mins  Occupational Therapy:      Prior Services: None  Housing / Facility: Independent Living Facility  Current Level of Function:      Speech Language Pathology:      Rehabilitation Prognosis/Potential: Good  Estimated Length of Stay: 10-14 days    Nursing:      Continent    Scope/Intensity of Services Recommended:  Physical Therapy: 1 hr / day  5 days / week. Therapeutic Interventions Required: Maximize Endurance, Mobility, Strength, and Safety  Occupational Therapy: 1 hr / day 5 days / week. Therapeutic Interventions Required: Maximize Self Care, ADLs, IADLs, and Energy Conservation  Speech & Language Pathology: 1 hr / day 5 days /  week. Therapeutic Interventions Required: Maximize Cognition, Swallowing, and Safety  Rehabilitation Nursin/7. Therapeutic Interventions Required: Monitor Pain, Skin, Vital Signs, Intake and Output, Labs, Safety, Aspiration Risk, and Family Training  Rehabilitation Physician: 3 - 5 days / week. Therapeutic Interventions Required: Medical Management  Respiratory Care: evaluate . Therapeutic Interventions Required: Pulmonary Toileting  Dietician: consult . Therapeutic Interventions Required: nutritional need     She requires 24-hour rehabilitation nursing to manage bowel and bladder function, skin care, nutrition and fluid intake, pulmonary hygiene, pain control, safety, medication management, and patient/family goals. In addition, rehabilitation nursing will reiterate and reinforce therapy skills and equipment use, including ADLs, as well as provide education to the patient and family. Chrissie Dueñas is willing to participate in and is able to tolerate the proposed plan of care.    Rehabilitation Goals and Plan (Expected frequency & duration of treatment in the IRF):   Return to the Community, Modified Independent Level of Care, and Family Able to Provide 24/7 Assistance  Anticipated Date of Rehabilitation Admission: 2023  Patient/Family oriented IRF level of care/facility/plan: Yes  Patient/Family willing to participate in IRF care/facility/plan: Yes  Patient able to tolerate IRF level of care proposed: Yes  Patient has potential to benefit IRF level of care proposed: Yes  Comments: Not Applicable    Special Needs or Precautions - Medical Necessity:  Safety Concerns/Precautions:  Fall Risk / High Risk for Falls and Balance  Cardiac Precautions  Current Medications:    Current Facility-Administered Medications Ordered in Epic   Medication Dose Route Frequency Provider Last Rate Last Admin    ampicillin/sulbactam (UNASYN) 3 g in  mL IVPB  3 g Intravenous Q12HRS Atul Johns M.D.   Stopped at  03/31/23 0505    Respiratory Therapy Consult   Nebulization Continuous RT DANIELLE SnowP.R.N.        rivaroxaban (XARELTO) tablet 10 mg  10 mg Oral DAILY AT 1800 Hyacinth Carlos A.P.R.N.   10 mg at 03/30/23 1727    acetaminophen (Tylenol) tablet 650 mg  650 mg Oral Q6HRS PRN Hyacinth Carlos A.P.R.N.        azithromycin (ZITHROMAX) tablet 500 mg  500 mg Oral Daily-1400 MONE Snow.P.R.N.   500 mg at 03/30/23 1453    ondansetron (ZOFRAN) syringe/vial injection 4 mg  4 mg Intravenous Q4HRS PRN MONE Snow.P.R.N.        ondansetron (ZOFRAN ODT) dispertab 4 mg  4 mg Oral Q4HRS PRN Hyacinth Carlos A.P.R.N.        guaiFENesin dextromethorphan (ROBITUSSIN DM) 100-10 MG/5ML syrup 10 mL  10 mL Oral Q6HRS PRN Hyacinth Carlos A.P.R.N.   10 mL at 03/31/23 0539    sodium chloride (SALT) tablet 1 g  1 g Oral TID WITH MEALS MONE Snow.P.R.N.   1 g at 03/31/23 1058    sertraline (Zoloft) tablet 25 mg  25 mg Oral DAILY MONE Snow.P.R.N.   25 mg at 03/31/23 0435    melatonin tablet 10 mg  10 mg Oral Nightly Mario Ortiz M.D.   10 mg at 03/30/23 2220     No current Epic-ordered outpatient medications on file.     Diet:   DIET ORDERS (From admission to next 24h)       Start     Ordered    03/29/23 1240  Diet Order Diet: Regular  ALL MEALS        Question:  Diet:  Answer:  Regular    03/29/23 1250                    Anticipated Discharge Destination / Patient/Family Goal:  Destination: Assisted Living Support System: Spouse  Anticipated home health services: OT, PT, Nursing, and Aide  Previously used HH service/ provider: Not Applicable  Anticipated DME Needs: Walker  Outpatient Services: OT and PT  Alternative resources to address additional identified needs:   Rodo Clark  Clinical Admissions Coordinator  Discharge Planning     Signed  Date of Service:  3/31/2023  1:12 PM        Follow up for post acute services. Spoke with daughter Kailee about goals and expectation for IRF level of care. Discussed  therapy plan for 3 hours per day 5 days a week. Discussed therapy schedules 30/45 or 60 minute sessions typically 1.5 hours between breakfast and lunch and another 1.5 hours between lunch and dinner. Discussed PT/OT and SLP roles. Discussed potential length of stay. Discussed insurance Medicare and [place] coverage for the program. Discussed Covid visitation and clothing requirements. Kailee will be primary support for Chrissie to return to the community. Discussed participation with therapy program when visiting.  Family goal for MOD I to return ILF. Daughter will discuss with her mother possibility of assisted living also. If assisted living is not available at time of discharge and Chrissie would require additional support daughter Kailee will bring her home with her. Financial support is not going to be a barrier. Daughter is agreeable to transfer to [facility] when medically cleared.             Future Appointments   Date Time Provider Department Center   4/11/2023  9:30 AM Reyes Damon M.D. [facility] None   4/26/2023  4:20 PM [facility] [address]                 Pre-Screen Completed: 3/31/2023 1:21 PM Rodo Clark

## 2023-03-31 NOTE — H&P
REHABILITATION HISTORY AND PHYSICAL/POST ADMISSION EVALUATION    3/31/2023  4:47 PM  Chrissie Dueñas  [id number]  Admission  3/31/2023  3:52 PM  [facility] Code/Reason for admission: 0016 - Debility (Non-Cardiac, Non-Pulmonary)   Etiologic diagnosis/problem: Multifocal pneumonia  Chief Complaint: Cough, polyuria    HPI:  Patient is a 89 y.o. female  with a past medical history of hypertension, macular degeneration, depression, chronic hyponatremia, chronic kidney disease, hypothyroidism, admitted to [facility] on 3/29, with decreased appetite x1 month secondary to persistent nausea, dry cough over several days, hoarse voice, and diarrhea.     Labs with elevated D-dimer, BNP, leukocytosis to 17.4, hyponatremia with sodium of 124, and elevated troponin.  Patient was hypoxic requiring oxygen.  Patient had a CT of the chest which was negative for pulmonary emboli but did show multifocal pneumonia.  She was admitted for IV antibiotics and treatment of her pneumonia.  She had an echocardiogram that showed ejection fraction of 75% and hyperdynamic left ventricular systolic function.  Patient's TSH was slightly elevated at 5.4, with normal free T4 at 1.2.  Patient continues to have a leukocytosis and her procalcitonin has increased.  Blood cultures negative to growth thus far.    Patient currently reports cough, hoarse voice and polyuria.  She reports she has had low sodium for about a year with unclear cause.  She reports continued poor appetite ever since she has been ill in the last week.  She denies any pain.  She reports blurry vision in her right eye which is chronic.  She is currently reporting some right eye discharge and pain.    Patient was evaluated by Rehab Medicine physician and Physical Therapy and determined to be appropriate for acute inpatient rehab and was transferred to [facility] on 3/31/2023  3:52 PM.    With this acute therapeutic intervention, this patient hopes to  improve her functional status, and return to independent living with the supportive care of community resources.    REVIEW OF SYSTEMS:     A complete review of systems was performed and was negative in detail with the exception of items mentioned elsewhere in this document.    PMH:  Past Medical History:   Diagnosis Date    Depression     Hypertension     Hyponatremia     Hypothyroidism     Macular degeneration        PSH:  Past Surgical History:   Procedure Laterality Date    APPENDECTOMY      OVARIAN CYSTECTOMY         Family History   Problem Relation Age of Onset    Cancer Mother     Stroke Father         MEDICATIONS:  Scheduled Medications   Medication Dose Frequency    Pharmacy Consult Request  1 Each PHARMACY TO DOSE    senna-docusate  2 Tablet BID    ampicillin-sulbactam (UNASYN) IV  3 g Q12HRS    rivaroxaban  10 mg DAILY AT 1800    [START ON 4/1/2023] sertraline  25 mg DAILY    sodium chloride  1 g TID WITH MEALS       ALLERGIES:  Patient has no known allergies.    PSYCHOSOCIAL HISTORY:  Pre-morbidly, this patient lived in a single level home at [facility] living, with plans to move to assisted living at discharge.  She reports she has been living at her current independent living for 4 years and she really likes it there.  She has multiple friends there.  She has been a  for over 20 years.  She has multiple children, grandchildren and great-grandchildren.  She is a retired .  She smoked in college and then quit.  She has a daily glass of wine with dinner.    LEVEL OF FUNCTION PRIOR TO DISABILTY:  Independent, used walker to get to the dining room only, no longer drives    LEVEL OF FUNCTION PRIOR TO ADMISSION to [facility]:  PT:    Balance Assessment   Sitting Balance (Static) Fair +   Sitting Balance (Dynamic) Fair   Standing Balance (Static) Fair   Standing Balance (Dynamic) Fair -   Weight Shift Sitting Fair   Weight Shift Standing Fair   Comments with FWW in  stand, needs close CGA and up to min A for balance losses   Bed Mobility    Supine to Sit Standby Assist   Sit to Supine    (lef tup in chair)   Scooting Standby Assist  (extra time)   Gait Analysis   Gait Level Of Assist Minimal Assist   Assistive Device Front Wheel Walker   Distance (Feet) 10   # of Times Distance was Traveled 2   Deviation Shuffled Gait;Bradykinetic;Decreased Base Of Support   Comments 2 LOB on wy to sink and at the sink during ADL, needed brief Colt for balance recovery   Functional Mobility   Sit to Stand Contact Guard Assist   Bed, Chair, Wheelchair Transfer Contact Guard Assist   Mobility bed mobility>transfer>gait to sink to brush teeth>then gait to recliner chair - agreeabel for upright ~30 mins and amendable to call light for back to bed           CURRENT LEVEL OF FUNCTION:   Same as level of function prior to admission to [facility]    PHYSICAL EXAM:     VITAL SIGNS:   height is 1.524 m (5') and weight is 58.5 kg (129 lb). Her oral temperature is 36.5 °C (97.7 °F). Her blood pressure is 154/75 (abnormal) and her pulse is 84. Her respiration is 18 and oxygen saturation is 96%.     GENERAL: No apparent distress, looks younger than her age  HEENT: Normocephalic/atraumatic, dry mucous membranes, yellow discharge around right lateral eye with injected conjunctiva  CARDIAC: Regular rate and rhythm, normal S1, S2, no murmurs, no peripheral edema   LUNGS: Clear to auscultation, normal respiratory effort, on room air   ABDOMINAL: bowel sounds present, soft, nontender, and nondistended    EXTREMITIES: no edema or 2+ bilateral DP/PT pulses  MSK: Enlarged bilateral MCPs, PIPs and DIPs    NEURO:    Mental status: alert, impaired short-term memory    Motor:  Shoulder flexors:  Right -  5/5, Left -deferred as nursing is attempting to get IV started  Elbow flexors:  Right -  5/5, Left -  deferred as nursing is attempting to get IV started  Elbow extensors:  Right -  5/5, Left -   deferred as nursing is attempting to get IV started  Symmetrical   Dorsiflexors:  Right -  5/5, Left -  5/5  Plantar flexors:  Right -  5/5, Left -  5/5   Negative Pronator drift bilaterally      RADIOLOGY:    Imaging personally reviewed and interpreted by me.      3/29/2023 10:42 AM     HISTORY/REASON FOR EXAM:  Nausea vomiting and diarrhea. Cough.        TECHNIQUE/EXAM DESCRIPTION:  CT angiogram scan for pulmonary embolism with contrast, with reconstructions.     1.25 mm helical sections were obtained from the lung apices through the lung bases following the rapid bolus administration of Omnipaque 350 nonionic contrast. Thin-section overlapping reconstruction interval was utilized. Coronal reconstructions were   generated from the axial data. MIP post processing was performed and utilized for the interpretation.     Low dose optimization technique was utilized for this CT exam including automated exposure control and adjustment of the mA and/or kV according to patient size.     COMPARISON: None     FINDINGS:  Pulmonary Embolism: No.     Main Pulmonary Arteries: No.     Segmental branches: No.     Subsegmental branches: No.     Additional Comments: None.     Lungs: Airspace opacities in the bilateral upper lobes and lower lobes, most in the left lower lobe     Airway: Patent.     Pleura: No pleural effusion or pneumothorax.     Nodes: No enlarged mediastinal or hilar lymph nodes.        Additional findings:     Thoracic aorta and great vessels:  No aneurysm.     Heart and pericardium:   No significant coronary artery calcification. No pericardial effusion.     Thoracic spine:  No fracture or malalignment. No compression deformity.     Chest wall:   Unremarkable.     Visualized upper abdomen: No acute abnormality.     IMPRESSION:        1. No CT evidence of pulmonary embolism.     2. Airspace opacities in the bilateral upper lobes and lower lobes, most in the left lower lobe, in keeping with multifocal  pneumonia           LABS:  Recent Labs     03/29/23  0743 03/30/23  0030 03/31/23  0046   SODIUM 124* 130* 128*   POTASSIUM 4.7 4.1 4.3   CHLORIDE 87* 98 99   CO2 23 21 18*   GLUCOSE 113* 113* 110*   BUN 17 20 24*   CREATININE 1.02 1.07 1.18   CALCIUM 8.9 7.9* 8.1*     Recent Labs     03/29/23  0743 03/30/23  0030 03/31/23  0046   WBC 17.4* 13.9* 14.2*   RBC 3.89* 3.47* 3.37*   HEMOGLOBIN 12.8 11.4* 11.0*   HEMATOCRIT 36.6* 33.4* 33.3*   MCV 94.1 96.3 98.8*   MCH 32.9 32.9 32.6   MCHC 35.0 34.1 33.0*   RDW 41.1 42.5 44.8   PLATELETCT 258 238 246   MPV 9.9 9.7 9.1         PRIMARY REHAB DIAGNOSIS:    This patient is a 89 y.o. female admitted for acute inpatient rehabilitation with   Debility secondary to hospitalization for most of the lobar pneumonia.    IMPAIRMENTS:   Cognitive  ADLs/IADLs  Mobility    SECONDARY DIAGNOSIS/MEDICAL CO-MORBIDITIES AFFECTING FUNCTION:    Pneumonia  Leukocytosis  Respiratory failure with hypoxia  Bacterial conjunctivitis  History of depression  Hyponatremia  Chronic kidney disease  Hypertension  Insomnia        RELEVANT CHANGES SINCE PREADMISSION EVALUATION:    Status unchanged    The patient's rehabilitation potential is Very Good  The patient's medical prognosis is good    PLAN:   Discussion and Recommendations, discussed with the patient and/or family:   1. The patient requires an acute inpatient rehabilitation program with a coordinated program of care at an intensity and frequency not available at a lower level of care. This recommendation is substantiated by the patient's medical physicians who recommend that the patient's intervention and assessment of medical issues needs to be done at an acute level of care for patient's safety and maximum outcome.     2. A coordinated program of care will be supplied by an interdisciplinary team of physical therapy, occupational therapy, rehab physician, rehab nursing, and, if needed, speech therapy and rehab psychology. Rehab team presents a  patient-specific rehabilitation and education program concentrating on prevention of future problems related to accessibility, mobility, skin, bowel, bladder, sexuality, and psychosocial and medical/surgical problems.     3. Need for Rehabilitation Physician: The rehab physician will be evaluating the patient on a multi-weekly basis to help coordinate the program of care. The rehab physician communicates between medical physicians, therapists, and nurses to maximize the patient's potential outcome. Specific areas in which the rehab physician will be providing daily assessment include the following:   A. Assessing the patient's heart rate and blood pressure response (vitals monitoring) to activity and making adjustments in medications or conservative measures as needed.   B. The rehab physician will be assessing the frequency at which the program can be increased to allow the patient to reach optimal functional outcome.   C. The rehab physician will also provide assessments in daily skin care, especially in light of patient's impairments in mobility.   D. The rehab physician will provide special expertise in understanding how to work with functional impairment and recommend appropriate interventions, compensatory techniques, and education that will facilitate the patient's outcome.     4. Rehab R.N.   The rehab RN will be working with patient to carry over in room mobility and activities of daily living when the patient is not in 3 hours of skilled therapy. Rehab nursing will be working in conjunction with rehab physician to address all the medical issues above and continue to assess laboratory work and discuss abnormalities with the treating physicians, assess vitals, and response to activity, and discuss and report abnormalities with the rehab physician. Rehab RN will also continue daily skin care, supervise bladder/bowel program, instruct in medication administration, and ensure patient safety.     5. Therapies to  treat at intensity and frequency of (may change after completion of evaluation by all therapeutic disciplines):       PT:  Physical therapy to address mobility, transfer, gait training and evaluation for adaptive equipment needs 1hour/day at least 5 days/week for the duration of the ELOS (see below)       OT:  Occupational therapy to address ADLs, self-care, home management training, functional mobility/transfers and assistive device evaluation, and community re-integration 1hour/day at least 5 days/week for the duration of the ELOS (see below).        ST/Dysphagia:  Speech therapy to address speech, language, and cognitive deficits as well as swallowing difficulties with retraining/dysphagia management and community re-integration with comprehension, expression, cognitive training 1hour/day at least 5 days/week for the duration of the ELOS (see below).     6. Medical management / Rehabilitation Issues/Adverse Potential affecting function as part of rehabilitation plan.    Pneumonia  Complete IV antibiotics  Consult hospitalist    Leukocytosis, continues  Check morning labs  Continue IV antibiotics  Check procalcitonin  Consult hospitalist    Respiratory failure with hypoxia  Titrate oxygen    Bacterial conjunctivitis, right side  Start Cipro eyedrops    History of depression  Continue sertraline    Hyponatremia  Continue salt tabs for now  Check urine osmolality  Consult hospitalist    Chronic kidney disease  Check morning labs  Avoid nephrotoxins  Renally dose medications     Hypertension  Restart losartan at half normal dose  Consult hospitalist    Insomnia  Melatonin ineffective at acute hospital  Trial of Atarax    I performed a complete drug regimen review and did not identify any potential clinically significant medication issues.    The patient's CODE STATUS was confirmed as DNR/DNI on admission, with the patient and/or family at bedside.    REHABILITATION ISSUES/ADVERSE POTENTIAL:  1.  Debility: Patient  demonstrates functional deficits in strength, balance, coordination, and ADL's. Patient is admitted to [facility] for comprehensive rehabilitation therapy as described below.   Rehabilitation nursing monitors bowel and bladder control, educates on medication administration, co-morbidities and monitors patient safety.    2.  DVT prophylaxis:  Patient is on Xarelto for anticoagulation upon transfer. Encourage OOB. Monitor daily for signs and symptoms of DVT including but not limited to swelling and pain to prevent the development of DVT that may interfere with therapies.    3.  Pain: No issues with pain currently / Controlled with as needed oral analgesics.    4.  Nutrition/Dysphagia: Dietician monitors nutrient intake, recommend supplements prn and provide nutrition education to pt/family to promote optimal nutrition for wound healing/recovery.     5.  Bladder/bowel:  Start bowel and bladder program, to prevent constipation, urinary retention (which may lead to UTI), and urinary incontinence (which will impact upon pt's functional independence).   - TV Q3h while awake with post void bladder scans, I&O cath for PVRs >400  - up to commode after meal     6.  Skin/dermal ulcer prophylaxis: Monitor for new skin conditions with q.2 h. turns as required to prevent the development of skin breakdown.     7.  GI prophylaxis: Omeprazole to prevent gastritis or GI bleed that would interfere with therapies.    8. Respiratory therapy: RT performs O2 management prn, breathing retraining, pulmonary hygiene and bronchospasm management prn to optimize participation in therapies.    Pt was seen today for 75 min, and entire time spent in face-to-face contact was >50% in counseling and coordination of care as detailed in A/P above.        GOALS/EXPECTED LEVEL OF FUNCTION BASED ON CURRENT MEDICAL AND FUNCTIONAL STATUS (may change based on patient's medical status and rate of impairment recovery):  Transfers:   Modified  Independent  Mobility/Gait:   Modified Independent  ADL's:   Supervision    DISPOSITION: Discharge to pre-morbid independent living setting with the supportive care of patient's caregiver(s).      ELOS: 7-10 days    Sailaja Mallory M.D.  Physical Medicine and Rehabilitation

## 2023-03-31 NOTE — DISCHARGE PLANNING
Follow up for post acute services. Spoke with daughter Kailee about goals and expectation for IRF level of care. Discussed therapy plan for 3 hours per day 5 days a week. Discussed therapy schedules 30/45 or 60 minute sessions typically 1.5 hours between breakfast and lunch and another 1.5 hours between lunch and dinner. Discussed PT/OT and SLP roles. Discussed potential length of stay. Discussed insurance Medicare and [employer] coverage for the program. Discussed Covid visitation and clothing requirements. Kailee will be primary support for Chrissie to return to the community. Discussed participation with therapy program when visiting.  Family goal for MOD I to return ILF. Daughter will discuss with her mother possibility of assisted living also. If assisted living is not available at time of discharge and Chrissie would require additional support daughter Kailee will bring her home with her. Financial support is not going to be a barrier. Daughter is agreeable to transfer to [facility] when medically cleared.

## 2023-03-31 NOTE — CARE PLAN
The patient is Stable - Low risk of patient condition declining or worsening    Shift Goals  Clinical Goals: Replace Na, Abx, SNF placement.  Patient Goals: Discharge    Progress made toward(s) clinical / shift goals:    Problem: Respiratory  Goal: Patient will achieve/maintain optimum respiratory ventilation and gas exchange  Outcome: Progressing  Note: Patient is on 2L via NC     Problem: Discharge Planning - Pneumonia  Goal: Patient will verbalize understanding of lifestyle changes and therapeutic needs post discharge  Outcome: Progressing  Note: PT recommending SNF. Pending placement. Provide incentive spirometry education.       Patient is not progressing towards the following goals:

## 2023-03-31 NOTE — PROGRESS NOTES
Telemetry Shift Summary     Rhythm: SR w/ 1st Degree HB, BBB  Rate: 70s-90s  Measurements: 0.26/0.12/0.38  Ectopy (reported by Monitor Tech): rare PAC/PVC, rare BPAC, rare Couplets/Trigeminy      Normal Values  Rhythm: Sinus  HR:   Measurements: 0.12-0.20/0.06-0.10/0.30-0.52

## 2023-04-01 ENCOUNTER — APPOINTMENT (OUTPATIENT)
Dept: SPEECH THERAPY | Facility: REHABILITATION | Age: 88
DRG: 193 | End: 2023-04-01
Attending: PHYSICAL MEDICINE & REHABILITATION
Payer: MEDICARE

## 2023-04-01 ENCOUNTER — APPOINTMENT (OUTPATIENT)
Dept: OCCUPATIONAL THERAPY | Facility: REHABILITATION | Age: 88
DRG: 193 | End: 2023-04-01
Attending: PHYSICAL MEDICINE & REHABILITATION
Payer: MEDICARE

## 2023-04-01 PROBLEM — R74.01 TRANSAMINITIS: Status: ACTIVE | Noted: 2023-04-01

## 2023-04-01 LAB
ALBUMIN SERPL BCP-MCNC: 2.9 G/DL (ref 3.2–4.9)
ALBUMIN/GLOB SERPL: 1.1 G/DL
ALP SERPL-CCNC: 274 U/L (ref 30–99)
ALT SERPL-CCNC: 109 U/L (ref 2–50)
ANION GAP SERPL CALC-SCNC: 12 MMOL/L (ref 7–16)
APPEARANCE UR: CLEAR
AST SERPL-CCNC: 105 U/L (ref 12–45)
BACTERIA #/AREA URNS HPF: NEGATIVE /HPF
BASOPHILS # BLD AUTO: 0.3 % (ref 0–1.8)
BASOPHILS # BLD: 0.04 K/UL (ref 0–0.12)
BILIRUB SERPL-MCNC: 0.9 MG/DL (ref 0.1–1.5)
BILIRUB UR QL STRIP.AUTO: NEGATIVE
BUN SERPL-MCNC: 17 MG/DL (ref 8–22)
CALCIUM ALBUM COR SERPL-MCNC: 8.9 MG/DL (ref 8.5–10.5)
CALCIUM SERPL-MCNC: 8 MG/DL (ref 8.5–10.5)
CHLORIDE SERPL-SCNC: 100 MMOL/L (ref 96–112)
CO2 SERPL-SCNC: 22 MMOL/L (ref 20–33)
COLOR UR: YELLOW
CREAT SERPL-MCNC: 0.84 MG/DL (ref 0.5–1.4)
EOSINOPHIL # BLD AUTO: 0.03 K/UL (ref 0–0.51)
EOSINOPHIL NFR BLD: 0.2 % (ref 0–6.9)
EPI CELLS #/AREA URNS HPF: ABNORMAL /HPF
ERYTHROCYTE [DISTWIDTH] IN BLOOD BY AUTOMATED COUNT: 43.2 FL (ref 35.9–50)
GFR SERPLBLD CREATININE-BSD FMLA CKD-EPI: 65 ML/MIN/1.73 M 2
GLOBULIN SER CALC-MCNC: 2.6 G/DL (ref 1.9–3.5)
GLUCOSE SERPL-MCNC: 102 MG/DL (ref 65–99)
GLUCOSE UR STRIP.AUTO-MCNC: NEGATIVE MG/DL
HCT VFR BLD AUTO: 33.1 % (ref 37–47)
HGB BLD-MCNC: 11.1 G/DL (ref 12–16)
HYALINE CASTS #/AREA URNS LPF: ABNORMAL /LPF
IMM GRANULOCYTES # BLD AUTO: 0.17 K/UL (ref 0–0.11)
IMM GRANULOCYTES NFR BLD AUTO: 1.4 % (ref 0–0.9)
KETONES UR STRIP.AUTO-MCNC: NEGATIVE MG/DL
LEUKOCYTE ESTERASE UR QL STRIP.AUTO: ABNORMAL
LYMPHOCYTES # BLD AUTO: 1.08 K/UL (ref 1–4.8)
LYMPHOCYTES NFR BLD: 9 % (ref 22–41)
MAGNESIUM SERPL-MCNC: 1.9 MG/DL (ref 1.5–2.5)
MCH RBC QN AUTO: 32.1 PG (ref 27–33)
MCHC RBC AUTO-ENTMCNC: 33.5 G/DL (ref 33.6–35)
MCV RBC AUTO: 95.7 FL (ref 81.4–97.8)
MICRO URNS: ABNORMAL
MONOCYTES # BLD AUTO: 1.32 K/UL (ref 0–0.85)
MONOCYTES NFR BLD AUTO: 10.9 % (ref 0–13.4)
NEUTROPHILS # BLD AUTO: 9.42 K/UL (ref 2–7.15)
NEUTROPHILS NFR BLD: 78.2 % (ref 44–72)
NITRITE UR QL STRIP.AUTO: NEGATIVE
NRBC # BLD AUTO: 0 K/UL
NRBC BLD-RTO: 0 /100 WBC
NT-PROBNP SERPL IA-MCNC: 5293 PG/ML (ref 0–125)
OSMOLALITY UR: 522 MOSM/KG H2O (ref 300–900)
PH UR STRIP.AUTO: 5.5 [PH] (ref 5–8)
PLATELET # BLD AUTO: 272 K/UL (ref 164–446)
PMV BLD AUTO: 9.2 FL (ref 9–12.9)
POTASSIUM SERPL-SCNC: 3.8 MMOL/L (ref 3.6–5.5)
PROCALCITONIN SERPL-MCNC: 0.24 NG/ML
PROT SERPL-MCNC: 5.5 G/DL (ref 6–8.2)
PROT UR QL STRIP: 30 MG/DL
RBC # BLD AUTO: 3.46 M/UL (ref 4.2–5.4)
RBC # URNS HPF: ABNORMAL /HPF
RBC UR QL AUTO: ABNORMAL
SODIUM SERPL-SCNC: 134 MMOL/L (ref 135–145)
SP GR UR STRIP.AUTO: 1.02
UROBILINOGEN UR STRIP.AUTO-MCNC: 1 MG/DL
WBC # BLD AUTO: 12.1 K/UL (ref 4.8–10.8)
WBC #/AREA URNS HPF: ABNORMAL /HPF

## 2023-04-01 PROCEDURE — 99222 1ST HOSP IP/OBS MODERATE 55: CPT | Performed by: HOSPITALIST

## 2023-04-01 PROCEDURE — 97166 OT EVAL MOD COMPLEX 45 MIN: CPT

## 2023-04-01 PROCEDURE — 83880 ASSAY OF NATRIURETIC PEPTIDE: CPT

## 2023-04-01 PROCEDURE — 85025 COMPLETE CBC W/AUTO DIFF WBC: CPT

## 2023-04-01 PROCEDURE — 700111 HCHG RX REV CODE 636 W/ 250 OVERRIDE (IP): Performed by: PHYSICAL MEDICINE & REHABILITATION

## 2023-04-01 PROCEDURE — 700105 HCHG RX REV CODE 258: Performed by: PHYSICAL MEDICINE & REHABILITATION

## 2023-04-01 PROCEDURE — 83735 ASSAY OF MAGNESIUM: CPT

## 2023-04-01 PROCEDURE — 770010 HCHG ROOM/CARE - REHAB SEMI PRIVAT*

## 2023-04-01 PROCEDURE — 36415 COLL VENOUS BLD VENIPUNCTURE: CPT

## 2023-04-01 PROCEDURE — 81001 URINALYSIS AUTO W/SCOPE: CPT

## 2023-04-01 PROCEDURE — 99232 SBSQ HOSP IP/OBS MODERATE 35: CPT | Performed by: PHYSICAL MEDICINE & REHABILITATION

## 2023-04-01 PROCEDURE — A9270 NON-COVERED ITEM OR SERVICE: HCPCS | Performed by: PHYSICAL MEDICINE & REHABILITATION

## 2023-04-01 PROCEDURE — 97162 PT EVAL MOD COMPLEX 30 MIN: CPT

## 2023-04-01 PROCEDURE — 84145 PROCALCITONIN (PCT): CPT

## 2023-04-01 PROCEDURE — 83935 ASSAY OF URINE OSMOLALITY: CPT

## 2023-04-01 PROCEDURE — 80053 COMPREHEN METABOLIC PANEL: CPT

## 2023-04-01 PROCEDURE — 700102 HCHG RX REV CODE 250 W/ 637 OVERRIDE(OP): Performed by: PHYSICAL MEDICINE & REHABILITATION

## 2023-04-01 PROCEDURE — 97535 SELF CARE MNGMENT TRAINING: CPT

## 2023-04-01 PROCEDURE — 92523 SPEECH SOUND LANG COMPREHEN: CPT

## 2023-04-01 RX ADMIN — CIPROFLOXACIN 1 DROP: 3 SOLUTION OPHTHALMIC at 14:00

## 2023-04-01 RX ADMIN — SERTRALINE HYDROCHLORIDE 25 MG: 50 TABLET, FILM COATED ORAL at 09:45

## 2023-04-01 RX ADMIN — SENNOSIDES AND DOCUSATE SODIUM 2 TABLET: 50; 8.6 TABLET ORAL at 09:45

## 2023-04-01 RX ADMIN — CIPROFLOXACIN 1 DROP: 3 SOLUTION OPHTHALMIC at 05:28

## 2023-04-01 RX ADMIN — CIPROFLOXACIN 1 DROP: 3 SOLUTION OPHTHALMIC at 21:35

## 2023-04-01 RX ADMIN — CIPROFLOXACIN 1 DROP: 3 SOLUTION OPHTHALMIC at 18:20

## 2023-04-01 RX ADMIN — CIPROFLOXACIN 1 DROP: 3 SOLUTION OPHTHALMIC at 09:46

## 2023-04-01 RX ADMIN — AMPICILLIN AND SULBACTAM 3 G: 1; 2 INJECTION, POWDER, FOR SOLUTION INTRAMUSCULAR; INTRAVENOUS at 21:27

## 2023-04-01 RX ADMIN — SODIUM CHLORIDE 1 G: 1 TABLET ORAL at 18:20

## 2023-04-01 RX ADMIN — SODIUM CHLORIDE 1 G: 1 TABLET ORAL at 11:18

## 2023-04-01 RX ADMIN — LOSARTAN POTASSIUM 25 MG: 25 TABLET, FILM COATED ORAL at 05:28

## 2023-04-01 RX ADMIN — SODIUM CHLORIDE 1 G: 1 TABLET ORAL at 08:20

## 2023-04-01 RX ADMIN — RIVAROXABAN 10 MG: 10 TABLET, FILM COATED ORAL at 18:20

## 2023-04-01 RX ADMIN — AMPICILLIN AND SULBACTAM 3 G: 1; 2 INJECTION, POWDER, FOR SOLUTION INTRAMUSCULAR; INTRAVENOUS at 08:16

## 2023-04-01 ASSESSMENT — ENCOUNTER SYMPTOMS
SHORTNESS OF BREATH: 0
MUSCULOSKELETAL NEGATIVE: 1
COUGH: 0
PALPITATIONS: 0
FEVER: 0
NAUSEA: 0
DEPRESSION: 1
VOMITING: 0
BRUISES/BLEEDS EASILY: 0
EYES NEGATIVE: 1
CHILLS: 0
ABDOMINAL PAIN: 0
POLYDIPSIA: 0

## 2023-04-01 ASSESSMENT — ACTIVITIES OF DAILY LIVING (ADL)
TOILET_TRANSFER_DESCRIPTION: GRAB BAR;INCREASED TIME;SET-UP OF EQUIPMENT;VERBAL CUEING;SUPERVISION FOR SAFETY
TOILETING: INDEPENDENT
BED_CHAIR_WHEELCHAIR_TRANSFER_DESCRIPTION: INCREASED TIME;SUPERVISION FOR SAFETY;VERBAL CUEING;INITIAL PREPARATION FOR TASK
TUB_SHOWER_TRANSFER_DESCRIPTION: GRAB BAR;SHOWER BENCH;INCREASED TIME;SUPERVISION FOR SAFETY;VERBAL CUEING
BED_CHAIR_WHEELCHAIR_TRANSFER_DESCRIPTION: SUPERVISION FOR SAFETY;SET-UP OF EQUIPMENT;VERBAL CUEING

## 2023-04-01 ASSESSMENT — BALANCE ASSESSMENTS
TRANSFERS: 3
REACHING FORWARD WITH OUTSTRETCHED ARM WHILE STANDING: 1
TURN 360 DEGREES: 0
STANDING ON ONE LEG: 0
PLACE ALTERNATE FOOT ON STEP OR STOOL WHILE STANDING UNSUPPORTED: 0
STANDING UNSUPPORTED ONE FOOT IN FRONT: 0
PICK UP OBJECT FROM THE FLOOR FROM A STANDING POSITION: 1
STANDING UNSUPPORTED: 4
LONG VERSION TOTAL SCORE (MAX 56): 22
SITTING TO STANDING: 3
STANDING UNSUPPORTED WITH FEET TOGETHER: 0
STANDING UNSUPPORTED WITH EYES CLOSED: 3
STANDING TO SITTING: 3
LONG VERSION TOTAL SCORE (MAX 56): 22
LOOK OVER LEFT AND RIGHT SHOULDERS WHILE STANDING: 0
SITTING UNSUPPORTED: 4

## 2023-04-01 ASSESSMENT — BRIEF INTERVIEW FOR MENTAL STATUS (BIMS)
ASKED TO RECALL BLUE: YES, NO CUE REQUIRED
INITIAL REPETITION OF BED BLUE SOCK - FIRST ATTEMPT: 3
ASKED TO RECALL BED: YES, NO CUE REQUIRED
INITIAL REPETITION OF BED BLUE SOCK - FIRST ATTEMPT: 3
WHAT MONTH IS IT: ACCURATE WITHIN 5 DAYS
WHAT YEAR IS IT: CORRECT
BIMS SUMMARY SCORE: 14
ASKED TO RECALL SOCK: YES, NO CUE REQUIRED
WHAT DAY OF THE WEEK IS IT: INCORRECT
ASKED TO RECALL SOCK: YES, NO CUE REQUIRED
ASKED TO RECALL BLUE: YES, NO CUE REQUIRED
BIMS SUMMARY SCORE: 15
WHAT MONTH IS IT: ACCURATE WITHIN 5 DAYS
WHAT DAY OF THE WEEK IS IT: CORRECT
ASKED TO RECALL BED: YES, NO CUE REQUIRED
WHAT YEAR IS IT: CORRECT

## 2023-04-01 ASSESSMENT — GAIT ASSESSMENTS
DISTANCE (FEET): 80
GAIT LEVEL OF ASSIST: CONTACT GUARD ASSIST
ASSISTIVE DEVICE: FRONT WHEEL WALKER
DEVIATION: SHUFFLED GAIT;DECREASED BASE OF SUPPORT

## 2023-04-01 NOTE — CONSULTS
HOSPITAL MEDICINE CONSULTATION    Requesting Physician:  Dr. Mallory    Reason for Consult:  Pneumonia, Hypertension, Hyponatremia    History of Present Illness:  The patient is a 89-year-old  female with past medical history significant for hypertension, hyponatremia, macular degeneration, and depression.  She was admitted to [facility] on 3/29/23 for malaise and cough.  CT angiogram of the chest was negative for pulmonary embolism but revealed multifocal pneumonia.  The patient is being treated with Unasyn.  Due to her ongoing functional debility, the patient was transferred to [facility] on 3/31/23.  Hospital Medicine consultation is requested to assist in the management of this patient's PNA, HTN, and hyponatremia.  She is also noted to have transaminitis.    Review of Systems:  Review of Systems   Constitutional:  Negative for chills and fever.   HENT: Negative.     Eyes: Negative.    Respiratory:  Negative for cough and shortness of breath.    Cardiovascular:  Negative for chest pain and palpitations.   Gastrointestinal:  Negative for abdominal pain, nausea and vomiting.   Genitourinary: Negative.    Musculoskeletal: Negative.    Skin:  Negative for itching and rash.   Endo/Heme/Allergies:  Negative for polydipsia. Does not bruise/bleed easily.   Psychiatric/Behavioral:  Positive for depression.    All other systems reviewed and are negative.    Allergies:  No Known Allergies    Medications:    Current Facility-Administered Medications:     melatonin tablet 3 mg, 3 mg, Oral, HS PRN, Sailaja Mallory M.D., 3 mg at 03/31/23 2127    Respiratory Therapy Consult, , Nebulization, Continuous RT, Sailaja Mallory M.D.    acetaminophen (Tylenol) tablet 650 mg, 650 mg, Oral, Q4HRS PRN, Sailaja Mallory M.D.    lactulose 20 GM/30ML solution 30 mL, 30 mL, Oral, QDAY PRN, Sailaja Mallory M.D.    docusate sodium (ENEMEEZ) enema 283 mg, 283 mg, Rectal, QDAY PRN,  Sailaja Mallory M.D.    carboxymethylcellulose (REFRESH TEARS) 0.5 % ophthalmic drops 1 Drop, 1 Drop, Both Eyes, PRN, Sailaja Mallory M.D.    benzocaine-menthol (Cepacol) lozenge 1 Lozenge, 1 Lozenge, Mouth/Throat, Q2HRS PRN, Sailaja Mallory M.D.    mag hydrox-al hydrox-simeth (MAALOX PLUS ES or MYLANTA DS) suspension 20 mL, 20 mL, Oral, Q2HRS PRN, Sailjaa Mallory M.D.    ondansetron (ZOFRAN ODT) dispertab 4 mg, 4 mg, Oral, 4X/DAY PRN **OR** ondansetron (ZOFRAN) syringe/vial injection 4 mg, 4 mg, Intramuscular, 4X/DAY PRN, Sailaja Mallory M.D.    sodium chloride (OCEAN) 0.65 % nasal spray 2 Spray, 2 Spray, Nasal, PRN, Sailaja Mallory M.D.    senna-docusate (PERICOLACE or SENOKOT S) 8.6-50 MG per tablet 2 Tablet, 2 Tablet, Oral, BID, 2 Tablet at 03/31/23 2127 **AND** polyethylene glycol/lytes (MIRALAX) PACKET 1 Packet, 1 Packet, Oral, QDAY PRN **AND** magnesium hydroxide (MILK OF MAGNESIA) suspension 30 mL, 30 mL, Oral, QDAY PRN **AND** bisacodyl (DULCOLAX) suppository 10 mg, 10 mg, Rectal, QDAY PRN, Sailaja Mallory M.D.    ampicillin/sulbactam (UNASYN) 3 g in  mL IVPB, 3 g, Intravenous, Q12HRS, Sailaja Mallory M.D., Last Rate: 200 mL/hr at 04/01/23 0816, 3 g at 04/01/23 0816    guaiFENesin dextromethorphan (ROBITUSSIN DM) 100-10 MG/5ML syrup 10 mL, 10 mL, Oral, Q6HRS PRN, Sailaja Mallory M.D.    rivaroxaban (XARELTO) tablet 10 mg, 10 mg, Oral, DAILY AT 1800, Sailaja Mallory M.D., 10 mg at 03/31/23 1753    sertraline (Zoloft) tablet 25 mg, 25 mg, Oral, DAILY, Sailaja Mallory M.D.    sodium chloride (SALT) tablet 1 g, 1 g, Oral, TID WITH MEALS, Sailaja Mallory M.D., 1 g at 04/01/23 0820    ciprofloxacin (CILOXIN) 0.3 % ophthalmic solution 1 Drop, 1 Drop, Right Eye, Q4HRS WHILE AWAKE, Sailaja Mallory M.D., 1 Drop at 04/01/23 0528    losartan (COZAAR) tablet 25 mg, 25 mg, Oral, Q DAY, Sailaja Mallory M.D., 25 mg at 04/01/23 0528    hydrOXYzine HCl (ATARAX) tablet 50 mg, 50  mg, Oral, Q6HRS PRN, Sailaja Mallory M.D., 50 mg at 23    Past Medical/Surgical History:  Past Medical History:   Diagnosis Date    Depression     Hypertension     Hyponatremia     Hypothyroidism     Macular degeneration      Past Surgical History:   Procedure Laterality Date    APPENDECTOMY      OVARIAN CYSTECTOMY         Social History:  Social History     Socioeconomic History    Marital status: Single     Spouse name: Not on file    Number of children: Not on file    Years of education: Not on file    Highest education level: Not on file   Occupational History    Not on file   Tobacco Use    Smoking status: Former     Packs/day: 0.50     Years: 30.00     Pack years: 15.00     Types: Cigarettes     Quit date: 1974     Years since quittin.8    Smokeless tobacco: Never   Vaping Use    Vaping Use: Never used   Substance and Sexual Activity    Alcohol use: Yes     Alcohol/week: 4.2 oz     Types: 7 Glasses of wine per week     Comment: glass of wine everyday     Drug use: Not Currently    Sexual activity: Not on file   Other Topics Concern    Not on file   Social History Narrative    Not on file     Social Determinants of Health     Financial Resource Strain: Not on file   Food Insecurity: Not on file   Transportation Needs: Not on file   Physical Activity: Not on file   Stress: Not on file   Social Connections: Not on file   Intimate Partner Violence: Not on file   Housing Stability: Not on file       Family History:  Family History   Problem Relation Age of Onset    Cancer Mother     Stroke Father        Physical Examination:   Vitals:    23 1850 23 1925 23 0526 23 0620   BP: (!) 145/68  129/74 126/70   Pulse: 83 90 80 71   Resp: 20 18  18   Temp: 37.1 °C (98.8 °F)   36.5 °C (97.7 °F)   TempSrc: Oral   Oral   SpO2: 95% 96% 92% 91%   Weight:       Height:           Physical Exam  Vitals reviewed.   Constitutional:       General: She is not in acute distress.      Appearance: Normal appearance. She is not ill-appearing.   HENT:      Head: Normocephalic and atraumatic.      Right Ear: External ear normal.      Left Ear: External ear normal.      Nose: Nose normal.      Mouth/Throat:      Pharynx: Oropharynx is clear.   Eyes:      General:         Right eye: No discharge.         Left eye: No discharge.      Extraocular Movements: Extraocular movements intact.      Conjunctiva/sclera: Conjunctivae normal.   Cardiovascular:      Rate and Rhythm: Normal rate and regular rhythm.   Pulmonary:      Effort: No respiratory distress.      Breath sounds: No wheezing.      Comments: Decreased BS  Abdominal:      General: Bowel sounds are normal. There is no distension.      Palpations: Abdomen is soft.      Tenderness: There is no abdominal tenderness.   Musculoskeletal:      Cervical back: Normal range of motion and neck supple.      Right lower leg: Edema present.      Left lower leg: Edema present.   Skin:     General: Skin is warm and dry.   Neurological:      Mental Status: She is alert and oriented to person, place, and time.       Laboratory Data:  Recent Labs     03/30/23  0030 03/31/23 0046 04/01/23  0632   WBC 13.9* 14.2* 12.1*   RBC 3.47* 3.37* 3.46*   HEMOGLOBIN 11.4* 11.0* 11.1*   HEMATOCRIT 33.4* 33.3* 33.1*   MCV 96.3 98.8* 95.7   MCH 32.9 32.6 32.1   MCHC 34.1 33.0* 33.5*   RDW 42.5 44.8 43.2   PLATELETCT 238 246 272   MPV 9.7 9.1 9.2     Recent Labs     03/30/23  0030 03/31/23  0046 04/01/23  0632   SODIUM 130* 128* 134*   POTASSIUM 4.1 4.3 3.8   CHLORIDE 98 99 100   CO2 21 18* 22   GLUCOSE 113* 110* 102*   BUN 20 24* 17   CREATININE 1.07 1.18 0.84   CALCIUM 7.9* 8.1* 8.0*             Impressions/Recommendations:  Acute bacterial conjunctivitis of right eye  On Cipro eye gtts per Physiatry    Chronic hyponatremia  Suspect 2/2 Zoloft  On NaCl tabs  Follow electrolytes    Essential hypertension  Observe blood pressure trends on Losartan    History of macular  degeneration  Takes OTC Preservision    Multifocal pneumonia  SARS-CoV-2 negative 3/28/23  Unclear why repeat COVID-19 screening test not done upon Rehab admission  CTA Chest 3/29/23 +multifocal PNA, negative for PE  Clinically improving on Unasyn, complete abx course    Major depressive disorder  On Zoloft    Transaminitis  Likely 2/2 abx  Pursue further work up if persists or develops GI symptoms  Follow LFT's for now    Elevated brain natriuretic peptide (BNP) level  Echo EF 75%  Check CXR    DNR    Discussed with patient, daughter, and RN.  Thank you for the opportunity to assist in this patient's care.  We will continue to follow along with you.

## 2023-04-01 NOTE — PROGRESS NOTES
Patient admitted to facility at 1545 via wheelchair; accompanied by hospital transport.  Patient assisted to room and positioned in bed for comfort and safety; call light within reach.  Patient assisted with stowing belongings and oriented to room and facility.  Admission assessment performed and documented in computer. Patient received and reviewed education binder. Admission paperwork completed; signed copies placed in chart.  Will continue to monitor.

## 2023-04-01 NOTE — FLOWSHEET NOTE
03/31/23 1932   Incentive Spirometry Treatment   Incentive Spirometer Volume 1000 mL  (Good effort)   Incentive Spirometer Next Change Date 04/28/23

## 2023-04-01 NOTE — PROGRESS NOTES
"0600- Pt is having small loose hard BMs thru the night which mixes with her urine output too. Unable to collect UA,Offered her MOM this am so she'll have a good large result the next time she goes but she refused and said that she'll be going more frequently with accidents. She said \"maybe after breakfast.\" Will continue to follow up.  "

## 2023-04-01 NOTE — CARE PLAN
"  Problem: Fall Risk - Rehab  Goal: Patient will remain free from falls  Outcome: Progressing   Benita Vaughn Fall risk Assessment Score: 16    High fall risk Interventions   - Alarming seatbelt  - Wander guard  - Bed and strip alarm   - Yellow sign by the door   - Yellow wrist band \"Fall risk\"  - Room near to the nurse station  - Do not leave patient unattended in the bathroom  - Fall risk education provided           Problem: Infection  Goal: Patient will remain free from infection  Outcome: Progressing   Patient verbalized understanding infection prevention education.       "

## 2023-04-01 NOTE — THERAPY
"Speech Language Pathology   Initial Assessment     Patient Name: Chrissie Dueñas  AGE:  89 y.o., SEX:  female  Medical Record #: [id number]  Today's Date: 4/1/2023     Subjective    Per H&P: \"Patient is a 89 y.o. female  with a past medical history of hypertension, macular degeneration, depression, chronic hyponatremia, chronic kidney disease, hypothyroidism, admitted to [facility] on 3/29, with decreased appetite x1 month secondary to persistent nausea, dry cough over several days, hoarse voice, and diarrhea.     Labs with elevated D-dimer, BNP, leukocytosis to 17.4, hyponatremia with sodium of 124, and elevated troponin.  Patient was hypoxic requiring oxygen.  Patient had a CT of the chest which was negative for pulmonary emboli but did show multifocal pneumonia.  She was admitted for IV antibiotics and treatment of her pneumonia.  She had an echocardiogram that showed ejection fraction of 75% and hyperdynamic left ventricular systolic function.  Patient's TSH was slightly elevated at 5.4, with normal free T4 at 1.2.  Patient continues to have a leukocytosis and her procalcitonin has increased.  Blood cultures negative to growth thus far.     Patient currently reports cough, hoarse voice and polyuria.  She reports she has had low sodium for about a year with unclear cause.  She reports continued poor appetite ever since she has been ill in the last week.  She denies any pain.  She reports blurry vision in her right eye which is chronic.  She is currently reporting some right eye discharge and pain.\"     Objective       04/01/23 1331   Evaluation Charges   SLP Speech Language Evaluation Speech Sound Language Comprehension   SLP Total Time Spent   SLP Individual Total Time Spent (Mins) 60   Precautions   Precautions Fall Risk   Comments Conjunctivitis, 2 L O2   Prior Living Situation   Prior Services Home-Independent   Housing / Facility Independent Living Facility   Lives with - Patient's Self " "Care Capacity Alone and Able to Care For Self   Prior Level Of Function   Communication Within Functional Limits   Swallow Within Functional Limits   Dentition Intact   Dentures None   Hearing Within Functional Limits for Evaluation   Hearing Aid Right;Left   Vision Reading ;Distance   Patient's Primary Language English   Occupation (Pre-Hospital Vocational) Retired Due To Age   Comments 1st-    Receptive Language / Auditory Comprehension   Receptive Language / Auditory Comprehension WDL   Expressive Language   Expressive Language (WDL) WDL   Reading Comprehension    Reading Comprehension (WDL) WDL   Written Language Expression   Written Language Expression (WDL) WDL   Dominant Hand Right   Cognition   Cognitive-Linguistic (WDL) X   Attention to Task Within Functional Limits (6-7)   Complex Reasoning  / Problem Solving Minimal (4)   Functional Problem Solving Minimal (4)   Safety Awareness Supervision (5)   Insight into Deficits Supervision (5)   Executive Functioning / Organization Minimal (4)   ABS (Agitated Behavior Scale)   Agitated Behavior Scale Performed No   Cognitive Pattern Assessment   Cognitive Pattern Assessment Used BIMS   Brief Interview for Mental Status (BIMS)   Repetition of Three Words (First Attempt) 3   Temporal Orientation: Year Correct   Temporal Orientation: Month Accurate within 5 days   Temporal Orientation: Day Incorrect   Recall: \"Sock\" Yes, no cue required   Recall: \"Blue\" Yes, no cue required   Recall: \"Bed\" Yes, no cue required   BIMS Summary Score 14   Confusion Assessment Method (CAM)   Is there evidence of an acute change in mental status from the patient's baseline? No   Inattention Behavior not present   Disorganized thinking Behavior not present   Altered level of consciousness Behavior not present   Social / Pragmatic Communication   Social / Pragmatic Communication WDL   Functional Level of Assist   Eating Supervision   Eating Description Set-up of equipment or " meal/tube feeding   Comprehension Modified Independent   Comprehension Description Glasses;Hearing aids/amplifiers   Expression Independent   Social Interaction Independent   Problem Solving Minimal Assist   Problem Solving Description Therapy schedule;Verbal cueing;Seat belt   Memory Supervision   Memory Description Therapy schedule;Verbal cueing;Seat belt   Outcome Measures   Outcome Measures Utilized SCCAN   SCCAN (Scales of Cognitive and Communicative Ability for Neurorehabilitation)   Oral Expression - Raw Score 19   Oral Expression - Scale Performance Score 100   Orientation - Raw Score 12   Orientation - Scale Performance Score 100   Memory - Raw Score 18   Memory - Scale Performance Score 95   Speech Comprehension - Raw Score 13   Speech Comprehension - Scale Performance Score 100   Problem List   Problem List Cognitive-Linguistic Deficits;Memory Deficit;Verbal Problem Solving Deficits;Executive Function Deficit;Impaired Safety   Current Discharge Plan   Current Discharge Plan Return to Prior Living Situation   Benefit   Therapy Benefit Patient would benefit from Inpatient Rehab Speech-Language Pathology to address above identified deficits.   Interdisciplinary Plan of Care Collaboration   Patient Position at End of Therapy Seated;Chair Alarm On;Self Releasing Lap Belt Applied;Call Light within Reach;Tray Table within Reach;Phone within Reach   Strengths & Barriers   Strengths Able to follow instructions;Alert and oriented;Independent prior level of function;Motivated for self care and independence;Pleasant and cooperative;Willingly participates in therapeutic activities;Supportive family   Barriers Hearing impairment;Impaired activity tolerance;Impaired carryover of learning;Impaired insight/denial of deficits;Impaired functional cognition;Visual impairment   Speech Language Pathologist Assigned   Assigned SLP / Extension ANGELA/60 Cog       Assessment    Patient is 89 y.o. female with a diagnosis of Multifocal  "pneumonia.  Additional factors influencing patient status/progress (ie: cognitive factors, co-morbidities, social support, etc): Impaired functional cognition, with indep PLOF, strong family support, motivation to participate and return to baseline.      Discussed pt's PLOF. Pt reports she lives in an ILF with strong family support and social life. Pt reports she has a helper 9-12pm daily (?) to assist with showers, shopping, and cleaning. Pt reports indep with medications, finances (on the computer, large font), and cooking breakfast and lunch (she eats dinner in the dining room). Pt reports she has 2 daughters and 1 son; 1 daughter lives in [place] and visits her on the weekends. Pt presents with a hoarse voice due to coughing. Pt reports chronic blurry vision in her R eye and wears bilateral hearing aids.    Initiated SCCAN assessment; however, unable to complete due to time constraints, pt fatigue and losing her voice. Pt completed 4/8 subtest's (Oral Expression- 100%, Orientation- 100%, Memory- 95%, and Speech Comprehension- 100%). Pt demonstrated difficulty with the w/c (I.e., rolling backwards, leaning forward). Pt reports she has never been in one before. Provided w/c safety education. Pt reports she has not noticed much change in her memory aside from \"normal aging\". SCCAN to be completed in upcoming ST session with goals to be added as appropriate.     Plan  Recommend Speech Therapy 30-60 minutes per day 5-6 days per week for 3 weeks for the following treatments:  SLP Speech Language Treatment, SLP Cognitive Skill Development, and SLP Group Treatment.    Passport items to be completed:  Express basic needs, understand food/liquid recommendations, consistently follow swallow precautions, manage finances, manage medications, arrive to therapy appointments on time, complete daily memory log entries, solve problems related to safety situations, review education related to hospitalization, complete caregiver " training     Goals:  Long term and short term goals have been discussed with patient and they are in agreement.    Speech Therapy Problems (Active)       Problem: Comprehension STGs       Dates: Start: 04/01/23         Goal: STG-Within one week, patient will complete SCCAN assessment with goals to be added as appropriate.        Dates: Start: 04/01/23               Problem: Problem Solving STGs       Dates: Start: 04/01/23         Goal: STG-Within one week, patient will complete functional financial management tasks with 80% accuracy provided with min cues.        Dates: Start: 04/01/23            Goal: STG-Within one week, patient will complete functional medication tasks with 80% accuracy provided with min cues.        Dates: Start: 04/01/23            Goal: STG-Within one week, patient will solve basic problems with 80% accuracy provided with min cues.        Dates: Start: 04/01/23               Problem: Speech/Swallowing LTGs       Dates: Start: 04/01/23         Goal: LTG-By discharge, patient will solve complex problems with mod I for safe d/c home.        Dates: Start: 04/01/23

## 2023-04-01 NOTE — PROGRESS NOTES
4 Eyes Skin Assessment Completed by SANGEETHA Alfaro and SANGEETHA Killian.    Head:right eye redness and yellow drainage to eye lid.  Bilateral eyes cleansed with Navin & Navin baby shampoo mixed in warm water to cleanse drainage off lids as well as bacteria.  Dr. Mallory aware.  Ears WDL  Nose WDL  Mouth WDL  Neck WDL  Breast/Chest WDL  Shoulder Blades WDL  Spine WDL  (R) Arm/Elbow/Hand WDL  (L) Arm/Elbow/Hand Bruising  Abdomen WDL  Groin WDL  Scrotum/Coccyx/Buttocks WDL  (R) Leg WDL  (L) Leg WDL  (R) Heel/Foot/Toe WDL  (L) Heel/Foot/Toe WDL          Devices In Places Nasal Cannula      Interventions In Place Gray Ear Foams, NC W/Ear Foams, Pillows, Q2 Turns, and Heels Loaded W/Pillows    Possible Skin Injury No    Pictures Uploaded Into Epic Yes  Wound Consult Placed N/A  RN Wound Prevention Protocol Ordered No

## 2023-04-01 NOTE — CARE PLAN
The patient is Watcher - Medium risk of patient condition declining or worsening     Shift Goals:safety, increase endurance and independence  Clinical Goals: safety     Problem: Knowledge Deficit - Standard  Goal: Patient and family/care givers will demonstrate understanding of plan of care, disease process/condition, diagnostic tests and medications  Note: Patient is a new admit. Educated on safety precautions and fall prevention measures. Pt had a PCR done at [facility] and influenza negative

## 2023-04-01 NOTE — THERAPY
Physical Therapy   Initial Evaluation     Patient Name: Chrissie Dueñas  Age:  89 y.o., Sex:  female  Medical Record #: [id number]  Today's Date: 4/1/2023     Subjective    Pt is pleasant and cooperative throughout session     Objective       04/01/23 0930   PT Charge Group   Charges Yes   PT Evaluation PT Evaluation Mod   PT Total Time Spent   PT Individual Total Time Spent (Mins) 60   Prior Living Situation   Prior Services Home-Independent   Housing / Facility Independent Living Facility   Steps Into Home 0   Steps In Home 0   Equipment Owned Front-Wheel Walker   Lives with - Patient's Self Care Capacity Alone and Able to Care For Self   Prior Level of Functional Mobility   Bed Mobility Independent   Transfer Status Independent   Ambulation Independent   Distance Ambulation (Feet)   (limited community)   Assistive Devices Used Front-Wheel Walker   Stairs Required Assist   Prior Functioning: Everyday Activities   Self Care Independent   Indoor Mobility (Ambulation) Independent   Stairs Needed some help   Functional Cognition Independent   Prior Device Use Walker   Vitals   Pulse 72   Pulse Oximetry 98 %   O2 (LPM) 2   O2 Delivery Device Nasal Cannula   Vitals Comments At start of session, pt was weened to RA. After 2 mins O2 at 92%, however after performing bed mobility O2 decreased to 86% on RA. She was transferred back to  via NC and remained on NC throughout session.   Pain   Intervention Declines   Passive ROM Lower Body   Passive ROM Lower Body WDL   Active ROM Lower Body    Active ROM Lower Body  WDL   Strength Lower Body   Lower Body Strength  X   Rt Hip Flexion Strength 4 (G)   Rt Knee Extension Strength 4 (G)   Rt Ankle Dorsiflexion Strength 4 (G)   Lt Hip Flexion Strength 4 (G)   Lt Ankle Dorsiflexion Strength 4 (G)   Balance Assessment   Sitting Balance (Static) Fair +   Sitting Balance (Dynamic) Fair +   Standing Balance (Static) Fair -   Standing Balance (Dynamic) Poor -   Weight Shift Sitting Fair    Weight Shift Standing Poor   Bed Mobility    Supine to Sit Standby Assist   Sit to Stand Contact Guard Assist   Rolling Standby Assist   Roll Left and Right   Assistance Needed Supervision;Verbal cues   Physical Assistance Level No physical assistance   CARE Score - Roll Left and Right 4   Roll Left and Right Discharge Goal   Discharge Goal 6   Sit to Lying   Assistance Needed Supervision;Verbal cues   Physical Assistance Level No physical assistance   CARE Score - Sit to Lying 4   Sit to Lying Discharge Goal   Discharge Goal 6   Lying to Sitting on Side of Bed   Assistance Needed Supervision;Verbal cues   Physical Assistance Level No physical assistance   CARE Score - Lying to Sitting on Side of Bed 4   Lying to Sitting on Side of Bed Discharge Goal   Discharge Goal 6   Sit to Stand   Assistance Needed Physical assistance;Incidental touching   Physical Assistance Level 25% or less   CARE Score - Sit to Stand 3   Sit to Stand Discharge Goal   Discharge Goal 6   Chair/Bed-to-Chair Transfer   Assistance Needed Incidental touching;Physical assistance   Physical Assistance Level 25% or less   CARE Score - Chair/Bed-to-Chair Transfer 3   Chair/Bed-to-Chair Transfer Discharge Goal   Discharge Goal 6   Car Transfer   Assistance Needed Physical assistance;Incidental touching   Physical Assistance Level 25% or less   CARE Score - Car Transfer 3   Car Transfer Discharge Goal   Discharge Goal 6   Walk 10 Feet   Assistance Needed Incidental touching;Physical assistance   Physical Assistance Level 25% or less   CARE Score - Walk 10 Feet 3   Walk 10 Feet Discharge Goal   Discharge Goal 6   Walk 50 Feet with Two Turns   Assistance Needed Incidental touching;Physical assistance   Physical Assistance Level 25% or less   CARE Score - Walk 50 Feet with Two Turns 3   Walk 50 Feet with Two Turns Discharge Goal   Discharge Goal 6   Walk 150 Feet   Comment Pt reports fatigue after 80' unable to ambulate further   Reason if not Attempted  Medical concerns   CARE Score - Walk 150 Feet 88   Walk 150 Feet Discharge Goal   Discharge Goal 6   Walking 10 Feet on Uneven Surfaces   Assistance Needed Incidental touching;Physical assistance   Physical Assistance Level 25% or less   CARE Score - Walking 10 Feet on Uneven Surfaces 3   Walking 10 Feet on Uneven Surfaces Discharge Goal   Discharge Goal 6   1 Step (Curb)   Assistance Needed Incidental touching;Physical assistance   Physical Assistance Level 25% or less   CARE Score - 1 Step (Curb) 3   1 Step (Curb) Discharge Goal   Discharge Goal 6   4 Steps   Assistance Needed Incidental touching;Physical assistance   Physical Assistance Level 25% or less   CARE Score - 4 Steps 3   4 Steps Discharge Goal   Discharge Goal 6   12 Steps   Reason if not Attempted Safety concerns   CARE Score - 12 Steps 88   12 Steps Discharge Goal   Discharge Goal 6   Picking Up Object   Assistance Needed Incidental touching;Physical assistance   Physical Assistance Level 25% or less   CARE Score - Picking Up Object 3   Picking Up Object Discharge Goal   Discharge Goal 6   Wheel 50 Feet with Two Turns   Physical Assistance Level Total assistance   CARE Score - Wheel 50 Feet with Two Turns 1   Wheel 50 Feet with Two Turns Discharge Goal   Discharge Goal 1   Wheel 150 Feet   Physical Assistance Level Total assistance   CARE Score - Wheel 150 Feet 1   Wheel 150 Feet Discharge Goal   Discharge Goal 1   Gait Functional Level of Assist    Gait Level Of Assist Contact Guard Assist   Assistive Device Front Wheel Walker   Distance (Feet) 80   # of Times Distance was Traveled 1   Deviation Shuffled Gait;Decreased Base Of Support   Wheelchair Functional Level of Assist   Wheelchair Assist Total Assist   Stairs Functional Level of Assist   Level of Assist with Stairs Minimal Assist   # of Stairs Climbed 4   Stairs Description Hand rails;Limited by fatigue;Oxygen   Transfer Functional Level of Assist   Bed, Chair, Wheelchair Transfer Contact Guard  Assist   Bed Chair Wheelchair Transfer Description Supervision for safety;Set-up of equipment;Verbal cueing   Problem List    Problems Impaired Bed Mobility;Functional Strength Deficit;Impaired Ambulation;Impaired Transfers;Impaired Balance;Impaired Coordination;Decreased Activity Tolerance;Safety Awareness Deficits / Cognition   Precautions   Precautions Fall Risk   Current Discharge Plan   Current Discharge Plan Return to Prior Living Situation   Interdisciplinary Plan of Care Collaboration   IDT Collaboration with  Occupational Therapist   Patient Position at End of Therapy Seated;Chair Alarm On;Phone within Reach;Tray Table within Reach;Call Light within Reach   Benefit   Therapy Benefit Patient Would Benefit from Inpatient Rehabilitation Physical Therapy to Maximize Functional [place] with ADLs, IADLs and Mobility.   Strengths & Barriers   Strengths Able to follow instructions;Effective communication skills;Good insight into deficits/needs;Independent prior level of function;Motivated for self care and independence;Pleasant and cooperative;Supportive family;Willingly participates in therapeutic activities   Barriers Decreased endurance;Fatigue;Generalized weakness;Impaired balance       Pt performed stand pivot transfer from w/c to bed with CGA. Sit to supine with SBA. Pt rolled R/L with SBA. Supine to sit with SBA. Pt was brought to therapy gym via w/c.     Pt ambulated 80' with FWW and CGA. She demonstrates decreased base of support, forward flexed posture. She reports fatigue post ambulation, O2 at 95% on 2L via NC.     Pt performed 4 stairs with MIN A and B handrails.     Pt performed curb transfer with FWW and MIN A.     YAMIL performed 22/56 pt is at high falls risk.     Motomed BLE G2 10 mins.     Assessment  Patient is 89 y.o. female with a diagnosis of Debility.  Additional factors influencing patient status / progress (ie: cognitive factors, co-morbidities, social support, etc): PMH includes HTN,  macular degeneration, depression, hyponatremia, CKD, hypthyroidism.       Plan  Recommend Physical Therapy  minutes per day 5-7 days per week for 10 days for the following treatments:  PT Group Therapy, PT E Stim Attended, PT Gait Training, PT Self Care/Home Eval, PT Therapeutic Exercises, PT Ultrasound, PT TENS Application, PT Neuro Re-Ed/Balance, PT Aquatic Therapy, PT Therapeutic Activity, PT Manual Therapy, PT Wound Therapy, PT Debridement, and PT Evaluation.    Passport items to be completed:  Get in/out of bed safely, in/out of a vehicle, safely use mobility device, walk or wheel around home/community, navigate up and down stairs, show how to get up/down from the ground, ensure home is accessible, demonstrate HEP, complete caregiver training    Goals:  Long term and short term goals have been discussed with patient and they are in agreement.    Physical Therapy Problems (Active)       Problem: Balance       Dates: Start: 04/01/23         Goal: STG-Within one week, patient will improve LOBO score by at least 5 points to demonstrate decreased risk for falls.        Dates: Start: 04/01/23               Problem: Mobility       Dates: Start: 04/01/23         Goal: STG-Within one week, patient will ambulate 150' with FWW and supervision       Dates: Start: 04/01/23               Problem: Mobility Transfers       Dates: Start: 04/01/23         Goal: STG-Within one week, patient will perform 4 stairs with B handrails and supervision       Dates: Start: 04/01/23               Problem: PT-Long Term Goals       Dates: Start: 04/01/23         Goal: LTG-By discharge, patient will ambulate 150' with FWW independently.        Dates: Start: 04/01/23            Goal: LTG-By discharge, patient will transfer in/out of a car with FWW independently.        Dates: Start: 04/01/23            Goal: LTG-By discharge, patient will improve LOBO score by at least 10 points to demonstrate decreased risk for falls.        Dates:  Start: 04/01/23            Goal: LTG-By discharge, patient will perform 12 stairs with B handrails independently.        Dates: Start: 04/01/23

## 2023-04-01 NOTE — ASSESSMENT & PLAN NOTE
Na: 133 --> 134 --> 134 (4/7)  Has had since 2021  2nd to diminished appetite  2nd to Cozaar, Zoloft  Cont salt tabs  Cont to monitor

## 2023-04-01 NOTE — CARE PLAN
"The patient is Watcher - Medium risk of patient condition declining or worsening         Progress made toward(s) clinical / shift goals:      Problem: Fall Risk - Rehab  Goal: Patient will remain free from falls  Outcome: Progressing  Note: Benita Vaughn Fall risk Assessment Score: 16    High fall risk Interventions   - Alarming seatbelt  - Bed and strip alarm   - Yellow sign by the door   - Yellow wrist band \"Fall risk\"  - Do not leave patient unattended in the bathroom  - Fall risk education provided    Pt calls appropriately when in need of assistance. She can be forgetful at night. Constant re-orientation is needed. Will continue to monitor.     Problem: Respiratory  Goal: Patient will understand use and administration of respiratory medications to improve respiratory function  Outcome: Progressing  Note: Pt on oxygen 2L continuous via nasal cannula. Saturations >90%. Respirations WNL.       Patient is not progressing towards the following goals:      "

## 2023-04-01 NOTE — FLOWSHEET NOTE
03/31/23 1933   Protocol Assessment   Initial Assessment Yes   Patient History   Pulmonary Diagnosis None   Procedures Relevant to Respiratory Status None   Home O2 No   Nocturnal CPAP No   Home Treatments/Frequency No   Protocol Pathways   Protocol Pathways None

## 2023-04-01 NOTE — THERAPY
Occupational Therapy   Initial Evaluation     Patient Name: Chrissie Dueñas  Age:  89 y.o., Sex:  female  Medical Record #: [id number]  Today's Date: 4/1/2023     Subjective    Pt reports she would like to return to her ILF w/o increase in care.      Objective       04/01/23 0701   OT Charge Group   Charges Yes   OT Self Care / ADL (Units) 1   OT Evaluation OT Evaluation Mod   OT Total Time Spent   OT Individual Total Time Spent (Mins) 60   Prior Living Situation   Prior Services Home-Independent   Housing / Facility Independent Living Facility   Steps Into Home 0   Steps In Home 0   Bathroom Set up Walk In Shower;Shower Chair   Equipment Owned Front-Wheel Walker;Tub / Shower Seat   Lives with - Patient's Self Care Capacity Alone and Able to Care For Self   Prior Level of ADL Function   Self Feeding Independent   Grooming / Hygiene Independent   Bathing Independent   Dressing Independent   Toileting Independent   Prior Level of IADL Function   Medication Management Independent   Laundry Independent   Kitchen Mobility Independent   Finances Independent   Home Management Requires Assist   Shopping Requires Assist   Prior Level Of Mobility Independent With Device in Community;Independent Without Device in Home   Driving / Transportation Relatives / Others Provide Transportation   Occupation (Pre-Hospital Vocational) Retired Due To Age   Leisure Interests Exercise  (chair yoga)   Prior Functioning: Everyday Activities   Self Care Independent   Indoor Mobility (Ambulation) Independent   Stairs Needed some help   Functional Cognition Independent   Prior Device Use Walker   Pain   Pain Scales 0 to 10 Scale    Pain 0 - 10 Group   Pain Rating Scale (NPRS) 0   Cognition    Cognition / Consciousness WDL   Level of Consciousness Alert   Cognitive Pattern Assessment   Cognitive Pattern Assessment Used BIMS   Brief Interview for Mental Status (BIMS)   Repetition of Three Words (First Attempt) 3   Temporal Orientation: Year Correct  "  Temporal Orientation: Month Accurate within 5 days   Temporal Orientation: Day Correct   Recall: \"Sock\" Yes, no cue required   Recall: \"Blue\" Yes, no cue required   Recall: \"Bed\" Yes, no cue required   BIMS Summary Score 15   Confusion Assessment Method (CAM)   Is there evidence of an acute change in mental status from the patient's baseline? No   Inattention Behavior not present   Disorganized thinking Behavior not present   Altered level of consciousness Behavior not present   Vision Screen   Vision Not tested   Passive ROM Upper Body   Passive ROM Upper Body WDL   Active ROM Upper Body   Active ROM Upper Body  WDL   Dominant Hand Right   Strength Upper Body   Upper Body Strength  WDL   Sensation Upper Body   Upper Extremity Sensation  WDL   Upper Body Muscle Tone   Upper Body Muscle Tone  WDL   Balance Assessment   Sitting Balance (Static) Fair +   Sitting Balance (Dynamic) Fair +   Standing Balance (Static) Fair -   Standing Balance (Dynamic) Fair -   Weight Shift Sitting Fair   Weight Shift Standing Fair   Bed Mobility    Supine to Sit Contact Guard Assist   Sit to Stand Minimal Assist   Scooting Standby Assist   Coordination Upper Body   Coordination X   Fine Motor Coordination limited by arthritis   Eating   Assistance Needed Set-up / clean-up   CARE Score - Eating 5   Eating Discharge Goal   Discharge Goal 6   Oral Hygiene   Assistance Needed Set-up / clean-up   CARE Score - Oral Hygiene 5   Oral Hygiene Discharge Goal   Discharge Goal 6   Shower/Bathe Self   Assistance Needed Physical assistance   Physical Assistance Level 25% or less   CARE Score - Shower/Bathe Self 3   Shower/Bathe Self Discharge Goal   Discharge Goal 6   Upper Body Dressing   Assistance Needed Physical assistance   Physical Assistance Level 25% or less   CARE Score - Upper Body Dressing 3   Upper Body Dressing Discharge Goal   Discharge Goal 6   Lower Body Dressing   Assistance Needed Physical assistance   Physical Assistance Level 25% " or less   CARE Score - Lower Body Dressing 3   Lower Body Dressing Discharge Goal   Discharge Goal 6   Putting On/Taking Off Footwear   Assistance Needed Physical assistance   Physical Assistance Level 51%-75%   CARE Score - Putting On/Taking Off Footwear 2   Putting On/Taking Off Footwear Discharge Goal   Discharge Goal 6   Toileting Hygiene   Assistance Needed Physical assistance   Physical Assistance Level 25% or less   CARE Score - Toileting Hygiene 3   Toileting Hygiene Discharge Goal   Discharge Goal 6   Toilet Transfer   Assistance Needed Physical assistance   Physical Assistance Level 25% or less   CARE Score - Toilet Transfer 3   Toilet Transfer Discharge Goal   Discharge Goal 6   Hearing, Speech, and Vision   Ability to Hear Adequate   Ability to See in Adequate Light Adequate   Expression of Ideas and Wants Without difficulty   Understanding Verbal and Non-Verbal Content Understands   Functional Level of Assist   Eating Supervision   Eating Description Set-up of equipment or meal/tube feeding   Grooming Supervision;Seated   Grooming Description Seated in wheelchair at sink   Bathing Minimal Assist   Bathing Description Grab bar;Hand held shower;Increased time;Initial preparation for task   Upper Body Dressing Minimal Assist   Upper Body Dressing Description Increased time;Initial preparation for task;Set-up of equipment;Supervision for safety   Lower Body Dressing Minimal Assist   Lower Body Dressing Description Grab bar;Increased time;Initial preparation for task;Supervision for safety;Verbal cueing   Toileting Minimal Assist   Toileting Description Assist for hygiene;Assist to pull pants up;Grab bar;Increased time;Assist to pull pants down;Initial preparation for task   Bed, Chair, Wheelchair Transfer Minimal Assist   Bed Chair Wheelchair Transfer Description Increased time;Supervision for safety;Verbal cueing;Initial preparation for task   Toilet Transfers Minimal Assist   Toilet Transfer Description  Grab bar;Increased time;Set-up of equipment;Verbal cueing;Supervision for safety   Tub / Shower Transfers Minimal Assist   Tub Shower Transfer Description Grab bar;Shower bench;Increased time;Supervision for safety;Verbal cueing   Comprehension Modified Independent   Comprehension Description Glasses   Expression Modified Independent   Problem Solving Modified Independent   Memory Modified Independent   Problem List   Problem List Decreased Active Daily Living Skills;Decreased Homemaking Skills;Decreased Functional Mobility;Impaired Postural Control / Balance   Precautions   Precautions Fall Risk   Comments conjunctivitis, 2 L O2   Current Discharge Plan   Current Discharge Plan Return to Prior Living Situation   Benefit    Therapy Benefit Patient Would Benefit from Inpatient Rehab Occupational Therapy to Maximize [place] with ADLs, IADLs and Functional Mobility.   Interdisciplinary Plan of Care Collaboration   IDT Collaboration with  Physical Therapist   Patient Position at End of Therapy Seated;Phone within Reach;Tray Table within Reach;Call Light within Reach   Collaboration Comments CLOF/POC   Equipment Needs   Assistive Device / DME Grab Bars In Shower / Tub;Grab Bars By Toilet;Shower Chair;Tub Transfer Bench   Adaptive Equipment Reacher   Strengths & Barriers   Strengths Alert and oriented;Good insight into deficits/needs;Independent prior level of function;Making steady progress towards goals;Motivated for self care and independence;Pleasant and cooperative;Supportive family;Willingly participates in therapeutic activities   Barriers Decreased endurance;Generalized weakness;Home accessibility;Impaired activity tolerance;Impaired balance;Limited mobility;Pain       Assessment  Patient is 89 y.o. female with a diagnosis of debility due to multifocal pneumonia.  Pt has past medical history of hypertension, macular degeneration, depression, chronic hyponatremia, chronic kidney disease, and hypothyroidism. Pt  "reports she was I with all ADLs prior to hospitalization. She does have an \"aide\" come one a week to drive her to appointments and errands around town. Pt is very motivated to return to her prior living situation if possible. Pt reports her home does not have any GB and is unsure if she can have someone add them.  During OT eval pt presented below baseline for ADLs due to impaired balance, decreased endurance, and limited mobility.    Plan  Recommend Occupational Therapy  minutes per day 5-7 days per week for 14-17 days for the following treatments:  OT Orthotics Training, OT E Stim Attended, OT Group Therapy, OT Self Care/ADL, OT Cognitive Skill Dev, OT Community Reintegration, OT Manual Ther Technique, OT Neuro Re-Ed/Balance, OT Sensory Int Techniques, OT Therapeutic Activity, OT Evaluation, and OT Therapeutic Exercise.    Passport items to be completed:  Perform bathroom transfers, complete dressing, complete feeding, get ready for the day, prepare a simple meal, participate in household tasks, adapt home for safety needs, demonstrate home exercise program, complete caregiver training     Goals:  Long term and short term goals have been discussed with patient and they are in agreement.    Occupational Therapy Goals (Active)       Problem: Bathing       Dates: Start: 04/01/23         Goal: STG-Within one week, patient will bathe w/ CGA.       Dates: Start: 04/01/23               Problem: Dressing       Dates: Start: 04/01/23         Goal: STG-Within one week, patient will dress LB w/ CGA.        Dates: Start: 04/01/23            Goal: STG-Within one week, patient will retrieve clothing w/ min A and LRD.       Dates: Start: 04/01/23               Problem: Functional Transfers       Dates: Start: 04/01/23         Goal: STG-Within one week, patient will transfer to toilet w/ SBA.       Dates: Start: 04/01/23               Problem: IADL's       Dates: Start: 04/01/23         Goal: STG-Within one week, patient " will access kitchen area w/ min A and LRD.        Dates: Start: 04/01/23               Problem: OT Long Term Goals       Dates: Start: 04/01/23         Goal: LTG-By discharge, patient will complete basic self care tasks w/ mod I- supervision.        Dates: Start: 04/01/23            Goal: LTG-By discharge, patient will perform bathroom transfers w/ mos I- supervision.        Dates: Start: 04/01/23            Goal: LTG-By discharge, patient will complete basic home management w/ mod I- min A.        Dates: Start: 04/01/23

## 2023-04-01 NOTE — ASSESSMENT & PLAN NOTE
BNP: 5293 --> 2712 (4/4)  Echo: showed an EF of 75%  CXR: showed small B/L effusion, mild pulm edema  Cont Lasix  Cont KCL: 20 meq daily  Encouraging IS   Monitor K+: 4.2 (4/7)

## 2023-04-01 NOTE — FLOWSHEET NOTE
03/31/23 1925   Events/Summary/Plan   Events/Summary/Plan RT Assessment   Vital Signs   Pulse 90   Respiration 18   Pulse Oximetry 96 %   $ Pulse Oximetry (Spot Check) Yes   Respiratory Assessment   Respiratory Pattern Within Normal Limits   Level of Consciousness Alert   Chest Exam   Work Of Breathing / Effort Within Normal Limits   Breath Sounds   RUL Breath Sounds Clear   RML Breath Sounds Crackles   RLL Breath Sounds Crackles   MEÑO Breath Sounds Clear   LLL Breath Sounds Crackles   Secretions   Cough Non Productive   Oxygen   O2 (LPM) 2   O2 Delivery Device Nasal Cannula   Smoking History   Have you ever smoked Yes   Have you smoked in the last 12 months No   Confirm Quit Date   (Quit about 40 yr ago)

## 2023-04-02 ENCOUNTER — APPOINTMENT (OUTPATIENT)
Dept: RADIOLOGY | Facility: REHABILITATION | Age: 88
DRG: 193 | End: 2023-04-02
Attending: HOSPITALIST
Payer: MEDICARE

## 2023-04-02 PROBLEM — E87.6 HYPOKALEMIA: Status: ACTIVE | Noted: 2023-04-02

## 2023-04-02 LAB
ALBUMIN SERPL BCP-MCNC: 2.8 G/DL (ref 3.2–4.9)
ALBUMIN/GLOB SERPL: 1 G/DL
ALP SERPL-CCNC: 314 U/L (ref 30–99)
ALT SERPL-CCNC: 126 U/L (ref 2–50)
ANION GAP SERPL CALC-SCNC: 11 MMOL/L (ref 7–16)
APPEARANCE UR: CLEAR
AST SERPL-CCNC: 117 U/L (ref 12–45)
BASOPHILS # BLD AUTO: 0.3 % (ref 0–1.8)
BASOPHILS # BLD: 0.03 K/UL (ref 0–0.12)
BILIRUB SERPL-MCNC: 0.8 MG/DL (ref 0.1–1.5)
BILIRUB UR QL STRIP.AUTO: NEGATIVE
BUN SERPL-MCNC: 12 MG/DL (ref 8–22)
CALCIUM ALBUM COR SERPL-MCNC: 9.1 MG/DL (ref 8.5–10.5)
CALCIUM SERPL-MCNC: 8.1 MG/DL (ref 8.5–10.5)
CHLORIDE SERPL-SCNC: 99 MMOL/L (ref 96–112)
CO2 SERPL-SCNC: 23 MMOL/L (ref 20–33)
COLOR UR: YELLOW
CREAT SERPL-MCNC: 0.7 MG/DL (ref 0.5–1.4)
EOSINOPHIL # BLD AUTO: 0.04 K/UL (ref 0–0.51)
EOSINOPHIL NFR BLD: 0.4 % (ref 0–6.9)
ERYTHROCYTE [DISTWIDTH] IN BLOOD BY AUTOMATED COUNT: 43.3 FL (ref 35.9–50)
GFR SERPLBLD CREATININE-BSD FMLA CKD-EPI: 81 ML/MIN/1.73 M 2
GLOBULIN SER CALC-MCNC: 2.7 G/DL (ref 1.9–3.5)
GLUCOSE SERPL-MCNC: 100 MG/DL (ref 65–99)
GLUCOSE UR STRIP.AUTO-MCNC: NEGATIVE MG/DL
HCT VFR BLD AUTO: 34.3 % (ref 37–47)
HGB BLD-MCNC: 11.1 G/DL (ref 12–16)
IMM GRANULOCYTES # BLD AUTO: 0.27 K/UL (ref 0–0.11)
IMM GRANULOCYTES NFR BLD AUTO: 2.5 % (ref 0–0.9)
KETONES UR STRIP.AUTO-MCNC: NEGATIVE MG/DL
LEUKOCYTE ESTERASE UR QL STRIP.AUTO: NEGATIVE
LYMPHOCYTES # BLD AUTO: 1.22 K/UL (ref 1–4.8)
LYMPHOCYTES NFR BLD: 11.2 % (ref 22–41)
MCH RBC QN AUTO: 31.5 PG (ref 27–33)
MCHC RBC AUTO-ENTMCNC: 32.4 G/DL (ref 33.6–35)
MCV RBC AUTO: 97.4 FL (ref 81.4–97.8)
MICRO URNS: NORMAL
MONOCYTES # BLD AUTO: 1.36 K/UL (ref 0–0.85)
MONOCYTES NFR BLD AUTO: 12.5 % (ref 0–13.4)
NEUTROPHILS # BLD AUTO: 7.96 K/UL (ref 2–7.15)
NEUTROPHILS NFR BLD: 73.1 % (ref 44–72)
NITRITE UR QL STRIP.AUTO: NEGATIVE
NRBC # BLD AUTO: 0 K/UL
NRBC BLD-RTO: 0 /100 WBC
PH UR STRIP.AUTO: 6.5 [PH] (ref 5–8)
PHOSPHATE SERPL-MCNC: 3.5 MG/DL (ref 2.5–4.5)
PLATELET # BLD AUTO: 303 K/UL (ref 164–446)
PMV BLD AUTO: 9 FL (ref 9–12.9)
POTASSIUM SERPL-SCNC: 3.4 MMOL/L (ref 3.6–5.5)
PROT SERPL-MCNC: 5.5 G/DL (ref 6–8.2)
PROT UR QL STRIP: NEGATIVE MG/DL
RBC # BLD AUTO: 3.52 M/UL (ref 4.2–5.4)
RBC UR QL AUTO: NEGATIVE
SODIUM SERPL-SCNC: 133 MMOL/L (ref 135–145)
SP GR UR STRIP.AUTO: 1.01
UROBILINOGEN UR STRIP.AUTO-MCNC: 1 MG/DL
WBC # BLD AUTO: 10.9 K/UL (ref 4.8–10.8)

## 2023-04-02 PROCEDURE — 700102 HCHG RX REV CODE 250 W/ 637 OVERRIDE(OP): Performed by: PHYSICAL MEDICINE & REHABILITATION

## 2023-04-02 PROCEDURE — 99232 SBSQ HOSP IP/OBS MODERATE 35: CPT | Performed by: HOSPITALIST

## 2023-04-02 PROCEDURE — 84100 ASSAY OF PHOSPHORUS: CPT

## 2023-04-02 PROCEDURE — 85025 COMPLETE CBC W/AUTO DIFF WBC: CPT

## 2023-04-02 PROCEDURE — 94760 N-INVAS EAR/PLS OXIMETRY 1: CPT

## 2023-04-02 PROCEDURE — 80053 COMPREHEN METABOLIC PANEL: CPT

## 2023-04-02 PROCEDURE — 71045 X-RAY EXAM CHEST 1 VIEW: CPT

## 2023-04-02 PROCEDURE — 700102 HCHG RX REV CODE 250 W/ 637 OVERRIDE(OP): Performed by: HOSPITALIST

## 2023-04-02 PROCEDURE — 700111 HCHG RX REV CODE 636 W/ 250 OVERRIDE (IP): Performed by: PHYSICAL MEDICINE & REHABILITATION

## 2023-04-02 PROCEDURE — A9270 NON-COVERED ITEM OR SERVICE: HCPCS | Performed by: HOSPITALIST

## 2023-04-02 PROCEDURE — 700105 HCHG RX REV CODE 258: Performed by: PHYSICAL MEDICINE & REHABILITATION

## 2023-04-02 PROCEDURE — 81003 URINALYSIS AUTO W/O SCOPE: CPT

## 2023-04-02 PROCEDURE — 770010 HCHG ROOM/CARE - REHAB SEMI PRIVAT*

## 2023-04-02 PROCEDURE — 36415 COLL VENOUS BLD VENIPUNCTURE: CPT

## 2023-04-02 PROCEDURE — A9270 NON-COVERED ITEM OR SERVICE: HCPCS | Performed by: PHYSICAL MEDICINE & REHABILITATION

## 2023-04-02 RX ORDER — POTASSIUM CHLORIDE 20 MEQ/1
40 TABLET, EXTENDED RELEASE ORAL ONCE
Status: COMPLETED | OUTPATIENT
Start: 2023-04-02 | End: 2023-04-02

## 2023-04-02 RX ORDER — LOSARTAN POTASSIUM 25 MG/1
50 TABLET ORAL
Status: DISCONTINUED | OUTPATIENT
Start: 2023-04-03 | End: 2023-04-06

## 2023-04-02 RX ADMIN — SERTRALINE HYDROCHLORIDE 25 MG: 50 TABLET, FILM COATED ORAL at 08:01

## 2023-04-02 RX ADMIN — CIPROFLOXACIN 1 DROP: 3 SOLUTION OPHTHALMIC at 05:06

## 2023-04-02 RX ADMIN — POTASSIUM CHLORIDE 40 MEQ: 1500 TABLET, EXTENDED RELEASE ORAL at 12:40

## 2023-04-02 RX ADMIN — LOSARTAN POTASSIUM 25 MG: 25 TABLET, FILM COATED ORAL at 05:07

## 2023-04-02 RX ADMIN — CIPROFLOXACIN 1 DROP: 3 SOLUTION OPHTHALMIC at 21:49

## 2023-04-02 RX ADMIN — AMPICILLIN AND SULBACTAM 3 G: 1; 2 INJECTION, POWDER, FOR SOLUTION INTRAMUSCULAR; INTRAVENOUS at 08:15

## 2023-04-02 RX ADMIN — SODIUM CHLORIDE 1 G: 1 TABLET ORAL at 17:53

## 2023-04-02 RX ADMIN — CIPROFLOXACIN 1 DROP: 3 SOLUTION OPHTHALMIC at 10:47

## 2023-04-02 RX ADMIN — RIVAROXABAN 10 MG: 10 TABLET, FILM COATED ORAL at 17:53

## 2023-04-02 RX ADMIN — SODIUM CHLORIDE 1 G: 1 TABLET ORAL at 08:01

## 2023-04-02 RX ADMIN — AMPICILLIN AND SULBACTAM 3 G: 1; 2 INJECTION, POWDER, FOR SOLUTION INTRAMUSCULAR; INTRAVENOUS at 20:37

## 2023-04-02 RX ADMIN — SODIUM CHLORIDE 1 G: 1 TABLET ORAL at 12:40

## 2023-04-02 RX ADMIN — CIPROFLOXACIN 1 DROP: 3 SOLUTION OPHTHALMIC at 17:54

## 2023-04-02 RX ADMIN — CIPROFLOXACIN 1 DROP: 3 SOLUTION OPHTHALMIC at 16:30

## 2023-04-02 ASSESSMENT — FIBROSIS 4 INDEX: FIB4 SCORE: 3.4

## 2023-04-02 ASSESSMENT — ENCOUNTER SYMPTOMS
VOMITING: 0
EYES NEGATIVE: 1
PALPITATIONS: 0
ABDOMINAL PAIN: 0
CHILLS: 0
NAUSEA: 0
BRUISES/BLEEDS EASILY: 0
COUGH: 0
SHORTNESS OF BREATH: 0
POLYDIPSIA: 0
FEVER: 0

## 2023-04-02 NOTE — CARE PLAN
"  Problem: Knowledge Deficit - Standard  Goal: Patient and family/care givers will demonstrate understanding of plan of care, disease process/condition, diagnostic tests and medications  Outcome: Progressing  Patient verbalized understanding plan of care.                Problem: Fall Risk - Rehab  Goal: Patient will remain free from falls  Outcome: Progressing     Benita Vaughn Fall risk Assessment Score: 16    High fall risk Interventions   - Alarming seatbelt  - Wander guard  - Bed and strip alarm   - Yellow sign by the door   - Yellow wrist band \"Fall risk\"  - Room near to the nurse station  - Do not leave patient unattended in the bathroom  - Fall risk education provided              "

## 2023-04-02 NOTE — PROGRESS NOTES
Hospital Medicine Daily Progress Note      Chief Complaint  Pneumonia  Hypertension  Hyponatremia    Interval Problem Update  Blood pressures trending up; pt asymptomatic.  Labs reviewed; WBC continues to improve.    Review of Systems  Review of Systems   Constitutional:  Negative for chills and fever.   HENT: Negative.     Eyes: Negative.    Respiratory:  Negative for cough and shortness of breath.    Cardiovascular:  Negative for chest pain and palpitations.   Gastrointestinal:  Negative for abdominal pain, nausea and vomiting.   Skin:  Negative for itching and rash.   Endo/Heme/Allergies:  Negative for polydipsia. Does not bruise/bleed easily.      Physical Exam  Temp:  [36.6 °C (97.9 °F)-37.2 °C (98.9 °F)] 37.2 °C (98.9 °F)  Pulse:  [] 77  Resp:  [16-18] 16  BP: (146-166)/(68-78) 155/74  SpO2:  [94 %-100 %] 94 %    Physical Exam  Constitutional:       General: She is not in acute distress.     Appearance: Normal appearance. She is not ill-appearing.   HENT:      Head: Normocephalic and atraumatic.      Right Ear: External ear normal.      Left Ear: External ear normal.      Nose: Nose normal.      Mouth/Throat:      Pharynx: Oropharynx is clear.   Eyes:      General:         Right eye: No discharge.         Left eye: No discharge.      Extraocular Movements: Extraocular movements intact.      Conjunctiva/sclera: Conjunctivae normal.   Cardiovascular:      Rate and Rhythm: Normal rate and regular rhythm.   Pulmonary:      Effort: No respiratory distress.      Breath sounds: No wheezing.      Comments: Decreased BS  Abdominal:      General: Bowel sounds are normal. There is no distension.      Palpations: Abdomen is soft.      Tenderness: There is no abdominal tenderness.   Musculoskeletal:      Cervical back: Normal range of motion and neck supple.      Right lower leg: No edema.      Left lower leg: No edema.   Skin:     General: Skin is warm and dry.   Neurological:      Mental Status: She is alert and  oriented to person, place, and time.       Fluids    Intake/Output Summary (Last 24 hours) at 4/2/2023 1058  Last data filed at 4/2/2023 0900  Gross per 24 hour   Intake 900 ml   Output --   Net 900 ml       Laboratory  Recent Labs     03/31/23 0046 04/01/23  0632 04/02/23  0541   WBC 14.2* 12.1* 10.9*   RBC 3.37* 3.46* 3.52*   HEMOGLOBIN 11.0* 11.1* 11.1*   HEMATOCRIT 33.3* 33.1* 34.3*   MCV 98.8* 95.7 97.4   MCH 32.6 32.1 31.5   MCHC 33.0* 33.5* 32.4*   RDW 44.8 43.2 43.3   PLATELETCT 246 272 303   MPV 9.1 9.2 9.0     Recent Labs     03/31/23 0046 04/01/23  0632 04/02/23  0541   SODIUM 128* 134* 133*   POTASSIUM 4.3 3.8 3.4*   CHLORIDE 99 100 99   CO2 18* 22 23   GLUCOSE 110* 102* 100*   BUN 24* 17 12   CREATININE 1.18 0.84 0.70   CALCIUM 8.1* 8.0* 8.1*                 Assessment/Plan  * Multifocal pneumonia- (present on admission)  Assessment & Plan  SARS-CoV-2 negative 3/28/23  Unclear why repeat COVID-19 screening test not done upon Rehab admission  CTA Chest 3/29/23 +multifocal PNA, negative for PE  Clinically improving on Unasyn, complete abx course    Hypokalemia  Assessment & Plan  Replete K+    Transaminitis  Assessment & Plan  Likely 2/2 abx  Pursue further work up if persists after abx completed  Follow LFT's for now    Acute bacterial conjunctivitis of right eye- (present on admission)  Assessment & Plan  On Cipro eye gtts per Physiatry    Elevated brain natriuretic peptide (BNP) level- (present on admission)  Assessment & Plan  Echo EF 75%  CXR ordered, not done    Major depressive disorder- (present on admission)  Assessment & Plan  On Zoloft    Chronic hyponatremia- (present on admission)  Assessment & Plan  Could be 2/2 Zoloft  Continue NaCl tabs  Follow electrolytes    Essential hypertension- (present on admission)  Assessment & Plan  Increase Losartan for elevated blood pressures    History of macular degeneration- (present on admission)  Assessment & Plan  Takes OTC Preservision       DNR

## 2023-04-02 NOTE — CARE PLAN
Problem: Skin Integrity  Goal: Skin integrity is maintained or improved  Outcome: Progressing     Problem: Fall Risk - Rehab  Goal: Patient will remain free from falls  Outcome: Progressing    The patient is Stable - Low risk of patient condition declining or worsening    Shift Goals  Clinical Goals: Safety  Patient Goals: Sleep    Progress made toward(s) clinical / shift goals:  Progressing    Patient is not progressing towards the following goals:

## 2023-04-02 NOTE — FLOWSHEET NOTE
04/02/23 1434   Events/Summary/Plan   Events/Summary/Plan SpO2 check   Vital Signs   Pulse 80   Respiration 16   Pulse Oximetry 96 %   $ Pulse Oximetry (Spot Check) Yes   Respiratory Assessment   Respiratory Pattern Within Normal Limits   Level of Consciousness Alert   Chest Exam   Work Of Breathing / Effort Within Normal Limits   Oxygen   O2 (LPM) 2   O2 Delivery Device Nasal Cannula   Room Air Challenge Fail  (Room air SpO2=86%)

## 2023-04-02 NOTE — PROGRESS NOTES
"2327 hours: [place]/ telephoned this RN and requested an additional UA on pt, due to \"suspicious results,\" namely RBC and WBC.  [place] related that the UA collection is not urgent.  [place]/Charge RN was notified, who placed the order.  UA will be captured during this shift as the pt needs to void.  "

## 2023-04-03 ENCOUNTER — APPOINTMENT (OUTPATIENT)
Dept: PHYSICAL THERAPY | Facility: REHABILITATION | Age: 88
DRG: 193 | End: 2023-04-03
Attending: PHYSICAL MEDICINE & REHABILITATION
Payer: MEDICARE

## 2023-04-03 ENCOUNTER — APPOINTMENT (OUTPATIENT)
Dept: OCCUPATIONAL THERAPY | Facility: REHABILITATION | Age: 88
DRG: 193 | End: 2023-04-03
Attending: PHYSICAL MEDICINE & REHABILITATION
Payer: MEDICARE

## 2023-04-03 ENCOUNTER — APPOINTMENT (OUTPATIENT)
Dept: SPEECH THERAPY | Facility: REHABILITATION | Age: 88
DRG: 193 | End: 2023-04-03
Attending: PHYSICAL MEDICINE & REHABILITATION
Payer: MEDICARE

## 2023-04-03 LAB
BACTERIA BLD CULT: NORMAL
BACTERIA BLD CULT: NORMAL
SIGNIFICANT IND 70042: NORMAL
SIGNIFICANT IND 70042: NORMAL
SITE SITE: NORMAL
SITE SITE: NORMAL
SOURCE SOURCE: NORMAL
SOURCE SOURCE: NORMAL

## 2023-04-03 PROCEDURE — 94760 N-INVAS EAR/PLS OXIMETRY 1: CPT

## 2023-04-03 PROCEDURE — 700102 HCHG RX REV CODE 250 W/ 637 OVERRIDE(OP): Performed by: HOSPITALIST

## 2023-04-03 PROCEDURE — 700105 HCHG RX REV CODE 258: Performed by: PHYSICAL MEDICINE & REHABILITATION

## 2023-04-03 PROCEDURE — 99232 SBSQ HOSP IP/OBS MODERATE 35: CPT | Performed by: PHYSICAL MEDICINE & REHABILITATION

## 2023-04-03 PROCEDURE — 97130 THER IVNTJ EA ADDL 15 MIN: CPT

## 2023-04-03 PROCEDURE — 99232 SBSQ HOSP IP/OBS MODERATE 35: CPT | Performed by: HOSPITALIST

## 2023-04-03 PROCEDURE — 770010 HCHG ROOM/CARE - REHAB SEMI PRIVAT*

## 2023-04-03 PROCEDURE — 97535 SELF CARE MNGMENT TRAINING: CPT

## 2023-04-03 PROCEDURE — 97116 GAIT TRAINING THERAPY: CPT

## 2023-04-03 PROCEDURE — 97110 THERAPEUTIC EXERCISES: CPT

## 2023-04-03 PROCEDURE — 700111 HCHG RX REV CODE 636 W/ 250 OVERRIDE (IP): Performed by: PHYSICAL MEDICINE & REHABILITATION

## 2023-04-03 PROCEDURE — 97129 THER IVNTJ 1ST 15 MIN: CPT

## 2023-04-03 PROCEDURE — 700102 HCHG RX REV CODE 250 W/ 637 OVERRIDE(OP): Performed by: PHYSICAL MEDICINE & REHABILITATION

## 2023-04-03 PROCEDURE — A9270 NON-COVERED ITEM OR SERVICE: HCPCS | Performed by: HOSPITALIST

## 2023-04-03 PROCEDURE — A9270 NON-COVERED ITEM OR SERVICE: HCPCS | Performed by: PHYSICAL MEDICINE & REHABILITATION

## 2023-04-03 RX ORDER — POTASSIUM CHLORIDE 20 MEQ/1
10 TABLET, EXTENDED RELEASE ORAL DAILY
Status: DISCONTINUED | OUTPATIENT
Start: 2023-04-03 | End: 2023-04-04

## 2023-04-03 RX ORDER — FUROSEMIDE 20 MG/1
20 TABLET ORAL
Status: DISCONTINUED | OUTPATIENT
Start: 2023-04-03 | End: 2023-04-12 | Stop reason: HOSPADM

## 2023-04-03 RX ADMIN — SERTRALINE HYDROCHLORIDE 25 MG: 50 TABLET, FILM COATED ORAL at 08:32

## 2023-04-03 RX ADMIN — CIPROFLOXACIN 1 DROP: 3 SOLUTION OPHTHALMIC at 17:21

## 2023-04-03 RX ADMIN — SODIUM CHLORIDE 1 G: 1 TABLET ORAL at 08:33

## 2023-04-03 RX ADMIN — CIPROFLOXACIN 1 DROP: 3 SOLUTION OPHTHALMIC at 14:49

## 2023-04-03 RX ADMIN — SODIUM CHLORIDE 1 G: 1 TABLET ORAL at 11:28

## 2023-04-03 RX ADMIN — POTASSIUM CHLORIDE 10 MEQ: 1500 TABLET, EXTENDED RELEASE ORAL at 08:33

## 2023-04-03 RX ADMIN — SENNOSIDES AND DOCUSATE SODIUM 2 TABLET: 50; 8.6 TABLET ORAL at 21:08

## 2023-04-03 RX ADMIN — CIPROFLOXACIN 1 DROP: 3 SOLUTION OPHTHALMIC at 06:00

## 2023-04-03 RX ADMIN — CIPROFLOXACIN 1 DROP: 3 SOLUTION OPHTHALMIC at 21:09

## 2023-04-03 RX ADMIN — SODIUM CHLORIDE 1 G: 1 TABLET ORAL at 17:21

## 2023-04-03 RX ADMIN — CIPROFLOXACIN 1 DROP: 3 SOLUTION OPHTHALMIC at 10:47

## 2023-04-03 RX ADMIN — AMPICILLIN AND SULBACTAM 3 G: 1; 2 INJECTION, POWDER, FOR SOLUTION INTRAMUSCULAR; INTRAVENOUS at 08:40

## 2023-04-03 RX ADMIN — FUROSEMIDE 20 MG: 20 TABLET ORAL at 08:33

## 2023-04-03 RX ADMIN — LOSARTAN POTASSIUM 50 MG: 25 TABLET, FILM COATED ORAL at 05:51

## 2023-04-03 SDOH — ECONOMIC STABILITY: TRANSPORTATION INSECURITY
IN THE PAST 12 MONTHS, HAS THE LACK OF TRANSPORTATION KEPT YOU FROM MEDICAL APPOINTMENTS OR FROM GETTING MEDICATIONS?: NO

## 2023-04-03 SDOH — ECONOMIC STABILITY: TRANSPORTATION INSECURITY
IN THE PAST 12 MONTHS, HAS LACK OF RELIABLE TRANSPORTATION KEPT YOU FROM MEDICAL APPOINTMENTS, MEETINGS, WORK OR FROM GETTING THINGS NEEDED FOR DAILY LIVING?: NO

## 2023-04-03 ASSESSMENT — ENCOUNTER SYMPTOMS
SHORTNESS OF BREATH: 0
VOMITING: 0
ABDOMINAL PAIN: 0
BRUISES/BLEEDS EASILY: 0
POLYDIPSIA: 0
CHILLS: 0
PALPITATIONS: 0
COUGH: 0
NAUSEA: 0
EYES NEGATIVE: 1
FEVER: 0

## 2023-04-03 ASSESSMENT — ACTIVITIES OF DAILY LIVING (ADL)
TOILET_TRANSFER_DESCRIPTION: GRAB BAR;INCREASED TIME;INITIAL PREPARATION FOR TASK;SET-UP OF EQUIPMENT;SUPERVISION FOR SAFETY;VERBAL CUEING
TOILETING_LEVEL_OF_ASSIST_DESCRIPTION: GRAB BAR;INCREASED TIME;INITIAL PREPARATION FOR TASK;SUPERVISION FOR SAFETY;VERBAL CUEING
BED_CHAIR_WHEELCHAIR_TRANSFER_DESCRIPTION: ADAPTIVE EQUIPMENT;INCREASED TIME;INITIAL PREPARATION FOR TASK;SET-UP OF EQUIPMENT;SUPERVISION FOR SAFETY;VERBAL CUEING
BED_CHAIR_WHEELCHAIR_TRANSFER_DESCRIPTION: ADAPTIVE EQUIPMENT;INCREASED TIME;VERBAL CUEING
TOILET_TRANSFER_DESCRIPTION: GRAB BAR;INCREASED TIME;VERBAL CUEING

## 2023-04-03 ASSESSMENT — GAIT ASSESSMENTS
GAIT LEVEL OF ASSIST: CONTACT GUARD ASSIST
DEVIATION: BRADYKINETIC;SHUFFLED GAIT
DISTANCE (FEET): 100
ASSISTIVE DEVICE: FRONT WHEEL WALKER

## 2023-04-03 NOTE — PROGRESS NOTES
Rehab Progress Note     Date of Service: 4/3/2023  Chief Complaint: Follow-up debility    Interval Events (Subjective)    Patient seen and examined today in the cafeteria.  She is waiting for lunch to be delivered.  She reports generalized weakness with difficulty doing sit to stand transfers.  She reports continued cough but denies any shortness of breath.  She also reports some swelling in her legs which is new.  Speech therapy to discontinue as patient is at her baseline cognitive function.    Patient developed elevated liver enzymes over the weekend without any abdominal complaints.  Per hospitalist, thought secondary to antibiotics.    Patient has no other new questions, concerns, or complaints today.     ROS: No changes to bowel, bladder, pain, mood, or sleep.       Objective:  VITAL SIGNS: BP (!) 145/68   Pulse 87   Temp 36.7 °C (98.1 °F) (Oral)   Resp 17   Ht 1.524 m (5')   Wt 62.5 kg (137 lb 12.6 oz)   SpO2 95%   BMI 26.91 kg/m²   Gen: alert, no apparent distress, looks younger than her age  CV: Regular rate, regular rhythm  Resp: Mild crackles in bilateral bases, on oxygen    Recent Results (from the past 72 hour(s))   CBC with Differential    Collection Time: 04/01/23  6:32 AM   Result Value Ref Range    WBC 12.1 (H) 4.8 - 10.8 K/uL    RBC 3.46 (L) 4.20 - 5.40 M/uL    Hemoglobin 11.1 (L) 12.0 - 16.0 g/dL    Hematocrit 33.1 (L) 37.0 - 47.0 %    MCV 95.7 81.4 - 97.8 fL    MCH 32.1 27.0 - 33.0 pg    MCHC 33.5 (L) 33.6 - 35.0 g/dL    RDW 43.2 35.9 - 50.0 fL    Platelet Count 272 164 - 446 K/uL    MPV 9.2 9.0 - 12.9 fL    Neutrophils-Polys 78.20 (H) 44.00 - 72.00 %    Lymphocytes 9.00 (L) 22.00 - 41.00 %    Monocytes 10.90 0.00 - 13.40 %    Eosinophils 0.20 0.00 - 6.90 %    Basophils 0.30 0.00 - 1.80 %    Immature Granulocytes 1.40 (H) 0.00 - 0.90 %    Nucleated RBC 0.00 /100 WBC    Neutrophils (Absolute) 9.42 (H) 2.00 - 7.15 K/uL    Lymphs (Absolute) 1.08 1.00 - 4.80 K/uL    Monos (Absolute) 1.32 (H)  0.00 - 0.85 K/uL    Eos (Absolute) 0.03 0.00 - 0.51 K/uL    Baso (Absolute) 0.04 0.00 - 0.12 K/uL    Immature Granulocytes (abs) 0.17 (H) 0.00 - 0.11 K/uL    NRBC (Absolute) 0.00 K/uL   Comp Metabolic Panel (CMP)    Collection Time: 04/01/23  6:32 AM   Result Value Ref Range    Sodium 134 (L) 135 - 145 mmol/L    Potassium 3.8 3.6 - 5.5 mmol/L    Chloride 100 96 - 112 mmol/L    Co2 22 20 - 33 mmol/L    Anion Gap 12.0 7.0 - 16.0    Glucose 102 (H) 65 - 99 mg/dL    Bun 17 8 - 22 mg/dL    Creatinine 0.84 0.50 - 1.40 mg/dL    Calcium 8.0 (L) 8.5 - 10.5 mg/dL    AST(SGOT) 105 (H) 12 - 45 U/L    ALT(SGPT) 109 (H) 2 - 50 U/L    Alkaline Phosphatase 274 (H) 30 - 99 U/L    Total Bilirubin 0.9 0.1 - 1.5 mg/dL    Albumin 2.9 (L) 3.2 - 4.9 g/dL    Total Protein 5.5 (L) 6.0 - 8.2 g/dL    Globulin 2.6 1.9 - 3.5 g/dL    A-G Ratio 1.1 g/dL   Magnesium    Collection Time: 04/01/23  6:32 AM   Result Value Ref Range    Magnesium 1.9 1.5 - 2.5 mg/dL   proBrain Natriuretic Peptide, NT    Collection Time: 04/01/23  6:32 AM   Result Value Ref Range    NT-proBNP 5293 (H) 0 - 125 pg/mL   PROCALCITONIN    Collection Time: 04/01/23  6:32 AM   Result Value Ref Range    Procalcitonin 0.24 <0.25 ng/mL   CORRECTED CALCIUM    Collection Time: 04/01/23  6:32 AM   Result Value Ref Range    Correct Calcium 8.9 8.5 - 10.5 mg/dL   ESTIMATED GFR    Collection Time: 04/01/23  6:32 AM   Result Value Ref Range    GFR (CKD-EPI) 65 >60 mL/min/1.73 m 2   OSMOLALITY URINE    Collection Time: 04/01/23  2:50 PM   Result Value Ref Range    Osmolality Urine 522 300 - 900 mOsm/kg H2O   URINALYSIS    Collection Time: 04/01/23  2:50 PM    Specimen: Urine, Clean Catch   Result Value Ref Range    Color Yellow     Character Clear     Specific Gravity 1.018 <1.035    Ph 5.5 5.0 - 8.0    Glucose Negative Negative mg/dL    Ketones Negative Negative mg/dL    Protein 30 (A) Negative mg/dL    Bilirubin Negative Negative    Urobilinogen, Urine 1.0 Negative    Nitrite Negative  Negative    Leukocyte Esterase Small (A) Negative    Occult Blood Moderate (A) Negative    Micro Urine Req Microscopic    URINE MICROSCOPIC (W/UA)    Collection Time: 04/01/23  2:50 PM   Result Value Ref Range    WBC 5-10 (A) /hpf    RBC 20-50 (A) /hpf    Bacteria Negative None /hpf    Epithelial Cells Few /hpf    Hyaline Cast 3-5 (A) /lpf   CBC WITH DIFFERENTIAL    Collection Time: 04/02/23  5:41 AM   Result Value Ref Range    WBC 10.9 (H) 4.8 - 10.8 K/uL    RBC 3.52 (L) 4.20 - 5.40 M/uL    Hemoglobin 11.1 (L) 12.0 - 16.0 g/dL    Hematocrit 34.3 (L) 37.0 - 47.0 %    MCV 97.4 81.4 - 97.8 fL    MCH 31.5 27.0 - 33.0 pg    MCHC 32.4 (L) 33.6 - 35.0 g/dL    RDW 43.3 35.9 - 50.0 fL    Platelet Count 303 164 - 446 K/uL    MPV 9.0 9.0 - 12.9 fL    Neutrophils-Polys 73.10 (H) 44.00 - 72.00 %    Lymphocytes 11.20 (L) 22.00 - 41.00 %    Monocytes 12.50 0.00 - 13.40 %    Eosinophils 0.40 0.00 - 6.90 %    Basophils 0.30 0.00 - 1.80 %    Immature Granulocytes 2.50 (H) 0.00 - 0.90 %    Nucleated RBC 0.00 /100 WBC    Neutrophils (Absolute) 7.96 (H) 2.00 - 7.15 K/uL    Lymphs (Absolute) 1.22 1.00 - 4.80 K/uL    Monos (Absolute) 1.36 (H) 0.00 - 0.85 K/uL    Eos (Absolute) 0.04 0.00 - 0.51 K/uL    Baso (Absolute) 0.03 0.00 - 0.12 K/uL    Immature Granulocytes (abs) 0.27 (H) 0.00 - 0.11 K/uL    NRBC (Absolute) 0.00 K/uL   Comp Metabolic Panel    Collection Time: 04/02/23  5:41 AM   Result Value Ref Range    Sodium 133 (L) 135 - 145 mmol/L    Potassium 3.4 (L) 3.6 - 5.5 mmol/L    Chloride 99 96 - 112 mmol/L    Co2 23 20 - 33 mmol/L    Anion Gap 11.0 7.0 - 16.0    Glucose 100 (H) 65 - 99 mg/dL    Bun 12 8 - 22 mg/dL    Creatinine 0.70 0.50 - 1.40 mg/dL    Calcium 8.1 (L) 8.5 - 10.5 mg/dL    AST(SGOT) 117 (H) 12 - 45 U/L    ALT(SGPT) 126 (H) 2 - 50 U/L    Alkaline Phosphatase 314 (H) 30 - 99 U/L    Total Bilirubin 0.8 0.1 - 1.5 mg/dL    Albumin 2.8 (L) 3.2 - 4.9 g/dL    Total Protein 5.5 (L) 6.0 - 8.2 g/dL    Globulin 2.7 1.9 - 3.5 g/dL     A-G Ratio 1.0 g/dL   PHOSPHORUS    Collection Time: 04/02/23  5:41 AM   Result Value Ref Range    Phosphorus 3.5 2.5 - 4.5 mg/dL   ESTIMATED GFR    Collection Time: 04/02/23  5:41 AM   Result Value Ref Range    GFR (CKD-EPI) 81 >60 mL/min/1.73 m 2   CORRECTED CALCIUM    Collection Time: 04/02/23  5:41 AM   Result Value Ref Range    Correct Calcium 9.1 8.5 - 10.5 mg/dL   URINALYSIS    Collection Time: 04/02/23 11:00 AM   Result Value Ref Range    Color Yellow     Character Clear     Specific Gravity 1.015 <1.035    Ph 6.5 5.0 - 8.0    Glucose Negative Negative mg/dL    Ketones Negative Negative mg/dL    Protein Negative Negative mg/dL    Bilirubin Negative Negative    Urobilinogen, Urine 1.0 Negative    Nitrite Negative Negative    Leukocyte Esterase Negative Negative    Occult Blood Negative Negative    Micro Urine Req see below        Scheduled Medications   Medication Dose Frequency    furosemide  20 mg Q DAY    potassium chloride SA  10 mEq DAILY    losartan  50 mg Q DAY    senna-docusate  2 Tablet BID    ampicillin-sulbactam (UNASYN) IV  3 g Q12HRS    rivaroxaban  10 mg DAILY AT 1800    sertraline  25 mg DAILY    sodium chloride  1 g TID WITH MEALS    ciprofloxacin  1 Drop Q4HRS WHILE AWAKE       Current Diet Order   Procedures    Diet Order Diet: Regular       Assessment:    This patient is a 89 y.o. female admitted for acute inpatient rehabilitation with   Debility secondary to hospitalization for most of the lobar pneumonia.    Problem List/Medical Decision Making and Plan:    Debility  PT/OT, 1.5 hr each discipline, 5 days per week    Multilobar pneumonia  Completed IV antibiotics     Leukocytosis, improved  Completed IV antibiotics for pneumonia  Continue to monitor until resolution    Transaminitis, worsening  No abdominal complaints  Thought secondary to recent antibiotics  Continue to monitor until resolution    Normocytic anemia  Check iron    Respiratory failure with hypoxia  Elevated BNP  Continue  furosemide  Continue to titrate oxygen     Bacterial conjunctivitis, right side  Continue Cipro eyedrops     History of depression  Continue sertraline     Hyponatremia, improved  Continue salt tabs     Chronic kidney disease  Check morning labs  Avoid nephrotoxins  Renally dose medications      Hypertension  Continue losartan  Started on furosemide    Insomnia  Continue hydroxyzine as needed  Continue melatonin as needed    DVT prophylaxis  Luz Mallory M.D.  Physical Medicine and Rehabilitation

## 2023-04-03 NOTE — PROGRESS NOTES
Hospital Medicine Daily Progress Note      Chief Complaint  Pneumonia  Hypertension  Hyponatremia    Interval Problem Update  CXR finally done, images and results reviewed and discussed w/ pt.    Review of Systems  Review of Systems   Constitutional:  Negative for chills and fever.   HENT: Negative.     Eyes: Negative.    Respiratory:  Negative for cough and shortness of breath.    Cardiovascular:  Negative for chest pain and palpitations.   Gastrointestinal:  Negative for abdominal pain, nausea and vomiting.   Skin:  Negative for itching and rash.   Endo/Heme/Allergies:  Negative for polydipsia. Does not bruise/bleed easily.      Physical Exam  Temp:  [36.4 °C (97.5 °F)-36.7 °C (98.1 °F)] 36.7 °C (98.1 °F)  Pulse:  [76-87] 84  Resp:  [16-18] 16  BP: (139-165)/(67-78) 145/68  SpO2:  [92 %-97 %] 97 %    Physical Exam  Constitutional:       General: She is not in acute distress.     Appearance: Normal appearance. She is not ill-appearing.   HENT:      Head: Normocephalic and atraumatic.      Right Ear: External ear normal.      Left Ear: External ear normal.      Nose: Nose normal.      Mouth/Throat:      Pharynx: Oropharynx is clear.   Eyes:      General:         Right eye: No discharge.         Left eye: No discharge.      Extraocular Movements: Extraocular movements intact.      Conjunctiva/sclera: Conjunctivae normal.   Cardiovascular:      Rate and Rhythm: Normal rate and regular rhythm.   Pulmonary:      Effort: No respiratory distress.      Breath sounds: No wheezing.      Comments: Decreased BS  Abdominal:      General: Bowel sounds are normal. There is no distension.      Palpations: Abdomen is soft.      Tenderness: There is no abdominal tenderness.   Musculoskeletal:      Cervical back: Normal range of motion and neck supple.      Right lower leg: No edema.      Left lower leg: No edema.   Skin:     General: Skin is warm and dry.   Neurological:      Mental Status: She is alert and oriented to person,  place, and time.       Fluids    Intake/Output Summary (Last 24 hours) at 4/3/2023 1311  Last data filed at 4/3/2023 1200  Gross per 24 hour   Intake 600 ml   Output --   Net 600 ml       Laboratory  Recent Labs     04/01/23  0632 04/02/23  0541   WBC 12.1* 10.9*   RBC 3.46* 3.52*   HEMOGLOBIN 11.1* 11.1*   HEMATOCRIT 33.1* 34.3*   MCV 95.7 97.4   MCH 32.1 31.5   MCHC 33.5* 32.4*   RDW 43.2 43.3   PLATELETCT 272 303   MPV 9.2 9.0     Recent Labs     04/01/23  0632 04/02/23  0541   SODIUM 134* 133*   POTASSIUM 3.8 3.4*   CHLORIDE 100 99   CO2 22 23   GLUCOSE 102* 100*   BUN 17 12   CREATININE 0.84 0.70   CALCIUM 8.0* 8.1*                 Assessment/Plan  * Multifocal pneumonia- (present on admission)  Assessment & Plan  SARS-CoV-2 negative 3/28/23  Unclear why repeat COVID-19 screening test not done upon Rehab admission  CTA Chest 3/29/23 +multifocal PNA, negative for PE  Clinically improving on Unasyn, complete abx course    Hypokalemia  Assessment & Plan  Repleted K+  Check F/U labs in AM    Transaminitis  Assessment & Plan  Likely 2/2 abx  Pursue further work up if persists after abx completed  Follow LFT's for now  Check F/U labs in AM    Acute bacterial conjunctivitis of right eye- (present on admission)  Assessment & Plan  On Cipro eye gtts per Physiatry    Elevated brain natriuretic peptide (BNP) level- (present on admission)  Assessment & Plan  Echo EF 75%  CXR small bilat eff, R base atx, mild pulm edema  Start IS and Lasix    Major depressive disorder- (present on admission)  Assessment & Plan  On Zoloft    Chronic hyponatremia- (present on admission)  Assessment & Plan  Could be 2/2 Zoloft  Continue NaCl tabs  Follow electrolytes  Check F/U labs in AM    Essential hypertension- (present on admission)  Assessment & Plan  Increased Losartan for elevated blood pressures    History of macular degeneration- (present on admission)  Assessment & Plan  Takes OTC Preservision       DNR

## 2023-04-03 NOTE — CARE PLAN
"The patient is Watcher - Medium risk of patient condition declining or worsening    Shift Goals  Clinical Goals: safety    Problem: Fall Risk - Rehab  Goal: Patient will remain free from falls  Note: Benita aVughn Fall risk Assessment Score: 21    High fall risk Interventions   - Bed and strip alarm   - Yellow sign by the door   - Yellow wrist band \"Fall risk\"  - Room near to the nurse station  - Do not leave patient unattended in the bathroom  - Fall risk education provided     Problem: Bladder / Voiding  Goal: Patient will establish and maintain regular urinary output  Note: Patient has been continent of bladder, no dysuria, will continue to monitor.      "

## 2023-04-03 NOTE — CARE PLAN
Problem: Speech/Swallowing LTGs  Goal: LTG-By discharge, patient will solve complex problems with mod I for safe d/c home.   Outcome: Met     Problem: Comprehension STGs  Goal: STG-Within one week, patient will complete SCCAN assessment with goals to be added as appropriate.   Outcome: Met     Problem: Problem Solving STGs  Goal: STG-Within one week, patient will complete functional financial management tasks with 80% accuracy provided with min cues.   Outcome: Met  Goal: STG-Within one week, patient will complete functional medication tasks with 80% accuracy provided with min cues.   Outcome: Met  Goal: STG-Within one week, patient will solve basic problems with 80% accuracy provided with min cues.   Outcome: Met

## 2023-04-03 NOTE — CARE PLAN
Problem: Knowledge Deficit - Standard  Goal: Patient and family/care givers will demonstrate understanding of plan of care, disease process/condition, diagnostic tests and medications  Outcome: Progressing     Problem: Skin Integrity  Goal: Skin integrity is maintained or improved  Outcome: Progressing     Problem: Fall Risk - Rehab  Goal: Patient will remain free from falls  Outcome: Progressing   The patient is Stable - Low risk of patient condition declining or worsening    Shift Goals  Clinical Goals: Safety  Patient Goals: sleep well    Progress made toward(s) clinical / shift goals:  Patient sleeping no distress noted O2 in place via nasal can.    Patient is not progressing towards the following goals:

## 2023-04-03 NOTE — IDT DISCHARGE PLANNING
CASE MANAGEMENT INITIAL ASSESSMENT    Admit Date:  3/31/2023     CM reviewed the medical chart and will meet with the patient to discuss role of case management / discharge planning / team conference.       Diagnosis: Sepsis (HCC) [A41.9]  Debility [R53.81]    Co-morbidities:   Patient Active Problem List    Diagnosis Date Noted    Hypokalemia 04/02/2023    Transaminitis 04/01/2023    Debility 03/31/2023    Acute bacterial conjunctivitis of right eye 03/31/2023    Hyponatremia 03/29/2023    Hypothyroidism 03/29/2023    Elevated troponin 03/29/2023    Multifocal pneumonia 03/29/2023    Elevated brain natriuretic peptide (BNP) level 03/29/2023    Diarrhea 03/29/2023    Major depressive disorder 03/15/2023    Other specified hypothyroidism 03/15/2023    Demand ischemia (HCC) 02/18/2023    Near syncope 02/17/2023    History of skin cancer in adulthood 08/03/2022    Chronic kidney disease 08/03/2022    History of fall 12/28/2021    Chronic hyponatremia 12/24/2021    Poor appetite 12/24/2021    Contraindication to deep vein thrombosis (DVT) prophylaxis 10/08/2021    Skin lesion 07/30/2021    History of macular degeneration 06/01/2020    Essential hypertension 06/01/2020     Prior Living Situation:  Housing / Facility: Independent Living Facility  Lives with - Patient's Self Care Capacity: Alone and Able to Care For Self    Prior Level of Function:  Medication Management: Independent  Finances: Independent  Home Management: Requires Assist  Shopping: Requires Assist  Prior Level Of Mobility: Independent With Device in Community, Independent Without Device in Home  Driving / Transportation: Relatives / Others Provide Transportation    Support Systems:  Primary : Kailee Ruiz  Other support systems: [facility]       Previous Services Utilized:   Equipment Owned: Front-Wheel Walker  Prior Services: Home-Independent    Other Information:  Occupation (Pre-Hospital Vocational): Retired Due To Age     Primary Payor  Source: Medicare A, Medicare B  Primary Care Practitioner : Felix Farias    Patient / Family Goal:  Patient / Family Goal: Move to [place].    Plan:  1. Continue to follow patient through hospitalization and provide discharge planning in collaboration with patient, family, physicians and ancillary services.     2. Utilize community resources to ensure a safe discharge.

## 2023-04-03 NOTE — FLOWSHEET NOTE
04/03/23 0922   Events/Summary/Plan   Events/Summary/Plan 02 pulse ox check. Pt has requested a RA trial   Vital Signs   Pulse 84   Respiration 16   Pulse Oximetry 97 %   $ Pulse Oximetry (Spot Check) Yes   Respiratory Assessment   Level of Consciousness Alert   Chest Exam   Work Of Breathing / Effort Within Normal Limits   Oxygen   O2 (LPM) 1   O2 Delivery Device Nasal Cannula

## 2023-04-03 NOTE — THERAPY
"Occupational Therapy  Daily Treatment     Patient Name: Chrissie Dueñas  Age:  89 y.o., Sex:  female  Medical Record #: [id number]  Today's Date: 4/3/2023     Precautions  Precautions: Fall Risk  Comments: Conjunctivitis, 2 L O2         Subjective    Pt resting in bed upon arrival, agreeable to OT session. \"I hope I'm not the only one around here who needs help.\"     Objective     04/03/23 0701   OT Charge Group   OT Self Care / ADL (Units) 4   OT Total Time Spent   OT Individual Total Time Spent (Mins) 60   Functional Level of Assist   Grooming Minimal Assist;Standing  (assist to squeeze face lotion out of bottle, demo's impaired sequencing and poor attention attempting to apply lipstick with the lid and leaves large amount of lotion on hand without knowing)   Grooming Description Increased time;Initial preparation for task;Standing at sink   Upper Body Dressing Minimal Assist  (assist for bra closure and cues for O2 mgmt)   Upper Body Dressing Description Assist with closures;Increased time;Initial preparation for task;Verbal cueing   Lower Body Dressing Minimal Assist  (assist for footwear and to thread RLE through pant)   Lower Body Dressing Description Increased time;Initial preparation for task;Set-up of equipment;Supervision for safety;Verbal cueing   Toileting Contact Guard Assist   Toileting Description Grab bar;Increased time;Initial preparation for task;Supervision for safety;Verbal cueing  (x2 during session)   Bed, Chair, Wheelchair Transfer Contact Guard Assist  (progressed to SBA at end of session, assist for O2 line mgmt)   Bed Chair Wheelchair Transfer Description Adaptive equipment;Increased time;Initial preparation for task;Set-up of equipment;Supervision for safety;Verbal cueing   Toilet Transfers Contact Guard Assist  (x2 during session, CGA-SBA, assist for O2 line mgmt)   Toilet Transfer Description Grab bar;Increased time;Initial preparation for task;Set-up of equipment;Supervision for " safety;Verbal cueing   Bed Mobility    Supine to Sit Supervised   Interdisciplinary Plan of Care Collaboration   Patient Position at End of Therapy Seated;Chair Alarm On;Self Releasing Lap Belt Applied;Tray Table within Reach;Phone within Reach;Call Light within Reach     Functional mob EOB<>toilet x2 with FWW and CGA-SBA, assist for O2 line mgmt    Assessment    Pt tolerated OT session well focused on ADLs. Needs increased time to complete morning ADL routine. Demo's impaired sequencing and attention during grooming tasks described above. May benefit from trialing a [facility] for donning shoes.     Strengths: Alert and oriented, Good insight into deficits/needs, Independent prior level of function, Making steady progress towards goals, Motivated for self care and independence, Pleasant and cooperative, Supportive family, Willingly participates in therapeutic activities  Barriers: Decreased endurance, Generalized weakness, Home accessibility, Impaired activity tolerance, Impaired balance, Limited mobility, Pain    Plan    ADLs, wean O2, general endurance/strengthening, transfers, functional mob    Passport items to be completed:  Perform bathroom transfers, complete dressing, complete feeding, get ready for the day, prepare a simple meal, participate in household tasks, adapt home for safety needs, demonstrate home exercise program, complete caregiver training     Occupational Therapy Goals (Active)       Problem: Bathing       Dates: Start: 04/01/23         Goal: STG-Within one week, patient will bathe w/ CGA.       Dates: Start: 04/01/23               Problem: Dressing       Dates: Start: 04/01/23         Goal: STG-Within one week, patient will dress LB w/ CGA.        Dates: Start: 04/01/23            Goal: STG-Within one week, patient will retrieve clothing w/ min A and LRD.       Dates: Start: 04/01/23               Problem: Functional Transfers       Dates: Start: 04/01/23         Goal: STG-Within one week, patient  will transfer to toilet w/ SBA.       Dates: Start: 04/01/23               Problem: IADL's       Dates: Start: 04/01/23         Goal: STG-Within one week, patient will access kitchen area w/ min A and LRD.        Dates: Start: 04/01/23               Problem: OT Long Term Goals       Dates: Start: 04/01/23         Goal: LTG-By discharge, patient will complete basic self care tasks w/ mod I- supervision.        Dates: Start: 04/01/23            Goal: LTG-By discharge, patient will perform bathroom transfers w/ mos I- supervision.        Dates: Start: 04/01/23            Goal: LTG-By discharge, patient will complete basic home management w/ mod I- min A.        Dates: Start: 04/01/23

## 2023-04-03 NOTE — THERAPY
Speech Language Pathology  Daily Treatment     Patient Name: Chrissie Dueñas  Age:  89 y.o., Sex:  female  Medical Record #: [id number]  Today's Date: 4/3/2023     Precautions  Precautions: Fall Risk  Comments: Conjunctivitis, 2 L O2    Subjective    Assumed care from nursing. Pt agreeable to therapy and in agreement to d/c ST services at this time     Objective       04/03/23 1234   Treatment Charges   Charges Yes   SLP Cognitive Skill Development First 15 Minutes 1   SLP Cognitive Skill Development Additional 15 Minutes 1   SLP Total Time Spent   SLP Individual Total Time Spent (Mins) 30   Cognition   Medication Management  Supervision (5)   Interdisciplinary Plan of Care Collaboration   IDT Collaboration with  Nursing   Patient Position at End of Therapy Seated;Self Releasing Lap Belt Applied;Call Light within Reach;Tray Table within Reach   Collaboration Comments assumed care from nursing   Speech Language Pathologist Assigned   Assigned SLP / Extension d/c ST 4/3/23         Assessment    Per SLP who treated pt in a.m. - pt scoring within typical functioning on outcome assessment. Review of medications pt is currently taking as she was only on 2 previously, an eye drop and BP med. Pt reports daughter can assist in sorting medications into pill organizer if she is having difficulty managing at d/c. Pt preparing list of questions for physician related to meds with SLP acting as scribe and will ask when next meeting physiatrist. No further ST services warranted at this time.     Strengths: Able to follow instructions, Alert and oriented, Independent prior level of function, Motivated for self care and independence, Pleasant and cooperative, Willingly participates in therapeutic activities, Supportive family  Barriers: Hearing impairment, Impaired activity tolerance, Impaired carryover of learning, Impaired insight/denial of deficits, Impaired functional cognition, Visual impairment    Plan    D/c ST     Passport items  to be completed:  Completed.     Speech Therapy Problems (Active)       Problem: Comprehension STGs       Dates: Start: 04/01/23         Goal: STG-Within one week, patient will complete SCCAN assessment with goals to be added as appropriate.        Dates: Start: 04/01/23               Problem: Problem Solving STGs       Dates: Start: 04/01/23         Goal: STG-Within one week, patient will complete functional financial management tasks with 80% accuracy provided with min cues.        Dates: Start: 04/01/23            Goal: STG-Within one week, patient will complete functional medication tasks with 80% accuracy provided with min cues.        Dates: Start: 04/01/23            Goal: STG-Within one week, patient will solve basic problems with 80% accuracy provided with min cues.        Dates: Start: 04/01/23               Problem: Speech/Swallowing LTGs       Dates: Start: 04/01/23         Goal: LTG-By discharge, patient will solve complex problems with mod I for safe d/c home.        Dates: Start: 04/01/23

## 2023-04-03 NOTE — THERAPY
"Speech Language Pathology  Daily Treatment     Patient Name: Chrissie Dueñas  Age:  89 y.o., Sex:  female  Medical Record #: [id number]  Today's Date: 4/3/2023     Precautions  Precautions: Fall Risk  Comments: Conjunctivitis, 2 L O2    Subjective    Patient is pleasant and agreeable to ST     Objective       04/03/23 0931   Treatment Charges   SLP Cognitive Skill Development First 15 Minutes 1   SLP Cognitive Skill Development Additional 15 Minutes 1   SLP Total Time Spent   SLP Individual Total Time Spent (Mins) 30   SCCAN (Scales of Cognitive and Communicative Ability for Neurorehabilitation)   Oral Expression - Raw Score 19   Oral Expression - Scale Performance Score 100   Orientation - Raw Score 12   Orientation - Scale Performance Score 100   Memory - Raw Score 18   Memory - Scale Performance Score 95   Speech Comprehension - Raw Score 13   Speech Comprehension - Scale Performance Score 100   Reading Comprehension - Raw Score 11   Reading Comprehension - Scale Performance Score 92   Writing - Raw Score 7   Writing - Scale Performance Score 100   Attention - Raw Score 15   Attention - Scale Performance Score 94   Problem Solving - Raw Score 22   Problem Solving - Scale Performance Score 96   SCCAN Total Raw Score 91   SCCAN Degree of Severity Typical Functioning         Assessment    Completed SCCAN, with a total raw score of 91, indicating \"typical functioning.\" Subtests reported above. Patient reports plan to discharge to [facility] where she will have assistance with IADLs as needed.     Strengths: Able to follow instructions, Alert and oriented, Independent prior level of function, Motivated for self care and independence, Pleasant and cooperative, Willingly participates in therapeutic activities, Supportive family  Barriers: Hearing impairment, Impaired activity tolerance, Impaired carryover of learning, Impaired insight/denial of deficits, Impaired functional cognition, Visual impairment    Plan    Final results of " SCCAN communicated to patient's primary ST, who reports plan to review medication management with patient before discharging her from ST services     Speech Therapy Problems (Active)       Problem: Comprehension STGs       Dates: Start: 04/01/23         Goal: STG-Within one week, patient will complete SCCAN assessment with goals to be added as appropriate.        Dates: Start: 04/01/23               Problem: Problem Solving STGs       Dates: Start: 04/01/23         Goal: STG-Within one week, patient will complete functional financial management tasks with 80% accuracy provided with min cues.        Dates: Start: 04/01/23            Goal: STG-Within one week, patient will complete functional medication tasks with 80% accuracy provided with min cues.        Dates: Start: 04/01/23            Goal: STG-Within one week, patient will solve basic problems with 80% accuracy provided with min cues.        Dates: Start: 04/01/23               Problem: Speech/Swallowing LTGs       Dates: Start: 04/01/23         Goal: LTG-By discharge, patient will solve complex problems with mod I for safe d/c home.        Dates: Start: 04/01/23

## 2023-04-04 ENCOUNTER — APPOINTMENT (OUTPATIENT)
Dept: OCCUPATIONAL THERAPY | Facility: REHABILITATION | Age: 88
DRG: 193 | End: 2023-04-04
Attending: PHYSICAL MEDICINE & REHABILITATION
Payer: MEDICARE

## 2023-04-04 ENCOUNTER — APPOINTMENT (OUTPATIENT)
Dept: PHYSICAL THERAPY | Facility: REHABILITATION | Age: 88
DRG: 193 | End: 2023-04-04
Attending: PHYSICAL MEDICINE & REHABILITATION
Payer: MEDICARE

## 2023-04-04 PROBLEM — D64.9 ANEMIA: Status: ACTIVE | Noted: 2023-04-04

## 2023-04-04 PROBLEM — J81.1 PULMONARY EDEMA: Status: ACTIVE | Noted: 2023-03-29

## 2023-04-04 LAB
ALBUMIN SERPL BCP-MCNC: 2.8 G/DL (ref 3.2–4.9)
ALBUMIN/GLOB SERPL: 1 G/DL
ALP SERPL-CCNC: 306 U/L (ref 30–99)
ALT SERPL-CCNC: 103 U/L (ref 2–50)
ANION GAP SERPL CALC-SCNC: 12 MMOL/L (ref 7–16)
AST SERPL-CCNC: 81 U/L (ref 12–45)
BILIRUB SERPL-MCNC: 0.7 MG/DL (ref 0.1–1.5)
BUN SERPL-MCNC: 11 MG/DL (ref 8–22)
CALCIUM ALBUM COR SERPL-MCNC: 9.3 MG/DL (ref 8.5–10.5)
CALCIUM SERPL-MCNC: 8.3 MG/DL (ref 8.5–10.5)
CHLORIDE SERPL-SCNC: 98 MMOL/L (ref 96–112)
CO2 SERPL-SCNC: 24 MMOL/L (ref 20–33)
CREAT SERPL-MCNC: 0.7 MG/DL (ref 0.5–1.4)
ERYTHROCYTE [DISTWIDTH] IN BLOOD BY AUTOMATED COUNT: 42.5 FL (ref 35.9–50)
GFR SERPLBLD CREATININE-BSD FMLA CKD-EPI: 81 ML/MIN/1.73 M 2
GLOBULIN SER CALC-MCNC: 2.9 G/DL (ref 1.9–3.5)
GLUCOSE SERPL-MCNC: 91 MG/DL (ref 65–99)
HCT VFR BLD AUTO: 32.5 % (ref 37–47)
HGB BLD-MCNC: 10.9 G/DL (ref 12–16)
IRON SATN MFR SERPL: 21 % (ref 15–55)
IRON SERPL-MCNC: 28 UG/DL (ref 40–170)
MCH RBC QN AUTO: 31.9 PG (ref 27–33)
MCHC RBC AUTO-ENTMCNC: 33.5 G/DL (ref 33.6–35)
MCV RBC AUTO: 95 FL (ref 81.4–97.8)
NT-PROBNP SERPL IA-MCNC: 2712 PG/ML (ref 0–125)
PLATELET # BLD AUTO: 360 K/UL (ref 164–446)
PMV BLD AUTO: 8.9 FL (ref 9–12.9)
POTASSIUM SERPL-SCNC: 3.5 MMOL/L (ref 3.6–5.5)
PROT SERPL-MCNC: 5.7 G/DL (ref 6–8.2)
RBC # BLD AUTO: 3.42 M/UL (ref 4.2–5.4)
SODIUM SERPL-SCNC: 134 MMOL/L (ref 135–145)
TIBC SERPL-MCNC: 136 UG/DL (ref 250–450)
UIBC SERPL-MCNC: 108 UG/DL (ref 110–370)
WBC # BLD AUTO: 10.9 K/UL (ref 4.8–10.8)

## 2023-04-04 PROCEDURE — 97116 GAIT TRAINING THERAPY: CPT | Mod: CQ

## 2023-04-04 PROCEDURE — 97535 SELF CARE MNGMENT TRAINING: CPT

## 2023-04-04 PROCEDURE — 99232 SBSQ HOSP IP/OBS MODERATE 35: CPT | Performed by: HOSPITALIST

## 2023-04-04 PROCEDURE — A9270 NON-COVERED ITEM OR SERVICE: HCPCS | Performed by: HOSPITALIST

## 2023-04-04 PROCEDURE — 99232 SBSQ HOSP IP/OBS MODERATE 35: CPT | Performed by: PHYSICAL MEDICINE & REHABILITATION

## 2023-04-04 PROCEDURE — 36415 COLL VENOUS BLD VENIPUNCTURE: CPT

## 2023-04-04 PROCEDURE — 97530 THERAPEUTIC ACTIVITIES: CPT

## 2023-04-04 PROCEDURE — 83540 ASSAY OF IRON: CPT

## 2023-04-04 PROCEDURE — 97112 NEUROMUSCULAR REEDUCATION: CPT

## 2023-04-04 PROCEDURE — 83880 ASSAY OF NATRIURETIC PEPTIDE: CPT

## 2023-04-04 PROCEDURE — 700102 HCHG RX REV CODE 250 W/ 637 OVERRIDE(OP): Performed by: PHYSICAL MEDICINE & REHABILITATION

## 2023-04-04 PROCEDURE — 97110 THERAPEUTIC EXERCISES: CPT

## 2023-04-04 PROCEDURE — 770010 HCHG ROOM/CARE - REHAB SEMI PRIVAT*

## 2023-04-04 PROCEDURE — 83550 IRON BINDING TEST: CPT

## 2023-04-04 PROCEDURE — 80053 COMPREHEN METABOLIC PANEL: CPT

## 2023-04-04 PROCEDURE — A9270 NON-COVERED ITEM OR SERVICE: HCPCS | Performed by: PHYSICAL MEDICINE & REHABILITATION

## 2023-04-04 PROCEDURE — 85027 COMPLETE CBC AUTOMATED: CPT

## 2023-04-04 PROCEDURE — 700102 HCHG RX REV CODE 250 W/ 637 OVERRIDE(OP): Performed by: HOSPITALIST

## 2023-04-04 PROCEDURE — 700111 HCHG RX REV CODE 636 W/ 250 OVERRIDE (IP): Performed by: PHYSICAL MEDICINE & REHABILITATION

## 2023-04-04 RX ORDER — FERROUS SULFATE 325(65) MG
325 TABLET ORAL
Status: DISCONTINUED | OUTPATIENT
Start: 2023-04-05 | End: 2023-04-04

## 2023-04-04 RX ORDER — FERROUS SULFATE 325(65) MG
325 TABLET ORAL 2 TIMES DAILY WITH MEALS
Status: DISCONTINUED | OUTPATIENT
Start: 2023-04-04 | End: 2023-04-12 | Stop reason: HOSPADM

## 2023-04-04 RX ORDER — POTASSIUM CHLORIDE 20 MEQ/1
20 TABLET, EXTENDED RELEASE ORAL DAILY
Status: DISCONTINUED | OUTPATIENT
Start: 2023-04-05 | End: 2023-04-12 | Stop reason: HOSPADM

## 2023-04-04 RX ORDER — POTASSIUM CHLORIDE 20 MEQ/1
40 TABLET, EXTENDED RELEASE ORAL ONCE
Status: COMPLETED | OUTPATIENT
Start: 2023-04-04 | End: 2023-04-04

## 2023-04-04 RX ORDER — ENOXAPARIN SODIUM 100 MG/ML
40 INJECTION SUBCUTANEOUS DAILY
Status: DISCONTINUED | OUTPATIENT
Start: 2023-04-04 | End: 2023-04-12 | Stop reason: HOSPADM

## 2023-04-04 RX ORDER — ASCORBIC ACID 500 MG
500 TABLET ORAL
Status: DISCONTINUED | OUTPATIENT
Start: 2023-04-05 | End: 2023-04-10

## 2023-04-04 RX ADMIN — FUROSEMIDE 20 MG: 20 TABLET ORAL at 05:26

## 2023-04-04 RX ADMIN — POTASSIUM CHLORIDE 10 MEQ: 1500 TABLET, EXTENDED RELEASE ORAL at 07:42

## 2023-04-04 RX ADMIN — CIPROFLOXACIN 1 DROP: 3 SOLUTION OPHTHALMIC at 14:07

## 2023-04-04 RX ADMIN — CIPROFLOXACIN 1 DROP: 3 SOLUTION OPHTHALMIC at 09:52

## 2023-04-04 RX ADMIN — CIPROFLOXACIN 1 DROP: 3 SOLUTION OPHTHALMIC at 17:57

## 2023-04-04 RX ADMIN — SODIUM CHLORIDE 1 G: 1 TABLET ORAL at 07:42

## 2023-04-04 RX ADMIN — SODIUM CHLORIDE 1 G: 1 TABLET ORAL at 11:35

## 2023-04-04 RX ADMIN — ENOXAPARIN SODIUM 40 MG: 100 INJECTION SUBCUTANEOUS at 17:07

## 2023-04-04 RX ADMIN — SERTRALINE HYDROCHLORIDE 25 MG: 50 TABLET, FILM COATED ORAL at 07:42

## 2023-04-04 RX ADMIN — CIPROFLOXACIN 1 DROP: 3 SOLUTION OPHTHALMIC at 05:26

## 2023-04-04 RX ADMIN — SODIUM CHLORIDE 1 G: 1 TABLET ORAL at 17:07

## 2023-04-04 RX ADMIN — CIPROFLOXACIN 1 DROP: 3 SOLUTION OPHTHALMIC at 21:06

## 2023-04-04 RX ADMIN — FERROUS SULFATE TAB 325 MG (65 MG ELEMENTAL FE) 325 MG: 325 (65 FE) TAB at 09:50

## 2023-04-04 RX ADMIN — FERROUS SULFATE TAB 325 MG (65 MG ELEMENTAL FE) 325 MG: 325 (65 FE) TAB at 17:07

## 2023-04-04 RX ADMIN — POTASSIUM CHLORIDE 40 MEQ: 1500 TABLET, EXTENDED RELEASE ORAL at 09:49

## 2023-04-04 RX ADMIN — SENNOSIDES AND DOCUSATE SODIUM 2 TABLET: 50; 8.6 TABLET ORAL at 07:42

## 2023-04-04 RX ADMIN — LOSARTAN POTASSIUM 50 MG: 25 TABLET, FILM COATED ORAL at 05:26

## 2023-04-04 ASSESSMENT — GAIT ASSESSMENTS
GAIT LEVEL OF ASSIST: CONTACT GUARD ASSIST
DEVIATION: DECREASED HEEL STRIKE;DECREASED TOE OFF
GAIT LEVEL OF ASSIST: STANDBY ASSIST
ASSISTIVE DEVICE: FRONT WHEEL WALKER

## 2023-04-04 ASSESSMENT — ENCOUNTER SYMPTOMS
FEVER: 0
ABDOMINAL PAIN: 0
CHILLS: 0
NERVOUS/ANXIOUS: 0
VOMITING: 0
DIARRHEA: 0
NAUSEA: 0
SHORTNESS OF BREATH: 0

## 2023-04-04 ASSESSMENT — ACTIVITIES OF DAILY LIVING (ADL)
TOILETING_LEVEL_OF_ASSIST_DESCRIPTION: GRAB BAR;INCREASED TIME;INITIAL PREPARATION FOR TASK;SET-UP OF EQUIPMENT;SUPERVISION FOR SAFETY;VERBAL CUEING
TOILET_TRANSFER_DESCRIPTION: GRAB BAR;INCREASED TIME;SET-UP OF EQUIPMENT;SUPERVISION FOR SAFETY;VERBAL CUEING
BED_CHAIR_WHEELCHAIR_TRANSFER_DESCRIPTION: ADAPTIVE EQUIPMENT;VERBAL CUEING;SUPERVISION FOR SAFETY
TOILET_TRANSFER_DESCRIPTION: GRAB BAR;SUPERVISION FOR SAFETY
TOILET_TRANSFER_DESCRIPTION: GRAB BAR;INCREASED TIME;SUPERVISION FOR SAFETY

## 2023-04-04 NOTE — DOCUMENTATION QUERY
DOCUMENTATION QUERY    PROVIDERS: Please select “Cosign w/ note”to reply to query.    To better represent the severity of illness of your patient, please review the following information and exercise your independent professional judgment in responding to this query.     Respiratory failure with hypoxia is documented in the Progress Notes. Based upon the clinical findings, risk factors, and treatment, can this diagnosis be further specified?    Acute respiratory failure with hypoxia  Chronic respiratory failure with hypoxia  Acute on chronic respiratory failure with hypoxia  Other explanation of clinical findings  Findings of no clinical significance  Unable to determine      The medical record reflects the following:   Clinical Findings Respiratory failure with hypoxia  Elevated BNP                   03/31/23 1932   Incentive Spirometry Treatment   Incentive Spirometer Volume 1000 mL  (Good effort)   Incentive Spirometer Next Change Date 04/28/23      Treatment  Continue furosemide  Continue to titrate oxygen   Risk Factors  Debility s/p pneumonia,    Location within medical record  Progress Notes     Thank you,   [email or web address]

## 2023-04-04 NOTE — THERAPY
Occupational Therapy  Daily Treatment     Patient Name: Chrissie Dueñas  Age:  89 y.o., Sex:  female  Medical Record #: [id number]  Today's Date: 4/4/2023     Precautions  Precautions: Fall Risk  Comments: Conjunctivitis, 2 L O2         Subjective    Pt up in w/c upon arrival, agreeable to OT session.      Objective     04/04/23 0831   OT Charge Group   OT Self Care / ADL (Units) 2   OT Therapy Activity (Units) 1   OT Therapeutic Exercise (Units) 1   OT Total Time Spent   OT Individual Total Time Spent (Mins) 60   Functional Level of Assist   Grooming Supervision;Standing  (hair brushing and hand hygiene)   Grooming Description Increased time;Standing at sink   Lower Body Dressing Standby Assist  (socks/shoes from w/c and with leg propped on bed to reach)   Lower Body Dressing Description Increased time;Initial preparation for task;Supervision for safety;Verbal cueing   Toileting Standby Assist   Toileting Description Grab bar;Increased time;Initial preparation for task;Set-up of equipment;Supervision for safety;Verbal cueing   Toilet Transfers Standby Assist  (FWW and assist for O2 line mgmt)   Toilet Transfer Description Grab bar;Increased time;Set-up of equipment;Supervision for safety;Verbal cueing   Sitting Lower Body Exercises   Nustep Resistance Level 2  (10 min to progress CV endurance)   Interdisciplinary Plan of Care Collaboration   Patient Position at End of Therapy Seated;Chair Alarm On;Self Releasing Lap Belt Applied;Call Light within Reach;Tray Table within Reach;Phone within Reach     Functional mob 27' x2 and 92' x1 with FWW and SBA for endurance and to target turning and sitting sequence with FWW. Completed on RA to wean O2, starts 93-94% but drops to 86-88%, recovers with seated rest and cues for PLB. Maintains SpO2 on 1L during Nustep exercise.     Assessment    Pt tolerated OT session well focused on ADLs and endurance with focus on weaning O2 needs. Easily maintains SpO2 in 90s on 1L but quickly  drops into high 80s on RA, needs cues for PLB. No overt s/s and pt denies SOB. Recalled initial instructions for safe turning and sitting with FWW, reports she typically only uses a 4WW for longer distances at her apt complex.     Strengths: Alert and oriented, Good insight into deficits/needs, Independent prior level of function, Making steady progress towards goals, Motivated for self care and independence, Pleasant and cooperative, Supportive family, Willingly participates in therapeutic activities  Barriers: Decreased endurance, Generalized weakness, Home accessibility, Impaired activity tolerance, Impaired balance, Limited mobility, Pain    Plan    ADLs, wean O2, general endurance/strengthening, transfers, functional mob    Passport items to be completed:  Perform bathroom transfers, complete dressing, complete feeding, get ready for the day, prepare a simple meal, participate in household tasks, adapt home for safety needs, demonstrate home exercise program, complete caregiver training     Occupational Therapy Goals (Active)       Problem: Bathing       Dates: Start: 04/01/23         Goal: STG-Within one week, patient will bathe w/ CGA.       Dates: Start: 04/01/23               Problem: Dressing       Dates: Start: 04/01/23         Goal: STG-Within one week, patient will dress LB w/ CGA.        Dates: Start: 04/01/23            Goal: STG-Within one week, patient will retrieve clothing w/ min A and LRD.       Dates: Start: 04/01/23               Problem: Functional Transfers       Dates: Start: 04/01/23         Goal: STG-Within one week, patient will transfer to toilet w/ SBA.       Dates: Start: 04/01/23               Problem: IADL's       Dates: Start: 04/01/23         Goal: STG-Within one week, patient will access kitchen area w/ min A and LRD.        Dates: Start: 04/01/23               Problem: OT Long Term Goals       Dates: Start: 04/01/23         Goal: LTG-By discharge, patient will complete basic self  care tasks w/ mod I- supervision.        Dates: Start: 04/01/23            Goal: LTG-By discharge, patient will perform bathroom transfers w/ mos I- supervision.        Dates: Start: 04/01/23            Goal: LTG-By discharge, patient will complete basic home management w/ mod I- min A.        Dates: Start: 04/01/23

## 2023-04-04 NOTE — THERAPY
Occupational Therapy  Daily Treatment     Patient Name: Chrissie Dueñas  Age:  89 y.o., Sex:  female  Medical Record #: [id number]  Today's Date: 4/4/2023     Precautions  Precautions: Fall Risk  Comments: Conjunctivitis, 2 L O2         Subjective    Pt in w/c in gym after finishing PT session, agreeable to OT.      Objective     04/04/23 1331   OT Charge Group   OT Therapeutic Exercise (Units) 2   OT Total Time Spent   OT Individual Total Time Spent (Mins) 30   Sitting Upper Body Exercises   Bilateral Row 3 sets of 10;Bilateral;Weight (See Comments for lbs)  (20# on Equalizer)   Tricep Press 3 sets of 10;Bilateral;Weight (See Comments for lbs)  (20# facing fwd on Rickshaw)   Upper Extremity Bike Level 3 Resistance  (Motomed 5 min warmup prior to UE exercises)   Interdisciplinary Plan of Care Collaboration   Patient Position at End of Therapy Seated;Chair Alarm On;Self Releasing Lap Belt Applied;Call Light within Reach;Tray Table within Reach;Phone within Reach       Assessment    Pt tolerated UE exercises well to progress strength and endurance for transfers and functional daily activities. Had no s/s of SOB during session.     Strengths: Alert and oriented, Good insight into deficits/needs, Independent prior level of function, Making steady progress towards goals, Motivated for self care and independence, Pleasant and cooperative, Supportive family, Willingly participates in therapeutic activities  Barriers: Decreased endurance, Generalized weakness, Home accessibility, Impaired activity tolerance, Impaired balance, Limited mobility, Pain    Plan    ADLs, wean O2, general endurance/strengthening, transfers, functional mob    Passport items to be completed:  Perform bathroom transfers, complete dressing, complete feeding, get ready for the day, prepare a simple meal, participate in household tasks, adapt home for safety needs, demonstrate home exercise program, complete caregiver training     Occupational Therapy  Goals (Active)       Problem: Bathing       Dates: Start: 04/01/23         Goal: STG-Within one week, patient will bathe w/ CGA.       Dates: Start: 04/01/23               Problem: Dressing       Dates: Start: 04/01/23         Goal: STG-Within one week, patient will dress LB w/ CGA.        Dates: Start: 04/01/23            Goal: STG-Within one week, patient will retrieve clothing w/ min A and LRD.       Dates: Start: 04/01/23               Problem: Functional Transfers       Dates: Start: 04/01/23         Goal: STG-Within one week, patient will transfer to toilet w/ SBA.       Dates: Start: 04/01/23               Problem: IADL's       Dates: Start: 04/01/23         Goal: STG-Within one week, patient will access kitchen area w/ min A and LRD.        Dates: Start: 04/01/23               Problem: OT Long Term Goals       Dates: Start: 04/01/23         Goal: LTG-By discharge, patient will complete basic self care tasks w/ mod I- supervision.        Dates: Start: 04/01/23            Goal: LTG-By discharge, patient will perform bathroom transfers w/ mos I- supervision.        Dates: Start: 04/01/23            Goal: LTG-By discharge, patient will complete basic home management w/ mod I- min A.        Dates: Start: 04/01/23

## 2023-04-04 NOTE — PROGRESS NOTES
Hospital Medicine Daily Progress Note      Chief Complaint  Hypertension  Hyponatremia  Pulm edema    Interval Problem Update  Discussed about supplementing her K+ and Fe.  Also discussed about he sodium low due to decreased oral intake.    Review of Systems  Review of Systems   Constitutional:  Negative for chills and fever.   Respiratory:  Negative for shortness of breath.    Cardiovascular:  Negative for chest pain.   Gastrointestinal:  Negative for abdominal pain, diarrhea, nausea and vomiting.   Psychiatric/Behavioral:  The patient is not nervous/anxious.       Physical Exam  Temp:  [36.3 °C (97.3 °F)-36.7 °C (98.1 °F)] 36.3 °C (97.3 °F)  Pulse:  [76-90] 89  Resp:  [16-18] 18  BP: (133-161)/(70-76) 153/73  SpO2:  [94 %-96 %] 94 %    Physical Exam  Vitals and nursing note reviewed.   Constitutional:       Appearance: Normal appearance.   HENT:      Head: Atraumatic.   Eyes:      Conjunctiva/sclera: Conjunctivae normal.      Pupils: Pupils are equal, round, and reactive to light.   Cardiovascular:      Rate and Rhythm: Normal rate and regular rhythm.      Heart sounds: No murmur heard.  Pulmonary:      Effort: Pulmonary effort is normal.      Breath sounds: No stridor. No wheezing or rales.   Abdominal:      General: There is no distension.      Palpations: Abdomen is soft.      Tenderness: There is no abdominal tenderness.   Musculoskeletal:      Cervical back: Normal range of motion and neck supple.      Right lower leg: No edema.      Left lower leg: No edema.   Skin:     General: Skin is warm and dry.      Findings: No rash.   Neurological:      Mental Status: She is alert and oriented to person, place, and time.   Psychiatric:         Mood and Affect: Mood normal.         Behavior: Behavior normal.       Fluids    Intake/Output Summary (Last 24 hours) at 4/4/2023 0965  Last data filed at 4/4/2023 0831  Gross per 24 hour   Intake 720 ml   Output --   Net 720 ml         Laboratory  Recent Labs      04/02/23  0541 04/04/23  0538   WBC 10.9* 10.9*   RBC 3.52* 3.42*   HEMOGLOBIN 11.1* 10.9*   HEMATOCRIT 34.3* 32.5*   MCV 97.4 95.0   MCH 31.5 31.9   MCHC 32.4* 33.5*   RDW 43.3 42.5   PLATELETCT 303 360   MPV 9.0 8.9*       Recent Labs     04/02/23  0541 04/04/23  0538   SODIUM 133* 134*   POTASSIUM 3.4* 3.5*   CHLORIDE 99 98   CO2 23 24   GLUCOSE 100* 91   BUN 12 11   CREATININE 0.70 0.70   CALCIUM 8.1* 8.3*                   Assessment/Plan  * Multifocal pneumonia- (present on admission)  Assessment & Plan  CTA chest (3/29): multifocal pneumonia, no PE  WBC's slowly normalizing  S/P Unasyn    Anemia  Assessment & Plan  H&H stable  Fe: 28, sats 21%  Will start Fe supplements    Transaminitis  Assessment & Plan  LFT's recently decreasing  ? 2nd to recent abx  Monitor for now    Acute bacterial conjunctivitis of right eye- (present on admission)  Assessment & Plan  On Cipro eye drops (thru 4/5)    Pulmonary edema- (present on admission)  Assessment & Plan  BNP: 5293 --> 2712 (4/4)  Echo: showed an EF of 75%  CXR: showed small B/L effusion, mild pul edema  On Lasix  On KCL: 10 meq daily --> will increase to 20 meq daily  Will give KCL 40 meq x 1  Encouraging IS     Major depressive disorder- (present on admission)  Assessment & Plan  On Zoloft    Chronic hyponatremia- (present on admission)  Assessment & Plan  Na: 133 --> 134  Has some diminished appetite  ? 2nd to Zoloft  Cont salt tabs  Cont to monitor    Essential hypertension- (present on admission)  Assessment & Plan  BP a little elevated  Cont Cozaar -- dose recently increased  Will monitor another day before adjusting meds

## 2023-04-04 NOTE — THERAPY
Physical Therapy   Daily Treatment     Patient Name: Chrissie Dueñas  Age:  89 y.o., Sex:  female  Medical Record #: [id number]  Today's Date: 4/4/2023     Precautions  Precautions: Fall Risk  Comments: Conjunctivitis, 2 L O2    Subjective    Pt seated in w/c upon arrival, requested to use the bathroom and agreeable to session.     Objective       04/04/23 1301   PT Charge Group   PT Gait Training (Units) 2   Supervising Physical Therapist Kimani Dang   PT Total Time Spent   PT Individual Total Time Spent (Mins) 30   Vitals   Pulse (!) 113   Patient BP Position   (post amb)   Pulse Oximetry 94 %   O2 (LPM) 1   O2 Delivery Device Nasal Cannula   Cognition    Level of Consciousness Alert   Gait Functional Level of Assist    Gait Level Of Assist Standby Assist   Assistive Device Front Wheel Walker   Distance (Feet)   (150+250)   # of Times Distance was Traveled 2   Deviation Decreased Heel Strike;Decreased Toe Off  (cues for FWW proximity)   Transfer Functional Level of Assist   Bed, Chair, Wheelchair Transfer Standby Assist   Bed Chair Wheelchair Transfer Description Adaptive equipment;Verbal cueing;Supervision for safety  (FWW)   Toilet Transfers Standby Assist   Toilet Transfer Description Grab bar;Supervision for safety  (w/c <> toilet)   Neuro-Muscular Treatments   Neuro-Muscular Treatments Postural Facilitation   Comments static standing w/o UE support x1 min, reaching PTA's hand x1min for different direction, no LOB   Interdisciplinary Plan of Care Collaboration   IDT Collaboration with  Occupational Therapist   Patient Position at End of Therapy Seated  (in main gym)   Collaboration Comments transition care to OT     O2 monitored during ambulation  First bout w/o O2, O2 88%, HR 102bpm  2nd bout w/ 1L O2, O2 94%  bpm    Assessment    Pt tolerated session well, is motivated to participate and is SBA during mobility w/ FWW. Progressing in ambulation distance and would like to wean O2 if possible since she  wasn't using O2 at [facility].  Strengths: Able to follow instructions, Effective communication skills, Independent prior level of function, Making steady progress towards goals, Motivated for self care and independence, Pleasant and cooperative, Willingly participates in therapeutic activities  Barriers: Decreased endurance, Generalized weakness, Impaired activity tolerance    Plan    Hip/ core strengthening in supine/ seated/ standing/ dynamic postures, cardiovascular endurance     Passport items to be completed:  Get in/out of bed safely, in/out of a vehicle, safely use mobility device, walk or wheel around home/community, navigate up and down stairs, show how to get up/down from the ground, ensure home is accessible, demonstrate HEP, complete caregiver training    Physical Therapy Problems (Active)       Problem: Balance       Dates: Start: 04/01/23         Goal: STG-Within one week, patient will improve LOBO score by at least 5 points to demonstrate decreased risk for falls.        Dates: Start: 04/01/23               Problem: Mobility       Dates: Start: 04/01/23         Goal: STG-Within one week, patient will ambulate 150' with FWW and supervision       Dates: Start: 04/01/23               Problem: Mobility Transfers       Dates: Start: 04/01/23         Goal: STG-Within one week, patient will perform 4 stairs with B handrails and supervision       Dates: Start: 04/01/23               Problem: PT-Long Term Goals       Dates: Start: 04/01/23         Goal: LTG-By discharge, patient will ambulate 150' with FWW independently.        Dates: Start: 04/01/23            Goal: LTG-By discharge, patient will transfer in/out of a car with FWW independently.        Dates: Start: 04/01/23            Goal: LTG-By discharge, patient will improve LOBO score by at least 10 points to demonstrate decreased risk for falls.        Dates: Start: 04/01/23            Goal: LTG-By discharge, patient will perform 12 stairs with B handrails  independently.        Dates: Start: 04/01/23

## 2023-04-04 NOTE — THERAPY
Physical Therapy   Daily Treatment     Patient Name: Chrissie Dueñas  Age:  89 y.o., Sex:  female  Medical Record #: [id number]  Today's Date: 4/4/2023     Precautions  Precautions: Fall Risk  Comments: Conjunctivitis, 2 L O2    Subjective    Pt up in wc, willing to participate.     Objective       04/03/23 1331   PT Charge Group   PT Gait Training (Units) 2   PT Therapeutic Exercise (Units) 2   PT Total Time Spent   PT Individual Total Time Spent (Mins) 60   Gait Functional Level of Assist    Gait Level Of Assist Contact Guard Assist   Assistive Device Front Wheel Walker   Distance (Feet) 100  (in addition to 50 ft x 2)   # of Times Distance was Traveled 2   Deviation Bradykinetic;Shuffled Gait   Transfer Functional Level of Assist   Bed, Chair, Wheelchair Transfer Standby Assist   Bed Chair Wheelchair Transfer Description Adaptive equipment;Increased time;Verbal cueing   Toilet Transfers Standby Assist   Toilet Transfer Description Grab bar;Increased time;Verbal cueing   Sitting Lower Body Exercises   Ankle Pumps 3 sets of 10   Hip Flexion 3 sets of 10   Hip Abduction 3 sets of 10   Hip Adduction 3 sets of 10   Long Arc Quad 3 sets of 10   Hamstring Curl 3 sets of 10   Comments 2# wts/ light TB resistance   Bed Mobility    Sit to Stand Contact Guard Assist         Assessment    Pt requires rests between activity due to fatigue but completed gait endurance and strength training well.    Strengths: Able to follow instructions, Effective communication skills, Independent prior level of function, Making steady progress towards goals, Motivated for self care and independence, Pleasant and cooperative, Willingly participates in therapeutic activities  Barriers: Decreased endurance, Generalized weakness, Impaired activity tolerance    Plan    Hip/ core strengthening in supine/ seated/ standing/ dynamic postures, cardiovascular endurance     Passport items to be completed:  Get in/out of bed safely, in/out of a vehicle,  safely use mobility device, walk or wheel around home/community, navigate up and down stairs, show how to get up/down from the ground, ensure home is accessible, demonstrate HEP, complete caregiver training      Physical Therapy Problems (Active)       Problem: Balance       Dates: Start: 04/01/23         Goal: STG-Within one week, patient will improve LOBO score by at least 5 points to demonstrate decreased risk for falls.        Dates: Start: 04/01/23               Problem: Mobility       Dates: Start: 04/01/23         Goal: STG-Within one week, patient will ambulate 150' with FWW and supervision       Dates: Start: 04/01/23               Problem: Mobility Transfers       Dates: Start: 04/01/23         Goal: STG-Within one week, patient will perform 4 stairs with B handrails and supervision       Dates: Start: 04/01/23               Problem: PT-Long Term Goals       Dates: Start: 04/01/23         Goal: LTG-By discharge, patient will ambulate 150' with FWW independently.        Dates: Start: 04/01/23            Goal: LTG-By discharge, patient will transfer in/out of a car with FWW independently.        Dates: Start: 04/01/23            Goal: LTG-By discharge, patient will improve LOBO score by at least 10 points to demonstrate decreased risk for falls.        Dates: Start: 04/01/23            Goal: LTG-By discharge, patient will perform 12 stairs with B handrails independently.        Dates: Start: 04/01/23

## 2023-04-04 NOTE — THERAPY
Physical Therapy   Daily Treatment     Patient Name: Chrissie Dueñas  Age:  89 y.o., Sex:  female  Medical Record #: [id number]  Today's Date: 4/4/2023     Precautions  Precautions: Fall Risk  Comments: Conjunctivitis, 2 L O2    Subjective    Patient is found seated up in chair, is consulting with Dr Mallory when I arrived. Patient is agreeable to participate. Denies pain, requests to use the bathroom before we go down to the gym. Willing to work on balance     Objective       04/04/23 1031   PT Charge Group   PT Therapeutic Exercise (Units) 2   PT Neuromuscular Re-Education / Balance (Units) 2   PT Total Time Spent   PT Individual Total Time Spent (Mins) 60   Transfer Functional Level of Assist   Toilet Transfers Standby Assist   Toilet Transfer Description Grab bar;Increased time;Supervision for safety   Sitting Lower Body Exercises   Hip Adduction 2 sets of 10;Bilateral;Light Resistance Theraband  (small ball for resistance)   Long Arc Quad 2 sets of 10;Bilateral   Hamstring Curl 2 sets of 10;Bilateral;Medium Resistance Theraband  (green band)   Sit to Stand 1 set of 10  (+ 5 more without UE support)   Nustep Resistance Level 1  (15 mins, 40-50 steps per minute, 706 total steps)   Standing Lower Body Exercises   Hip Abduction 2 sets of 10;Bilateral   Marching 2 sets of 10  (no UE support, instructions only to clear the floor)   Heel Rise 2 sets of 10  (with BUE support)   Neuro-Muscular Treatments   Neuro-Muscular Treatments Anterior weight shift;Compensatory Strategies;Verbal Cuing;Postural Changes   Comments picking up ball x 2 with no UE support. semi tandem no UE support x 30 sec B, tandem with AP weight shifts x 30 sec B, toe taps on 4inch block x 10 single UE and no UE support x 10, side steps up and down parallel bars x 3 no UE support   Interdisciplinary Plan of Care Collaboration   IDT Collaboration with  Nursing   Patient Position at End of Therapy Seated;Chair Alarm On;Call Light within Reach;Tray Table  within Reach;Phone within Reach;Self Releasing Lap Belt Applied   Collaboration Comments nursing in room providing meds, informed RN patient voided twice during therapy session   Strengths & Barriers   Strengths Able to follow instructions;Effective communication skills;Independent prior level of function;Making steady progress towards goals;Motivated for self care and independence;Pleasant and cooperative;Willingly participates in therapeutic activities   Barriers Decreased endurance;Generalized weakness;Impaired activity tolerance         Assessment    Patient demonstrates functional weaknesses with dynamic tasks; she will benefit from further strength training during movements. She has difficulty with picking up the small ball from the floor due to difficulty with stability in a crouched posture, has difficulty with side steps due to weakness in L hip abductors, and difficulty with stability in AP weight shifts and toe taps due to limited core/hip strength. She is motivated to improve her mobility.     Strengths: (P) Able to follow instructions, Effective communication skills, Independent prior level of function, Making steady progress towards goals, Motivated for self care and independence, Pleasant and cooperative, Willingly participates in therapeutic activities  Barriers: (P) Decreased endurance, Generalized weakness, Impaired activity tolerance    Plan    Hip/ core strengthening in supine/ seated/ standing/ dynamic postures, cardiovascular endurance    Passport items to be completed:  Get in/out of bed safely, in/out of a vehicle, safely use mobility device, walk or wheel around home/community, navigate up and down stairs, show how to get up/down from the ground, ensure home is accessible, demonstrate HEP, complete caregiver training    Physical Therapy Problems (Active)       Problem: Balance       Dates: Start: 04/01/23         Goal: STG-Within one week, patient will improve LOBO score by at least 5 points  to demonstrate decreased risk for falls.        Dates: Start: 04/01/23               Problem: Mobility       Dates: Start: 04/01/23         Goal: STG-Within one week, patient will ambulate 150' with FWW and supervision       Dates: Start: 04/01/23               Problem: Mobility Transfers       Dates: Start: 04/01/23         Goal: STG-Within one week, patient will perform 4 stairs with B handrails and supervision       Dates: Start: 04/01/23               Problem: PT-Long Term Goals       Dates: Start: 04/01/23         Goal: LTG-By discharge, patient will ambulate 150' with FWW independently.        Dates: Start: 04/01/23            Goal: LTG-By discharge, patient will transfer in/out of a car with FWW independently.        Dates: Start: 04/01/23            Goal: LTG-By discharge, patient will improve LOBO score by at least 10 points to demonstrate decreased risk for falls.        Dates: Start: 04/01/23            Goal: LTG-By discharge, patient will perform 12 stairs with B handrails independently.        Dates: Start: 04/01/23

## 2023-04-04 NOTE — PROGRESS NOTES
Rehab Progress Note     Date of Service: 4/4/2023  Chief Complaint: Follow-up debility    Interval Events (Subjective)    Patient seen and evaluated today in her room.  She wants to know if her IV can be removed.  She is concerned about taking the Xarelto for DVT prophylaxis as she reports she had a bad bloody nose 2 days ago.  Labs showed low iron this morning.  She continues to be hypertensive.  She has been titrated down to 1 L oxygen.  She is willing to use Lovenox instead of Xarelto for DVT prophylaxis after explaining her risk due to her decreased mobility.    Patient has no other new questions, concerns, or complaints today.         Objective:  VITAL SIGNS: BP (!) 153/73   Pulse 89   Temp 36.3 °C (97.3 °F) (Oral)   Resp 18   Ht 1.524 m (5')   Wt 62.5 kg (137 lb 12.6 oz)   SpO2 94%   BMI 26.91 kg/m²   Gen: alert, no apparent distress  CV: Regular rate, regular rhythm, no peripheral edema, systolic murmur  Resp: Clear to auscultation bilaterally      Recent Results (from the past 72 hour(s))   OSMOLALITY URINE    Collection Time: 04/01/23  2:50 PM   Result Value Ref Range    Osmolality Urine 522 300 - 900 mOsm/kg H2O   URINALYSIS    Collection Time: 04/01/23  2:50 PM    Specimen: Urine, Clean Catch   Result Value Ref Range    Color Yellow     Character Clear     Specific Gravity 1.018 <1.035    Ph 5.5 5.0 - 8.0    Glucose Negative Negative mg/dL    Ketones Negative Negative mg/dL    Protein 30 (A) Negative mg/dL    Bilirubin Negative Negative    Urobilinogen, Urine 1.0 Negative    Nitrite Negative Negative    Leukocyte Esterase Small (A) Negative    Occult Blood Moderate (A) Negative    Micro Urine Req Microscopic    URINE MICROSCOPIC (W/UA)    Collection Time: 04/01/23  2:50 PM   Result Value Ref Range    WBC 5-10 (A) /hpf    RBC 20-50 (A) /hpf    Bacteria Negative None /hpf    Epithelial Cells Few /hpf    Hyaline Cast 3-5 (A) /lpf   CBC WITH DIFFERENTIAL    Collection Time: 04/02/23  5:41 AM   Result  Value Ref Range    WBC 10.9 (H) 4.8 - 10.8 K/uL    RBC 3.52 (L) 4.20 - 5.40 M/uL    Hemoglobin 11.1 (L) 12.0 - 16.0 g/dL    Hematocrit 34.3 (L) 37.0 - 47.0 %    MCV 97.4 81.4 - 97.8 fL    MCH 31.5 27.0 - 33.0 pg    MCHC 32.4 (L) 33.6 - 35.0 g/dL    RDW 43.3 35.9 - 50.0 fL    Platelet Count 303 164 - 446 K/uL    MPV 9.0 9.0 - 12.9 fL    Neutrophils-Polys 73.10 (H) 44.00 - 72.00 %    Lymphocytes 11.20 (L) 22.00 - 41.00 %    Monocytes 12.50 0.00 - 13.40 %    Eosinophils 0.40 0.00 - 6.90 %    Basophils 0.30 0.00 - 1.80 %    Immature Granulocytes 2.50 (H) 0.00 - 0.90 %    Nucleated RBC 0.00 /100 WBC    Neutrophils (Absolute) 7.96 (H) 2.00 - 7.15 K/uL    Lymphs (Absolute) 1.22 1.00 - 4.80 K/uL    Monos (Absolute) 1.36 (H) 0.00 - 0.85 K/uL    Eos (Absolute) 0.04 0.00 - 0.51 K/uL    Baso (Absolute) 0.03 0.00 - 0.12 K/uL    Immature Granulocytes (abs) 0.27 (H) 0.00 - 0.11 K/uL    NRBC (Absolute) 0.00 K/uL   Comp Metabolic Panel    Collection Time: 04/02/23  5:41 AM   Result Value Ref Range    Sodium 133 (L) 135 - 145 mmol/L    Potassium 3.4 (L) 3.6 - 5.5 mmol/L    Chloride 99 96 - 112 mmol/L    Co2 23 20 - 33 mmol/L    Anion Gap 11.0 7.0 - 16.0    Glucose 100 (H) 65 - 99 mg/dL    Bun 12 8 - 22 mg/dL    Creatinine 0.70 0.50 - 1.40 mg/dL    Calcium 8.1 (L) 8.5 - 10.5 mg/dL    AST(SGOT) 117 (H) 12 - 45 U/L    ALT(SGPT) 126 (H) 2 - 50 U/L    Alkaline Phosphatase 314 (H) 30 - 99 U/L    Total Bilirubin 0.8 0.1 - 1.5 mg/dL    Albumin 2.8 (L) 3.2 - 4.9 g/dL    Total Protein 5.5 (L) 6.0 - 8.2 g/dL    Globulin 2.7 1.9 - 3.5 g/dL    A-G Ratio 1.0 g/dL   PHOSPHORUS    Collection Time: 04/02/23  5:41 AM   Result Value Ref Range    Phosphorus 3.5 2.5 - 4.5 mg/dL   ESTIMATED GFR    Collection Time: 04/02/23  5:41 AM   Result Value Ref Range    GFR (CKD-EPI) 81 >60 mL/min/1.73 m 2   CORRECTED CALCIUM    Collection Time: 04/02/23  5:41 AM   Result Value Ref Range    Correct Calcium 9.1 8.5 - 10.5 mg/dL   URINALYSIS    Collection Time:  04/02/23 11:00 AM   Result Value Ref Range    Color Yellow     Character Clear     Specific Gravity 1.015 <1.035    Ph 6.5 5.0 - 8.0    Glucose Negative Negative mg/dL    Ketones Negative Negative mg/dL    Protein Negative Negative mg/dL    Bilirubin Negative Negative    Urobilinogen, Urine 1.0 Negative    Nitrite Negative Negative    Leukocyte Esterase Negative Negative    Occult Blood Negative Negative    Micro Urine Req see below    CBC WITHOUT DIFFERENTIAL    Collection Time: 04/04/23  5:38 AM   Result Value Ref Range    WBC 10.9 (H) 4.8 - 10.8 K/uL    RBC 3.42 (L) 4.20 - 5.40 M/uL    Hemoglobin 10.9 (L) 12.0 - 16.0 g/dL    Hematocrit 32.5 (L) 37.0 - 47.0 %    MCV 95.0 81.4 - 97.8 fL    MCH 31.9 27.0 - 33.0 pg    MCHC 33.5 (L) 33.6 - 35.0 g/dL    RDW 42.5 35.9 - 50.0 fL    Platelet Count 360 164 - 446 K/uL    MPV 8.9 (L) 9.0 - 12.9 fL   Comp Metabolic Panel    Collection Time: 04/04/23  5:38 AM   Result Value Ref Range    Sodium 134 (L) 135 - 145 mmol/L    Potassium 3.5 (L) 3.6 - 5.5 mmol/L    Chloride 98 96 - 112 mmol/L    Co2 24 20 - 33 mmol/L    Anion Gap 12.0 7.0 - 16.0    Glucose 91 65 - 99 mg/dL    Bun 11 8 - 22 mg/dL    Creatinine 0.70 0.50 - 1.40 mg/dL    Calcium 8.3 (L) 8.5 - 10.5 mg/dL    AST(SGOT) 81 (H) 12 - 45 U/L    ALT(SGPT) 103 (H) 2 - 50 U/L    Alkaline Phosphatase 306 (H) 30 - 99 U/L    Total Bilirubin 0.7 0.1 - 1.5 mg/dL    Albumin 2.8 (L) 3.2 - 4.9 g/dL    Total Protein 5.7 (L) 6.0 - 8.2 g/dL    Globulin 2.9 1.9 - 3.5 g/dL    A-G Ratio 1.0 g/dL   proBrain Natriuretic Peptide, NT    Collection Time: 04/04/23  5:38 AM   Result Value Ref Range    NT-proBNP 2712 (H) 0 - 125 pg/mL   IRON/TOTAL IRON BIND    Collection Time: 04/04/23  5:38 AM   Result Value Ref Range    Iron 28 (L) 40 - 170 ug/dL    Total Iron Binding 136 (L) 250 - 450 ug/dL    Unsat Iron Binding 108 (L) 110 - 370 ug/dL    % Saturation 21 15 - 55 %   ESTIMATED GFR    Collection Time: 04/04/23  5:38 AM   Result Value Ref Range    GFR  (CKD-EPI) 81 >60 mL/min/1.73 m 2   CORRECTED CALCIUM    Collection Time: 04/04/23  5:38 AM   Result Value Ref Range    Correct Calcium 9.3 8.5 - 10.5 mg/dL       Scheduled Medications   Medication Dose Frequency    [START ON 4/5/2023] ferrous sulfate  325 mg QDAY with Breakfast    [START ON 4/5/2023] ascorbic acid  500 mg QDAY with Breakfast    furosemide  20 mg Q DAY    potassium chloride SA  10 mEq DAILY    losartan  50 mg Q DAY    senna-docusate  2 Tablet BID    rivaroxaban  10 mg DAILY AT 1800    sertraline  25 mg DAILY    sodium chloride  1 g TID WITH MEALS    ciprofloxacin  1 Drop Q4HRS WHILE AWAKE       Current Diet Order   Procedures    Diet Order Diet: Regular       Assessment:    This patient is a 89 y.o. female admitted for acute inpatient rehabilitation   with Debility secondary to hospitalization for most of the lobar pneumonia.    We will have the patient's first weekly conference tomorrow to discuss a discharge date.    Problem List/Medical Decision Making and Plan:    Debility  Generalized weakness  PT/OT, 1.5 hr each discipline, 5 days per week    Multilobar pneumonia  Completed IV antibiotics     Leukocytosis, improved  Completed IV antibiotics for pneumonia  Continue to monitor until resolution    Transaminitis, worsening  No abdominal complaints  Thought secondary to recent antibiotics  Continue to monitor until resolution    Normocytic anemia  Iron deficiency  Start ferrous sulfate and vitamin C    Respiratory failure with hypoxia  Elevated BNP  Continue furosemide  Continue to titrate oxygen     Bacterial conjunctivitis, right side, improved  Continue Cipro     History of depression  Continue sertraline     Hyponatremia, improved  Continue salt tabs     Chronic kidney disease  Avoid nephrotoxins  Renally dose medications   Monitor with intermittent labs     Hypertension  Continue losartan and furosemide    Insomnia, resolved  Continue melatonin or hydroxyzine as needed    Epistaxis  Xarelto  changed to Lovenox per patient's request    DVT prophylaxis  Xarelto changed to Lovenox per patient's request      Sailaja Mallory M.D.  Physical Medicine and Rehabilitation

## 2023-04-04 NOTE — CARE PLAN
"The patient is Watcher - Medium risk of patient condition declining or worsening    Shift Goals  Clinical Goals: safety    Problem: Bladder / Voiding  Goal: Patient will establish and maintain regular urinary output  Note: Patient has been continent of bladder with occasional stress incontinence, no dysuria, will continue to monitor.      Problem: Fall Risk - Rehab  Goal: Patient will remain free from falls  Note: Benita Vaughn Fall risk Assessment Score: 20    High fall risk Interventions   - Bed and strip alarm   - Yellow sign by the door   - Yellow wrist band \"Fall risk\"  - Room near to the nurse station  - Do not leave patient unattended in the bathroom  - Fall risk education provided     "

## 2023-04-04 NOTE — CARE PLAN
"  Problem: Fall Risk - Rehab  Goal: Patient will remain free from falls  Outcome: Progressing  Note: Benita Vaughn Fall risk Assessment Score: 21    High fall risk Interventions   - Alarming seatbelt  - Bed and strip alarm   - Yellow sign by the door   - Yellow wrist band \"Fall risk\"  - Room near to the nurse station  - Do not leave patient unattended in the bathroom  - Fall risk education provided    Problem: Respiratory  Goal: Patient will understand use and administration of respiratory medications to improve respiratory function  Outcome: Progressing  Note: O2  1L NC in use ,denies sob and discomfort .     Problem: Bladder / Voiding  Goal: Patient will establish and maintain regular urinary output  Outcome: Progressing  Note: Assisted OOB to bathroom, voiding freely.        The patient is Stable - Low risk of patient condition declining or worsening    Shift Goals  Clinical Goals: safety  Patient Goals: sleep well    Progress made toward(s) clinical / shift goals:  Pt free from fall and injury.    "

## 2023-04-05 ENCOUNTER — APPOINTMENT (OUTPATIENT)
Dept: OCCUPATIONAL THERAPY | Facility: REHABILITATION | Age: 88
DRG: 193 | End: 2023-04-05
Attending: PHYSICAL MEDICINE & REHABILITATION
Payer: MEDICARE

## 2023-04-05 ENCOUNTER — APPOINTMENT (OUTPATIENT)
Dept: PHYSICAL THERAPY | Facility: REHABILITATION | Age: 88
DRG: 193 | End: 2023-04-05
Attending: PHYSICAL MEDICINE & REHABILITATION
Payer: MEDICARE

## 2023-04-05 PROCEDURE — 97110 THERAPEUTIC EXERCISES: CPT

## 2023-04-05 PROCEDURE — 700111 HCHG RX REV CODE 636 W/ 250 OVERRIDE (IP): Performed by: PHYSICAL MEDICINE & REHABILITATION

## 2023-04-05 PROCEDURE — 770010 HCHG ROOM/CARE - REHAB SEMI PRIVAT*

## 2023-04-05 PROCEDURE — 99232 SBSQ HOSP IP/OBS MODERATE 35: CPT | Performed by: PHYSICAL MEDICINE & REHABILITATION

## 2023-04-05 PROCEDURE — 700102 HCHG RX REV CODE 250 W/ 637 OVERRIDE(OP): Performed by: HOSPITALIST

## 2023-04-05 PROCEDURE — 97535 SELF CARE MNGMENT TRAINING: CPT

## 2023-04-05 PROCEDURE — 94760 N-INVAS EAR/PLS OXIMETRY 1: CPT

## 2023-04-05 PROCEDURE — 700102 HCHG RX REV CODE 250 W/ 637 OVERRIDE(OP): Performed by: PHYSICAL MEDICINE & REHABILITATION

## 2023-04-05 PROCEDURE — A9270 NON-COVERED ITEM OR SERVICE: HCPCS | Performed by: HOSPITALIST

## 2023-04-05 PROCEDURE — 97530 THERAPEUTIC ACTIVITIES: CPT

## 2023-04-05 PROCEDURE — A9270 NON-COVERED ITEM OR SERVICE: HCPCS | Performed by: PHYSICAL MEDICINE & REHABILITATION

## 2023-04-05 PROCEDURE — 99232 SBSQ HOSP IP/OBS MODERATE 35: CPT | Performed by: HOSPITALIST

## 2023-04-05 PROCEDURE — 97112 NEUROMUSCULAR REEDUCATION: CPT

## 2023-04-05 PROCEDURE — 97116 GAIT TRAINING THERAPY: CPT

## 2023-04-05 RX ADMIN — FUROSEMIDE 20 MG: 20 TABLET ORAL at 05:29

## 2023-04-05 RX ADMIN — SODIUM CHLORIDE 1 G: 1 TABLET ORAL at 11:03

## 2023-04-05 RX ADMIN — FERROUS SULFATE TAB 325 MG (65 MG ELEMENTAL FE) 325 MG: 325 (65 FE) TAB at 08:19

## 2023-04-05 RX ADMIN — FERROUS SULFATE TAB 325 MG (65 MG ELEMENTAL FE) 325 MG: 325 (65 FE) TAB at 17:12

## 2023-04-05 RX ADMIN — POTASSIUM CHLORIDE 20 MEQ: 1500 TABLET, EXTENDED RELEASE ORAL at 08:18

## 2023-04-05 RX ADMIN — OXYCODONE HYDROCHLORIDE AND ACETAMINOPHEN 500 MG: 500 TABLET ORAL at 08:18

## 2023-04-05 RX ADMIN — ENOXAPARIN SODIUM 40 MG: 100 INJECTION SUBCUTANEOUS at 17:12

## 2023-04-05 RX ADMIN — CIPROFLOXACIN 1 DROP: 3 SOLUTION OPHTHALMIC at 15:14

## 2023-04-05 RX ADMIN — SERTRALINE HYDROCHLORIDE 25 MG: 50 TABLET, FILM COATED ORAL at 08:18

## 2023-04-05 RX ADMIN — SENNOSIDES AND DOCUSATE SODIUM 2 TABLET: 50; 8.6 TABLET ORAL at 19:41

## 2023-04-05 RX ADMIN — SODIUM CHLORIDE 1 G: 1 TABLET ORAL at 08:18

## 2023-04-05 RX ADMIN — SODIUM CHLORIDE 1 G: 1 TABLET ORAL at 17:12

## 2023-04-05 RX ADMIN — CIPROFLOXACIN 1 DROP: 3 SOLUTION OPHTHALMIC at 11:03

## 2023-04-05 RX ADMIN — LOSARTAN POTASSIUM 50 MG: 25 TABLET, FILM COATED ORAL at 05:29

## 2023-04-05 RX ADMIN — CIPROFLOXACIN 1 DROP: 3 SOLUTION OPHTHALMIC at 05:33

## 2023-04-05 ASSESSMENT — ACTIVITIES OF DAILY LIVING (ADL)
TOILET_TRANSFER_DESCRIPTION: GRAB BAR;SUPERVISION FOR SAFETY
TOILETING_LEVEL_OF_ASSIST_DESCRIPTION: GRAB BAR;SUPERVISION FOR SAFETY
BED_CHAIR_WHEELCHAIR_TRANSFER_DESCRIPTION: ADAPTIVE EQUIPMENT;INCREASED TIME;SET-UP OF EQUIPMENT;SUPERVISION FOR SAFETY;VERBAL CUEING
TOILETING_LEVEL_OF_ASSIST_DESCRIPTION: GRAB BAR;INCREASED TIME;SUPERVISION FOR SAFETY
TOILET_TRANSFER_DESCRIPTION: GRAB BAR;SUPERVISION FOR SAFETY
BED_CHAIR_WHEELCHAIR_TRANSFER_DESCRIPTION: ADAPTIVE EQUIPMENT;INCREASED TIME;SET-UP OF EQUIPMENT;SUPERVISION FOR SAFETY;VERBAL CUEING
TOILET_TRANSFER_DESCRIPTION: GRAB BAR;INCREASED TIME;SET-UP OF EQUIPMENT;SUPERVISION FOR SAFETY;VERBAL CUEING

## 2023-04-05 ASSESSMENT — GAIT ASSESSMENTS
GAIT LEVEL OF ASSIST: CONTACT GUARD ASSIST
ASSISTIVE DEVICE: FRONT WHEEL WALKER
GAIT LEVEL OF ASSIST: CONTACT GUARD ASSIST
DEVIATION: BRADYKINETIC;DECREASED HEEL STRIKE;DECREASED TOE OFF
DEVIATION: BRADYKINETIC;DECREASED HEEL STRIKE;DECREASED TOE OFF

## 2023-04-05 ASSESSMENT — ENCOUNTER SYMPTOMS
VOMITING: 0
BLURRED VISION: 0
HALLUCINATIONS: 0
NAUSEA: 0
SHORTNESS OF BREATH: 0
PALPITATIONS: 0
HEADACHES: 0
FEVER: 0
DIZZINESS: 0

## 2023-04-05 NOTE — CARE PLAN
Problem: Balance  Goal: STG-Within one week, patient will improve LOBO score by at least 5 points to demonstrate decreased risk for falls.   Outcome: Not Met     Problem: Mobility  Goal: STG-Within one week, patient will ambulate 150' with FWW and supervision  Outcome: Progressing     Problem: Mobility Transfers  Goal: STG-Within one week, patient will perform 4 stairs with B handrails and supervision  Outcome: Progressing

## 2023-04-05 NOTE — CARE PLAN
Problem: Bathing  Goal: STG-Within one week, patient will bathe w/ CGA.  Outcome: Not Met     Problem: IADL's  Goal: STG-Within one week, patient will access kitchen area w/ min A and LRD.   Outcome: Not Met  Anticipate pt would meet goal, however declined kitchen mob at this time      Problem: Dressing  Goal: STG-Within one week, patient will dress LB w/ CGA.   Outcome: Progressing  Note: SBA for pants, assist for footwear      Problem: Dressing  Goal: STG-Within one week, patient will retrieve clothing w/ min A and LRD.  Outcome: Met     Problem: Functional Transfers  Goal: STG-Within one week, patient will transfer to toilet w/ SBA.  Outcome: Met

## 2023-04-05 NOTE — PROGRESS NOTES
Hospital Medicine Daily Progress Note      Chief Complaint  Hypertension  Hyponatremia  Pulm edema    Interval Problem Update  Pt has an IV access in and asked if this can be removed -- will d/c.    Review of Systems  Review of Systems   Constitutional:  Negative for fever.   Eyes:  Negative for blurred vision.   Respiratory:  Negative for shortness of breath.    Cardiovascular:  Negative for palpitations.   Gastrointestinal:  Negative for nausea and vomiting.   Neurological:  Negative for dizziness and headaches.   Psychiatric/Behavioral:  Negative for hallucinations.       Physical Exam  Temp:  [36.3 °C (97.3 °F)-36.4 °C (97.6 °F)] 36.3 °C (97.3 °F)  Pulse:  [] 85  Resp:  [18] 18  BP: (119-158)/(63-71) 158/63  SpO2:  [88 %-98 %] 88 %    Physical Exam  Vitals and nursing note reviewed.   Constitutional:       General: She is not in acute distress.  HENT:      Mouth/Throat:      Mouth: Mucous membranes are moist.      Pharynx: Oropharynx is clear.   Eyes:      General: No scleral icterus.  Neck:      Vascular: No JVD.   Cardiovascular:      Rate and Rhythm: Normal rate and regular rhythm.      Heart sounds: Normal heart sounds. No murmur heard.  Pulmonary:      Effort: Pulmonary effort is normal.      Breath sounds: Normal breath sounds. No stridor.   Abdominal:      General: There is no distension.      Palpations: Abdomen is soft.      Tenderness: There is no abdominal tenderness.   Musculoskeletal:      Cervical back: No rigidity.      Right lower leg: No edema.      Left lower leg: No edema.   Skin:     General: Skin is warm and dry.      Findings: No rash.   Neurological:      Mental Status: She is alert and oriented to person, place, and time.   Psychiatric:         Mood and Affect: Mood normal.         Behavior: Behavior normal.       Fluids    Intake/Output Summary (Last 24 hours) at 4/5/2023 0924  Last data filed at 4/4/2023 1800  Gross per 24 hour   Intake 240 ml   Output --   Net 240 ml          Laboratory  Recent Labs     04/04/23  0538   WBC 10.9*   RBC 3.42*   HEMOGLOBIN 10.9*   HEMATOCRIT 32.5*   MCV 95.0   MCH 31.9   MCHC 33.5*   RDW 42.5   PLATELETCT 360   MPV 8.9*       Recent Labs     04/04/23  0538   SODIUM 134*   POTASSIUM 3.5*   CHLORIDE 98   CO2 24   GLUCOSE 91   BUN 11   CREATININE 0.70   CALCIUM 8.3*                   Assessment/Plan  * Multifocal pneumonia- (present on admission)  Assessment & Plan  CTA chest (3/29): multifocal pneumonia, no PE  WBC's slowly normalizing  S/P Unasyn    Anemia  Assessment & Plan  H&H stable  Fe: 28, sats 21%  On Fe supplements    Transaminitis  Assessment & Plan  LFT's recently decreasing  ? 2nd to recent abx  Monitor for now    Acute bacterial conjunctivitis of right eye- (present on admission)  Assessment & Plan  On Cipro eye drops (thru 4/5)    Pulmonary edema- (present on admission)  Assessment & Plan  BNP: 5293 --> 2712 (4/4)  Echo: showed an EF of 75%  CXR: showed small B/L effusion, mild pulm edema  On Lasix  On KCL: 10 meq daily --> 20 meq daily  S/P extra KCL 40 meq x 1 (4/4)  Encouraging IS   Monitor K+: 3.4 --> 3.5 (4/4)    Major depressive disorder- (present on admission)  Assessment & Plan  On Zoloft    Chronic hyponatremia- (present on admission)  Assessment & Plan  Na: 133 --> 134  Has had since 2021  2nd to diminished appetite  2nd to Cozaar, Zoloft  Cont salt tabs  Cont to monitor    Essential hypertension- (present on admission)  Assessment & Plan  BP better recently but occ rises up a little  Cont Cozaar -- dose recently increased  Cont to monitor

## 2023-04-05 NOTE — THERAPY
"Occupational Therapy  Daily Treatment     Patient Name: Chrissie Dueñas  Age:  89 y.o., Sex:  female  Medical Record #: [id number]  Today's Date: 4/5/2023     Precautions  Precautions: Fall Risk  Comments: Conjunctivitis, 2 L O2         Subjective    Pt declined shower stating she had one last night with nursing. When asked if she needed help with any part of it, pt stated \"Oh they just did it all for me.\"    \"That was quite some exercise.\"     Objective     04/05/23 0931   OT Charge Group   OT Self Care / ADL (Units) 1   OT Therapy Activity (Units) 1   OT Therapeutic Exercise (Units) 2   OT Total Time Spent   OT Individual Total Time Spent (Mins) 60   Functional Level of Assist   Grooming Supervision;Standing   Grooming Description Standing at sink   Toileting Standby Assist   Toileting Description Grab bar;Increased time;Supervision for safety   Toilet Transfers Standby Assist  (FWW)   Toilet Transfer Description Grab bar;Supervision for safety   Standing Upper Body Exercises   Comments 2x10 with 4# medicine ball - chest press, shoulder press, diagonal lifts both directions, and front twists. CGA-SBA for balance, seated RB between each set   Sitting Lower Body Exercises   Sit to Stand 3 sets of 10  (1st set with FWW, 2nd set pushing from w/c arm rests and 3rd set with no UE support and hands crossed across chest. Mirror in front for visual feedback, CGA-SBA)   Interdisciplinary Plan of Care Collaboration   IDT Collaboration with  Respiratory Therapist   Patient Position at End of Therapy Seated;Chair Alarm On;Self Releasing Lap Belt Applied;Call Light within Reach;Tray Table within Reach;Phone within Reach   Collaboration Comments RT updated on pt tolerance on RA during therapy session     Functional mob with FWW and SBA: room-> main gym, main gym -> back gym, back gym -> room within therapy session     Close monitoring of SpO2 throughout - maintains in 90s during session except one occurrence of high 80s though " quickly recovered with cues for PLB -  updated RT     Discussed d/c planning with pt as she plans to d/c to her daughter's home until May 1st when she will transition to [facility] - pt's daughter's home is a [facility] with 2 YENI with a rail, WIS with a shower chair, states her CARLOS is home during the day     Assessment    Pt tolerated OT session well with improvement from requiring 1L O2 in yesterday's session to tolerating RA today. No overt LOB with functional mob, STS or standing UE exercises. No c/o pain or discomfort with exercises detailed above.     Strengths: Alert and oriented, Good insight into deficits/needs, Independent prior level of function, Making steady progress towards goals, Motivated for self care and independence, Pleasant and cooperative, Supportive family, Willingly participates in therapeutic activities  Barriers: Decreased endurance, Generalized weakness, Home accessibility, Impaired activity tolerance, Impaired balance, Limited mobility, Pain    Plan    ADLs, wean O2, general endurance/strengthening, transfers, functional mob    Passport items to be completed:  Perform bathroom transfers, complete dressing, complete feeding, get ready for the day, prepare a simple meal, participate in household tasks, adapt home for safety needs, demonstrate home exercise program, complete caregiver training     Occupational Therapy Goals (Active)       Problem: Bathing       Dates: Start: 04/01/23         Goal: STG-Within one week, patient will bathe w/ CGA.       Dates: Start: 04/01/23               Problem: Dressing       Dates: Start: 04/01/23         Goal: STG-Within one week, patient will dress LB w/ CGA.        Dates: Start: 04/01/23            Goal: STG-Within one week, patient will retrieve clothing w/ min A and LRD.       Dates: Start: 04/01/23               Problem: Functional Transfers       Dates: Start: 04/01/23         Goal: STG-Within one week, patient will transfer to toilet w/ SBA.       Dates: Start:  04/01/23               Problem: IADL's       Dates: Start: 04/01/23         Goal: STG-Within one week, patient will access kitchen area w/ min A and LRD.        Dates: Start: 04/01/23               Problem: OT Long Term Goals       Dates: Start: 04/01/23         Goal: LTG-By discharge, patient will complete basic self care tasks w/ mod I- supervision.        Dates: Start: 04/01/23            Goal: LTG-By discharge, patient will perform bathroom transfers w/ mos I- supervision.        Dates: Start: 04/01/23            Goal: LTG-By discharge, patient will complete basic home management w/ mod I- min A.        Dates: Start: 04/01/23

## 2023-04-05 NOTE — THERAPY
Physical Therapy   Daily Treatment     Patient Name: Chrissie Dueñas  Age:  89 y.o., Sex:  female  Medical Record #: [id number]  Today's Date: 4/5/2023     Precautions  Precautions: Fall Risk  Comments: Conjunctivitis, 2 L O2    Subjective    Patient agreeable to session, although states she would prefer to not have 7AM sessions anymore.      Objective       04/05/23 0702   PT Charge Group   PT Gait Training (Units) 1   PT Therapeutic Exercise (Units) 1   PT Therapeutic Activities (Units) 2   PT Total Time Spent   PT Individual Total Time Spent (Mins) 60   Vitals   Pulse Oximetry 88 %   O2 (LPM) 0   O2 Delivery Device Silicone Nasal Cannula   Vitals Comments gait trials completed on O2 wean to RA, 88-91% w/o significant symptoms reported, placed back on 1L for nustep CV endurance training and remains 95% post session.   Gait Functional Level of Assist    Gait Level Of Assist Contact Guard Assist   Assistive Device Front Wheel Walker   Distance (Feet)   (220 x 1, 110 x 1 and 15 x 3)   Deviation Bradykinetic;Decreased Heel Strike;Decreased Toe Off   Transfer Functional Level of Assist   Bed, Chair, Wheelchair Transfer Standby Assist   Bed Chair Wheelchair Transfer Description Adaptive equipment;Increased time;Set-up of equipment;Supervision for safety;Verbal cueing   Sitting Lower Body Exercises   Nustep Resistance Level 3  (x 10 minutes, 50-60spm for CV endurance)   Bed Mobility    Sit to Stand Standby Assist   Interdisciplinary Plan of Care Collaboration   Patient Position at End of Therapy Seated;Chair Alarm On;Call Light within Reach;Tray Table within Reach;Phone within Reach     Morning routine completed seated EOB w/ CGA for dressing tasks due to safety and impaired balance, worked on unsupported standing and dynamic balance throughout. Standing ADLs at sink w/ SBA for safety and ambulation bed <> bathroom, poor awareness of oxygen tubing and assist needed from PT.     Discussed PLOF and d/c plan/goals w/ patient.      Assessment    Patient continues to show improvement in function, would benefit from trial of 4ww next session as this is what she uses for longer distance ambulation at baseline. Continued trials of RA and pt remains WNL O2 but at lower level so kept on 1L for comfort and to keep pt in mid 90s.     Strengths: Able to follow instructions, Effective communication skills, Independent prior level of function, Making steady progress towards goals, Motivated for self care and independence, Pleasant and cooperative, Willingly participates in therapeutic activities  Barriers: Decreased endurance, Generalized weakness, Impaired activity tolerance    Plan    Hip/ core strengthening in supine/ seated/ standing/ dynamic postures  cardiovascular endurance  4ww trial     Passport items to be completed:  Get in/out of bed safely, in/out of a vehicle, safely use mobility device, walk or wheel around home/community, navigate up and down stairs, show how to get up/down from the ground, ensure home is accessible, demonstrate HEP, complete caregiver training    Physical Therapy Problems (Active)       Problem: Balance       Dates: Start: 04/01/23         Goal: STG-Within one week, patient will improve LOBO score by at least 5 points to demonstrate decreased risk for falls.        Dates: Start: 04/01/23               Problem: Mobility       Dates: Start: 04/01/23         Goal: STG-Within one week, patient will ambulate 150' with FWW and supervision       Dates: Start: 04/01/23               Problem: Mobility Transfers       Dates: Start: 04/01/23         Goal: STG-Within one week, patient will perform 4 stairs with B handrails and supervision       Dates: Start: 04/01/23               Problem: PT-Long Term Goals       Dates: Start: 04/01/23         Goal: LTG-By discharge, patient will ambulate 150' with FWW independently.        Dates: Start: 04/01/23            Goal: LTG-By discharge, patient will transfer in/out of a car with FWW  independently.        Dates: Start: 04/01/23            Goal: LTG-By discharge, patient will improve LOBO score by at least 10 points to demonstrate decreased risk for falls.        Dates: Start: 04/01/23            Goal: LTG-By discharge, patient will perform 12 stairs with B handrails independently.        Dates: Start: 04/01/23

## 2023-04-05 NOTE — CARE PLAN
"  Problem: Fall Risk - Rehab  Goal: Patient will remain free from falls  Outcome: Progressing  Note: Benita Vaughn Fall risk Assessment Score: 21    High fall risk Interventions   - Alarming seatbelt  - Bed and strip alarm   - Yellow sign by the door   - Yellow wrist band \"Fall risk\"  - Room near to the nurse station  - Do not leave patient unattended in the bathroom  - Fall risk education provided       Problem: Bladder / Voiding  Goal: Patient will establish and maintain regular urinary output  Outcome: Progressing  Note: Assisted oob to bathroom .voiding freely.     Problem: Bowel Elimination  Goal: Patient will participate in bowel management program  Outcome: Progressing  Note: Had bm to a loose stools, refused senna .    The patient is Stable - Low risk of patient condition declining or worsening    Shift Goals  Clinical Goals: safety  Patient Goals: sleep well    Progress made toward(s) clinical / shift goals:  Pt free from fall and injury.    "

## 2023-04-05 NOTE — THERAPY
"Occupational Therapy  Daily Treatment     Patient Name: Chrissie Dueñas  Age:  89 y.o., Sex:  female  Medical Record #: [id number]  Today's Date: 4/5/2023     Precautions  Precautions: Fall Risk  Comments: Conjunctivitis, 2 L O2         Subjective    Pt on toilet upon arrival, agreeable to OT session. \"When did I become so frail?\"     Objective     04/05/23 1301   OT Charge Group   OT Neuromuscular Re-education / Balance (Units) 2   OT Total Time Spent   OT Individual Total Time Spent (Mins) 30   Functional Level of Assist   Toileting Standby Assist   Toileting Description Grab bar;Supervision for safety   Toilet Transfers Standby Assist   Toilet Transfer Description Grab bar;Supervision for safety   Balance   Skilled Intervention Facilitation;Postural Facilitation;Tactile Cuing;Verbal Cuing   Comments Alternating cone toe taps initially with RUE support on bar, then no UE support, then hand held support, [place]-CGA   Interdisciplinary Plan of Care Collaboration   Patient Position at End of Therapy Seated;Chair Alarm On;Self Releasing Lap Belt Applied;Call Light within Reach;Tray Table within Reach;Phone within Reach     Functional mob in // bars with no UE support, CGA.     Assessment    Pt tolerated OT session well focused on dynamic standing balance. Had no overt LOB though mildly unsteady during cone toe taps with no UE support. Continues to tolerate therapy activities on RA today.     Strengths: Alert and oriented, Good insight into deficits/needs, Independent prior level of function, Making steady progress towards goals, Motivated for self care and independence, Pleasant and cooperative, Supportive family, Willingly participates in therapeutic activities  Barriers: Decreased endurance, Generalized weakness, Home accessibility, Impaired activity tolerance, Impaired balance, Limited mobility, Pain    Plan    ADLs, wean O2, general endurance/strengthening, transfers, functional mob    Passport items to be " completed:  Perform bathroom transfers, complete dressing, complete feeding, get ready for the day, prepare a simple meal, participate in household tasks, adapt home for safety needs, demonstrate home exercise program, complete caregiver training     Occupational Therapy Goals (Active)       Problem: Bathing       Dates: Start: 04/01/23         Goal: STG-Within one week, patient will bathe w/ CGA.       Dates: Start: 04/01/23               Problem: Dressing       Dates: Start: 04/01/23         Goal: STG-Within one week, patient will dress LB w/ CGA.        Dates: Start: 04/01/23         Goal Note filed on 04/05/23 1203 by Amparo Adkins, OT       SBA for pants, assist for footwear                  Problem: IADL's       Dates: Start: 04/01/23         Goal: STG-Within one week, patient will access kitchen area w/ min A and LRD.        Dates: Start: 04/01/23               Problem: OT Long Term Goals       Dates: Start: 04/01/23         Goal: LTG-By discharge, patient will complete basic self care tasks w/ mod I- supervision.        Dates: Start: 04/01/23            Goal: LTG-By discharge, patient will perform bathroom transfers w/ mos I- supervision.        Dates: Start: 04/01/23            Goal: LTG-By discharge, patient will complete basic home management w/ mod I- min A.        Dates: Start: 04/01/23

## 2023-04-05 NOTE — PROGRESS NOTES
Rehab Progress Note     Date of Service: 4/5/2023  Chief Complaint: Follow-up debility    Interval Events (Subjective)      Patient seen and examined today in the therapy gym.  She is working on her balance in the parallel bars.  She reports feeling very frail despite doing so well given her age.  We will have her weekly conference this afternoon to discuss a discharge date.  Patient has been titrated off oxygen.    Patient has no other new questions, concerns, or complaints today.         Objective:  VITAL SIGNS: BP (!) 158/63   Pulse 85   Temp 36.3 °C (97.3 °F) (Oral)   Resp 18   Ht 1.524 m (5')   Wt 62.5 kg (137 lb 12.6 oz)   SpO2 88%   BMI 26.91 kg/m²   Gen: alert, no apparent distress  CV: Regular rate, regular rhythm  Resp: Clear to auscultation bilaterally      Recent Results (from the past 72 hour(s))   URINALYSIS    Collection Time: 04/02/23 11:00 AM   Result Value Ref Range    Color Yellow     Character Clear     Specific Gravity 1.015 <1.035    Ph 6.5 5.0 - 8.0    Glucose Negative Negative mg/dL    Ketones Negative Negative mg/dL    Protein Negative Negative mg/dL    Bilirubin Negative Negative    Urobilinogen, Urine 1.0 Negative    Nitrite Negative Negative    Leukocyte Esterase Negative Negative    Occult Blood Negative Negative    Micro Urine Req see below    CBC WITHOUT DIFFERENTIAL    Collection Time: 04/04/23  5:38 AM   Result Value Ref Range    WBC 10.9 (H) 4.8 - 10.8 K/uL    RBC 3.42 (L) 4.20 - 5.40 M/uL    Hemoglobin 10.9 (L) 12.0 - 16.0 g/dL    Hematocrit 32.5 (L) 37.0 - 47.0 %    MCV 95.0 81.4 - 97.8 fL    MCH 31.9 27.0 - 33.0 pg    MCHC 33.5 (L) 33.6 - 35.0 g/dL    RDW 42.5 35.9 - 50.0 fL    Platelet Count 360 164 - 446 K/uL    MPV 8.9 (L) 9.0 - 12.9 fL   Comp Metabolic Panel    Collection Time: 04/04/23  5:38 AM   Result Value Ref Range    Sodium 134 (L) 135 - 145 mmol/L    Potassium 3.5 (L) 3.6 - 5.5 mmol/L    Chloride 98 96 - 112 mmol/L    Co2 24 20 - 33 mmol/L    Anion Gap 12.0 7.0  - 16.0    Glucose 91 65 - 99 mg/dL    Bun 11 8 - 22 mg/dL    Creatinine 0.70 0.50 - 1.40 mg/dL    Calcium 8.3 (L) 8.5 - 10.5 mg/dL    AST(SGOT) 81 (H) 12 - 45 U/L    ALT(SGPT) 103 (H) 2 - 50 U/L    Alkaline Phosphatase 306 (H) 30 - 99 U/L    Total Bilirubin 0.7 0.1 - 1.5 mg/dL    Albumin 2.8 (L) 3.2 - 4.9 g/dL    Total Protein 5.7 (L) 6.0 - 8.2 g/dL    Globulin 2.9 1.9 - 3.5 g/dL    A-G Ratio 1.0 g/dL   proBrain Natriuretic Peptide, NT    Collection Time: 04/04/23  5:38 AM   Result Value Ref Range    NT-proBNP 2712 (H) 0 - 125 pg/mL   IRON/TOTAL IRON BIND    Collection Time: 04/04/23  5:38 AM   Result Value Ref Range    Iron 28 (L) 40 - 170 ug/dL    Total Iron Binding 136 (L) 250 - 450 ug/dL    Unsat Iron Binding 108 (L) 110 - 370 ug/dL    % Saturation 21 15 - 55 %   ESTIMATED GFR    Collection Time: 04/04/23  5:38 AM   Result Value Ref Range    GFR (CKD-EPI) 81 >60 mL/min/1.73 m 2   CORRECTED CALCIUM    Collection Time: 04/04/23  5:38 AM   Result Value Ref Range    Correct Calcium 9.3 8.5 - 10.5 mg/dL       Scheduled Medications   Medication Dose Frequency    ascorbic acid  500 mg QDAY with Breakfast    ferrous sulfate  325 mg BID WITH MEALS    potassium chloride SA  20 mEq DAILY    enoxaparin (LOVENOX) injection  40 mg DAILY AT 1800    furosemide  20 mg Q DAY    losartan  50 mg Q DAY    senna-docusate  2 Tablet BID    sertraline  25 mg DAILY    sodium chloride  1 g TID WITH MEALS    ciprofloxacin  1 Drop Q4HRS WHILE AWAKE       Current Diet Order   Procedures    Diet Order Diet: Regular       Assessment:    This patient is a 89 y.o. female admitted for acute inpatient rehabilitation   with Debility secondary to hospitalization for most of the lobar pneumonia.    I led and attended the weekly conference today, and agree with the IDT conference documentation and plan of care as noted below.    Date of conference: 4/5/2023    Goals and barriers: See IDT note.    Biggest barriers: respiratory insufficiency, impaired  balance, poor endurance    CM/social support: to live with daughter until her room is ready at [facility] May 1st    Anticipated DC date: 4/12    Home health: PT/OT/RN    Equip: none needed     Follow up: PCP      Problem List/Medical Decision Making and Plan:    Debility  Generalized weakness  PT/OT, 1.5 hr each discipline, 5 days per week    Multilobar pneumonia  Completed IV antibiotics     Leukocytosis, improved  Completed IV antibiotics for pneumonia  Continue to monitor until resolution    Transaminitis, improved  No abdominal complaints  Thought secondary to recent antibiotics  Continue to monitor until resolution    Normocytic anemia  Iron deficiency  Continue ferrous sulfate and vitamin C    Respiratory failure with hypoxia  Elevated BNP, improved  Continue furosemide  Titrated off oxygen today     Bacterial conjunctivitis, right side, resolved  Completed Cipro     History of depression  Continue sertraline     Hyponatremia, improved  Continue salt tabs     Chronic kidney disease  Avoid nephrotoxins  Renally dose medications   Monitor with intermittent labs     Hypertension  Continue losartan and furosemide    Insomnia, resolved  Continue as needed melatonin or hydroxyzine    Epistaxis  Xarelto changed to Lovenox per patient's request    DVT prophylaxis  Xarelto changed to Lovenox due to nosebleed      Sailaja Mallory M.D.  Physical Medicine and Rehabilitation

## 2023-04-05 NOTE — CARE PLAN
Problem: Fall Risk - Rehab  Goal: Patient will remain free from falls  Note: Patient remains free from falls this shift. Patient was educated on using the call light to prevent falls. Patients bed is in the lowest position. The patients belongings are placed in near proximity to the patient.       Problem: Hemodynamics  Goal: Patient's hemodynamics, fluid balance and neurologic status will be stable or improve  Note: IV removed per Dr. Pyle.    The patient is Stable - Low risk of patient condition declining or worsening

## 2023-04-05 NOTE — DISCHARGE PLANNING
DONALD AMES met with pt's dtr Kailee confirming dc date of 4/12; she confirms dc plan is for pt to stay @ her home in [place] until pt is able to transition into [facility] which will be in May; dtr will provide 24 hr care @ home for her mom; will arrange home health.

## 2023-04-05 NOTE — THERAPY
Physical Therapy   Daily Treatment     Patient Name: Chrissie Dueñas  Age:  89 y.o., Sex:  female  Medical Record #: [id number]  Today's Date: 4/5/2023     Precautions  Precautions: Fall Risk  Comments: Conjunctivitis, 2 L O2    Subjective    Patient agreeable to session. Has no complaints.  She is pleased at the progress she has already made at rehab. Excited to have a d/c date.      Objective       04/05/23 1431   PT Charge Group   PT Gait Training (Units) 2   PT Total Time Spent   PT Individual Total Time Spent (Mins) 30   Vitals   Pulse Oximetry 91 %   O2 (LPM) 0   Vitals Comments RA trial O2 remains stable, no reported symptoms   Gait Functional Level of Assist    Gait Level Of Assist Contact Guard Assist   Assistive Device 4 Wheel Walker;None   Distance (Feet)   (250 x 2 4ww, 80 x 2 no AD)   Deviation Bradykinetic;Decreased Heel Strike;Decreased Toe Off  (cues for proximity to 4ww, and arms swing w/o AD)   Transfer Functional Level of Assist   Bed, Chair, Wheelchair Transfer Standby Assist   Bed Chair Wheelchair Transfer Description Adaptive equipment;Increased time;Set-up of equipment;Supervision for safety;Verbal cueing   Toilet Transfers Standby Assist   Toilet Transfer Description Grab bar;Increased time;Set-up of equipment;Supervision for safety;Verbal cueing   Bed Mobility    Sit to Stand Standby Assist   Interdisciplinary Plan of Care Collaboration   IDT Collaboration with  Occupational Therapist   Patient Position at End of Therapy Seated;Chair Alarm On;Call Light within Reach;Tray Table within Reach;Phone within Reach;Self Releasing Lap Belt Applied   Collaboration Comments CLOF     Gait no AD:  - sidestepping both right/left, forward gait w/ arm swing and retro gait x 10 ft each  Gait w/ 4ww:  - cues for brake use and safety needed. Pt unaware of braking mechanics even though used similar device at baseline.         Assessment    Patient able to progress to 4ww use at this time, w/ min cues for brake  use. Reports no symptoms of SOB during and O2 remains stable. Slow pace and rigid trunk w/ gait no device would benefit from continued work to improve confidence and in more dynamic/functional environments.     Strengths: Able to follow instructions, Effective communication skills, Independent prior level of function, Making steady progress towards goals, Motivated for self care and independence, Pleasant and cooperative, Willingly participates in therapeutic activities  Barriers: Decreased endurance, Generalized weakness, Impaired activity tolerance    Plan    Continue 4ww functional mobility w/ brake review  General CV strength/endurance  Therapeutic ex  Dyn balance    Passport items to be completed:  Get in/out of bed safely, in/out of a vehicle, safely use mobility device, walk or wheel around home/community, navigate up and down stairs, show how to get up/down from the ground, ensure home is accessible, demonstrate HEP, complete caregiver training    Physical Therapy Problems (Active)       Problem: Balance       Dates: Start: 04/01/23         Goal: STG-Within one week, patient will improve LOBO score by at least 5 points to demonstrate decreased risk for falls.        Dates: Start: 04/01/23               Problem: Mobility       Dates: Start: 04/01/23         Goal: STG-Within one week, patient will ambulate 150' with FWW and supervision       Dates: Start: 04/01/23               Problem: Mobility Transfers       Dates: Start: 04/01/23         Goal: STG-Within one week, patient will perform 4 stairs with B handrails and supervision       Dates: Start: 04/01/23               Problem: PT-Long Term Goals       Dates: Start: 04/01/23         Goal: LTG-By discharge, patient will ambulate 150' with FWW independently.        Dates: Start: 04/01/23            Goal: LTG-By discharge, patient will transfer in/out of a car with FWW independently.        Dates: Start: 04/01/23            Goal: LTG-By discharge, patient will  improve LOBO score by at least 10 points to demonstrate decreased risk for falls.        Dates: Start: 04/01/23            Goal: LTG-By discharge, patient will perform 12 stairs with B handrails independently.        Dates: Start: 04/01/23

## 2023-04-05 NOTE — FLOWSHEET NOTE
04/05/23 1033   Events/Summary/Plan   Events/Summary/Plan 02 Pulse ox check. Therapy stated she worked with them on RA. Placed back on 02 when pt went back to her room   Vital Signs   Pulse 82   Respiration 16   Pulse Oximetry 98 %   $ Pulse Oximetry (Spot Check) Yes   Respiratory Assessment   Level of Consciousness Alert   Chest Exam   Work Of Breathing / Effort Within Normal Limits   Oxygen   O2 (LPM) 1  (Placed on RA trial)   O2 Delivery Device Silicone Nasal Cannula

## 2023-04-05 NOTE — DISCHARGE PLANNING
Case Management/IDT follow up.   IDT continues to recommend IRF level of care as patient continue to make progress with all therapies.   Projected dc date set for 4/12/23.      DC needs:  Recommendations made for home health for PT/OT/SLP  Follow up with: PCP    Met with pt / family providing update from IDT and discussed plan of care.    Plan:  Continue to follow

## 2023-04-06 ENCOUNTER — APPOINTMENT (OUTPATIENT)
Dept: PHYSICAL THERAPY | Facility: REHABILITATION | Age: 88
DRG: 193 | End: 2023-04-06
Attending: PHYSICAL MEDICINE & REHABILITATION
Payer: MEDICARE

## 2023-04-06 ENCOUNTER — APPOINTMENT (OUTPATIENT)
Dept: OCCUPATIONAL THERAPY | Facility: REHABILITATION | Age: 88
DRG: 193 | End: 2023-04-06
Attending: PHYSICAL MEDICINE & REHABILITATION
Payer: MEDICARE

## 2023-04-06 PROCEDURE — A9270 NON-COVERED ITEM OR SERVICE: HCPCS | Performed by: PHYSICAL MEDICINE & REHABILITATION

## 2023-04-06 PROCEDURE — 97530 THERAPEUTIC ACTIVITIES: CPT

## 2023-04-06 PROCEDURE — 94760 N-INVAS EAR/PLS OXIMETRY 1: CPT

## 2023-04-06 PROCEDURE — 99232 SBSQ HOSP IP/OBS MODERATE 35: CPT | Performed by: HOSPITALIST

## 2023-04-06 PROCEDURE — 700102 HCHG RX REV CODE 250 W/ 637 OVERRIDE(OP): Performed by: HOSPITALIST

## 2023-04-06 PROCEDURE — A9270 NON-COVERED ITEM OR SERVICE: HCPCS | Performed by: HOSPITALIST

## 2023-04-06 PROCEDURE — 770010 HCHG ROOM/CARE - REHAB SEMI PRIVAT*

## 2023-04-06 PROCEDURE — 700102 HCHG RX REV CODE 250 W/ 637 OVERRIDE(OP): Performed by: PHYSICAL MEDICINE & REHABILITATION

## 2023-04-06 PROCEDURE — 97116 GAIT TRAINING THERAPY: CPT

## 2023-04-06 PROCEDURE — 97112 NEUROMUSCULAR REEDUCATION: CPT

## 2023-04-06 PROCEDURE — 99232 SBSQ HOSP IP/OBS MODERATE 35: CPT | Performed by: PHYSICAL MEDICINE & REHABILITATION

## 2023-04-06 PROCEDURE — 97110 THERAPEUTIC EXERCISES: CPT

## 2023-04-06 PROCEDURE — 97110 THERAPEUTIC EXERCISES: CPT | Mod: CQ

## 2023-04-06 PROCEDURE — 700111 HCHG RX REV CODE 636 W/ 250 OVERRIDE (IP): Performed by: PHYSICAL MEDICINE & REHABILITATION

## 2023-04-06 PROCEDURE — 97535 SELF CARE MNGMENT TRAINING: CPT

## 2023-04-06 RX ORDER — GUAIFENESIN/DEXTROMETHORPHAN 100-10MG/5
10 SYRUP ORAL EVERY EVENING
Status: DISCONTINUED | OUTPATIENT
Start: 2023-04-06 | End: 2023-04-07

## 2023-04-06 RX ORDER — LOSARTAN POTASSIUM 25 MG/1
25 TABLET ORAL ONCE
Status: COMPLETED | OUTPATIENT
Start: 2023-04-06 | End: 2023-04-06

## 2023-04-06 RX ORDER — LOSARTAN POTASSIUM 25 MG/1
75 TABLET ORAL
Status: DISCONTINUED | OUTPATIENT
Start: 2023-04-07 | End: 2023-04-12 | Stop reason: HOSPADM

## 2023-04-06 RX ORDER — TRAZODONE HYDROCHLORIDE 50 MG/1
50 TABLET ORAL
Status: DISCONTINUED | OUTPATIENT
Start: 2023-04-06 | End: 2023-04-12 | Stop reason: HOSPADM

## 2023-04-06 RX ADMIN — SODIUM CHLORIDE 1 G: 1 TABLET ORAL at 17:09

## 2023-04-06 RX ADMIN — FUROSEMIDE 20 MG: 20 TABLET ORAL at 05:34

## 2023-04-06 RX ADMIN — LOSARTAN POTASSIUM 25 MG: 25 TABLET, FILM COATED ORAL at 10:31

## 2023-04-06 RX ADMIN — SERTRALINE HYDROCHLORIDE 25 MG: 50 TABLET, FILM COATED ORAL at 08:46

## 2023-04-06 RX ADMIN — TRAZODONE HYDROCHLORIDE 50 MG: 50 TABLET ORAL at 21:18

## 2023-04-06 RX ADMIN — GUAIFENESIN AND DEXTROMETHORPHAN 10 ML: 100; 10 SYRUP ORAL at 11:03

## 2023-04-06 RX ADMIN — FERROUS SULFATE TAB 325 MG (65 MG ELEMENTAL FE) 325 MG: 325 (65 FE) TAB at 08:47

## 2023-04-06 RX ADMIN — SODIUM CHLORIDE 1 G: 1 TABLET ORAL at 08:47

## 2023-04-06 RX ADMIN — ENOXAPARIN SODIUM 40 MG: 100 INJECTION SUBCUTANEOUS at 17:09

## 2023-04-06 RX ADMIN — FERROUS SULFATE TAB 325 MG (65 MG ELEMENTAL FE) 325 MG: 325 (65 FE) TAB at 17:09

## 2023-04-06 RX ADMIN — GUAIFENESIN AND DEXTROMETHORPHAN 10 ML: 100; 10 SYRUP ORAL at 21:18

## 2023-04-06 RX ADMIN — LOSARTAN POTASSIUM 50 MG: 25 TABLET, FILM COATED ORAL at 05:34

## 2023-04-06 RX ADMIN — POTASSIUM CHLORIDE 20 MEQ: 1500 TABLET, EXTENDED RELEASE ORAL at 08:47

## 2023-04-06 RX ADMIN — SODIUM CHLORIDE 1 G: 1 TABLET ORAL at 10:31

## 2023-04-06 RX ADMIN — OXYCODONE HYDROCHLORIDE AND ACETAMINOPHEN 500 MG: 500 TABLET ORAL at 08:46

## 2023-04-06 ASSESSMENT — ENCOUNTER SYMPTOMS
FEVER: 0
DIZZINESS: 0
BLURRED VISION: 0
NERVOUS/ANXIOUS: 0
DIARRHEA: 0

## 2023-04-06 ASSESSMENT — ACTIVITIES OF DAILY LIVING (ADL)
TOILETING_LEVEL_OF_ASSIST_DESCRIPTION: GRAB BAR;INCREASED TIME;INITIAL PREPARATION FOR TASK;SUPERVISION FOR SAFETY;VERBAL CUEING
TOILET_TRANSFER_DESCRIPTION: GRAB BAR;SUPERVISION FOR SAFETY;VERBAL CUEING
TOILETING_LEVEL_OF_ASSIST_DESCRIPTION: GRAB BAR;SET-UP OF EQUIPMENT;SUPERVISION FOR SAFETY
TOILET_TRANSFER_DESCRIPTION: GRAB BAR;SET-UP OF EQUIPMENT;SUPERVISION FOR SAFETY
BED_CHAIR_WHEELCHAIR_TRANSFER_DESCRIPTION: ADAPTIVE EQUIPMENT;INCREASED TIME;SET-UP OF EQUIPMENT;SUPERVISION FOR SAFETY;VERBAL CUEING
BED_CHAIR_WHEELCHAIR_TRANSFER_DESCRIPTION: ADAPTIVE EQUIPMENT;SET-UP OF EQUIPMENT;SUPERVISION FOR SAFETY
TOILET_TRANSFER_DESCRIPTION: GRAB BAR;INCREASED TIME;SUPERVISION FOR SAFETY;VERBAL CUEING

## 2023-04-06 ASSESSMENT — GAIT ASSESSMENTS
GAIT LEVEL OF ASSIST: CONTACT GUARD ASSIST
DEVIATION: BRADYKINETIC;DECREASED HEEL STRIKE;DECREASED TOE OFF
DISTANCE (FEET): 250

## 2023-04-06 ASSESSMENT — PAIN DESCRIPTION - PAIN TYPE: TYPE: ACUTE PAIN

## 2023-04-06 NOTE — PROGRESS NOTES
Hospital Medicine Daily Progress Note      Chief Complaint  Hypertension  Hyponatremia  Pulm edema    Interval Problem Update  Discussed about her BP being elevated and have increased her Cozaar.    Review of Systems  Review of Systems   Constitutional:  Negative for fever.   Eyes:  Negative for blurred vision.   Respiratory:  Positive for cough.    Cardiovascular:  Negative for chest pain.   Gastrointestinal:  Negative for diarrhea.   Musculoskeletal:  Negative for joint pain.   Neurological:  Negative for dizziness.   Psychiatric/Behavioral:  The patient is not nervous/anxious.       Physical Exam  Temp:  [36.3 °C (97.4 °F)-36.8 °C (98.2 °F)] 36.8 °C (98.2 °F)  Pulse:  [79-84] 80  Resp:  [16-18] 17  BP: (120-157)/(62-73) 157/73  SpO2:  [90 %-98 %] 95 %    Physical Exam  Vitals and nursing note reviewed.   Constitutional:       Appearance: She is not diaphoretic.   HENT:      Mouth/Throat:      Pharynx: No oropharyngeal exudate or posterior oropharyngeal erythema.   Eyes:      Extraocular Movements: Extraocular movements intact.   Neck:      Vascular: No carotid bruit or JVD.   Cardiovascular:      Rate and Rhythm: Normal rate and regular rhythm.      Heart sounds: Normal heart sounds. No murmur heard.  Pulmonary:      Effort: Pulmonary effort is normal.      Breath sounds: Normal breath sounds. No stridor.   Abdominal:      General: Bowel sounds are normal.      Palpations: Abdomen is soft.   Musculoskeletal:      Right lower leg: No edema.      Left lower leg: No edema.   Skin:     General: Skin is warm and dry.      Findings: No rash.   Neurological:      Mental Status: She is alert and oriented to person, place, and time.   Psychiatric:         Mood and Affect: Mood normal.         Behavior: Behavior normal.       Fluids    Intake/Output Summary (Last 24 hours) at 4/6/2023 0974  Last data filed at 4/5/2023 1810  Gross per 24 hour   Intake 360 ml   Output --   Net 360 ml         Laboratory  Recent Labs      04/04/23  0538   WBC 10.9*   RBC 3.42*   HEMOGLOBIN 10.9*   HEMATOCRIT 32.5*   MCV 95.0   MCH 31.9   MCHC 33.5*   RDW 42.5   PLATELETCT 360   MPV 8.9*       Recent Labs     04/04/23  0538   SODIUM 134*   POTASSIUM 3.5*   CHLORIDE 98   CO2 24   GLUCOSE 91   BUN 11   CREATININE 0.70   CALCIUM 8.3*                   Assessment/Plan  * Multifocal pneumonia- (present on admission)  Assessment & Plan  CTA chest (3/29): multifocal pneumonia, no PE  WBC's slowly normalizing  S/P Unasyn    Anemia  Assessment & Plan  H&H stable  Fe: 28, sats 21%  On Fe supplements    Transaminitis  Assessment & Plan  LFT's recently decreasing  ? 2nd to recent abx  Monitor for now    Acute bacterial conjunctivitis of right eye- (present on admission)  Assessment & Plan  On Cipro eye drops (thru 4/5)    Pulmonary edema- (present on admission)  Assessment & Plan  BNP: 5293 --> 2712 (4/4)  Echo: showed an EF of 75%  CXR: showed small B/L effusion, mild pulm edema  On Lasix  On KCL: 10 meq daily --> 20 meq daily  S/P extra KCL 40 meq x 1 (4/4)  Encouraging IS   Monitor K+: 3.4 --> 3.5 (4/4)    Major depressive disorder- (present on admission)  Assessment & Plan  On Zoloft    Chronic hyponatremia- (present on admission)  Assessment & Plan  Na: 133 --> 134  Has had since 2021  2nd to diminished appetite  2nd to Cozaar, Zoloft  Cont salt tabs  Cont to monitor    Essential hypertension- (present on admission)  Assessment & Plan  BP a little elevated recently  Cont Cozaar -- will increase dose  Cont to monitor

## 2023-04-06 NOTE — THERAPY
Occupational Therapy  Daily Treatment     Patient Name: Chrissie Dueñas  Age:  89 y.o., Sex:  female  Medical Record #: [id number]  Today's Date: 4/6/2023     Precautions  Precautions: Fall Risk  Comments: Conjunctivitis, 2 L O2         Subjective    Pt using bathroom upon arrival, agreeable to OT session. Reports poor sleep last night due to needing to urinate every hour throughout the night.      Objective     04/06/23 0831   OT Charge Group   OT Self Care / ADL (Units) 2   OT Therapy Activity (Units) 2   OT Total Time Spent   OT Individual Total Time Spent (Mins) 60   Functional Level of Assist   Grooming Supervision;Standing  (cue to lock 4WW brakes)   Grooming Description Standing at sink   Upper Body Dressing Supervision   Upper Body Dressing Description Increased time;Initial preparation for task   Lower Body Dressing Standby Assist  (cues for sequencing and donning footwear)   Lower Body Dressing Description Increased time;Initial preparation for task;Set-up of equipment;Supervision for safety;Verbal cueing   Toileting Standby Assist   Toileting Description Grab bar;Increased time;Initial preparation for task;Supervision for safety;Verbal cueing   Bed, Chair, Wheelchair Transfer Standby Assist  (4WW, cues to lock brakes)   Bed Chair Wheelchair Transfer Description Adaptive equipment;Set-up of equipment;Supervision for safety   Toilet Transfers Standby Assist  (4WW and GB, cues to lock brakes)   Toilet Transfer Description Grab bar;Increased time;Supervision for safety;Verbal cueing   Interdisciplinary Plan of Care Collaboration   IDT Collaboration with  Nursing   Patient Position at End of Therapy Seated;Chair Alarm On;Call Light within Reach;Tray Table within Reach;Phone within Reach   Collaboration Comments med pass     Functional mob with 4WW and SBA, cues for brake mgmt: room -> main gym, main gym -> lobby, lobby -> room with seated rest break at each location     Education provided on use of a Pancho  e-reader and demo'd features to improve accessibility including increasing font size and adjusting lighting as pt reports she reads one book a week but has had difficulty reading the font in standard books. Pt was able to read the standard font size on the Pancho with some strain but had no difficulty reading large font. Overview of all Pancho features provided for pt to determine if this would be beneficial to improving her ability to read.     Assessment    Pt tolerated OT session well. Able to manage full dressing routine with SBA and cues. Needs cues for managing brakes with 4WW throughout session, states she never used the brakes on her 4WW at home. Receptive to education on use of Pancho and states she will talk to her daughter about looking into getting one.     Strengths: Alert and oriented, Good insight into deficits/needs, Independent prior level of function, Making steady progress towards goals, Motivated for self care and independence, Pleasant and cooperative, Supportive family, Willingly participates in therapeutic activities  Barriers: Decreased endurance, Generalized weakness, Home accessibility, Impaired activity tolerance, Impaired balance, Limited mobility, Pain    Plan    ADLs, wean O2, general endurance/strengthening, transfers, functional mob    Passport items to be completed:  Perform bathroom transfers, complete dressing, complete feeding, get ready for the day, prepare a simple meal, participate in household tasks, adapt home for safety needs, demonstrate home exercise program, complete caregiver training     Occupational Therapy Goals (Active)       Problem: Bathing       Dates: Start: 04/01/23         Goal: STG-Within one week, patient will bathe w/ CGA.       Dates: Start: 04/01/23               Problem: Dressing       Dates: Start: 04/01/23         Goal: STG-Within one week, patient will dress LB w/ CGA.        Dates: Start: 04/01/23         Goal Note filed on 04/05/23 1203 by Amparo AMES  Jed, OT       SBA for pants, assist for footwear                  Problem: IADL's       Dates: Start: 04/01/23         Goal: STG-Within one week, patient will access kitchen area w/ min A and LRD.        Dates: Start: 04/01/23               Problem: OT Long Term Goals       Dates: Start: 04/01/23         Goal: LTG-By discharge, patient will complete basic self care tasks w/ mod I- supervision.        Dates: Start: 04/01/23            Goal: LTG-By discharge, patient will perform bathroom transfers w/ mos I- supervision.        Dates: Start: 04/01/23            Goal: LTG-By discharge, patient will complete basic home management w/ mod I- min A.        Dates: Start: 04/01/23

## 2023-04-06 NOTE — THERAPY
Physical Therapy   Daily Treatment     Patient Name: Chrissie Dueñas  Age:  89 y.o., Sex:  female  Medical Record #: [id number]  Today's Date: 4/6/2023     Precautions  Precautions: Fall Risk  Comments: Conjunctivitis, 2 L O2    Subjective    Patient agreeable to session. Has no complaints.       Objective       04/06/23 1301   PT Charge Group   PT Gait Training (Units) 1   PT Neuromuscular Re-Education / Balance (Units) 2   PT Therapeutic Activities (Units) 1   PT Total Time Spent   PT Individual Total Time Spent (Mins) 60   Vitals   O2 (LPM) 0   O2 Delivery Device Room air w/o2 available   Gait Functional Level of Assist    Gait Level Of Assist Contact Guard Assist   Assistive Device 4 Wheel Walker;None   Distance (Feet) 250   # of Times Distance was Traveled 2   Deviation Bradykinetic;Decreased Heel Strike;Decreased Toe Off   Transfer Functional Level of Assist   Bed, Chair, Wheelchair Transfer Standby Assist   Bed Chair Wheelchair Transfer Description Adaptive equipment;Increased time;Set-up of equipment;Supervision for safety;Verbal cueing   Toilet Transfers Supervised   Toilet Transfer Description Grab bar;Adaptive equipment;Increased time;Verbal cueing;Supervision for safety   Bed Mobility    Sit to Stand Supervised   Neuro-Muscular Treatments   Neuro-Muscular Treatments Anterior weight shift;Compensatory Strategies;Joint Approximation;Postural Changes;Postural Facilitation;Weight Shift Right;Weight Shift Left   Comments gait no AD: figure 8 x 100ft w/ increasing speed; 4 inch box toe taps alternating LE x 10 each; figure 4 stepping over small object 2 x each cw/ccw; static standing FA w/ HTs left/right and up/down x 10 each, FA w/ EC, semi tandem w/ head turns left/right and up/down x 10 each both legs leading, semi tandem EC; standing on airex pad feet apart static holds to failure x 3, then w/ head turns left/right x 10, then EC x 10s x 2.   Interdisciplinary Plan of Care Collaboration   Patient Position at  End of Therapy Seated;Chair Alarm On;Call Light within Reach;Tray Table within Reach     Worked on continued 4ww use reinforcing brakes and transfer to/from device 6+ times during session.             Assessment    Patient without significant LOB during session during dyn balance tasks, requires increased assist with higher level tasks and when her vision is removed on the airex pad. Improved use of 4ww brakes today and pt tolerates another session off O2. Will continue to benefit from skilled therapy to address deficits present.     Strengths: Able to follow instructions, Effective communication skills, Independent prior level of function, Making steady progress towards goals, Motivated for self care and independence, Pleasant and cooperative, Willingly participates in therapeutic activities  Barriers: Decreased endurance, Generalized weakness, Impaired activity tolerance    Plan  Reassess LOBO  Continue 4ww functional mobility w/ brake review  General CV strength/endurance  Therapeutic ex  Dyn balance     Passport items to be completed:  Get in/out of bed safely, in/out of a vehicle, safely use mobility device, walk or wheel around home/community, navigate up and down stairs, show how to get up/down from the ground, ensure home is accessible, demonstrate HEP, complete caregiver training    Physical Therapy Problems (Active)       Problem: Balance       Dates: Start: 04/01/23         Goal: STG-Within one week, patient will improve LOBO score by at least 5 points to demonstrate decreased risk for falls.        Dates: Start: 04/01/23               Problem: Mobility       Dates: Start: 04/01/23         Goal: STG-Within one week, patient will ambulate 150' with FWW and supervision       Dates: Start: 04/01/23               Problem: Mobility Transfers       Dates: Start: 04/01/23         Goal: STG-Within one week, patient will perform 4 stairs with B handrails and supervision       Dates: Start: 04/01/23                Problem: PT-Long Term Goals       Dates: Start: 04/01/23         Goal: LTG-By discharge, patient will ambulate 150' with FWW independently.        Dates: Start: 04/01/23            Goal: LTG-By discharge, patient will transfer in/out of a car with FWW independently.        Dates: Start: 04/01/23            Goal: LTG-By discharge, patient will improve LOBO score by at least 10 points to demonstrate decreased risk for falls.        Dates: Start: 04/01/23            Goal: LTG-By discharge, patient will perform 12 stairs with B handrails independently.        Dates: Start: 04/01/23

## 2023-04-06 NOTE — THERAPY
Occupational Therapy  Daily Treatment     Patient Name: Chrissie Dueñas  Age:  89 y.o., Sex:  female  Medical Record #: [id number]  Today's Date: 4/6/2023     Precautions  Precautions: Fall Risk  Comments: Conjunctivitis, 2 L O2         Subjective    Pt up in w/c upon arrival, agreeable to OT session.      Objective     04/06/23 1431   OT Charge Group   OT Self Care / ADL (Units) 1   OT Therapeutic Exercise (Units) 1   OT Total Time Spent   OT Individual Total Time Spent (Mins) 30   Vitals   Vitals Comments Maintains O2 in 90s during therapeutic exercise   Functional Level of Assist   Grooming Modified Independent;Seated  (hand hygiene)   Grooming Description Seated in wheelchair at sink   Toileting Supervision   Toileting Description Grab bar;Set-up of equipment;Supervision for safety   Toilet Transfers Standby Assist   Toilet Transfer Description Grab bar;Set-up of equipment;Supervision for safety   Sitting Lower Body Exercises   Nustep Resistance Level 3  (10 min to progress CV endurance, intermittent RBs to monitor O2)   Interdisciplinary Plan of Care Collaboration   Patient Position at End of Therapy Seated;Chair Alarm On;Self Releasing Lap Belt Applied;Call Light within Reach;Tray Table within Reach;Phone within Reach       Assessment    Pt continues to make progress with her endurance, tolerates Nustep exercise well on RA with O2 remaining in 90s, HR up to low 100s. Min cues for clothing mgmt with toileting but physically able to complete pants up/down.     Strengths: Alert and oriented, Good insight into deficits/needs, Independent prior level of function, Making steady progress towards goals, Motivated for self care and independence, Pleasant and cooperative, Supportive family, Willingly participates in therapeutic activities  Barriers: Decreased endurance, Generalized weakness, Home accessibility, Impaired activity tolerance, Impaired balance, Limited mobility, Pain    Plan    ADLs, wean O2, general  endurance/strengthening, transfers, functional mob    Passport items to be completed:  Perform bathroom transfers, complete dressing, complete feeding, get ready for the day, prepare a simple meal, participate in household tasks, adapt home for safety needs, demonstrate home exercise program, complete caregiver training     Occupational Therapy Goals (Active)       Problem: Bathing       Dates: Start: 04/01/23         Goal: STG-Within one week, patient will bathe w/ CGA.       Dates: Start: 04/01/23               Problem: Dressing       Dates: Start: 04/01/23         Goal: STG-Within one week, patient will dress LB w/ CGA.        Dates: Start: 04/01/23         Goal Note filed on 04/05/23 1203 by Amparo Adkins, OT       SBA for pants, assist for footwear                  Problem: IADL's       Dates: Start: 04/01/23         Goal: STG-Within one week, patient will access kitchen area w/ min A and LRD.        Dates: Start: 04/01/23               Problem: OT Long Term Goals       Dates: Start: 04/01/23         Goal: LTG-By discharge, patient will complete basic self care tasks w/ mod I- supervision.        Dates: Start: 04/01/23            Goal: LTG-By discharge, patient will perform bathroom transfers w/ mos I- supervision.        Dates: Start: 04/01/23            Goal: LTG-By discharge, patient will complete basic home management w/ mod I- min A.        Dates: Start: 04/01/23

## 2023-04-06 NOTE — CARE PLAN
"  Problem: Fall Risk - Rehab  Goal: Patient will remain free from falls  Outcome: Progressing  Note: Benita Vaughn Fall risk Assessment Score: 18    High fall risk Interventions   - Alarming seatbelt  - Bed and strip alarm   - Yellow sign by the door   - Yellow wrist band \"Fall risk\"  - Room near to the nurse station  - Do not leave patient unattended in the bathroom  - Fall risk education provided       Problem: Bladder / Voiding  Goal: Patient will establish and maintain regular urinary output  Outcome: Progressing  Note: Assisted oob to bathroom , voiding freely without difficulty .    The patient is Stable - Low risk of patient condition declining or worsening    Shift Goals  Clinical Goals: safety  Patient Goals: sleep well    Progress made toward(s) clinical / shift goals:  Pt free from free and injury.     "

## 2023-04-06 NOTE — CARE PLAN
Problem: Fall Risk - Rehab  Goal: Patient will remain free from falls  Note: Patient remains free from falls this shift. Patient was educated on using the call light to prevent falls. Patients bed is in the lowest position. The patients belongings are placed in near proximity to the patient.       Problem: Infection  Goal: Patient will remain free from infection  Note: Robitussin PRN given this shift for coughing.    The patient is Stable - Low risk of patient condition declining or worsening

## 2023-04-06 NOTE — PROGRESS NOTES
Rehab Progress Note     Date of Service: 4/6/2023  Chief Complaint: Follow-up debility    Interval Events (Subjective)      Patient seen and examined today in her room.  She is about ready to go to physical therapy.  She reports difficulty sleeping as well as a dry cough at night.  She was not aware that she could ask for Robitussin.  She reports that melatonin has been ineffective.  She is willing to try some trazodone.  She has been titrated off oxygen.    Patient has no other new questions, concerns, or complaints today.         Objective:  VITAL SIGNS: BP (!) 157/73   Pulse 80   Temp 36.8 °C (98.2 °F) (Oral)   Resp 17   Ht 1.524 m (5')   Wt 62.5 kg (137 lb 12.6 oz)   SpO2 95%   BMI 26.91 kg/m²   Gen: alert, no apparent distress  CV: Regular rate, regular rhythm  Resp: Clear to auscultation bilaterally, on room air        Recent Results (from the past 72 hour(s))   CBC WITHOUT DIFFERENTIAL    Collection Time: 04/04/23  5:38 AM   Result Value Ref Range    WBC 10.9 (H) 4.8 - 10.8 K/uL    RBC 3.42 (L) 4.20 - 5.40 M/uL    Hemoglobin 10.9 (L) 12.0 - 16.0 g/dL    Hematocrit 32.5 (L) 37.0 - 47.0 %    MCV 95.0 81.4 - 97.8 fL    MCH 31.9 27.0 - 33.0 pg    MCHC 33.5 (L) 33.6 - 35.0 g/dL    RDW 42.5 35.9 - 50.0 fL    Platelet Count 360 164 - 446 K/uL    MPV 8.9 (L) 9.0 - 12.9 fL   Comp Metabolic Panel    Collection Time: 04/04/23  5:38 AM   Result Value Ref Range    Sodium 134 (L) 135 - 145 mmol/L    Potassium 3.5 (L) 3.6 - 5.5 mmol/L    Chloride 98 96 - 112 mmol/L    Co2 24 20 - 33 mmol/L    Anion Gap 12.0 7.0 - 16.0    Glucose 91 65 - 99 mg/dL    Bun 11 8 - 22 mg/dL    Creatinine 0.70 0.50 - 1.40 mg/dL    Calcium 8.3 (L) 8.5 - 10.5 mg/dL    AST(SGOT) 81 (H) 12 - 45 U/L    ALT(SGPT) 103 (H) 2 - 50 U/L    Alkaline Phosphatase 306 (H) 30 - 99 U/L    Total Bilirubin 0.7 0.1 - 1.5 mg/dL    Albumin 2.8 (L) 3.2 - 4.9 g/dL    Total Protein 5.7 (L) 6.0 - 8.2 g/dL    Globulin 2.9 1.9 - 3.5 g/dL    A-G Ratio 1.0 g/dL    proBrain Natriuretic Peptide, NT    Collection Time: 04/04/23  5:38 AM   Result Value Ref Range    NT-proBNP 2712 (H) 0 - 125 pg/mL   IRON/TOTAL IRON BIND    Collection Time: 04/04/23  5:38 AM   Result Value Ref Range    Iron 28 (L) 40 - 170 ug/dL    Total Iron Binding 136 (L) 250 - 450 ug/dL    Unsat Iron Binding 108 (L) 110 - 370 ug/dL    % Saturation 21 15 - 55 %   ESTIMATED GFR    Collection Time: 04/04/23  5:38 AM   Result Value Ref Range    GFR (CKD-EPI) 81 >60 mL/min/1.73 m 2   CORRECTED CALCIUM    Collection Time: 04/04/23  5:38 AM   Result Value Ref Range    Correct Calcium 9.3 8.5 - 10.5 mg/dL       Scheduled Medications   Medication Dose Frequency    [START ON 4/7/2023] losartan  75 mg Q DAY    losartan  25 mg Once    ascorbic acid  500 mg QDAY with Breakfast    ferrous sulfate  325 mg BID WITH MEALS    potassium chloride SA  20 mEq DAILY    enoxaparin (LOVENOX) injection  40 mg DAILY AT 1800    furosemide  20 mg Q DAY    senna-docusate  2 Tablet BID    sertraline  25 mg DAILY    sodium chloride  1 g TID WITH MEALS       Current Diet Order   Procedures    Diet Order Diet: Regular       Assessment:    This patient is a 89 y.o. female admitted for acute inpatient rehabilitation   with Debility secondary to hospitalization for most of the lobar pneumonia.    I led and attended the weekly conference, and agree with the IDT conference documentation and plan of care as noted below.    Date of conference: 4/5/2023    Goals and barriers: See IDT note.    Biggest barriers: respiratory insufficiency, impaired balance, poor endurance    CM/social support: to live with daughter until her room is ready at [facility] May 1st    Anticipated DC date: 4/12    Home health: PT/OT/RN    Equip: none needed     Follow up: PCP      Problem List/Medical Decision Making and Plan:    Debility  Generalized weakness  PT/OT, 1.5 hr each discipline, 5 days per week    Multilobar pneumonia  Completed IV antibiotics    Cough  Scheduled  Robitussin at night  Continue as needed during the day       Leukocytosis, improved  Completed IV antibiotics for pneumonia  Continue to monitor until resolution  Recheck labs in the morning    Transaminitis, improved  No abdominal complaints  Thought secondary to recent antibiotics  Continue to monitor until resolution  Recheck labs in the morning    Normocytic anemia  Iron deficiency  Continue ferrous sulfate and vitamin C    Respiratory failure with hypoxia  Elevated BNP, improved  Continue furosemide  Titrated off oxygen     Bacterial conjunctivitis, right side, resolved  Completed Cipro     History of depression  Continue sertraline     Hyponatremia, improved  Continue salt tabs  Check labs in the morning     Chronic kidney disease  Avoid nephrotoxins  Renally dose medications   Monitor with intermittent labs     Hypertension  Continue losartan and furosemide    Insomnia, resolved  Melatonin ineffective  Start trial of trazodone    Epistaxis, resolved    DVT prophylaxis  Xarelto changed to Lovenox due to nosebleed      Sailaja Mallory M.D.  Physical Medicine and Rehabilitation

## 2023-04-06 NOTE — THERAPY
Physical Therapy   Daily Treatment     Patient Name: Chrissie Dueñas  Age:  89 y.o., Sex:  female  Medical Record #: [id number]  Today's Date: 4/6/2023     Precautions  Precautions: Fall Risk  Comments: Conjunctivitis, 2 L O2    Subjective    Pt seated in w/c upon arrival, reported not sleeping well last night and is feeling tired. Requested to go to the bathroom and agreeable to session.     Objective       04/06/23 1031   PT Charge Group   PT Therapeutic Exercise (Units) 1   PT Therapeutic Activities (Units) 1   Supervising Physical Therapist Juan Pablo Madison   PT Total Time Spent   PT Individual Total Time Spent (Mins) 30   Vitals   O2 (LPM) 0   O2 Delivery Device None - Room Air   Cognition    Level of Consciousness Alert   Transfer Functional Level of Assist   Toilet Transfers Supervised   Toilet Transfer Description Grab bar;Supervision for safety;Verbal cueing  (w/c <> toilet)   Sitting Lower Body Exercises   Sitting Lower Body Exercises   (#1.5lb, pink resistance band)   Ankle Pumps 1 set of 15;Bilateral   Hip Abduction 1 set of 15;Bilateral   Hip Adduction 1 set of 15;Bilateral   Long Arc Quad 1 set of 15;Bilateral   Hamstring Curl 1 set of 15;Bilateral   Bed Mobility    Sit to Stand Supervised  (grab bar)   Scooting Standby Assist   Interdisciplinary Plan of Care Collaboration   IDT Collaboration with  Physician;Nursing   Patient Position at End of Therapy Seated;Call Light within Reach;Tray Table within Reach;Phone within Reach   Collaboration Comments MD rounds, re:BM     Issued seated LE HEP.    Assessment    Pt tolerated session well despite fatigue. Good understanding in seated HEP and is pleasant throughout session. Was on room air throughout session and no sign of SOB during therex.    Strengths: Able to follow instructions, Effective communication skills, Independent prior level of function, Making steady progress towards goals, Motivated for self care and independence, Pleasant and cooperative, Willingly  participates in therapeutic activities  Barriers: Decreased endurance, Generalized weakness, Impaired activity tolerance    Plan    Continue 4ww functional mobility w/ brake review  General CV strength/endurance  Therapeutic ex  Dyn balance     Passport items to be completed:  Get in/out of bed safely, in/out of a vehicle, safely use mobility device, walk or wheel around home/community, navigate up and down stairs, show how to get up/down from the ground, ensure home is accessible, demonstrate HEP, complete caregiver training    Physical Therapy Problems (Active)       Problem: Balance       Dates: Start: 04/01/23         Goal: STG-Within one week, patient will improve LOBO score by at least 5 points to demonstrate decreased risk for falls.        Dates: Start: 04/01/23               Problem: Mobility       Dates: Start: 04/01/23         Goal: STG-Within one week, patient will ambulate 150' with FWW and supervision       Dates: Start: 04/01/23               Problem: Mobility Transfers       Dates: Start: 04/01/23         Goal: STG-Within one week, patient will perform 4 stairs with B handrails and supervision       Dates: Start: 04/01/23               Problem: PT-Long Term Goals       Dates: Start: 04/01/23         Goal: LTG-By discharge, patient will ambulate 150' with FWW independently.        Dates: Start: 04/01/23            Goal: LTG-By discharge, patient will transfer in/out of a car with FWW independently.        Dates: Start: 04/01/23            Goal: LTG-By discharge, patient will improve LOBO score by at least 10 points to demonstrate decreased risk for falls.        Dates: Start: 04/01/23            Goal: LTG-By discharge, patient will perform 12 stairs with B handrails independently.        Dates: Start: 04/01/23

## 2023-04-06 NOTE — FLOWSHEET NOTE
04/06/23 1630   Events/Summary/Plan   Events/Summary/Plan RA pulse ox check   Vital Signs   Pulse 93   Respiration 18   Pulse Oximetry 92 %   $ Pulse Oximetry (Spot Check) Yes   Respiratory Assessment   Level of Consciousness Alert   Chest Exam   Work Of Breathing / Effort Within Normal Limits   Oxygen   O2 Delivery Device Room air w/o2 available

## 2023-04-07 ENCOUNTER — APPOINTMENT (OUTPATIENT)
Dept: OCCUPATIONAL THERAPY | Facility: REHABILITATION | Age: 88
DRG: 193 | End: 2023-04-07
Attending: PHYSICAL MEDICINE & REHABILITATION
Payer: MEDICARE

## 2023-04-07 ENCOUNTER — APPOINTMENT (OUTPATIENT)
Dept: PHYSICAL THERAPY | Facility: REHABILITATION | Age: 88
DRG: 193 | End: 2023-04-07
Attending: PHYSICAL MEDICINE & REHABILITATION
Payer: MEDICARE

## 2023-04-07 ENCOUNTER — APPOINTMENT (OUTPATIENT)
Dept: RADIOLOGY | Facility: REHABILITATION | Age: 88
DRG: 193 | End: 2023-04-07
Attending: PHYSICAL MEDICINE & REHABILITATION
Payer: MEDICARE

## 2023-04-07 PROBLEM — R05.8 DRY COUGH: Status: ACTIVE | Noted: 2023-04-07

## 2023-04-07 LAB
ALBUMIN SERPL BCP-MCNC: 3.3 G/DL (ref 3.2–4.9)
ALBUMIN/GLOB SERPL: 0.9 G/DL
ALP SERPL-CCNC: 265 U/L (ref 30–99)
ALT SERPL-CCNC: 68 U/L (ref 2–50)
ANION GAP SERPL CALC-SCNC: 13 MMOL/L (ref 7–16)
AST SERPL-CCNC: 51 U/L (ref 12–45)
BASOPHILS # BLD AUTO: 0.3 % (ref 0–1.8)
BASOPHILS # BLD: 0.03 K/UL (ref 0–0.12)
BILIRUB SERPL-MCNC: 0.5 MG/DL (ref 0.1–1.5)
BUN SERPL-MCNC: 13 MG/DL (ref 8–22)
CALCIUM ALBUM COR SERPL-MCNC: 9.3 MG/DL (ref 8.5–10.5)
CALCIUM SERPL-MCNC: 8.7 MG/DL (ref 8.5–10.5)
CHLORIDE SERPL-SCNC: 98 MMOL/L (ref 96–112)
CO2 SERPL-SCNC: 23 MMOL/L (ref 20–33)
CREAT SERPL-MCNC: 0.77 MG/DL (ref 0.5–1.4)
EOSINOPHIL # BLD AUTO: 0.01 K/UL (ref 0–0.51)
EOSINOPHIL NFR BLD: 0.1 % (ref 0–6.9)
ERYTHROCYTE [DISTWIDTH] IN BLOOD BY AUTOMATED COUNT: 43 FL (ref 35.9–50)
FLUAV RNA SPEC QL NAA+PROBE: NEGATIVE
FLUBV RNA SPEC QL NAA+PROBE: NEGATIVE
GFR SERPLBLD CREATININE-BSD FMLA CKD-EPI: 72 ML/MIN/1.73 M 2
GLOBULIN SER CALC-MCNC: 3.5 G/DL (ref 1.9–3.5)
GLUCOSE SERPL-MCNC: 96 MG/DL (ref 65–99)
HCT VFR BLD AUTO: 35.1 % (ref 37–47)
HGB BLD-MCNC: 11.9 G/DL (ref 12–16)
IMM GRANULOCYTES # BLD AUTO: 0.35 K/UL (ref 0–0.11)
IMM GRANULOCYTES NFR BLD AUTO: 3.8 % (ref 0–0.9)
LYMPHOCYTES # BLD AUTO: 1.04 K/UL (ref 1–4.8)
LYMPHOCYTES NFR BLD: 11.4 % (ref 22–41)
MAGNESIUM SERPL-MCNC: 1.6 MG/DL (ref 1.5–2.5)
MCH RBC QN AUTO: 32 PG (ref 27–33)
MCHC RBC AUTO-ENTMCNC: 33.9 G/DL (ref 33.6–35)
MCV RBC AUTO: 94.4 FL (ref 81.4–97.8)
MONOCYTES # BLD AUTO: 1.11 K/UL (ref 0–0.85)
MONOCYTES NFR BLD AUTO: 12.1 % (ref 0–13.4)
NEUTROPHILS # BLD AUTO: 6.6 K/UL (ref 2–7.15)
NEUTROPHILS NFR BLD: 72.3 % (ref 44–72)
NRBC # BLD AUTO: 0 K/UL
NRBC BLD-RTO: 0 /100 WBC
PHOSPHATE SERPL-MCNC: 4 MG/DL (ref 2.5–4.5)
PLATELET # BLD AUTO: 398 K/UL (ref 164–446)
PMV BLD AUTO: 8.8 FL (ref 9–12.9)
POTASSIUM SERPL-SCNC: 4.2 MMOL/L (ref 3.6–5.5)
PROT SERPL-MCNC: 6.8 G/DL (ref 6–8.2)
RBC # BLD AUTO: 3.72 M/UL (ref 4.2–5.4)
RSV RNA SPEC QL NAA+PROBE: NEGATIVE
SARS-COV-2 RNA RESP QL NAA+PROBE: DETECTED
SODIUM SERPL-SCNC: 134 MMOL/L (ref 135–145)
SPECIMEN SOURCE: ABNORMAL
WBC # BLD AUTO: 9.1 K/UL (ref 4.8–10.8)

## 2023-04-07 PROCEDURE — 83735 ASSAY OF MAGNESIUM: CPT

## 2023-04-07 PROCEDURE — 99232 SBSQ HOSP IP/OBS MODERATE 35: CPT | Performed by: HOSPITALIST

## 2023-04-07 PROCEDURE — 97112 NEUROMUSCULAR REEDUCATION: CPT

## 2023-04-07 PROCEDURE — 97116 GAIT TRAINING THERAPY: CPT

## 2023-04-07 PROCEDURE — A9270 NON-COVERED ITEM OR SERVICE: HCPCS | Performed by: HOSPITALIST

## 2023-04-07 PROCEDURE — 97530 THERAPEUTIC ACTIVITIES: CPT

## 2023-04-07 PROCEDURE — 85025 COMPLETE CBC W/AUTO DIFF WBC: CPT

## 2023-04-07 PROCEDURE — 99232 SBSQ HOSP IP/OBS MODERATE 35: CPT | Performed by: PHYSICAL MEDICINE & REHABILITATION

## 2023-04-07 PROCEDURE — 97535 SELF CARE MNGMENT TRAINING: CPT | Mod: CO

## 2023-04-07 PROCEDURE — 94760 N-INVAS EAR/PLS OXIMETRY 1: CPT

## 2023-04-07 PROCEDURE — 97110 THERAPEUTIC EXERCISES: CPT

## 2023-04-07 PROCEDURE — 84100 ASSAY OF PHOSPHORUS: CPT

## 2023-04-07 PROCEDURE — 71045 X-RAY EXAM CHEST 1 VIEW: CPT

## 2023-04-07 PROCEDURE — 700102 HCHG RX REV CODE 250 W/ 637 OVERRIDE(OP): Performed by: PHYSICAL MEDICINE & REHABILITATION

## 2023-04-07 PROCEDURE — 700102 HCHG RX REV CODE 250 W/ 637 OVERRIDE(OP): Performed by: HOSPITALIST

## 2023-04-07 PROCEDURE — A9270 NON-COVERED ITEM OR SERVICE: HCPCS | Performed by: PHYSICAL MEDICINE & REHABILITATION

## 2023-04-07 PROCEDURE — 700111 HCHG RX REV CODE 636 W/ 250 OVERRIDE (IP): Performed by: PHYSICAL MEDICINE & REHABILITATION

## 2023-04-07 PROCEDURE — 36415 COLL VENOUS BLD VENIPUNCTURE: CPT

## 2023-04-07 PROCEDURE — 80053 COMPREHEN METABOLIC PANEL: CPT

## 2023-04-07 PROCEDURE — 0241U HCHG SARS-COV-2 COVID-19 NFCT DS RESP RNA 4 TRGT MIC: CPT

## 2023-04-07 PROCEDURE — 87205 SMEAR GRAM STAIN: CPT

## 2023-04-07 PROCEDURE — 87070 CULTURE OTHR SPECIMN AEROBIC: CPT

## 2023-04-07 PROCEDURE — 770010 HCHG ROOM/CARE - REHAB SEMI PRIVAT*

## 2023-04-07 RX ORDER — GUAIFENESIN/DEXTROMETHORPHAN 100-10MG/5
10 SYRUP ORAL EVERY 6 HOURS
Status: DISCONTINUED | OUTPATIENT
Start: 2023-04-07 | End: 2023-04-12 | Stop reason: HOSPADM

## 2023-04-07 RX ADMIN — GUAIFENESIN AND DEXTROMETHORPHAN 10 ML: 100; 10 SYRUP ORAL at 17:07

## 2023-04-07 RX ADMIN — ENOXAPARIN SODIUM 40 MG: 100 INJECTION SUBCUTANEOUS at 17:07

## 2023-04-07 RX ADMIN — FUROSEMIDE 20 MG: 20 TABLET ORAL at 05:32

## 2023-04-07 RX ADMIN — GUAIFENESIN AND DEXTROMETHORPHAN 10 ML: 100; 10 SYRUP ORAL at 23:10

## 2023-04-07 RX ADMIN — BENZOCAINE AND MENTHOL 1 LOZENGE: 15; 3.6 LOZENGE ORAL at 11:12

## 2023-04-07 RX ADMIN — SODIUM CHLORIDE 1 G: 1 TABLET ORAL at 11:12

## 2023-04-07 RX ADMIN — FERROUS SULFATE TAB 325 MG (65 MG ELEMENTAL FE) 325 MG: 325 (65 FE) TAB at 17:07

## 2023-04-07 RX ADMIN — FERROUS SULFATE TAB 325 MG (65 MG ELEMENTAL FE) 325 MG: 325 (65 FE) TAB at 09:19

## 2023-04-07 RX ADMIN — LOSARTAN POTASSIUM 75 MG: 25 TABLET, FILM COATED ORAL at 05:31

## 2023-04-07 RX ADMIN — TRAZODONE HYDROCHLORIDE 50 MG: 50 TABLET ORAL at 23:09

## 2023-04-07 RX ADMIN — SODIUM CHLORIDE 1 G: 1 TABLET ORAL at 17:07

## 2023-04-07 RX ADMIN — SERTRALINE HYDROCHLORIDE 25 MG: 50 TABLET, FILM COATED ORAL at 09:18

## 2023-04-07 RX ADMIN — POTASSIUM CHLORIDE 20 MEQ: 1500 TABLET, EXTENDED RELEASE ORAL at 09:18

## 2023-04-07 RX ADMIN — GUAIFENESIN AND DEXTROMETHORPHAN 10 ML: 100; 10 SYRUP ORAL at 11:12

## 2023-04-07 RX ADMIN — OXYCODONE HYDROCHLORIDE AND ACETAMINOPHEN 500 MG: 500 TABLET ORAL at 09:19

## 2023-04-07 RX ADMIN — SODIUM CHLORIDE 1 G: 1 TABLET ORAL at 09:18

## 2023-04-07 ASSESSMENT — GAIT ASSESSMENTS
DEVIATION: BRADYKINETIC;DECREASED HEEL STRIKE;DECREASED TOE OFF
DEVIATION: BRADYKINETIC;DECREASED HEEL STRIKE;DECREASED TOE OFF
GAIT LEVEL OF ASSIST: STANDBY ASSIST
GAIT LEVEL OF ASSIST: STANDBY ASSIST
ASSISTIVE DEVICE: 4 WHEEL WALKER

## 2023-04-07 ASSESSMENT — ENCOUNTER SYMPTOMS
DIARRHEA: 0
NERVOUS/ANXIOUS: 0
SHORTNESS OF BREATH: 0
NAUSEA: 0
VOMITING: 0
FEVER: 0
CHILLS: 0
ABDOMINAL PAIN: 0

## 2023-04-07 ASSESSMENT — BALANCE ASSESSMENTS
LOOK OVER LEFT AND RIGHT SHOULDERS WHILE STANDING: 2
STANDING UNSUPPORTED WITH FEET TOGETHER: 3
PLACE ALTERNATE FOOT ON STEP OR STOOL WHILE STANDING UNSUPPORTED: 0
STANDING ON ONE LEG: 0
TRANSFERS: 3
SITTING TO STANDING: 3
PICK UP OBJECT FROM THE FLOOR FROM A STANDING POSITION: 3
STANDING UNSUPPORTED WITH EYES CLOSED: 3
LONG VERSION TOTAL SCORE (MAX 56): 34
STANDING UNSUPPORTED ONE FOOT IN FRONT: 2
SITTING UNSUPPORTED: 4
STANDING TO SITTING: 3
REACHING FORWARD WITH OUTSTRETCHED ARM WHILE STANDING: 3
LONG VERSION TOTAL SCORE (MAX 56): 34
STANDING UNSUPPORTED: 4
TURN 360 DEGREES: 1

## 2023-04-07 ASSESSMENT — PAIN DESCRIPTION - PAIN TYPE: TYPE: ACUTE PAIN

## 2023-04-07 ASSESSMENT — ACTIVITIES OF DAILY LIVING (ADL)
BED_CHAIR_WHEELCHAIR_TRANSFER_DESCRIPTION: ADAPTIVE EQUIPMENT;INCREASED TIME;SET-UP OF EQUIPMENT;SUPERVISION FOR SAFETY
TOILET_TRANSFER_DESCRIPTION: GRAB BAR;INCREASED TIME;SUPERVISION FOR SAFETY
BED_CHAIR_WHEELCHAIR_TRANSFER_DESCRIPTION: ADAPTIVE EQUIPMENT;INCREASED TIME;SET-UP OF EQUIPMENT;SUPERVISION FOR SAFETY;VERBAL CUEING
TOILET_TRANSFER_DESCRIPTION: GRAB BAR

## 2023-04-07 NOTE — CARE PLAN
"The patient is Stable - Low risk of patient condition declining or worsening    Shift Goals  Clinical Goals: safety  Patient Goals: sleep well    Problem: Skin Integrity  Goal: Skin integrity is maintained or improved  Outcome: Progressing  Note:   Prabhjot Score: 20    Patient's skin remains intact and free from new or accidental injury this shift; no s/s of infection. RN wound protocol checked. Encouraged hydration and educated about the importance of nutrition to keep skin integrity. Will continue to monitor.       Problem: Fall Risk - Rehab  Goal: Patient will remain free from falls  Outcome: Progressing  Note: Benita Vaughn Fall risk Assessment Score: 18    High fall risk Interventions   - Alarming seatbelt  - Bed and strip alarm   - Yellow sign by the door   - Yellow wrist band \"Fall risk\"  - Room near to the nurse station  - Do not leave patient unattended in the bathroom  - Fall risk education provided       "

## 2023-04-07 NOTE — THERAPY
Physical Therapy   Daily Treatment     Patient Name: Chrissie Dueñas  Age:  89 y.o., Sex:  female  Medical Record #: [id number]  Today's Date: 4/7/2023     Precautions  Precautions: Fall Risk  Comments: Conjunctivitis, 2 L O2    Subjective    Patient agreeable to session. Has no complaints. Says she much prefers her new 4ww compared to the last one.      Objective       04/07/23 1401   PT Charge Group   PT Gait Training (Units) 1   PT Therapeutic Exercise (Units) 1   PT Neuromuscular Re-Education / Balance (Units) 1   PT Therapeutic Activities (Units) 1   PT Total Time Spent   PT Individual Total Time Spent (Mins) 60   Gait Functional Level of Assist    Gait Level Of Assist Standby Assist   Assistive Device 4 Wheel Walker   Distance (Feet)   (200', 150', 250', 100', outdoors 50 ft over incline)   Deviation Bradykinetic;Decreased Heel Strike;Decreased Toe Off  (cues for proximity to 4ww, intermittent shuffling present w/ fatigue.)   Transfer Functional Level of Assist   Bed, Chair, Wheelchair Transfer Supervised   Bed Chair Wheelchair Transfer Description Adaptive equipment;Increased time;Set-up of equipment;Supervision for safety   Sitting Lower Body Exercises   Nustep Resistance Level 5  (x 10 minutes, all extremities for CV endurance.)   Bed Mobility    Sit to Stand Supervised   Neuro-Muscular Treatments   Neuro-Muscular Treatments Anterior weight shift;Weight Shift Right;Weight Shift Left   Comments standing block taps BLEs w/ RUE support x 10 B. LOBO   Lobo Balance Scale   Sitting Unsupported (Score 0-4) 4   Change Of Positon: Sitting To Standing (Score 0-4) 3   Change Of Positon: Standing To Sitting (Score 0-4) 3   Transfers (Score 0-4) 3   Standing Unsupported (Score 0-4) 4   Standing With Eyes Closed (Score 0-4) 3   Standing With Feet Together (Score 0-4) 3   Tandem Standing (Score 0-4) 2   Standing On One Leg (Score 0-4) 0   Turning Trunk (Feet Fixed) (Score 0-4) 2   Retrieving Objects From Floor (Score 0-4) 3    Turning 360 Degrees (Score 0-4) 1   Stool Stepping (Score 0-4) 0   Reaching Forward While Standing (Score 0-4) 3   Lobo Balance Total Score (0-56) 34   Interdisciplinary Plan of Care Collaboration   Patient Position at End of Therapy Seated;Chair Alarm On;Call Light within Reach;Tray Table within Reach;Phone within Reach     Switched pt to new 4ww w/ less stiff breaks to allow patient to manage more independently. She continues to require around 50% cues and assist to manage brakes prior to sitting on other surfaces or sitting on the 4ww seat itself.     Discussed fall risk and indication for AD at this time.     Assessment    Patient able to manage brakes on new 4ww much better than previous one. But still needing cues for overall use and safety with transfers, will benefit from continued work on this and in functional scenarios similar to her ILF. Balance improves on LOBO to 34/56 but remains high fall risk on assessment. Will continue to benefit from skilled therapy to address deficits.     Strengths: Able to follow instructions, Effective communication skills, Independent prior level of function, Making steady progress towards goals, Motivated for self care and independence, Pleasant and cooperative, Willingly participates in therapeutic activities  Barriers: Decreased endurance, Generalized weakness, Impaired activity tolerance    Plan    Continue 4ww functional mobility w/ brake review  General CV strength/endurance  Therapeutic ex  Dyn balance     Passport items to be completed:  Get in/out of bed safely, in/out of a vehicle, safely use mobility device, walk or wheel around home/community, navigate up and down stairs, show how to get up/down from the ground, ensure home is accessible, demonstrate HEP, complete caregiver training    Physical Therapy Problems (Active)       Problem: Balance       Dates: Start: 04/01/23         Goal: STG-Within one week, patient will improve LOBO score by at least 5 points to  demonstrate decreased risk for falls.        Dates: Start: 04/01/23               Problem: Mobility       Dates: Start: 04/01/23         Goal: STG-Within one week, patient will ambulate 150' with FWW and supervision       Dates: Start: 04/01/23               Problem: Mobility Transfers       Dates: Start: 04/01/23         Goal: STG-Within one week, patient will perform 4 stairs with B handrails and supervision       Dates: Start: 04/01/23               Problem: PT-Long Term Goals       Dates: Start: 04/01/23         Goal: LTG-By discharge, patient will ambulate 150' with FWW independently.        Dates: Start: 04/01/23            Goal: LTG-By discharge, patient will transfer in/out of a car with FWW independently.        Dates: Start: 04/01/23            Goal: LTG-By discharge, patient will improve LOBO score by at least 10 points to demonstrate decreased risk for falls.        Dates: Start: 04/01/23            Goal: LTG-By discharge, patient will perform 12 stairs with B handrails independently.        Dates: Start: 04/01/23

## 2023-04-07 NOTE — ASSESSMENT & PLAN NOTE
Dx with Covid on 4/7  Has a dry cough   Had a low grade temp of 99 -- recently afebrile  Leukocytosis resolved (4/7)  O2 sats low normal on RA (off O2 supplementation on 4/7)  CXR: unremarkable  Supportive care

## 2023-04-07 NOTE — CARE PLAN
Problem: Fall Risk - Rehab  Goal: Patient will remain free from falls  Note: Patient remains free from falls this shift. Patient was educated on using the call light to prevent falls. Patients bed is in the lowest position. The patients belongings are placed in near proximity to the patient.       Problem: Hemodynamics  Goal: Patient's hemodynamics, fluid balance and neurologic status will be stable or improve  Note: Patient reports cough this shift. Throat lozenge and robitussin given. Covid test done per order. Awaiting sputum sample.    The patient is Stable - Low risk of patient condition declining or worsening

## 2023-04-07 NOTE — PROGRESS NOTES
Rehab Progress Note     Date of Service: 4/7/2023  Chief Complaint: Follow-up debility    Interval Events (Subjective)    Patient seen and examined today in her room.  She reports very little sleep last night as she continues to cough when she tries to lay down.  The cough syrup did not help much.  She also reports coughing this morning  Including being able to cough up some sputum.  Discussed with the hospitalist for management.  Patient last moved her bowels yesterday.    Patient has no other new questions, concerns, or complaints today.           Objective:  VITAL SIGNS: BP (!) 141/64   Pulse 85   Temp 37.2 °C (99 °F) (Oral)   Resp 18   Ht 1.524 m (5')   Wt 62.5 kg (137 lb 12.6 oz)   SpO2 90%   BMI 26.91 kg/m²   Gen: alert, no apparent distress  CV: Regular rate, regular rhythm  Resp: Clear to auscultation bilaterally, loose productive cough          Recent Results (from the past 72 hour(s))   CBC WITH DIFFERENTIAL    Collection Time: 04/07/23  6:09 AM   Result Value Ref Range    WBC 9.1 4.8 - 10.8 K/uL    RBC 3.72 (L) 4.20 - 5.40 M/uL    Hemoglobin 11.9 (L) 12.0 - 16.0 g/dL    Hematocrit 35.1 (L) 37.0 - 47.0 %    MCV 94.4 81.4 - 97.8 fL    MCH 32.0 27.0 - 33.0 pg    MCHC 33.9 33.6 - 35.0 g/dL    RDW 43.0 35.9 - 50.0 fL    Platelet Count 398 164 - 446 K/uL    MPV 8.8 (L) 9.0 - 12.9 fL    Neutrophils-Polys 72.30 (H) 44.00 - 72.00 %    Lymphocytes 11.40 (L) 22.00 - 41.00 %    Monocytes 12.10 0.00 - 13.40 %    Eosinophils 0.10 0.00 - 6.90 %    Basophils 0.30 0.00 - 1.80 %    Immature Granulocytes 3.80 (H) 0.00 - 0.90 %    Nucleated RBC 0.00 /100 WBC    Neutrophils (Absolute) 6.60 2.00 - 7.15 K/uL    Lymphs (Absolute) 1.04 1.00 - 4.80 K/uL    Monos (Absolute) 1.11 (H) 0.00 - 0.85 K/uL    Eos (Absolute) 0.01 0.00 - 0.51 K/uL    Baso (Absolute) 0.03 0.00 - 0.12 K/uL    Immature Granulocytes (abs) 0.35 (H) 0.00 - 0.11 K/uL    NRBC (Absolute) 0.00 K/uL   Comp Metabolic Panel    Collection Time: 04/07/23  6:09 AM    Result Value Ref Range    Sodium 134 (L) 135 - 145 mmol/L    Potassium 4.2 3.6 - 5.5 mmol/L    Chloride 98 96 - 112 mmol/L    Co2 23 20 - 33 mmol/L    Anion Gap 13.0 7.0 - 16.0    Glucose 96 65 - 99 mg/dL    Bun 13 8 - 22 mg/dL    Creatinine 0.77 0.50 - 1.40 mg/dL    Calcium 8.7 8.5 - 10.5 mg/dL    AST(SGOT) 51 (H) 12 - 45 U/L    ALT(SGPT) 68 (H) 2 - 50 U/L    Alkaline Phosphatase 265 (H) 30 - 99 U/L    Total Bilirubin 0.5 0.1 - 1.5 mg/dL    Albumin 3.3 3.2 - 4.9 g/dL    Total Protein 6.8 6.0 - 8.2 g/dL    Globulin 3.5 1.9 - 3.5 g/dL    A-G Ratio 0.9 g/dL   MAGNESIUM    Collection Time: 04/07/23  6:09 AM   Result Value Ref Range    Magnesium 1.6 1.5 - 2.5 mg/dL   PHOSPHORUS    Collection Time: 04/07/23  6:09 AM   Result Value Ref Range    Phosphorus 4.0 2.5 - 4.5 mg/dL   CORRECTED CALCIUM    Collection Time: 04/07/23  6:09 AM   Result Value Ref Range    Correct Calcium 9.3 8.5 - 10.5 mg/dL   ESTIMATED GFR    Collection Time: 04/07/23  6:09 AM   Result Value Ref Range    GFR (CKD-EPI) 72 >60 mL/min/1.73 m 2       Scheduled Medications   Medication Dose Frequency    losartan  75 mg Q DAY    guaiFENesin dextromethorphan  10 mL Q EVENING    traZODone  50 mg QHS    ascorbic acid  500 mg QDAY with Breakfast    ferrous sulfate  325 mg BID WITH MEALS    potassium chloride SA  20 mEq DAILY    enoxaparin (LOVENOX) injection  40 mg DAILY AT 1800    furosemide  20 mg Q DAY    senna-docusate  2 Tablet BID    sertraline  25 mg DAILY    sodium chloride  1 g TID WITH MEALS       Current Diet Order   Procedures    Diet Order Diet: Regular       Radiology    Imaging personally reviewed and interpreted by me.    DX-CHEST-LIMITED (1 VIEW)   Final Result      No acute cardiopulmonary disease.      DX-CHEST-PORTABLE (1 VIEW)   Final Result      1.  Small bilateral pleural effusions.   2.  Right basilar atelectasis.   3.  Possible mild hazy/interstitial opacities could represent mild vascular congestion and/or infection.           Assessment:    This patient is a 89 y.o. female admitted for acute inpatient rehabilitation   with Debility secondary to hospitalization for most of the lobar pneumonia.    I led and attended the weekly conference, and agree with the IDT conference documentation and plan of care as noted below.    Date of conference: 4/5/2023    Goals and barriers: See IDT note.    Biggest barriers: respiratory insufficiency, impaired balance, poor endurance    CM/social support: to live with daughter until her room is ready at [facility] May 1st    Anticipated DC date: 4/12    Home health: PT/OT/RN    Equip: none needed     Follow up: PCP      Problem List/Medical Decision Making and Plan:    Debility  Generalized weakness  PT/OT, 1.5 hr each discipline, 5 days per week    Multilobar pneumonia  Completed IV antibiotics  Recheck chest x-ray    Cough, continues  Scheduled Robitussin  Recheck chest x-ray  As needed cough drops  Discussed with hospitalist     Leukocytosis, resolved  Completed IV antibiotics for pneumonia  Continue to monitor until resolution    Transaminitis, continues to improve  No abdominal complaints  Thought secondary to recent antibiotics  Continue to monitor until resolution    Normocytic anemia  Iron deficiency  Continue ferrous sulfate and vitamin C    Respiratory failure with hypoxia  Elevated BNP, improved  Continue furosemide and potassium  Titrated off oxygen     Bacterial conjunctivitis, right side, resolved  Completed Cipro     History of depression  Continue sertraline     Hyponatremia, improved/continues  Continue salt tabs     Chronic kidney disease, history of, unknown stage  Avoid nephrotoxins  Renally dose medications   Labs currently with normal function     Hypertension  Continue losartan and furosemide    Insomnia, continues to cough see above  Continue trazodone    Epistaxis, resolved    DVT prophylaxis  Xarelto changed to Lovenox due to nosebleed      Sailaja Mallory M.D.  Physical Medicine  and Rehabilitation

## 2023-04-07 NOTE — THERAPY
"Occupational Therapy  Daily Treatment     Patient Name: Chrissie Dueñas  Age:  89 y.o., Sex:  female  Medical Record #: [id number]  Today's Date: 4/7/2023     Precautions  Precautions: (P) Fall Risk  Comments: Conjunctivitis, 2 L O2         Subjective    \" Juan Pablo usually  has me walk to the gym with my walker.\"     Objective       04/07/23 0701   OT Charge Group   OT Self Care / ADL (Units) 3   OT Therapeutic Exercise (Units) 1   OT Total Time Spent   OT Individual Total Time Spent (Mins) 60   Precautions   Precautions Fall Risk   Vitals   Room Air Oximetry 90  (extra time to get reading   cold hands  no ear probe available)   Functional Level of Assist   Grooming Modified Independent  (seated at sink oral care   wash/dry face and hands   brush hair)   Upper Body Dressing Supervision  (cues for oreintation of bra to don using pull over technique)   Lower Body Dressing Minimal Assist  (underwear pants and  shoes  setup/ supervision  required therapist to don  JOSÉ MIGUEL hose)   Toileting Supervision   Toilet Transfers Supervised   Toilet Transfer Description Grab bar   Sitting Upper Body Exercises   Chest Press 1 set of 10;Bilateral   Front Arm Raise 1 set of 10;Bilateral   Bicep Curls 1 set of 10;Right ;Left   Pronation / Supination 1 set of 10;Right ;Left   Comments 2lb weight   Interdisciplinary Plan of Care Collaboration   Patient Position at End of Therapy Seated;Call Light within Reach;Tray Table within Reach;Chair Alarm On;Self Releasing Lap Belt Applied     Change out w/c for improved fit . A w/c with lower seat height so feet an touch the floor   struggled to with LB dressing sitting in higher w/c . She usually sits edge of bed at home   Shower offered  declined due to room being cold       Assessment     Reported moderate fatigue  after performing UE ther ex with 2lb weight       Feels more comfortable in lower chair.   Strengths: Alert and oriented, Good insight into deficits/needs, Independent prior level of " function, Making steady progress towards goals, Motivated for self care and independence, Pleasant and cooperative, Supportive family, Willingly participates in therapeutic activities  Barriers: Decreased endurance, Generalized weakness, Home accessibility, Impaired activity tolerance, Impaired balance, Limited mobility, Pain    Plan    ADLs, wean O2, general endurance/strengthening, transfers, functional mob     Passport items to be completed:  Perform bathroom transfers, complete dressing, complete feeding, get ready for the day, prepare a simple meal, participate in household tasks, adapt home for safety needs, demonstrate home exercise program, complete caregiver training   Occupational Therapy Goals (Active)       Problem: Bathing       Dates: Start: 04/01/23         Goal: STG-Within one week, patient will bathe w/ CGA.       Dates: Start: 04/01/23               Problem: Dressing       Dates: Start: 04/01/23         Goal: STG-Within one week, patient will dress LB w/ CGA.        Dates: Start: 04/01/23         Goal Note filed on 04/05/23 1203 by Amparo Adkins, OT       SBA for pants, assist for footwear                  Problem: IADL's       Dates: Start: 04/01/23         Goal: STG-Within one week, patient will access kitchen area w/ min A and LRD.        Dates: Start: 04/01/23               Problem: OT Long Term Goals       Dates: Start: 04/01/23         Goal: LTG-By discharge, patient will complete basic self care tasks w/ mod I- supervision.        Dates: Start: 04/01/23            Goal: LTG-By discharge, patient will perform bathroom transfers w/ mos I- supervision.        Dates: Start: 04/01/23            Goal: LTG-By discharge, patient will complete basic home management w/ mod I- min A.        Dates: Start: 04/01/23

## 2023-04-07 NOTE — FLOWSHEET NOTE
04/07/23 1105   Events/Summary/Plan   Events/Summary/Plan SpO2 check on room air. DC oxygen per protocol   Vital Signs   Pulse 87   Respiration 18   Pulse Oximetry 90 %   $ Pulse Oximetry (Spot Check) Yes   Respiratory Assessment   Respiratory Pattern Within Normal Limits   Level of Consciousness Alert   Chest Exam   Work Of Breathing / Effort Within Normal Limits   Oxygen   O2 Delivery Device None - Room Air   Room Air Challenge Pass

## 2023-04-07 NOTE — PROGRESS NOTES
Hospital Medicine Daily Progress Note      Chief Complaint  Hypertension  Hyponatremia  Pulm edema    Interval Problem Update  Pt states she is having a dry cough recently.    Review of Systems  Review of Systems   Constitutional:  Negative for chills and fever.   Respiratory:  Positive for cough. Negative for shortness of breath.    Cardiovascular:  Negative for chest pain.   Gastrointestinal:  Negative for abdominal pain, diarrhea, nausea and vomiting.   Psychiatric/Behavioral:  The patient is not nervous/anxious.       Physical Exam  Temp:  [36.8 °C (98.3 °F)-37.2 °C (99 °F)] 37.2 °C (99 °F)  Pulse:  [] 85  Resp:  [18] 18  BP: (116-156)/(64-78) 141/64  SpO2:  [90 %-92 %] 90 %    Physical Exam  Vitals and nursing note reviewed.   Constitutional:       Appearance: Normal appearance.   HENT:      Head: Atraumatic.   Eyes:      Conjunctiva/sclera: Conjunctivae normal.      Pupils: Pupils are equal, round, and reactive to light.   Neck:      Vascular: No JVD.   Cardiovascular:      Rate and Rhythm: Normal rate and regular rhythm.      Heart sounds: Normal heart sounds.   Pulmonary:      Effort: Pulmonary effort is normal.      Breath sounds: Normal breath sounds.   Abdominal:      General: Bowel sounds are normal.      Palpations: Abdomen is soft.   Musculoskeletal:      Cervical back: Normal range of motion and neck supple.      Right lower leg: No edema.      Left lower leg: No edema.   Skin:     General: Skin is warm and dry.   Neurological:      Mental Status: She is alert and oriented to person, place, and time.   Psychiatric:         Mood and Affect: Mood normal.         Behavior: Behavior normal.       Fluids    Intake/Output Summary (Last 24 hours) at 4/7/2023 0855  Last data filed at 4/6/2023 2100  Gross per 24 hour   Intake 440 ml   Output --   Net 440 ml         Laboratory  Recent Labs     04/07/23  0609   WBC 9.1   RBC 3.72*   HEMOGLOBIN 11.9*   HEMATOCRIT 35.1*   MCV 94.4   MCH 32.0   MCHC 33.9    RDW 43.0   PLATELETCT 398   MPV 8.8*                         Assessment/Plan  * Multifocal pneumonia- (present on admission)  Assessment & Plan  CTA chest (3/29): multifocal pneumonia, no PE  WBC's have normalized  S/P Unasyn    Anemia  Assessment & Plan  H&H improved recently with Hb 11.9  Fe: 28, sats 21%  On Fe supplements    Transaminitis  Assessment & Plan  LFT's cont to normalize (4/7)  ? 2nd to recent abx  Monitor for now    Acute bacterial conjunctivitis of right eye- (present on admission)  Assessment & Plan  On Cipro eye drops (thru 4/5)    Pulmonary edema- (present on admission)  Assessment & Plan  BNP: 5293 --> 2712 (4/4)  Echo: showed an EF of 75%  CXR: showed small B/L effusion, mild pulm edema  On Lasix  On KCL: 10 meq daily --> 20 meq daily  S/P extra KCL 40 meq x 1 (4/4)  Encouraging IS   Monitor K+: 3.5 (4/4) --> 4.2 (4/7)    Major depressive disorder- (present on admission)  Assessment & Plan  On Zoloft    Chronic hyponatremia- (present on admission)  Assessment & Plan  Na: 133 --> 134 --> 134 (4/7)  Has had since 2021  2nd to diminished appetite  2nd to Cozaar, Zoloft  Cont salt tabs  Cont to monitor    Essential hypertension- (present on admission)  Assessment & Plan  BP a little elevated recently  Cont Cozaar -- dose increased 4/6  Will monitor another day since meds were recently adjusted

## 2023-04-07 NOTE — THERAPY
Occupational Therapy  Daily Treatment     Patient Name: Chrissie Dueñas  Age:  89 y.o., Sex:  female  Medical Record #: [id number]  Today's Date: 4/7/2023     Precautions  Precautions: Fall Risk  Comments: Conjunctivitis, 2 L O2         Subjective    Patient was seated in w/c watching the news. Was agreeable to OT.     Objective       04/07/23 0931   OT Charge Group   OT Therapeutic Exercise (Units) 2   OT Total Time Spent   OT Individual Total Time Spent (Mins) 30   Precautions   Precautions Fall Risk   Sitting Upper Body Exercises   Upper Extremity Bike Minutes / Rest Breaks (See Comments)  (motomed cycle gear 3 x 10 minutes with two rest breaks of < 1 minute)   Sitting Lower Body Exercises   Sit to Stand 1 set of 10  (from w/c up to FWW, cues for hand placement)   Interdisciplinary Plan of Care Collaboration   Patient Position at End of Therapy Seated;Chair Alarm On;Self Releasing Lap Belt Applied;Tray Table within Reach;Call Light within Reach;Phone within Reach     Functional mobility with 4ww from main gym to her room with SBA.    Assessment    Patient tolerated all activities well and without complaints.  She stated she was using muscles she has never used before when working on the motomed.  Strengths: Alert and oriented, Good insight into deficits/needs, Independent prior level of function, Making steady progress towards goals, Motivated for self care and independence, Pleasant and cooperative, Supportive family, Willingly participates in therapeutic activities  Barriers: Decreased endurance, Generalized weakness, Home accessibility, Impaired activity tolerance, Impaired balance, Limited mobility, Pain    Plan    ADLs, wean O2, general endurance/strengthening, transfers, functional mob    Occupational Therapy Goals (Active)       Problem: Bathing       Dates: Start: 04/01/23         Goal: STG-Within one week, patient will bathe w/ CGA.       Dates: Start: 04/01/23               Problem: Dressing       Dates:  Start: 04/01/23         Goal: STG-Within one week, patient will dress LB w/ CGA.        Dates: Start: 04/01/23         Goal Note filed on 04/05/23 1203 by Amparo Adkins, OT       SBA for pants, assist for footwear                  Problem: IADL's       Dates: Start: 04/01/23         Goal: STG-Within one week, patient will access kitchen area w/ min A and LRD.        Dates: Start: 04/01/23               Problem: OT Long Term Goals       Dates: Start: 04/01/23         Goal: LTG-By discharge, patient will complete basic self care tasks w/ mod I- supervision.        Dates: Start: 04/01/23            Goal: LTG-By discharge, patient will perform bathroom transfers w/ mos I- supervision.        Dates: Start: 04/01/23            Goal: LTG-By discharge, patient will complete basic home management w/ mod I- min A.        Dates: Start: 04/01/23

## 2023-04-07 NOTE — THERAPY
Physical Therapy   Daily Treatment     Patient Name: Chrissie Dueñsa  Age:  89 y.o., Sex:  female  Medical Record #: [id number]  Today's Date: 4/7/2023     Precautions  Precautions: Fall Risk  Comments: Conjunctivitis, 2 L O2    Subjective    Patient reporting she didn't sleep well lastnight, but is ok with therapy.      Objective       04/07/23 0831   PT Charge Group   PT Neuromuscular Re-Education / Balance (Units) 1   PT Therapeutic Activities (Units) 1   PT Total Time Spent   PT Individual Total Time Spent (Mins) 30   Gait Functional Level of Assist    Gait Level Of Assist Standby Assist   Assistive Device 4 Wheel Walker;None   Distance (Feet)   (150 x 2, 25 x 1; 15 ft x 2 no AD w/c <> toilet.)   Deviation Bradykinetic;Decreased Heel Strike;Decreased Toe Off   Transfer Functional Level of Assist   Bed, Chair, Wheelchair Transfer Standby Assist   Bed Chair Wheelchair Transfer Description Adaptive equipment;Increased time;Set-up of equipment;Supervision for safety;Verbal cueing   Toilet Transfers Supervised   Toilet Transfer Description Grab bar;Increased time;Supervision for safety   Bed Mobility    Sit to Stand Supervised   Neuro-Muscular Treatments   Neuro-Muscular Treatments Anterior weight shift;Postural Changes;Postural Facilitation   Comments standing supported in parallel bars: ankle strategy weight shifts anterior/posterior w/ feet standing on towel roll 2 rounds x 20-25 reps each, cues to lessen UE support as much as possible but pt needing at least single UE support to maintain balance during transitions; static standing on incline board unsupported for DF and ankle recruitment.   Interdisciplinary Plan of Care Collaboration   Patient Position at End of Therapy Seated;Chair Alarm On;Self Releasing Lap Belt Applied;Call Light within Reach;Tray Table within Reach     Increased Vcs needed today for 4ww brake use approximately 75% w/ hand over hand cues at times. Poor awareness of 4ww placement in preparation  to sit on seat as well.       Assessment    Patient needing inc verbal cues for 4ww use today, and remains impaired with ankle strategy and balance reactions. Heavy reliance on Ues needed.     Strengths: Able to follow instructions, Effective communication skills, Independent prior level of function, Making steady progress towards goals, Motivated for self care and independence, Pleasant and cooperative, Willingly participates in therapeutic activities  Barriers: Decreased endurance, Generalized weakness, Impaired activity tolerance    Plan    Reassess LOBO  Continue 4ww functional mobility w/ brake review  General CV strength/endurance  Therapeutic ex  Dyn balance     Passport items to be completed:  Get in/out of bed safely, in/out of a vehicle, safely use mobility device, walk or wheel around home/community, navigate up and down stairs, show how to get up/down from the ground, ensure home is accessible, demonstrate HEP, complete caregiver training    Physical Therapy Problems (Active)       Problem: Balance       Dates: Start: 04/01/23         Goal: STG-Within one week, patient will improve LOBO score by at least 5 points to demonstrate decreased risk for falls.        Dates: Start: 04/01/23               Problem: Mobility       Dates: Start: 04/01/23         Goal: STG-Within one week, patient will ambulate 150' with FWW and supervision       Dates: Start: 04/01/23               Problem: Mobility Transfers       Dates: Start: 04/01/23         Goal: STG-Within one week, patient will perform 4 stairs with B handrails and supervision       Dates: Start: 04/01/23               Problem: PT-Long Term Goals       Dates: Start: 04/01/23         Goal: LTG-By discharge, patient will ambulate 150' with FWW independently.        Dates: Start: 04/01/23            Goal: LTG-By discharge, patient will transfer in/out of a car with FWW independently.        Dates: Start: 04/01/23            Goal: LTG-By discharge, patient will  improve LOBO score by at least 10 points to demonstrate decreased risk for falls.        Dates: Start: 04/01/23            Goal: LTG-By discharge, patient will perform 12 stairs with B handrails independently.        Dates: Start: 04/01/23

## 2023-04-08 PROBLEM — U07.1 COVID: Status: ACTIVE | Noted: 2023-04-07

## 2023-04-08 LAB
GRAM STN SPEC: NORMAL
SIGNIFICANT IND 70042: NORMAL
SITE SITE: NORMAL
SOURCE SOURCE: NORMAL

## 2023-04-08 PROCEDURE — 700102 HCHG RX REV CODE 250 W/ 637 OVERRIDE(OP): Performed by: HOSPITALIST

## 2023-04-08 PROCEDURE — A9270 NON-COVERED ITEM OR SERVICE: HCPCS | Performed by: HOSPITALIST

## 2023-04-08 PROCEDURE — 700111 HCHG RX REV CODE 636 W/ 250 OVERRIDE (IP): Performed by: PHYSICAL MEDICINE & REHABILITATION

## 2023-04-08 PROCEDURE — 99232 SBSQ HOSP IP/OBS MODERATE 35: CPT | Performed by: HOSPITALIST

## 2023-04-08 PROCEDURE — 700102 HCHG RX REV CODE 250 W/ 637 OVERRIDE(OP): Performed by: PHYSICAL MEDICINE & REHABILITATION

## 2023-04-08 PROCEDURE — A9270 NON-COVERED ITEM OR SERVICE: HCPCS | Performed by: PHYSICAL MEDICINE & REHABILITATION

## 2023-04-08 PROCEDURE — 770010 HCHG ROOM/CARE - REHAB SEMI PRIVAT*

## 2023-04-08 RX ADMIN — SODIUM CHLORIDE 1 G: 1 TABLET ORAL at 11:19

## 2023-04-08 RX ADMIN — GUAIFENESIN AND DEXTROMETHORPHAN 10 ML: 100; 10 SYRUP ORAL at 11:19

## 2023-04-08 RX ADMIN — FUROSEMIDE 20 MG: 20 TABLET ORAL at 05:48

## 2023-04-08 RX ADMIN — TRAZODONE HYDROCHLORIDE 50 MG: 50 TABLET ORAL at 21:03

## 2023-04-08 RX ADMIN — SENNOSIDES AND DOCUSATE SODIUM 2 TABLET: 50; 8.6 TABLET ORAL at 21:03

## 2023-04-08 RX ADMIN — FERROUS SULFATE TAB 325 MG (65 MG ELEMENTAL FE) 325 MG: 325 (65 FE) TAB at 17:29

## 2023-04-08 RX ADMIN — OXYCODONE HYDROCHLORIDE AND ACETAMINOPHEN 500 MG: 500 TABLET ORAL at 08:40

## 2023-04-08 RX ADMIN — SENNOSIDES AND DOCUSATE SODIUM 2 TABLET: 50; 8.6 TABLET ORAL at 08:40

## 2023-04-08 RX ADMIN — FERROUS SULFATE TAB 325 MG (65 MG ELEMENTAL FE) 325 MG: 325 (65 FE) TAB at 08:41

## 2023-04-08 RX ADMIN — SERTRALINE HYDROCHLORIDE 25 MG: 50 TABLET, FILM COATED ORAL at 08:40

## 2023-04-08 RX ADMIN — POTASSIUM CHLORIDE 20 MEQ: 1500 TABLET, EXTENDED RELEASE ORAL at 08:40

## 2023-04-08 RX ADMIN — GUAIFENESIN AND DEXTROMETHORPHAN 10 ML: 100; 10 SYRUP ORAL at 23:42

## 2023-04-08 RX ADMIN — BENZOCAINE AND MENTHOL 1 LOZENGE: 15; 3.6 LOZENGE ORAL at 10:10

## 2023-04-08 RX ADMIN — SODIUM CHLORIDE 1 G: 1 TABLET ORAL at 08:41

## 2023-04-08 RX ADMIN — ENOXAPARIN SODIUM 40 MG: 100 INJECTION SUBCUTANEOUS at 18:00

## 2023-04-08 RX ADMIN — LOSARTAN POTASSIUM 75 MG: 25 TABLET, FILM COATED ORAL at 05:48

## 2023-04-08 RX ADMIN — SODIUM CHLORIDE 1 G: 1 TABLET ORAL at 17:29

## 2023-04-08 RX ADMIN — GUAIFENESIN AND DEXTROMETHORPHAN 10 ML: 100; 10 SYRUP ORAL at 17:29

## 2023-04-08 RX ADMIN — GUAIFENESIN AND DEXTROMETHORPHAN 10 ML: 100; 10 SYRUP ORAL at 05:48

## 2023-04-08 ASSESSMENT — ENCOUNTER SYMPTOMS
BLURRED VISION: 0
FEVER: 0
PALPITATIONS: 0
HALLUCINATIONS: 0
HEADACHES: 0
NAUSEA: 0
VOMITING: 0
SHORTNESS OF BREATH: 0
DIZZINESS: 0

## 2023-04-08 ASSESSMENT — PAIN DESCRIPTION - PAIN TYPE
TYPE: ACUTE PAIN
TYPE: ACUTE PAIN

## 2023-04-08 NOTE — CARE PLAN
"The patient is Watcher - Medium risk of patient condition declining or worsening    Shift Goals  Clinical Goals: safety  Patient Goals: sleep well      Problem: Fall Risk - Rehab  Goal: Patient will remain free from falls  Outcome: Progressing    Benita Vaughn Fall risk Assessment Score: 18    High fall risk Interventions   - Alarming seatbelt  - Wander guard  - Bed and strip alarm   - Yellow sign by the door   - Yellow wrist band \"Fall risk\"  - Room near to the nurse station  - Do not leave patient unattended in the bathroom  - Fall risk education provided       Problem: Risk for Aspiration  Goal: Patient's risk for aspiration will be absent or decrease  Outcome: Progressing    Patient able to swallow one pill whole at a time with thin liquid.  If more than one pill, she required applesauce.  "

## 2023-04-08 NOTE — PROGRESS NOTES
Hospital Medicine Daily Progress Note      Chief Complaint  Hypertension  Hyponatremia  Pulm edema    Interval Problem Update  Discussed about her being diagnosed with Covid and that's why she has a cough.  Pt doing ok off O2 supplementation.    Review of Systems  Review of Systems   Constitutional:  Negative for fever.   Eyes:  Negative for blurred vision.   Respiratory:  Negative for shortness of breath.    Cardiovascular:  Negative for palpitations.   Gastrointestinal:  Negative for nausea and vomiting.   Neurological:  Negative for dizziness and headaches.   Psychiatric/Behavioral:  Negative for hallucinations.       Physical Exam  Temp:  [36.6 °C (97.8 °F)-36.7 °C (98.1 °F)] 36.6 °C (97.8 °F)  Pulse:  [80-92] 80  Resp:  [14-18] 14  BP: (109-152)/(54-80) 121/54  SpO2:  [90 %-91 %] 90 %    Physical Exam  Vitals and nursing note reviewed.   Constitutional:       General: She is not in acute distress.  HENT:      Mouth/Throat:      Mouth: Mucous membranes are moist.      Pharynx: Oropharynx is clear.   Eyes:      General: No scleral icterus.  Neck:      Vascular: No JVD.   Cardiovascular:      Rate and Rhythm: Normal rate and regular rhythm.      Heart sounds: Normal heart sounds.   Pulmonary:      Effort: Pulmonary effort is normal.      Breath sounds: No wheezing or rales.   Abdominal:      General: Bowel sounds are normal.      Palpations: Abdomen is soft.   Musculoskeletal:      Cervical back: No rigidity.      Right lower leg: No edema.      Left lower leg: No edema.   Skin:     General: Skin is warm and dry.   Neurological:      Mental Status: She is alert and oriented to person, place, and time.   Psychiatric:         Mood and Affect: Mood normal.         Behavior: Behavior normal.       Fluids    Intake/Output Summary (Last 24 hours) at 4/8/2023 0842  Last data filed at 4/7/2023 2100  Gross per 24 hour   Intake 340 ml   Output --   Net 340 ml         Laboratory  Recent Labs     04/07/23  0609   WBC 9.1    RBC 3.72*   HEMOGLOBIN 11.9*   HEMATOCRIT 35.1*   MCV 94.4   MCH 32.0   MCHC 33.9   RDW 43.0   PLATELETCT 398   MPV 8.8*       Recent Labs     04/07/23  0609   SODIUM 134*   POTASSIUM 4.2   CHLORIDE 98   CO2 23   GLUCOSE 96   BUN 13   CREATININE 0.77   CALCIUM 8.7                   Assessment/Plan  * Multifocal pneumonia- (present on admission)  Assessment & Plan  CTA chest (3/29): multifocal pneumonia, no PE  WBC's have normalized  S/P Unasyn    COVID  Assessment & Plan  Dx with Covid on 4/7  Has had a dry cough for a few days  Has a low grade temp of 99  Leukocytosis resolved (4/7)  O2 sats low normal on RA (off O2 supplementation on 4/7)  CXR: unremarkable  Supportive care    Anemia  Assessment & Plan  H&H improved recently with Hb 11.9  Fe: 28, sats 21%  On Fe supplements    Transaminitis  Assessment & Plan  LFT's cont to normalize (4/7)  ? 2nd to recent abx  Monitor for now    Acute bacterial conjunctivitis of right eye- (present on admission)  Assessment & Plan  On Cipro eye drops (thru 4/5)    Pulmonary edema- (present on admission)  Assessment & Plan  BNP: 5293 --> 2712 (4/4)  Echo: showed an EF of 75%  CXR: showed small B/L effusion, mild pulm edema  On Lasix  On KCL: 10 meq daily --> 20 meq daily  S/P extra KCL 40 meq x 1 (4/4)  Encouraging IS   Monitor K+: 3.5 (4/4) --> 4.2 (4/7)    Major depressive disorder- (present on admission)  Assessment & Plan  On Zoloft    Chronic hyponatremia- (present on admission)  Assessment & Plan  Na: 133 --> 134 --> 134 (4/7)  Has had since 2021  2nd to diminished appetite  2nd to Cozaar, Zoloft  Cont salt tabs  Cont to monitor    Essential hypertension- (present on admission)  Assessment & Plan  BP trending better recently  Cont Cozaar -- dose increased 4/6  Cont to monitor

## 2023-04-09 ENCOUNTER — APPOINTMENT (OUTPATIENT)
Dept: OCCUPATIONAL THERAPY | Facility: REHABILITATION | Age: 88
DRG: 193 | End: 2023-04-09
Attending: PHYSICAL MEDICINE & REHABILITATION
Payer: MEDICARE

## 2023-04-09 LAB
BACTERIA SPEC RESP CULT: NORMAL
GRAM STN SPEC: NORMAL
SIGNIFICANT IND 70042: NORMAL
SITE SITE: NORMAL
SOURCE SOURCE: NORMAL

## 2023-04-09 PROCEDURE — A9270 NON-COVERED ITEM OR SERVICE: HCPCS | Performed by: HOSPITALIST

## 2023-04-09 PROCEDURE — 97535 SELF CARE MNGMENT TRAINING: CPT

## 2023-04-09 PROCEDURE — 700102 HCHG RX REV CODE 250 W/ 637 OVERRIDE(OP): Performed by: HOSPITALIST

## 2023-04-09 PROCEDURE — 770010 HCHG ROOM/CARE - REHAB SEMI PRIVAT*

## 2023-04-09 PROCEDURE — 700111 HCHG RX REV CODE 636 W/ 250 OVERRIDE (IP): Performed by: PHYSICAL MEDICINE & REHABILITATION

## 2023-04-09 PROCEDURE — 99231 SBSQ HOSP IP/OBS SF/LOW 25: CPT | Performed by: HOSPITALIST

## 2023-04-09 PROCEDURE — A9270 NON-COVERED ITEM OR SERVICE: HCPCS | Performed by: PHYSICAL MEDICINE & REHABILITATION

## 2023-04-09 PROCEDURE — 700102 HCHG RX REV CODE 250 W/ 637 OVERRIDE(OP): Performed by: PHYSICAL MEDICINE & REHABILITATION

## 2023-04-09 PROCEDURE — 97110 THERAPEUTIC EXERCISES: CPT

## 2023-04-09 RX ADMIN — FERROUS SULFATE TAB 325 MG (65 MG ELEMENTAL FE) 325 MG: 325 (65 FE) TAB at 08:38

## 2023-04-09 RX ADMIN — OXYCODONE HYDROCHLORIDE AND ACETAMINOPHEN 500 MG: 500 TABLET ORAL at 08:38

## 2023-04-09 RX ADMIN — SODIUM CHLORIDE 1 G: 1 TABLET ORAL at 17:55

## 2023-04-09 RX ADMIN — LOSARTAN POTASSIUM 75 MG: 25 TABLET, FILM COATED ORAL at 06:17

## 2023-04-09 RX ADMIN — GUAIFENESIN AND DEXTROMETHORPHAN 10 ML: 100; 10 SYRUP ORAL at 06:17

## 2023-04-09 RX ADMIN — FUROSEMIDE 20 MG: 20 TABLET ORAL at 06:18

## 2023-04-09 RX ADMIN — SODIUM CHLORIDE 1 G: 1 TABLET ORAL at 11:07

## 2023-04-09 RX ADMIN — GUAIFENESIN AND DEXTROMETHORPHAN 10 ML: 100; 10 SYRUP ORAL at 17:55

## 2023-04-09 RX ADMIN — ENOXAPARIN SODIUM 40 MG: 100 INJECTION SUBCUTANEOUS at 17:57

## 2023-04-09 RX ADMIN — TRAZODONE HYDROCHLORIDE 50 MG: 50 TABLET ORAL at 22:39

## 2023-04-09 RX ADMIN — SENNOSIDES AND DOCUSATE SODIUM 2 TABLET: 50; 8.6 TABLET ORAL at 08:38

## 2023-04-09 RX ADMIN — GUAIFENESIN AND DEXTROMETHORPHAN 10 ML: 100; 10 SYRUP ORAL at 11:07

## 2023-04-09 RX ADMIN — SERTRALINE HYDROCHLORIDE 25 MG: 50 TABLET, FILM COATED ORAL at 08:38

## 2023-04-09 RX ADMIN — FERROUS SULFATE TAB 325 MG (65 MG ELEMENTAL FE) 325 MG: 325 (65 FE) TAB at 17:55

## 2023-04-09 RX ADMIN — POTASSIUM CHLORIDE 20 MEQ: 1500 TABLET, EXTENDED RELEASE ORAL at 08:39

## 2023-04-09 RX ADMIN — GUAIFENESIN AND DEXTROMETHORPHAN 10 ML: 100; 10 SYRUP ORAL at 22:38

## 2023-04-09 RX ADMIN — SODIUM CHLORIDE 1 G: 1 TABLET ORAL at 08:38

## 2023-04-09 ASSESSMENT — ENCOUNTER SYMPTOMS
FEVER: 0
DIZZINESS: 0
BLURRED VISION: 0
COUGH: 1
DIARRHEA: 0
NERVOUS/ANXIOUS: 0

## 2023-04-09 ASSESSMENT — ACTIVITIES OF DAILY LIVING (ADL)
TUB_SHOWER_TRANSFER_DESCRIPTION: GRAB BAR;SHOWER BENCH;VERBAL CUEING;SUPERVISION FOR SAFETY
TOILETING_LEVEL_OF_ASSIST_DESCRIPTION: INCREASED TIME;GRAB BAR;SUPERVISION FOR SAFETY
TOILET_TRANSFER_DESCRIPTION: GRAB BAR;INCREASED TIME

## 2023-04-09 ASSESSMENT — PAIN DESCRIPTION - PAIN TYPE
TYPE: ACUTE PAIN
TYPE: ACUTE PAIN

## 2023-04-09 NOTE — CARE PLAN
"The patient is Watcher - Medium risk of patient condition declining or worsening    Shift Goals  Clinical Goals: safety  Patient Goals: sleep      Problem: Fall Risk - Rehab  Goal: Patient will remain free from falls  Outcome: Progressing     Benita Vaughn Fall risk Assessment Score: 18    High fall risk Interventions   - Alarming seatbelt  - Wander guard  - Bed and strip alarm   - Yellow sign by the door   - Yellow wrist band \"Fall risk\"  - Room near to the nurse station  - Do not leave patient unattended in the bathroom  - Fall risk education provided        Problem: Risk for Aspiration  Goal: Patient's risk for aspiration will be absent or decrease  Outcome: Progressing    Patient able to take whole medications 1 at a time without choking.     "

## 2023-04-09 NOTE — PROGRESS NOTES
Hospital Medicine Daily Progress Note      Chief Complaint  Hypertension  Hyponatremia  Pulm edema    Interval Problem Update  No complaints.  Doing ok.    Review of Systems  Review of Systems   Constitutional:  Negative for fever.   Eyes:  Negative for blurred vision.   Respiratory:  Positive for cough.    Cardiovascular:  Negative for chest pain.   Gastrointestinal:  Negative for diarrhea.   Musculoskeletal:  Negative for joint pain.   Neurological:  Negative for dizziness.   Psychiatric/Behavioral:  The patient is not nervous/anxious.       Physical Exam  Temp:  [36.6 °C (97.9 °F)-36.7 °C (98 °F)] 36.7 °C (98 °F)  Pulse:  [70-77] 70  Resp:  [16-18] 16  BP: (117-153)/(50-64) 126/50  SpO2:  [92 %-94 %] 92 %    Physical Exam  Vitals and nursing note reviewed.   Constitutional:       Appearance: She is not diaphoretic.   HENT:      Mouth/Throat:      Pharynx: No oropharyngeal exudate or posterior oropharyngeal erythema.   Eyes:      Extraocular Movements: Extraocular movements intact.   Neck:      Vascular: No carotid bruit or JVD.   Cardiovascular:      Rate and Rhythm: Normal rate and regular rhythm.      Heart sounds: Normal heart sounds.   Pulmonary:      Effort: Pulmonary effort is normal.      Breath sounds: No wheezing or rales.   Abdominal:      General: There is no distension.      Palpations: Abdomen is soft.      Tenderness: There is no abdominal tenderness.   Musculoskeletal:      Right lower leg: No edema.      Left lower leg: No edema.   Skin:     General: Skin is warm and dry.   Neurological:      Mental Status: She is alert and oriented to person, place, and time.   Psychiatric:         Mood and Affect: Mood normal.         Behavior: Behavior normal.       Fluids    Intake/Output Summary (Last 24 hours) at 4/9/2023 1007  Last data filed at 4/8/2023 2000  Gross per 24 hour   Intake 480 ml   Output --   Net 480 ml         Laboratory  Recent Labs     04/07/23  0609   WBC 9.1   RBC 3.72*   HEMOGLOBIN  11.9*   HEMATOCRIT 35.1*   MCV 94.4   MCH 32.0   MCHC 33.9   RDW 43.0   PLATELETCT 398   MPV 8.8*       Recent Labs     04/07/23  0609   SODIUM 134*   POTASSIUM 4.2   CHLORIDE 98   CO2 23   GLUCOSE 96   BUN 13   CREATININE 0.77   CALCIUM 8.7                   Assessment/Plan  * Multifocal pneumonia- (present on admission)  Assessment & Plan  CTA chest (3/29): multifocal pneumonia, no PE  WBC's have normalized  S/P Unasyn    COVID  Assessment & Plan  Dx with Covid on 4/7  Has a dry cough   Had a low grade temp of 99 -- recently afebrile  Leukocytosis resolved (4/7)  O2 sats low normal on RA (off O2 supplementation on 4/7)  CXR: unremarkable  Supportive care    Anemia  Assessment & Plan  H&H improved recently with Hb 11.9  Fe: 28, sats 21%  On Fe supplements    Transaminitis  Assessment & Plan  LFT's cont to normalize (4/7)  ? 2nd to recent abx  Monitor for now    Acute bacterial conjunctivitis of right eye- (present on admission)  Assessment & Plan  On Cipro eye drops (thru 4/5)    Pulmonary edema- (present on admission)  Assessment & Plan  BNP: 5293 --> 2712 (4/4)  Echo: showed an EF of 75%  CXR: showed small B/L effusion, mild pulm edema  On Lasix  On KCL: 20 meq daily  Encouraging IS   Monitor K+: 4.2 (4/7)    Major depressive disorder- (present on admission)  Assessment & Plan  On Zoloft    Chronic hyponatremia- (present on admission)  Assessment & Plan  Na: 133 --> 134 --> 134 (4/7)  Has had since 2021  2nd to diminished appetite  2nd to Cozaar, Zoloft  Cont salt tabs  Cont to monitor    Essential hypertension- (present on admission)  Assessment & Plan  BP ok recently  Cont Cozaar   Cont to monitor

## 2023-04-09 NOTE — THERAPY
"Occupational Therapy  Daily Treatment     Patient Name: Chrissie Dueñas  Age:  89 y.o., Sex:  female  Medical Record #: [id number]  Today's Date: 4/9/2023     Precautions  Precautions: Fall Risk  Comments: Conjunctivitis, 2 L O2         Subjective    Patient seated in wc stating she felt \"as good as to be expected\"     Objective       04/09/23 1001   OT Charge Group   Charges Yes   OT Self Care / ADL (Units) 3   OT Therapeutic Exercise (Units) 1   OT Total Time Spent   OT Individual Total Time Spent (Mins) 60   Precautions   Precautions Fall Risk   Pain   Intervention Declines   Functional Level of Assist   Grooming Modified Independent   Grooming Description Seated in wheelchair at sink   Bathing Minimal Assist   Bathing Description Grab bar;Hand held shower;Assit with perineal   Upper Body Dressing Supervision   Upper Body Dressing Description Set-up of equipment   Lower Body Dressing Minimal Assist   Lower Body Dressing Description Assist with threading into pant leg   Toileting Standby Assist   Toileting Description Increased time;Grab bar;Supervision for safety   Toilet Transfers Standby Assist   Toilet Transfer Description Grab bar;Increased time   Tub / Shower Transfers Standby Assist   Tub Shower Transfer Description Grab bar;Shower bench;Verbal cueing;Supervision for safety   Sitting Upper Body Exercises   Chest Press 1 set of 10;Bilateral   Front Arm Raise 1 set of 10;Bilateral   Bicep Curls 1 set of 10;Bilateral   Tricep Press 1 set of 10;Bilateral   Strengths & Barriers   Strengths Able to follow instructions;Alert and oriented;Effective communication skills;Motivated for self care and independence;Pleasant and cooperative;Willingly participates in therapeutic activities   Barriers Decreased endurance;Generalized weakness;Home accessibility;Impaired activity tolerance;Impaired balance;Limited mobility;Pain         Assessment    Patient tolerated session fair. She did not want to wash her hair, but was able " to complete bathing with verbal cues for thoroughness. She tolerated exercises without s/s pain or SOB.   Strengths: Able to follow instructions, Alert and oriented, Effective communication skills, Motivated for self care and independence, Pleasant and cooperative, Willingly participates in therapeutic activities  Barriers: Decreased endurance, Generalized weakness, Home accessibility, Impaired activity tolerance, Impaired balance, Limited mobility, Pain    Plan    ADLs, wean O2, general endurance/strengthening, transfers, functional mob       Passport items to be completed:  Perform bathroom transfers, complete dressing, complete feeding, get ready for the day, prepare a simple meal, participate in household tasks, adapt home for safety needs, demonstrate home exercise program, complete caregiver training     Occupational Therapy Goals (Active)       Problem: Bathing       Dates: Start: 04/01/23         Goal: STG-Within one week, patient will bathe w/ CGA.       Dates: Start: 04/01/23               Problem: Dressing       Dates: Start: 04/01/23         Goal: STG-Within one week, patient will dress LB w/ CGA.        Dates: Start: 04/01/23         Goal Note filed on 04/05/23 1203 by Amparo Adkins, OT       SBA for pants, assist for footwear                  Problem: IADL's       Dates: Start: 04/01/23         Goal: STG-Within one week, patient will access kitchen area w/ min A and LRD.        Dates: Start: 04/01/23               Problem: OT Long Term Goals       Dates: Start: 04/01/23         Goal: LTG-By discharge, patient will complete basic self care tasks w/ mod I- supervision.        Dates: Start: 04/01/23            Goal: LTG-By discharge, patient will perform bathroom transfers w/ mos I- supervision.        Dates: Start: 04/01/23            Goal: LTG-By discharge, patient will complete basic home management w/ mod I- min A.        Dates: Start: 04/01/23

## 2023-04-09 NOTE — CARE PLAN
"The patient is Stable - Low risk of patient condition declining or worsening      Problem: Fall Risk - Rehab  Goal: Patient will remain free from falls  Outcome: Progressing     Benita Vaughn Fall risk Assessment Score: 18    High fall risk Interventions   - Alarming seatbelt  - Bed and strip alarm   - Yellow sign by the door   - Yellow wrist band \"Fall risk\"  - Room near to the nurse station  - Do not leave patient unattended in the bathroom  - Fall risk education provided        Problem: Risk for Aspiration  Goal: Patient's risk for aspiration will be absent or decrease  Outcome: Progressing   Note: Patient was able to take whole pills one at a time with thin liquids.   "

## 2023-04-10 ENCOUNTER — APPOINTMENT (OUTPATIENT)
Dept: PHYSICAL THERAPY | Facility: REHABILITATION | Age: 88
DRG: 193 | End: 2023-04-10
Attending: PHYSICAL MEDICINE & REHABILITATION
Payer: MEDICARE

## 2023-04-10 ENCOUNTER — APPOINTMENT (OUTPATIENT)
Dept: OCCUPATIONAL THERAPY | Facility: REHABILITATION | Age: 88
DRG: 193 | End: 2023-04-10
Attending: PHYSICAL MEDICINE & REHABILITATION
Payer: MEDICARE

## 2023-04-10 PROCEDURE — 97530 THERAPEUTIC ACTIVITIES: CPT

## 2023-04-10 PROCEDURE — A9270 NON-COVERED ITEM OR SERVICE: HCPCS | Performed by: PHYSICAL MEDICINE & REHABILITATION

## 2023-04-10 PROCEDURE — A9270 NON-COVERED ITEM OR SERVICE: HCPCS | Performed by: HOSPITALIST

## 2023-04-10 PROCEDURE — 700111 HCHG RX REV CODE 636 W/ 250 OVERRIDE (IP): Performed by: PHYSICAL MEDICINE & REHABILITATION

## 2023-04-10 PROCEDURE — 97535 SELF CARE MNGMENT TRAINING: CPT

## 2023-04-10 PROCEDURE — 97110 THERAPEUTIC EXERCISES: CPT

## 2023-04-10 PROCEDURE — 97116 GAIT TRAINING THERAPY: CPT | Mod: CQ

## 2023-04-10 PROCEDURE — 99232 SBSQ HOSP IP/OBS MODERATE 35: CPT | Performed by: PHYSICAL MEDICINE & REHABILITATION

## 2023-04-10 PROCEDURE — 8E0ZXY6 ISOLATION: ICD-10-PCS | Performed by: PHYSICAL MEDICINE & REHABILITATION

## 2023-04-10 PROCEDURE — 700102 HCHG RX REV CODE 250 W/ 637 OVERRIDE(OP): Performed by: HOSPITALIST

## 2023-04-10 PROCEDURE — 700102 HCHG RX REV CODE 250 W/ 637 OVERRIDE(OP): Performed by: PHYSICAL MEDICINE & REHABILITATION

## 2023-04-10 PROCEDURE — 99231 SBSQ HOSP IP/OBS SF/LOW 25: CPT | Performed by: HOSPITALIST

## 2023-04-10 PROCEDURE — 770010 HCHG ROOM/CARE - REHAB SEMI PRIVAT*

## 2023-04-10 RX ORDER — ASCORBIC ACID 500 MG
500 TABLET ORAL 2 TIMES DAILY WITH MEALS
Status: DISCONTINUED | OUTPATIENT
Start: 2023-04-10 | End: 2023-04-12 | Stop reason: HOSPADM

## 2023-04-10 RX ADMIN — SODIUM CHLORIDE 1 G: 1 TABLET ORAL at 17:29

## 2023-04-10 RX ADMIN — POTASSIUM CHLORIDE 20 MEQ: 1500 TABLET, EXTENDED RELEASE ORAL at 08:44

## 2023-04-10 RX ADMIN — ENOXAPARIN SODIUM 40 MG: 100 INJECTION SUBCUTANEOUS at 17:29

## 2023-04-10 RX ADMIN — LOSARTAN POTASSIUM 75 MG: 25 TABLET, FILM COATED ORAL at 06:04

## 2023-04-10 RX ADMIN — GUAIFENESIN AND DEXTROMETHORPHAN 10 ML: 100; 10 SYRUP ORAL at 11:11

## 2023-04-10 RX ADMIN — SODIUM CHLORIDE 1 G: 1 TABLET ORAL at 08:43

## 2023-04-10 RX ADMIN — FERROUS SULFATE TAB 325 MG (65 MG ELEMENTAL FE) 325 MG: 325 (65 FE) TAB at 08:44

## 2023-04-10 RX ADMIN — SERTRALINE HYDROCHLORIDE 25 MG: 50 TABLET, FILM COATED ORAL at 08:43

## 2023-04-10 RX ADMIN — GUAIFENESIN AND DEXTROMETHORPHAN 10 ML: 100; 10 SYRUP ORAL at 17:29

## 2023-04-10 RX ADMIN — SODIUM CHLORIDE 1 G: 1 TABLET ORAL at 11:11

## 2023-04-10 RX ADMIN — GUAIFENESIN AND DEXTROMETHORPHAN 10 ML: 100; 10 SYRUP ORAL at 06:03

## 2023-04-10 RX ADMIN — ACETAMINOPHEN 650 MG: 325 TABLET ORAL at 21:31

## 2023-04-10 RX ADMIN — SENNOSIDES AND DOCUSATE SODIUM 2 TABLET: 50; 8.6 TABLET ORAL at 08:44

## 2023-04-10 RX ADMIN — TRAZODONE HYDROCHLORIDE 50 MG: 50 TABLET ORAL at 21:31

## 2023-04-10 RX ADMIN — Medication 3 MG: at 21:30

## 2023-04-10 RX ADMIN — FUROSEMIDE 20 MG: 20 TABLET ORAL at 06:04

## 2023-04-10 RX ADMIN — OXYCODONE HYDROCHLORIDE AND ACETAMINOPHEN 500 MG: 500 TABLET ORAL at 08:44

## 2023-04-10 RX ADMIN — FERROUS SULFATE TAB 325 MG (65 MG ELEMENTAL FE) 325 MG: 325 (65 FE) TAB at 17:29

## 2023-04-10 RX ADMIN — OXYCODONE HYDROCHLORIDE AND ACETAMINOPHEN 500 MG: 500 TABLET ORAL at 17:29

## 2023-04-10 ASSESSMENT — GAIT ASSESSMENTS
DISTANCE (FEET): 150
GAIT LEVEL OF ASSIST: SUPERVISED
DEVIATION: BRADYKINETIC;DECREASED HEEL STRIKE;DECREASED TOE OFF
DEVIATION: BRADYKINETIC;DECREASED HEEL STRIKE;DECREASED TOE OFF
ASSISTIVE DEVICE: 4 WHEEL WALKER
DISTANCE (FEET): 100
GAIT LEVEL OF ASSIST: SUPERVISED
ASSISTIVE DEVICE: 4 WHEEL WALKER

## 2023-04-10 ASSESSMENT — ENCOUNTER SYMPTOMS
VOMITING: 0
NAUSEA: 0
FEVER: 0
COUGH: 1
CHILLS: 0
SHORTNESS OF BREATH: 0
NERVOUS/ANXIOUS: 0
DIARRHEA: 0
ABDOMINAL PAIN: 0

## 2023-04-10 ASSESSMENT — PAIN DESCRIPTION - PAIN TYPE: TYPE: ACUTE PAIN

## 2023-04-10 ASSESSMENT — ACTIVITIES OF DAILY LIVING (ADL)
TOILET_TRANSFER_DESCRIPTION: ADAPTIVE EQUIPMENT;INCREASED TIME;SUPERVISION FOR SAFETY
BED_CHAIR_WHEELCHAIR_TRANSFER_DESCRIPTION: ADAPTIVE EQUIPMENT;INCREASED TIME;SUPERVISION FOR SAFETY;VERBAL CUEING
TOILET_TRANSFER_DESCRIPTION: GRAB BAR;INCREASED TIME;SUPERVISION FOR SAFETY

## 2023-04-10 NOTE — PROGRESS NOTES
Rehab Progress Note     Date of Service: 4/10/2023  Chief Complaint: Follow-up debility    Interval Events (Subjective)    Patient seen and examined today in her room in the respiratory waiting.  She tested positive for COVID on Friday.  She reports her cough has improved.  She has had no fevers.  She is not requiring oxygen.  She is wondering if she can still be discharged home with her daughter on Wednesday.  She last moved her bowels yesterday.     Patient has no other new questions, concerns, or complaints today.             Objective:  VITAL SIGNS: /54   Pulse (!) 56   Temp 36.6 °C (97.9 °F) (Oral)   Resp 18   Ht 1.524 m (5')   Wt 62.5 kg (137 lb 12.6 oz)   SpO2 96%   BMI 26.91 kg/m²   Gen: alert, no apparent distress  CV: Regular rate, regular rhythm  Resp: Clear to auscultation bilaterally, breathing comfortably on room air, mild intermittent dry cough        Recent Results (from the past 72 hour(s))   COV-2, FLU A/B, AND RSV BY PCR (2-4 HOURS CEPHEID): Collect NP swab in VTM    Collection Time: 04/07/23 11:15 AM    Specimen: Respirate   Result Value Ref Range    Influenza virus A RNA Negative Negative    Influenza virus B, PCR Negative Negative    RSV, PCR Negative Negative    SARS-CoV-2 by PCR DETECTED (AA)     SARS-CoV-2 Source NP Swab    CULTURE RESPIRATORY W/ GRM STN    Collection Time: 04/07/23  4:50 PM    Specimen: Sputum; Respirate   Result Value Ref Range    Significant Indicator NEG     Source RESP     Site SPUTUM     Culture Result       Light growth usual upper respiratory valente  No clinically significant Staphylococcus aureus, Methicillin  Resistant Staphylococcus aureus, or Pseudomonas species  isolated.      Gram Stain Result       Specimen Quality Score: 1+  Moderate epithelial cells.  Many WBCs.  Moderate Gram positive cocci.     GRAM STAIN    Collection Time: 04/07/23  4:50 PM    Specimen: Respirate   Result Value Ref Range    Significant Indicator .     Source RESP     Site  SPUTUM     Gram Stain Result       Specimen Quality Score: 1+  Moderate epithelial cells.  Many WBCs.  Moderate Gram positive cocci.         Scheduled Medications   Medication Dose Frequency    guaiFENesin dextromethorphan  10 mL Q6HRS    losartan  75 mg Q DAY    traZODone  50 mg QHS    ascorbic acid  500 mg QDAY with Breakfast    ferrous sulfate  325 mg BID WITH MEALS    potassium chloride SA  20 mEq DAILY    enoxaparin (LOVENOX) injection  40 mg DAILY AT 1800    furosemide  20 mg Q DAY    senna-docusate  2 Tablet BID    sertraline  25 mg DAILY    sodium chloride  1 g TID WITH MEALS       Current Diet Order   Procedures    Diet Order Diet: Regular       Radiology    Imaging personally reviewed and interpreted by me.    DX-CHEST-LIMITED (1 VIEW)   Final Result      No acute cardiopulmonary disease.      DX-CHEST-PORTABLE (1 VIEW)   Final Result      1.  Small bilateral pleural effusions.   2.  Right basilar atelectasis.   3.  Possible mild hazy/interstitial opacities could represent mild vascular congestion and/or infection.          Assessment:    This patient is a 89 y.o. female admitted for acute inpatient rehabilitation   with Debility secondary to hospitalization for most of the lobar pneumonia.    I led and attended the weekly conference, and agree with the IDT conference documentation and plan of care as noted below.    Date of conference: 4/5/2023    Goals and barriers: See IDT note.    Biggest barriers: respiratory insufficiency, impaired balance, poor endurance    CM/social support: to live with daughter until her room is ready at [facility] May 1st    Anticipated DC date: 4/12    Home health: PT/OT/RN    Equip: none needed     Follow up: PCP      Problem List/Medical Decision Making and Plan:    Debility, improved  Generalized weakness, improved  PT/OT, 1.5 hr each discipline, 5 days per week  No cognitive impairment    Multilobar pneumonia  Completed IV antibiotics    +COVID   Symptoms started 4/13, tested  +4/14  In respiratory wing/isolation  Still okay to discharge on Wednesday as she will be outside for 5-day recommended infectious window per CDC    Cough, see above  -Continued scheduled Robitussin and as needed cough drops     Leukocytosis, resolved  Completed IV antibiotics for pneumonia    Transaminitis, continues to improve  No abdominal complaints  Thought secondary to recent antibiotics  Continue to monitor until resolution    Normocytic anemia  Iron deficiency  -Continue ferrous sulfate and vitamin C    Respiratory failure with hypoxia  Elevated BNP, improved  -Continue ferrous sulfate and potassium  Titrated off oxygen     Bacterial conjunctivitis, right side, resolved  Completed Cipro     History of depression  -Continue sertraline     Hyponatremia, improved/continues  -Continue salt tabs     Chronic kidney disease, history of, unknown stage  Avoid nephrotoxins  Renally dose medications   Labs currently with normal function     Hypertension  -Continue losartan and furosemide    Insomnia,  resolved  -Continue trazodone    Epistaxis, resolved    DVT prophylaxis  -Continue Lovenox      Sailaja Mallory M.D.  Physical Medicine and Rehabilitation

## 2023-04-10 NOTE — THERAPY
Physical Therapy   Daily Treatment     Patient Name: Chrissie Dueñas  Age:  89 y.o., Sex:  female  Medical Record #: [id number]  Today's Date: 4/10/2023     Precautions  Precautions: Fall Risk  Comments: Conjunctivitis, 2 L O2    Subjective    Patient agreeable to session. Says she is quite discouraged at being COVID positive, and Is unsure of her discharge plan.      Objective       04/10/23 1031   PT Charge Group   PT Therapeutic Exercise (Units) 2   PT Therapeutic Activities (Units) 2   PT Total Time Spent   PT Individual Total Time Spent (Mins) 60   Vitals   Pulse 84   Pulse Oximetry 95 %   O2 (LPM) 0   O2 Delivery Device None - Room Air   Vitals Comments with activity, reports generalized fatigue, no SOB present   Gait Functional Level of Assist    Gait Level Of Assist Supervised   Assistive Device 4 Wheel Walker   Distance (Feet) 100   # of Times Distance was Traveled 2   Deviation Bradykinetic;Decreased Heel Strike;Decreased Toe Off   Transfer Functional Level of Assist   Bed, Chair, Wheelchair Transfer Supervised   Bed Chair Wheelchair Transfer Description Adaptive equipment;Increased time;Supervision for safety;Verbal cueing   Toilet Transfers Supervised   Toilet Transfer Description Adaptive equipment;Increased time;Supervision for safety   Standing Lower Body Exercises   Hamstring Curl 2 sets of 10;Bilateral    Hip Flexion 2 sets of 10;Bilateral   Hip Abduction 2 sets of 10;Bilateral   Marching 2 sets of 10   Heel Rise 2 sets of 10   Comments at 4ww   Bed Mobility    Supine to Sit Independent   Sit to Supine Modified Independent   Sit to Stand Supervised   Rolling Independent   Interdisciplinary Plan of Care Collaboration   IDT Collaboration with  Nursing;Physical Therapist Assistant (PTA)   Patient Position at End of Therapy Seated;Chair Alarm On;Call Light within Reach;Tray Table within Reach;Phone within Reach   Collaboration Comments RN in room for med pass, request for DORIS Carroll w/ PTA     Donned JOSÉ MIGUEL  stockings and edu on benefit for edema control.     Discussed d/c planning, with new COVID +, recommending continued d/c plan if dtr can provide 24/7 assist for her at home, along with 4ww mobility.     Discussed progress the past week and encouraged her about her CLOF, with COVID only being a temporary barrier.     Min cues for 4ww brakes today.     Assessment    Patient needing increased rest breaks today overall, is quite discouraged at being COVID positive. However she is still moving at a SPV level and demonstrating functional ability to discharge at current state w/ family support. Understanding of recommendations for 4ww use.     Strengths: Able to follow instructions, Effective communication skills, Independent prior level of function, Making steady progress towards goals, Motivated for self care and independence, Pleasant and cooperative, Willingly participates in therapeutic activities  Barriers: Decreased endurance, Generalized weakness, Impaired activity tolerance    Plan  *pending d/c w/ COVID positive    Continue 4ww functional mobility w/ brake review  General CV strength/endurance  Therapeutic ex  Step ups  Dyn balance     Passport items to be completed:   in/out of a vehicle,navigate up and down stairs, show how to get up/down from the ground,  demonstrate HEP, complete caregiver training    Physical Therapy Problems (Active)       Problem: Balance       Dates: Start: 04/01/23         Goal: STG-Within one week, patient will improve LOBO score by at least 5 points to demonstrate decreased risk for falls.        Dates: Start: 04/01/23               Problem: Mobility       Dates: Start: 04/01/23         Goal: STG-Within one week, patient will ambulate 150' with FWW and supervision       Dates: Start: 04/01/23               Problem: Mobility Transfers       Dates: Start: 04/01/23         Goal: STG-Within one week, patient will perform 4 stairs with B handrails and supervision       Dates: Start:  04/01/23               Problem: PT-Long Term Goals       Dates: Start: 04/01/23         Goal: LTG-By discharge, patient will ambulate 150' with FWW independently.        Dates: Start: 04/01/23            Goal: LTG-By discharge, patient will transfer in/out of a car with FWW independently.        Dates: Start: 04/01/23            Goal: LTG-By discharge, patient will improve LOBO score by at least 10 points to demonstrate decreased risk for falls.        Dates: Start: 04/01/23            Goal: LTG-By discharge, patient will perform 12 stairs with B handrails independently.        Dates: Start: 04/01/23

## 2023-04-10 NOTE — PROGRESS NOTES
Hospital Medicine Daily Progress Note      Chief Complaint  Hypertension  Hyponatremia  Pulm edema    Interval Problem Update  Pt states her cough is better recently.    Review of Systems  Review of Systems   Constitutional:  Negative for chills and fever.   Respiratory:  Positive for cough. Negative for shortness of breath.    Cardiovascular:  Negative for chest pain.   Gastrointestinal:  Negative for abdominal pain, diarrhea, nausea and vomiting.   Psychiatric/Behavioral:  The patient is not nervous/anxious.       Physical Exam  Temp:  [36.6 °C (97.8 °F)-37.2 °C (98.9 °F)] 36.6 °C (97.9 °F)  Pulse:  [56-97] 56  Resp:  [18] 18  BP: ()/(53-58) 118/54  SpO2:  [92 %-96 %] 96 %    Physical Exam  Vitals and nursing note reviewed.   Constitutional:       Appearance: Normal appearance.   HENT:      Head: Atraumatic.   Eyes:      Conjunctiva/sclera: Conjunctivae normal.      Pupils: Pupils are equal, round, and reactive to light.   Cardiovascular:      Rate and Rhythm: Normal rate and regular rhythm.      Heart sounds: No murmur heard.  Pulmonary:      Effort: Pulmonary effort is normal.      Breath sounds: No stridor. No wheezing or rales.   Abdominal:      General: There is no distension.      Palpations: Abdomen is soft.      Tenderness: There is no abdominal tenderness.   Musculoskeletal:      Cervical back: Normal range of motion and neck supple.      Right lower leg: No edema.      Left lower leg: No edema.   Skin:     General: Skin is warm and dry.      Findings: No rash.   Neurological:      Mental Status: She is alert and oriented to person, place, and time.   Psychiatric:         Mood and Affect: Mood normal.         Behavior: Behavior normal.       Fluids    Intake/Output Summary (Last 24 hours) at 4/10/2023 1054  Last data filed at 4/10/2023 0715  Gross per 24 hour   Intake 600 ml   Output --   Net 600 ml         Laboratory                          Assessment/Plan  * Multifocal pneumonia- (present on  admission)  Assessment & Plan  CTA chest (3/29): multifocal pneumonia, no PE  WBC's have normalized  S/P Unasyn    COVID  Assessment & Plan  Dx with Covid on 4/7  Has a dry cough   Had a low grade temp of 99 -- recently afebrile  Leukocytosis resolved (4/7)  O2 sats low normal on RA (off O2 supplementation on 4/7)  CXR: unremarkable  Supportive care    Anemia  Assessment & Plan  H&H improved recently with Hb 11.9  Fe: 28, sats 21%  On Fe supplements    Transaminitis  Assessment & Plan  LFT's cont to normalize (4/7)  ? 2nd to recent abx  Monitor for now    Acute bacterial conjunctivitis of right eye- (present on admission)  Assessment & Plan  On Cipro eye drops (thru 4/5)    Pulmonary edema- (present on admission)  Assessment & Plan  BNP: 5293 --> 2712 (4/4)  Echo: showed an EF of 75%  CXR: showed small B/L effusion, mild pulm edema  Cont Lasix  Cont KCL: 20 meq daily  Encouraging IS   Monitor K+: 4.2 (4/7)    Major depressive disorder- (present on admission)  Assessment & Plan  On Zoloft    Chronic hyponatremia- (present on admission)  Assessment & Plan  Na: 133 --> 134 --> 134 (4/7)  Has had since 2021  2nd to diminished appetite  2nd to Cozaar, Zoloft  Cont salt tabs  Cont to monitor    Essential hypertension- (present on admission)  Assessment & Plan  BP ok recently  Cont Cozaar   Cont to monitor

## 2023-04-10 NOTE — THERAPY
Physical Therapy   Daily Treatment     Patient Name: Chrissie Dueñas  Age:  89 y.o., Sex:  female  Medical Record #: [id number]  Today's Date: 4/10/2023     Precautions  Precautions: Fall Risk  Comments: Conjunctivitis, 2 L O2    Subjective    Pt unable to recall if the welling in her ankles is new or she had it last week     Objective       04/10/23 0831   PT Charge Group   PT Gait Training (Units) 1   PT Therapeutic Exercise (Units) 2   PT Therapeutic Activities (Units) 1   Supervising Physical Therapist Juan Pablo Madison   PT Total Time Spent   PT Individual Total Time Spent (Mins) 60   Vitals   Pulse 97   Room Air Oximetry 95   O2 (LPM) 0   O2 Delivery Device None - Room Air   Vitals Comments with activity   Gait Functional Level of Assist    Gait Level Of Assist Supervised   Assistive Device 4 Wheel Walker   Distance (Feet) 150  (plus 20' x 2 in/out of BR 4WW SBA/SPV)   # of Times Distance was Traveled 2   Deviation Bradykinetic;Decreased Heel Strike;Decreased Toe Off   Transfer Functional Level of Assist   Toilet Transfers Supervised   Toilet Transfer Description Grab bar;Increased time;Supervision for safety  (from walker level)   Standing Lower Body Exercises   Standing Lower Body Exercises Yes   Hamstring Curl 2 sets of 10   Hip Flexion 2 sets of 10   Hip Abduction 2 sets of 10   Marching 2 sets of 10   Heel Rise 2 sets of 10   Toe Rise 2 sets of 10   Mini Squat 2 sets of 10   Comments UE suppport on hospital bed rail   Bed Mobility    Sit to Stand Supervised   Interdisciplinary Plan of Care Collaboration   IDT Collaboration with  Nursing;Physical Therapist   Patient Position at End of Therapy Seated;Chair Alarm On;Call Light within Reach;Tray Table within Reach   Collaboration Comments RN meds for patient, PT: LE edema and PT tx session/CLOF     Assisted pt with set up to lilly socks, she requires total A to lilly B shoes from seated position due to B LE swelling.     Pt requested to use the BR at beginning of tx  session    Assessment    Overall pt had good tolerance to PT session, she participated in gait training and standing LE exercises all on RA and 02 sats remain WNL.  She cont to require extra time and vc for break management on the 4WW. The patient is very motivated to cont to progress her strength.   Strengths: Able to follow instructions, Effective communication skills, Independent prior level of function, Making steady progress towards goals, Motivated for self care and independence, Pleasant and cooperative, Willingly participates in therapeutic activities  Barriers: Decreased endurance, Generalized weakness, Impaired activity tolerance    Plan    Continue 4ww functional mobility w/ brake review  General CV strength/endurance  Therapeutic ex  Dyn balance     Passport items to be completed:  Get in/out of bed safely, in/out of a vehicle, safely use mobility device, walk or wheel around home/community, navigate up and down stairs, show how to get up/down from the ground, ensure home is accessible, demonstrate HEP, complete caregiver training    Physical Therapy Problems (Active)       Problem: Balance       Dates: Start: 04/01/23         Goal: STG-Within one week, patient will improve LOBO score by at least 5 points to demonstrate decreased risk for falls.        Dates: Start: 04/01/23               Problem: Mobility       Dates: Start: 04/01/23         Goal: STG-Within one week, patient will ambulate 150' with FWW and supervision       Dates: Start: 04/01/23               Problem: Mobility Transfers       Dates: Start: 04/01/23         Goal: STG-Within one week, patient will perform 4 stairs with B handrails and supervision       Dates: Start: 04/01/23               Problem: PT-Long Term Goals       Dates: Start: 04/01/23         Goal: LTG-By discharge, patient will ambulate 150' with FWW independently.        Dates: Start: 04/01/23            Goal: LTG-By discharge, patient will transfer in/out of a car with FWW  independently.        Dates: Start: 04/01/23            Goal: LTG-By discharge, patient will improve LOBO score by at least 10 points to demonstrate decreased risk for falls.        Dates: Start: 04/01/23            Goal: LTG-By discharge, patient will perform 12 stairs with B handrails independently.        Dates: Start: 04/01/23

## 2023-04-10 NOTE — PROGRESS NOTES
Received bedside shift report from Flower LITTLE RN regarding patient and assumed care. Patient awake, calm and stable, currently positioned in bed for comfort and safety; call light within reach. Denies pain or discomfort at this time. Will continue to monitor.

## 2023-04-10 NOTE — CARE PLAN
The patient is Stable - Low risk of patient condition declining or worsening    Shift Goals  Clinical Goals: safety  Patient Goals: safety    Problem: Fall Risk - Rehab  Goal: Patient will remain free from falls  Outcome: Progressing Pt uses call light consistently and appropriately. Waits for assistance does not attempt self transfer this shift. Able to verbalize needs.      Problem: Mobility  Goal: Patient's capacity to carry out activities will improve  Outcome: Progressing patient has been up participating in therapies, educated to take rest periods in between to avoid fatigue.

## 2023-04-10 NOTE — DISCHARGE PLANNING
Daughter will be taking patient home on the 12th and staying with her until they can get her moved into [facility]. Per huddle patient will be moving into [facility] on May 1st.

## 2023-04-10 NOTE — PROGRESS NOTES
Patient care assumed. Report received from [facility] DEEPTHI Riley. Patient is alert and calm, resting in bed. Call light and bedside table within reach. Will continue to monitor.

## 2023-04-10 NOTE — CARE PLAN
"  Problem: Knowledge Deficit - Standard  Goal: Patient and family/care givers will demonstrate understanding of plan of care, disease process/condition, diagnostic tests and medications  Outcome: Progressing  Note: Pt agrees with plan of care tonight regarding medications and safety.  Will continue to monitor patient.      Problem: Fall Risk - Rehab  Goal: Patient will remain free from falls  Outcome: Progressing  Note: Benita Vaughn Fall risk Assessment Score:  18    High fall risk Interventions   - Alarming seatbelt  - Wander guard  - Bed and strip alarm   - Yellow sign by the door   - Yellow wrist band \"Fall risk\"  - Room near to the nurse station  - Do not leave patient unattended in the bathroom  - Fall risk education provided         The patient is Stable - Low risk of patient condition declining or worsening    Shift Goals  Clinical Goals: Safety  Patient Goals: Sleep well    Progress made toward(s) clinical / shift goals:  progressing          "

## 2023-04-10 NOTE — THERAPY
"Occupational Therapy  Daily Treatment     Patient Name: Chrissie Dueñas  Age:  89 y.o., Sex:  female  Medical Record #: [id number]  Today's Date: 4/10/2023     Precautions  Precautions: Fall Risk  Comments: Conjunctivitis, 2 L O2         Subjective    \"I lived in [facility] which is independent, but I think I'm going to need a little more help so I'm going to start renting at [employer] across the [address]. I want to get back to [facility].\"    \"I'm a bit nervous about her dogs.\" Re: family member has two bulldogs. Edu on fall prevention and skin safety with dogs who like to jump.     Objective       04/10/23 1431   OT Charge Group   OT Self Care / ADL (Units) 1   OT Therapy Activity (Units) 1   OT Total Time Spent   OT Individual Total Time Spent (Mins) 30   Functional Level of Assist   Grooming Supervision;Standing   Toileting Supervision  (Completed twice due to urgency)   Toilet Transfers Supervised  (4WW level; Completed twice due to urgency)   Interdisciplinary Plan of Care Collaboration   IDT Collaboration with  Physical Therapist   Patient Position at End of Therapy Seated;Self Releasing Lap Belt Applied;Call Light within Reach;Tray Table within Reach;Phone within Reach;Chair Alarm On   Collaboration Comments re: pt's fatigue today and d/c plan       Reviewed fall prevention strategies.    Signed pt on grooming on passport.    Pt with large loose stool today.    Assessment    Pt is feeling slightly nervous for d/c, but does not want to push d/c back if her family member is still willing to have her. Pt did need multiple cues this session for 4WW brakes.    Strengths: Able to follow instructions, Alert and oriented, Effective communication skills, Motivated for self care and independence, Pleasant and cooperative, Willingly participates in therapeutic activities  Barriers: Decreased endurance, Generalized weakness, Home accessibility, Impaired activity tolerance, Impaired balance, Limited mobility, Pain    Plan    ADLs, " wean O2, general endurance/strengthening, transfers, functional mob        Passport items to be completed:  Perform bathroom transfers, complete dressing, complete feeding, get ready for the day, prepare a simple meal, participate in household tasks, adapt home for safety needs, demonstrate home exercise program, complete caregiver training        Occupational Therapy Goals (Active)       Problem: Bathing       Dates: Start: 04/01/23         Goal: STG-Within one week, patient will bathe w/ CGA.       Dates: Start: 04/01/23               Problem: Dressing       Dates: Start: 04/01/23         Goal: STG-Within one week, patient will dress LB w/ CGA.        Dates: Start: 04/01/23         Goal Note filed on 04/05/23 1203 by Amparo Adkins, OT       SBA for pants, assist for footwear                  Problem: IADL's       Dates: Start: 04/01/23         Goal: STG-Within one week, patient will access kitchen area w/ min A and LRD.        Dates: Start: 04/01/23               Problem: OT Long Term Goals       Dates: Start: 04/01/23         Goal: LTG-By discharge, patient will complete basic self care tasks w/ mod I- supervision.        Dates: Start: 04/01/23            Goal: LTG-By discharge, patient will perform bathroom transfers w/ mos I- supervision.        Dates: Start: 04/01/23            Goal: LTG-By discharge, patient will complete basic home management w/ mod I- min A.        Dates: Start: 04/01/23

## 2023-04-11 ENCOUNTER — APPOINTMENT (OUTPATIENT)
Dept: PHYSICAL THERAPY | Facility: REHABILITATION | Age: 88
DRG: 193 | End: 2023-04-11
Attending: PHYSICAL MEDICINE & REHABILITATION
Payer: MEDICARE

## 2023-04-11 ENCOUNTER — APPOINTMENT (OUTPATIENT)
Dept: CARDIOLOGY | Facility: MEDICAL CENTER | Age: 88
End: 2023-04-11
Attending: INTERNAL MEDICINE
Payer: MEDICARE

## 2023-04-11 ENCOUNTER — APPOINTMENT (OUTPATIENT)
Dept: OCCUPATIONAL THERAPY | Facility: REHABILITATION | Age: 88
DRG: 193 | End: 2023-04-11
Attending: PHYSICAL MEDICINE & REHABILITATION
Payer: MEDICARE

## 2023-04-11 LAB
ALBUMIN SERPL BCP-MCNC: 3.1 G/DL (ref 3.2–4.9)
ALBUMIN/GLOB SERPL: 0.9 G/DL
ALP SERPL-CCNC: 184 U/L (ref 30–99)
ALT SERPL-CCNC: 57 U/L (ref 2–50)
ANION GAP SERPL CALC-SCNC: 13 MMOL/L (ref 7–16)
AST SERPL-CCNC: 61 U/L (ref 12–45)
BILIRUB SERPL-MCNC: 0.4 MG/DL (ref 0.1–1.5)
BUN SERPL-MCNC: 14 MG/DL (ref 8–22)
CALCIUM ALBUM COR SERPL-MCNC: 9 MG/DL (ref 8.5–10.5)
CALCIUM SERPL-MCNC: 8.3 MG/DL (ref 8.5–10.5)
CHLORIDE SERPL-SCNC: 100 MMOL/L (ref 96–112)
CO2 SERPL-SCNC: 20 MMOL/L (ref 20–33)
CREAT SERPL-MCNC: 0.67 MG/DL (ref 0.5–1.4)
GFR SERPLBLD CREATININE-BSD FMLA CKD-EPI: 82 ML/MIN/1.73 M 2
GLOBULIN SER CALC-MCNC: 3.4 G/DL (ref 1.9–3.5)
GLUCOSE SERPL-MCNC: 106 MG/DL (ref 65–99)
MAGNESIUM SERPL-MCNC: 1.7 MG/DL (ref 1.5–2.5)
NT-PROBNP SERPL IA-MCNC: 767 PG/ML (ref 0–125)
PHOSPHATE SERPL-MCNC: 3.3 MG/DL (ref 2.5–4.5)
POTASSIUM SERPL-SCNC: 3.7 MMOL/L (ref 3.6–5.5)
PROT SERPL-MCNC: 6.5 G/DL (ref 6–8.2)
SODIUM SERPL-SCNC: 133 MMOL/L (ref 135–145)

## 2023-04-11 PROCEDURE — 700102 HCHG RX REV CODE 250 W/ 637 OVERRIDE(OP): Performed by: HOSPITALIST

## 2023-04-11 PROCEDURE — 97116 GAIT TRAINING THERAPY: CPT

## 2023-04-11 PROCEDURE — 700102 HCHG RX REV CODE 250 W/ 637 OVERRIDE(OP): Performed by: PHYSICAL MEDICINE & REHABILITATION

## 2023-04-11 PROCEDURE — 83880 ASSAY OF NATRIURETIC PEPTIDE: CPT

## 2023-04-11 PROCEDURE — 84100 ASSAY OF PHOSPHORUS: CPT

## 2023-04-11 PROCEDURE — 86480 TB TEST CELL IMMUN MEASURE: CPT

## 2023-04-11 PROCEDURE — A9270 NON-COVERED ITEM OR SERVICE: HCPCS | Performed by: PHYSICAL MEDICINE & REHABILITATION

## 2023-04-11 PROCEDURE — 36415 COLL VENOUS BLD VENIPUNCTURE: CPT

## 2023-04-11 PROCEDURE — A9270 NON-COVERED ITEM OR SERVICE: HCPCS | Performed by: HOSPITALIST

## 2023-04-11 PROCEDURE — 700111 HCHG RX REV CODE 636 W/ 250 OVERRIDE (IP): Performed by: PHYSICAL MEDICINE & REHABILITATION

## 2023-04-11 PROCEDURE — 80053 COMPREHEN METABOLIC PANEL: CPT

## 2023-04-11 PROCEDURE — 83735 ASSAY OF MAGNESIUM: CPT

## 2023-04-11 PROCEDURE — 99232 SBSQ HOSP IP/OBS MODERATE 35: CPT | Performed by: PHYSICAL MEDICINE & REHABILITATION

## 2023-04-11 PROCEDURE — 97110 THERAPEUTIC EXERCISES: CPT

## 2023-04-11 PROCEDURE — 97535 SELF CARE MNGMENT TRAINING: CPT

## 2023-04-11 PROCEDURE — 97530 THERAPEUTIC ACTIVITIES: CPT

## 2023-04-11 PROCEDURE — 770010 HCHG ROOM/CARE - REHAB SEMI PRIVAT*

## 2023-04-11 RX ORDER — SERTRALINE HYDROCHLORIDE 25 MG/1
25 TABLET, FILM COATED ORAL DAILY
Qty: 30 TABLET | Refills: 0 | Status: SHIPPED | OUTPATIENT
Start: 2023-04-11 | End: 2023-05-10 | Stop reason: SDUPTHER

## 2023-04-11 RX ORDER — ASCORBIC ACID 500 MG
500 TABLET ORAL 2 TIMES DAILY WITH MEALS
Qty: 60 TABLET | Refills: 0 | COMMUNITY
Start: 2023-04-11 | End: 2023-08-04

## 2023-04-11 RX ORDER — LOSARTAN POTASSIUM 25 MG/1
75 TABLET ORAL DAILY
Qty: 90 TABLET | Refills: 0 | Status: SHIPPED | OUTPATIENT
Start: 2023-04-12 | End: 2023-05-10 | Stop reason: SDUPTHER

## 2023-04-11 RX ORDER — FERROUS SULFATE 325(65) MG
325 TABLET ORAL 2 TIMES DAILY WITH MEALS
Qty: 60 TABLET | Refills: 0 | COMMUNITY
Start: 2023-04-11 | End: 2023-04-18 | Stop reason: SDUPTHER

## 2023-04-11 RX ORDER — ACETAMINOPHEN 325 MG/1
650 TABLET ORAL EVERY 4 HOURS PRN
Qty: 30 TABLET | Refills: 0 | COMMUNITY
Start: 2023-04-11 | End: 2023-04-18

## 2023-04-11 RX ORDER — TRAZODONE HYDROCHLORIDE 50 MG/1
50 TABLET ORAL
Qty: 30 TABLET | Refills: 0 | Status: SHIPPED | OUTPATIENT
Start: 2023-04-11 | End: 2023-05-10

## 2023-04-11 RX ORDER — FUROSEMIDE 20 MG/1
20 TABLET ORAL DAILY
Qty: 30 TABLET | Refills: 0 | Status: SHIPPED | OUTPATIENT
Start: 2023-04-12 | End: 2023-05-10 | Stop reason: SDUPTHER

## 2023-04-11 RX ORDER — SODIUM CHLORIDE 1 G/1
1 TABLET ORAL
Qty: 90 TABLET | Refills: 0 | Status: SHIPPED | OUTPATIENT
Start: 2023-04-11 | End: 2023-05-10 | Stop reason: SDUPTHER

## 2023-04-11 RX ORDER — POTASSIUM CHLORIDE 20 MEQ/1
20 TABLET, EXTENDED RELEASE ORAL DAILY
Qty: 30 TABLET | Refills: 0 | Status: SHIPPED | OUTPATIENT
Start: 2023-04-12 | End: 2023-05-10 | Stop reason: SDUPTHER

## 2023-04-11 RX ADMIN — SODIUM CHLORIDE 1 G: 1 TABLET ORAL at 18:24

## 2023-04-11 RX ADMIN — FERROUS SULFATE TAB 325 MG (65 MG ELEMENTAL FE) 325 MG: 325 (65 FE) TAB at 18:24

## 2023-04-11 RX ADMIN — FUROSEMIDE 20 MG: 20 TABLET ORAL at 05:56

## 2023-04-11 RX ADMIN — FERROUS SULFATE TAB 325 MG (65 MG ELEMENTAL FE) 325 MG: 325 (65 FE) TAB at 08:47

## 2023-04-11 RX ADMIN — SODIUM CHLORIDE 1 G: 1 TABLET ORAL at 08:47

## 2023-04-11 RX ADMIN — LOSARTAN POTASSIUM 75 MG: 25 TABLET, FILM COATED ORAL at 05:56

## 2023-04-11 RX ADMIN — SERTRALINE HYDROCHLORIDE 25 MG: 50 TABLET, FILM COATED ORAL at 08:47

## 2023-04-11 RX ADMIN — TRAZODONE HYDROCHLORIDE 50 MG: 50 TABLET ORAL at 21:19

## 2023-04-11 RX ADMIN — ACETAMINOPHEN 650 MG: 325 TABLET ORAL at 21:19

## 2023-04-11 RX ADMIN — GUAIFENESIN AND DEXTROMETHORPHAN 10 ML: 100; 10 SYRUP ORAL at 18:24

## 2023-04-11 RX ADMIN — OXYCODONE HYDROCHLORIDE AND ACETAMINOPHEN 500 MG: 500 TABLET ORAL at 18:24

## 2023-04-11 RX ADMIN — POTASSIUM CHLORIDE 20 MEQ: 1500 TABLET, EXTENDED RELEASE ORAL at 08:47

## 2023-04-11 RX ADMIN — SODIUM CHLORIDE 1 G: 1 TABLET ORAL at 11:46

## 2023-04-11 RX ADMIN — OXYCODONE HYDROCHLORIDE AND ACETAMINOPHEN 500 MG: 500 TABLET ORAL at 08:47

## 2023-04-11 RX ADMIN — ENOXAPARIN SODIUM 40 MG: 100 INJECTION SUBCUTANEOUS at 18:24

## 2023-04-11 RX ADMIN — GUAIFENESIN AND DEXTROMETHORPHAN 10 ML: 100; 10 SYRUP ORAL at 11:45

## 2023-04-11 RX ADMIN — GUAIFENESIN AND DEXTROMETHORPHAN 10 ML: 100; 10 SYRUP ORAL at 05:56

## 2023-04-11 RX ADMIN — Medication 3 MG: at 21:19

## 2023-04-11 ASSESSMENT — GAIT ASSESSMENTS
DISTANCE (FEET): 150
ASSISTIVE DEVICE: 4 WHEEL WALKER
GAIT LEVEL OF ASSIST: SUPERVISED
DEVIATION: BRADYKINETIC

## 2023-04-11 ASSESSMENT — ACTIVITIES OF DAILY LIVING (ADL)
TOILET_TRANSFER_DESCRIPTION: ADAPTIVE EQUIPMENT;GRAB BAR;SET-UP OF EQUIPMENT;SUPERVISION FOR SAFETY;VERBAL CUEING
TOILETING_LEVEL_OF_ASSIST_DESCRIPTION: SUPERVISION FOR SAFETY;VERBAL CUEING
TOILET_TRANSFER_DESCRIPTION: ADAPTIVE EQUIPMENT;GRAB BAR;SET-UP OF EQUIPMENT;SUPERVISION FOR SAFETY;VERBAL CUEING
TOILET_TRANSFER_LEVEL_OF_ASSIST: REQUIRES SUPERVISION WITH TOILET TRANSFER
TUB_SHOWER_TRANSFER_DESCRIPTION: GRAB BAR;SHOWER BENCH;INCREASED TIME;INITIAL PREPARATION FOR TASK;SET-UP OF EQUIPMENT;SUPERVISION FOR SAFETY;VERBAL CUEING
BED_CHAIR_WHEELCHAIR_TRANSFER_DESCRIPTION: INCREASED TIME;SET-UP OF EQUIPMENT;SUPERVISION FOR SAFETY;VERBAL CUEING
TOILET_TRANSFER_DESCRIPTION: ADAPTIVE EQUIPMENT;GRAB BAR;SUPERVISION FOR SAFETY
TOILETING_LEVEL_OF_ASSIST_DESCRIPTION: GRAB BAR;INCREASED TIME;SUPERVISION FOR SAFETY
BED_CHAIR_WHEELCHAIR_TRANSFER_DESCRIPTION: ADAPTIVE EQUIPMENT;SUPERVISION FOR SAFETY
TOILETING_LEVEL_OF_ASSIST: REQUIRES SUPERVISION WITH TOILETING
SHOWER_TRANSFER_LEVEL_OF_ASSIST: REQUIRES SUPERVISION WITH SHOWER TRANSFER

## 2023-04-11 ASSESSMENT — PAIN DESCRIPTION - PAIN TYPE
TYPE: ACUTE PAIN
TYPE: ACUTE PAIN

## 2023-04-11 NOTE — PROGRESS NOTES
Received bedside shift report from Rachel BRAVO RN regarding patient and assumed care. Patient awake, calm and stable, currently positioned in bed for comfort and safety; call light within reach. Denies pain or discomfort at this time. Will continue to monitor.

## 2023-04-11 NOTE — PROGRESS NOTES
Patient care assumed. Report received from [facility] DEEPTHI Riley. Patient is alert and calm, resting in bed. Call light and bedside table within reach. Will continue to monitor.

## 2023-04-11 NOTE — CARE PLAN
Problem: Bathing  Goal: STG-Within one week, patient will bathe w/ CGA.  Outcome: Met     Problem: OT Long Term Goals  Goal: LTG-By discharge, patient will perform bathroom transfers w/ mos I- supervision.   Outcome: Met  Goal: LTG-By discharge, patient will complete basic home management w/ mod I- min A.   Outcome: Met     Problem: Dressing  Goal: STG-Within one week, patient will dress LB w/ CGA.   Outcome: Discharged - Not Met  Note: SPV for pants/shoes, assist for TEDs      Problem: IADL's  Goal: STG-Within one week, patient will access kitchen area w/ min A and LRD.   Outcome: Discharged - Not Met  Note: Unable to assess secondary to COVID isolation      Problem: OT Long Term Goals  Goal: LTG-By discharge, patient will complete basic self care tasks w/ mod I- supervision.   Outcome: Discharged - Not Met  Note: Assist for TEDs

## 2023-04-11 NOTE — DISCHARGE INSTRUCTIONS
Occupational Therapy Discharge Instructions for Chrissie J Spenser    4/11/2023    Level of Assist Required for Eating: Able to Complete Eating without Assist  Level of Assist Required for Grooming: Able to Complete Grooming without Assist  Level of Assist Required for Dressing: Requires Supervision with Dressing  Level of Assist Required for Toileting: Requires Supervision with Toileting  Level of Assist Required for Toilet Transfer: Requires Supervision with Toilet Transfer  Equipment for Toilet Transfer: Grab Bars by Toilet  Level of Assist Required for Bathing: Requires Supervision with Bathing  Equipment for Bathing: Shower Chair, Grab Bars in Tub / Shower, Hand Held Shower Head  Level of Assist Required for Shower Transfer: Requires Supervision with Shower Transfer  Equipment for Shower Transfer: Shower Chair, Grab Bars in Tub / Shower  Comments: It was a pleasure to work with you, Chrissie! Good luck with your continued recovery - KELLY Christensen  Physical Therapy Discharge Instructions for Chrissie Dueñas    4/11/2023    Level of Assist Required for Ambulation: Supervision on Flat Surfaces, Assist for Balance on Curbs, Assist for Balance on Stairs  Distance Patient May Ambulate: 150+ feet as tolerated.  Device Recommended for Ambulation: 4-Wheeled Walker  Level of Assist Required for Transfers: Supervision  Device Recommended for Transfers: Front-Wheeled Walker  Home Exercise Program: Refer to Home Exercise Program Handout for Details    Best of mechelle Dickens, keep working hard and enjoy your new apartment! Juan Pablo, YVETTE

## 2023-04-11 NOTE — THERAPY
"Physical Therapy   Daily Treatment     Patient Name: Chrissie Dueñas  Age:  89 y.o., Sex:  female  Medical Record #: [id number]  Today's Date: 4/11/2023     Precautions  Precautions: Fall Risk  Comments: Conjunctivitis, 2 L O2    Subjective    Pt was in w/c upon arrival, requested to use the restroom.  \"I would like to get back to bed when we're done, I've been sitting in the chair since 7:30\"     Objective       04/11/23 1401   PT Charge Group   PT Therapeutic Activities (Units) 2   Supervising Physical Therapist Juan Pablo Madison   PT Total Time Spent   PT Individual Total Time Spent (Mins) 30   Cognition    Level of Consciousness Alert   Transfer Functional Level of Assist   Bed, Chair, Wheelchair Transfer Supervised   Bed Chair Wheelchair Transfer Description Adaptive equipment;Supervision for safety  (bed rail)   Toilet Transfers Supervised   Toilet Transfer Description Adaptive equipment;Grab bar;Supervision for safety  (w/c <> toilet)   Bed Mobility    Sit to Supine Modified Independent   Sit to Stand Supervised   Scooting Supervised   Interdisciplinary Plan of Care Collaboration   IDT Collaboration with  Physical Therapist;Certified Nursing Assistant   Patient Position at End of Therapy In Bed;Call Light within Reach;Phone within Reach;Tray Table within Reach   Collaboration Comments POC, re:BM     Review and give recommendation regarding safety after d/c.    Assessment    pt expressed nervious regarding d/c to dtr's home and halfway but is ready for d/c. Pt stated has no further questions as for now and is looking forward for FT and d/c tomorrow.    Strengths: Able to follow instructions, Effective communication skills, Independent prior level of function, Making steady progress towards goals, Motivated for self care and independence, Pleasant and cooperative, Willingly participates in therapeutic activities  Barriers: Decreased endurance, Generalized weakness, Impaired activity tolerance    Plan    FT day of d/c  Car " transfer, steps, 4ww mobility      Passport items to be completed:   in/out of a vehicle,navigate up and down stairs, complete caregiver training       Physical Therapy Problems (Active)       Problem: Balance       Dates: Start: 04/01/23         Goal: STG-Within one week, patient will improve LOBO score by at least 5 points to demonstrate decreased risk for falls.        Dates: Start: 04/01/23               Problem: Mobility       Dates: Start: 04/01/23         Goal: STG-Within one week, patient will ambulate 150' with FWW and supervision       Dates: Start: 04/01/23               Problem: Mobility Transfers       Dates: Start: 04/01/23         Goal: STG-Within one week, patient will perform 4 stairs with B handrails and supervision       Dates: Start: 04/01/23               Problem: PT-Long Term Goals       Dates: Start: 04/01/23         Goal: LTG-By discharge, patient will ambulate 150' with FWW independently.        Dates: Start: 04/01/23            Goal: LTG-By discharge, patient will transfer in/out of a car with FWW independently.        Dates: Start: 04/01/23            Goal: LTG-By discharge, patient will improve LOBO score by at least 10 points to demonstrate decreased risk for falls.        Dates: Start: 04/01/23            Goal: LTG-By discharge, patient will perform 12 stairs with B handrails independently.        Dates: Start: 04/01/23

## 2023-04-11 NOTE — PROGRESS NOTES
Rehab Progress Note     Date of Service: 4/11/2023  Chief Complaint: Follow-up debility    Interval Events (Subjective)    Patient seen and examined today in her room.  She reports her cough continues to improve.  She denies any pain.  She is looking forward to her discharge home tomorrow.  She last moved her bowels yesterday.  She reports she would like to continue to take trazodone at home to help with her sleep.    Patient has no other new questions, concerns, or complaints today.       Objective:  VITAL SIGNS: /59   Pulse 60   Temp 37 °C (98.6 °F) (Oral)   Resp 16   Ht 1.524 m (5')   Wt 62.5 kg (137 lb 12.6 oz)   SpO2 94%   BMI 26.91 kg/m²   Gen: alert, no apparent distress  CV: Regular rate, regular rhythm  Resp: Clear to auscultation bilaterally, breathing comfortably on room air          Recent Results (from the past 72 hour(s))   Comp Metabolic Panel    Collection Time: 04/11/23  6:24 AM   Result Value Ref Range    Sodium 133 (L) 135 - 145 mmol/L    Potassium 3.7 3.6 - 5.5 mmol/L    Chloride 100 96 - 112 mmol/L    Co2 20 20 - 33 mmol/L    Anion Gap 13.0 7.0 - 16.0    Glucose 106 (H) 65 - 99 mg/dL    Bun 14 8 - 22 mg/dL    Creatinine 0.67 0.50 - 1.40 mg/dL    Calcium 8.3 (L) 8.5 - 10.5 mg/dL    AST(SGOT) 61 (H) 12 - 45 U/L    ALT(SGPT) 57 (H) 2 - 50 U/L    Alkaline Phosphatase 184 (H) 30 - 99 U/L    Total Bilirubin 0.4 0.1 - 1.5 mg/dL    Albumin 3.1 (L) 3.2 - 4.9 g/dL    Total Protein 6.5 6.0 - 8.2 g/dL    Globulin 3.4 1.9 - 3.5 g/dL    A-G Ratio 0.9 g/dL   MAGNESIUM    Collection Time: 04/11/23  6:24 AM   Result Value Ref Range    Magnesium 1.7 1.5 - 2.5 mg/dL   PHOSPHORUS    Collection Time: 04/11/23  6:24 AM   Result Value Ref Range    Phosphorus 3.3 2.5 - 4.5 mg/dL   proBrain Natriuretic Peptide, NT    Collection Time: 04/11/23  6:24 AM   Result Value Ref Range    NT-proBNP 767 (H) 0 - 125 pg/mL   ESTIMATED GFR    Collection Time: 04/11/23  6:24 AM   Result Value Ref Range    GFR (CKD-EPI) 82  >60 mL/min/1.73 m 2   CORRECTED CALCIUM    Collection Time: 04/11/23  6:24 AM   Result Value Ref Range    Correct Calcium 9.0 8.5 - 10.5 mg/dL       Scheduled Medications   Medication Dose Frequency    ascorbic acid  500 mg BID WITH MEALS    guaiFENesin dextromethorphan  10 mL Q6HRS    losartan  75 mg Q DAY    traZODone  50 mg QHS    ferrous sulfate  325 mg BID WITH MEALS    potassium chloride SA  20 mEq DAILY    enoxaparin (LOVENOX) injection  40 mg DAILY AT 1800    furosemide  20 mg Q DAY    senna-docusate  2 Tablet BID    sertraline  25 mg DAILY    sodium chloride  1 g TID WITH MEALS       Current Diet Order   Procedures    Diet Order Diet: Regular       Assessment:    This patient is a 89 y.o. female admitted for acute inpatient rehabilitation   with Debility secondary to hospitalization for most of the lobar pneumonia.    I led and attended the weekly conference, and agree with the IDT conference documentation and plan of care as noted below.    Date of conference: 4/5/2023    Goals and barriers: See IDT note.    Biggest barriers: respiratory insufficiency, impaired balance, poor endurance    CM/social support: to live with daughter until her room is ready at [facility] May 1st    Anticipated DC date: 4/12    Home health: PT/OT/RN    Equip: none needed     Follow up: PCP      Problem List/Medical Decision Making and Plan:    Debility, improved  Generalized weakness, improved  PT/OT, 1.5 hr each discipline, 5 days per week  No cognitive impairment    Multilobar pneumonia  Completed IV antibiotics    +COVID  Symptoms started 4/13, tested +4/14  In respiratory wing/isolation  Still okay to discharge on Wednesday as she will be outside for 5-day recommended infectious window per CDC    Cough, see above  Continue Robitussin and cough drops     Leukocytosis, resolved  Completed IV antibiotics for pneumonia    Transaminitis, improved  No abdominal complaints  Thought secondary to recent antibiotics  Continue to monitor until  resolution, outpatient follow-up with PCP    Normocytic anemia  Iron deficiency  Continue ferrous sulfate and vitamin C    Respiratory failure with hypoxia  Elevated BNP, improved  -Continue ferrous sulfate and potassium  Titrated off oxygen     Bacterial conjunctivitis, right side, resolved  Completed Cipro     History of depression  Continue sertraline     Hyponatremia, improved/continues  Continue salt tabs     Chronic kidney disease, history of, unknown stage  Avoid nephrotoxins  Renally dose medications   Labs currently with normal function     Hypertension  Continue losartan and furosemide    Insomnia,  resolved  Continue trazodone    Epistaxis, resolved    DVT prophylaxis  Continue Lovenox      Sailajalucie Mallory M.D.  Physical Medicine and Rehabilitation

## 2023-04-11 NOTE — THERAPY
Physical Therapy   Daily Treatment     Patient Name: Chrissie Dueñas  Age:  89 y.o., Sex:  female  Medical Record #: [id number]  Today's Date: 4/11/2023     Precautions  Precautions: Fall Risk  Comments: Conjunctivitis, 2 L O2    Subjective    Patient agreeable to session. Has no complaints. Says she is just not really up for doing anything at the moment.        Objective       04/11/23 1301   PT Charge Group   PT Gait Training (Units) 1   PT Therapeutic Exercise (Units) 1   PT Therapeutic Activities (Units) 2   PT Total Time Spent   PT Individual Total Time Spent (Mins) 60   Vitals   Vitals Comments reports no symptoms during session.   Gait Functional Level of Assist    Gait Level Of Assist Supervised   Assistive Device 4 Wheel Walker   Distance (Feet) 150   # of Times Distance was Traveled 2   Deviation Bradykinetic   Stairs Functional Level of Assist   Level of Assist with Stairs Contact Guard Assist   # of Stairs Climbed 14  (6 inch height)   Stairs Description Extra time;Supervision for safety;Safety concerns;Limited by fatigue;Hand rails  (bed railing use)   Transfer Functional Level of Assist   Bed, Chair, Wheelchair Transfer Supervised   Bed Chair Wheelchair Transfer Description Increased time;Set-up of equipment;Supervision for safety;Verbal cueing   Sitting Lower Body Exercises   Hip Abduction Bilateral;1 set of 15;Light Resistance Theraband   Hip Adduction 1 set of 15;Bilateral   Long Arc Quad 1 set of 15;Bilateral;Light Resistance Theraband   Hamstring Curl 1 set of 15;Bilateral;Light Resistance Theraband   Other Exercises leg press w/ pink tband x 15 B   Bed Mobility    Sit to Stand Supervised   Interdisciplinary Plan of Care Collaboration   IDT Collaboration with  Physical Therapist Assistant (PTA)   Patient Position at End of Therapy Seated;Chair Alarm On;Call Light within Reach;Tray Table within Reach;Phone within Reach   Collaboration Comments tx w/ PTA     Simulated car transfer x 1 at [facility].    Discussed upcoming d/c recommendations for 4ww use and 24/7 assist from dtr.   Problem solved steps w/ railing and alternate methods of sidestepping.       Assessment    Patient maintaining her function in preparation for discharge. She is overall more self limiting just to the fact that she has been isolated for 5 days, and is ready to go home. Moving well and able to complete steps w/ single UE support to simulate her dtrs home. Training tomorrow for discharge.     Strengths: Able to follow instructions, Effective communication skills, Independent prior level of function, Making steady progress towards goals, Motivated for self care and independence, Pleasant and cooperative, Willingly participates in therapeutic activities  Barriers: Decreased endurance, Generalized weakness, Impaired activity tolerance    Plan    FT day of d/c  Car transfer, steps, 4ww mobility     Passport items to be completed:   in/out of a vehicle,navigate up and down stairs, complete caregiver training    Physical Therapy Problems (Active)       Problem: Balance       Dates: Start: 04/01/23         Goal: STG-Within one week, patient will improve LOBO score by at least 5 points to demonstrate decreased risk for falls.        Dates: Start: 04/01/23               Problem: Mobility       Dates: Start: 04/01/23         Goal: STG-Within one week, patient will ambulate 150' with FWW and supervision       Dates: Start: 04/01/23               Problem: Mobility Transfers       Dates: Start: 04/01/23         Goal: STG-Within one week, patient will perform 4 stairs with B handrails and supervision       Dates: Start: 04/01/23               Problem: PT-Long Term Goals       Dates: Start: 04/01/23         Goal: LTG-By discharge, patient will ambulate 150' with FWW independently.        Dates: Start: 04/01/23            Goal: LTG-By discharge, patient will transfer in/out of a car with FWW independently.        Dates: Start: 04/01/23            Goal:  LTG-By discharge, patient will improve LOBO score by at least 10 points to demonstrate decreased risk for falls.        Dates: Start: 04/01/23            Goal: LTG-By discharge, patient will perform 12 stairs with B handrails independently.        Dates: Start: 04/01/23

## 2023-04-11 NOTE — CARE PLAN
The patient is Stable - Low risk of patient condition declining or worsening    Shift Goals  Clinical Goals: safety  Patient Goals: safety    Problem: Fall Risk - Rehab  Goal: Patient will remain free from falls  Outcome: Progressing Pt uses call light consistently and appropriately. Waits for assistance does not attempt self transfer this shift. Able to verbalize needs.      Problem: Bladder / Voiding  Goal: Patient will establish and maintain regular urinary output  Outcome: Progressing Patient voiding adequate amounts of clear, yellow urine. Denies dysuria and flank pain: afebrile. Will continue to monitor.

## 2023-04-11 NOTE — THERAPY
Occupational Therapy  Daily Treatment     Patient Name: Chrissie Dueñsa  Age:  89 y.o., Sex:  female  Medical Record #: [id number]  Today's Date: 4/11/2023     Precautions  Precautions: Fall Risk  Comments: Conjunctivitis, 2 L O2         Subjective    Pt agreeable to both OT sessions.      Objective  Time addendum: Pt seen from 8:45-9:30 for first session    04/11/23 0831 04/11/23 1101   Therapy Missed   Missed Therapy (Minutes) 15  --    Reason For Missed Therapy Non-Medical - Other (Please Comment)  (CYNTHIA interview)  --    OT Charge Group   OT Self Care / ADL (Units) 3  --    OT Therapy Activity (Units)  --  2   OT Total Time Spent   OT Individual Total Time Spent (Mins) 45 30   Functional Level of Assist   Eating Independent  --    Grooming Supervision;Standing Supervision;Standing   Grooming Description Standing at sink Standing at sink   Bathing Supervision  --    Bathing Description Grab bar;Hand held shower;Tub bench;Increased time;Initial preparation for task;Supervision for safety;Verbal cueing  --    Upper Body Dressing Independent  --    Lower Body Dressing Supervision  ([place] for TEDs)  --    Lower Body Dressing Description Grab bar;Increased time;Initial preparation for task;Supervision for safety;Verbal cueing  --    Toileting Supervision Supervision   Toileting Description Supervision for safety;Verbal cueing Grab bar;Increased time;Supervision for safety   Toilet Transfers Supervised  (4WW, cues for brakes) Supervised  (4WW)   Toilet Transfer Description Adaptive equipment;Grab bar;Set-up of equipment;Supervision for safety;Verbal cueing Adaptive equipment;Grab bar;Set-up of equipment;Supervision for safety;Verbal cueing   Tub / Shower Transfers Standby Assist  --    Tub Shower Transfer Description Grab bar;Shower bench;Increased time;Initial preparation for task;Set-up of equipment;Supervision for safety;Verbal cueing  --    Sitting Upper Body Exercises   Comments  --  Chair yoga/AROM 1x10 reaching  forward and up, reaching out and bringing arms in, shoulder depression/elevation   Standing Lower Body Exercises   Marching  --  1 set of 10  (CGA, cues for lifting feet off floor)   Interdisciplinary Plan of Care Collaboration   Patient Position at End of Therapy Seated;Chair Alarm On;Self Releasing Lap Belt Applied;Call Light within Reach;Tray Table within Reach;Phone within Reach Seated;Chair Alarm On;Self Releasing Lap Belt Applied;Call Light within Reach;Tray Table within Reach;Phone within Reach     8:45 session: In addition to ADL routine above, reviewed and signed off on OT Passport to [place].     11:00: Functional mob to bathroom then to chair in front of window in room, chair yoga detailed above, functional mob window<>door x3 with 4WW and SPV    Assessment    Pt tolerated OT session well and demo's ADL routine and functional mob/transfers at a SPV level except for assist with doffing/donning TEDs. Needed cues for 4WW brake mgmt in first session, no cues during second session. Pt had no questions or concerns regarding d/c to her daughter's home until she moves into [facility].     Strengths: Able to follow instructions, Alert and oriented, Effective communication skills, Motivated for self care and independence, Pleasant and cooperative, Willingly participates in therapeutic activities  Barriers: Decreased endurance, Generalized weakness, Home accessibility, Impaired activity tolerance, Impaired balance, Limited mobility, Pain    Plan    D/c BIMS    Day of d/c FT with pt's daughter: Review ADL CLOF, transfers, 4WW mgmt, shower setup/transfer       Occupational Therapy Goals (Active)       Problem: Bathing       Dates: Start: 04/01/23         Goal: STG-Within one week, patient will bathe w/ CGA.       Dates: Start: 04/01/23               Problem: Dressing       Dates: Start: 04/01/23         Goal: STG-Within one week, patient will dress LB w/ CGA.        Dates: Start: 04/01/23         Goal Note filed on  04/05/23 1203 by Amparo Adkins, OT       SBA for pants, assist for footwear                  Problem: IADL's       Dates: Start: 04/01/23         Goal: STG-Within one week, patient will access kitchen area w/ min A and LRD.        Dates: Start: 04/01/23               Problem: OT Long Term Goals       Dates: Start: 04/01/23         Goal: LTG-By discharge, patient will complete basic self care tasks w/ mod I- supervision.        Dates: Start: 04/01/23            Goal: LTG-By discharge, patient will perform bathroom transfers w/ mos I- supervision.        Dates: Start: 04/01/23            Goal: LTG-By discharge, patient will complete basic home management w/ mod I- min A.        Dates: Start: 04/01/23

## 2023-04-12 ENCOUNTER — APPOINTMENT (OUTPATIENT)
Dept: PHYSICAL THERAPY | Facility: REHABILITATION | Age: 88
End: 2023-04-12
Attending: PHYSICAL MEDICINE & REHABILITATION
Payer: MEDICARE

## 2023-04-12 ENCOUNTER — PHARMACY VISIT (OUTPATIENT)
Dept: PHARMACY | Facility: MEDICAL CENTER | Age: 88
End: 2023-04-12
Payer: COMMERCIAL

## 2023-04-12 VITALS
DIASTOLIC BLOOD PRESSURE: 71 MMHG | BODY MASS INDEX: 27.05 KG/M2 | TEMPERATURE: 98.9 F | HEART RATE: 70 BPM | SYSTOLIC BLOOD PRESSURE: 150 MMHG | RESPIRATION RATE: 16 BRPM | HEIGHT: 60 IN | WEIGHT: 137.79 LBS | OXYGEN SATURATION: 97 %

## 2023-04-12 LAB
GAMMA INTERFERON BACKGROUND BLD IA-ACNC: 0.1 IU/ML
M TB IFN-G BLD-IMP: NEGATIVE
M TB IFN-G CD4+ BCKGRND COR BLD-ACNC: -0.04 IU/ML
MITOGEN IGNF BCKGRD COR BLD-ACNC: 1.46 IU/ML
QFT TB2 - NIL TBQ2: -0.05 IU/ML

## 2023-04-12 PROCEDURE — RXMED WILLOW AMBULATORY MEDICATION CHARGE: Performed by: PHYSICAL MEDICINE & REHABILITATION

## 2023-04-12 PROCEDURE — 97530 THERAPEUTIC ACTIVITIES: CPT

## 2023-04-12 PROCEDURE — A9270 NON-COVERED ITEM OR SERVICE: HCPCS | Performed by: HOSPITALIST

## 2023-04-12 PROCEDURE — 700102 HCHG RX REV CODE 250 W/ 637 OVERRIDE(OP): Performed by: HOSPITALIST

## 2023-04-12 PROCEDURE — A9270 NON-COVERED ITEM OR SERVICE: HCPCS | Performed by: PHYSICAL MEDICINE & REHABILITATION

## 2023-04-12 PROCEDURE — 99239 HOSP IP/OBS DSCHRG MGMT >30: CPT | Performed by: PHYSICAL MEDICINE & REHABILITATION

## 2023-04-12 PROCEDURE — 700102 HCHG RX REV CODE 250 W/ 637 OVERRIDE(OP): Performed by: PHYSICAL MEDICINE & REHABILITATION

## 2023-04-12 RX ADMIN — OXYCODONE HYDROCHLORIDE AND ACETAMINOPHEN 500 MG: 500 TABLET ORAL at 08:35

## 2023-04-12 RX ADMIN — LOSARTAN POTASSIUM 75 MG: 25 TABLET, FILM COATED ORAL at 05:55

## 2023-04-12 RX ADMIN — FERROUS SULFATE TAB 325 MG (65 MG ELEMENTAL FE) 325 MG: 325 (65 FE) TAB at 08:35

## 2023-04-12 RX ADMIN — FUROSEMIDE 20 MG: 20 TABLET ORAL at 05:55

## 2023-04-12 RX ADMIN — GUAIFENESIN AND DEXTROMETHORPHAN 10 ML: 100; 10 SYRUP ORAL at 05:55

## 2023-04-12 RX ADMIN — SERTRALINE HYDROCHLORIDE 25 MG: 50 TABLET, FILM COATED ORAL at 08:35

## 2023-04-12 RX ADMIN — GUAIFENESIN AND DEXTROMETHORPHAN 10 ML: 100; 10 SYRUP ORAL at 00:15

## 2023-04-12 RX ADMIN — POTASSIUM CHLORIDE 20 MEQ: 1500 TABLET, EXTENDED RELEASE ORAL at 08:35

## 2023-04-12 RX ADMIN — SODIUM CHLORIDE 1 G: 1 TABLET ORAL at 08:35

## 2023-04-12 ASSESSMENT — BRIEF INTERVIEW FOR MENTAL STATUS (BIMS)
ASKED TO RECALL SOCK: YES, NO CUE REQUIRED
WHAT DAY OF THE WEEK IS IT: CORRECT
ASKED TO RECALL BED: YES, NO CUE REQUIRED
WHAT YEAR IS IT: CORRECT
BIMS SUMMARY SCORE: 15
INITIAL REPETITION OF BED BLUE SOCK - FIRST ATTEMPT: 3
ASKED TO RECALL BLUE: YES, NO CUE REQUIRED
WHAT MONTH IS IT: ACCURATE WITHIN 5 DAYS

## 2023-04-12 ASSESSMENT — GAIT ASSESSMENTS
ASSISTIVE DEVICE: 4 WHEEL WALKER
DEVIATION: BRADYKINETIC
GAIT LEVEL OF ASSIST: SUPERVISED
DISTANCE (FEET): 150

## 2023-04-12 ASSESSMENT — ACTIVITIES OF DAILY LIVING (ADL)
TOILET_TRANSFER_DESCRIPTION: GRAB BAR;SUPERVISION FOR SAFETY
BED_CHAIR_WHEELCHAIR_TRANSFER_DESCRIPTION: ADAPTIVE EQUIPMENT;INCREASED TIME;SUPERVISION FOR SAFETY
TOILETING_LEVEL_OF_ASSIST_DESCRIPTION: GRAB BAR;SUPERVISION FOR SAFETY

## 2023-04-12 NOTE — DISCHARGE PLANNING
Case management  Reviewed signed copy of IMM and answered all questions.    Dc date /disposition: 4/12/232 Home with daughter and home health.

## 2023-04-12 NOTE — CARE PLAN
Problem: Knowledge Deficit - Standard  Goal: Patient and family/care givers will demonstrate understanding of plan of care, disease process/condition, diagnostic tests and medications  Outcome: Met     Problem: Skin Integrity  Goal: Skin integrity is maintained or improved  Outcome: Met     Problem: Fall Risk - Rehab  Goal: Patient will remain free from falls  Outcome: Met     Problem: Discharge Barriers/Planning  Goal: Patient's continuum of care needs are met  Outcome: Met     Problem: Psychosocial  Goal: Patient's level of anxiety will decrease  Outcome: Met  Goal: Patient's ability to verbalize feelings about condition will improve  Outcome: Met  Goal: Patient's ability to re-evaluate and adapt role responsibilities will improve  Outcome: Met  Goal: Patient and family will demonstrate ability to cope with life altering diagnosis and/or procedure  Outcome: Met  Goal: Spiritual and cultural needs incorporated into hospitalization  Outcome: Met     Problem: Hemodynamics  Goal: Patient's hemodynamics, fluid balance and neurologic status will be stable or improve  Outcome: Met     Problem: Respiratory  Goal: Patient will understand use and administration of respiratory medications to improve respiratory function  Outcome: Met     Problem: Risk for Aspiration  Goal: Patient's risk for aspiration will be absent or decrease  Outcome: Met     Problem: Bladder / Voiding  Goal: Patient will establish and maintain regular urinary output  Outcome: Met  Goal: Patient will establish and maintain bladder regimen  Outcome: Met     Problem: Bowel Elimination  Goal: Patient will participate in bowel management program  Outcome: Met     Problem: Skin Integrity  Goal: Patient's skin integrity will be maintained or improve  Outcome: Met     Problem: Nutrition  Goal: Patient's nutritional and fluid intake will be adequate or improve  Outcome: Met  Goal: Patient will display progressive weight gain toward goal have adequate food and  fluid intake  Outcome: Met  Goal: Patient will demonstrate ability to administer respiratory medications  Outcome: Met     Problem: Self Care  Goal: Patient will have the ability to perform ADLs independently or with assistance  Outcome: Met     Problem: Mobility  Goal: Patient's capacity to carry out activities will improve  Outcome: Met     Problem: Infection  Goal: Patient will remain free from infection  Outcome: Met     Problem: VTE Prevention  Goal: Patient will remain free from venous thromboembolism (VTE)  Outcome: Met     Problem: Pain - Standard  Goal: Alleviation of pain or a reduction in pain to the patient’s comfort goal  Outcome: Met   The patient is Stable - Low risk of patient condition declining or worsening

## 2023-04-12 NOTE — THERAPY
Occupational Therapy  Daily Treatment     Patient Name: Chrissie Dueñas  Age:  89 y.o., Sex:  female  Medical Record #: [id number]  Today's Date: 4/12/2023     Precautions  Precautions: Fall Risk  Comments: Conjunctivitis, 2 L O2         Subjective    Pt up in w/c with her daughter Kailee present for FT before d/c today.      Objective     04/12/23 0901   OT Charge Group   OT Therapy Activity (Units) 1   OT Total Time Spent   OT Individual Total Time Spent (Mins) 15   Functional Level of Assist   Grooming Supervision;Standing  (hand hygiene)   Grooming Description Standing at sink   Toileting Supervision   Toileting Description Grab bar;Supervision for safety   Toilet Transfers Supervised  (4WW)   Toilet Transfer Description Grab bar;Supervision for safety   Interdisciplinary Plan of Care Collaboration   IDT Collaboration with  Family / Caregiver;Physician   Patient Position at End of Therapy Seated;Self Releasing Lap Belt Applied;Family / Friend in Room   Collaboration Comments Pt's daughter present for FT, Dr. Mallory notified of pt's daughter's request to meet before d/c     FT with pt and her daughter Kailee: Reviewed ADL CLOF and recommendation for SPV except for assist with compression socks/footwear, SPV for transfers and functional mob with 4WW, pt's daughter has a WIS with a shower chair and grab bars, reviewed fall prevention with managing her dogs, recommended BSC over toilet to provide arm rests for transfers in lieu of not having a grab bar next to the toilet.     Assessment    Pt and daughter receptive to FT, has no further questions or concerns regarding d/c today. Has all DME needs in place except recommended BSC which pt's daughter states she will acquire.     Strengths: Able to follow instructions, Alert and oriented, Effective communication skills, Motivated for self care and independence, Pleasant and cooperative, Willingly participates in therapeutic activities  Barriers: Decreased endurance,  Generalized weakness, Home accessibility, Impaired activity tolerance, Impaired balance, Limited mobility, Pain    Plan    D/c to pt's daughter's home until she moves into [facility] next month    Occupational Therapy Goals (Active)       There are no active problems.

## 2023-04-12 NOTE — PROGRESS NOTES
Patient discharged to home per order. Discharge instructions reviewed with patient and daughter; they verbalize understanding and signed copies placed in chart. Patient has all belongings. Patient left facility at 1100 via wheelchair accompanied by rehab staff and daughter. Have enjoyed working with this pleasant patient.

## 2023-04-12 NOTE — THERAPY
Physical Therapy   Daily Treatment     Patient Name: Chrissie Dueñas  Age:  89 y.o., Sex:  female  Medical Record #: [id number]  Today's Date: 4/12/2023     Precautions  Precautions: Fall Risk  Comments: Conjunctivitis, 2 L O2    Subjective    Patient agreeable to session. Has no complaints.       Objective       04/12/23 0831   PT Charge Group   PT Therapeutic Activities (Units) 2   PT Total Time Spent   PT Individual Total Time Spent (Mins) 30   Gait Functional Level of Assist    Gait Level Of Assist Supervised   Assistive Device 4 Wheel Walker   Distance (Feet) 150   # of Times Distance was Traveled 1   Deviation Bradykinetic   Stairs Functional Level of Assist   Level of Assist with Stairs Contact Guard Assist  (provided from daughter)   # of Stairs Climbed 4   Stairs Description Extra time;Limited by fatigue;Supervision for safety;Hand rails  (used bed railing for single UE support.)   Transfer Functional Level of Assist   Bed, Chair, Wheelchair Transfer Supervised   Bed Chair Wheelchair Transfer Description Adaptive equipment;Increased time;Supervision for safety   Bed Mobility    Sit to Stand Independent   Interdisciplinary Plan of Care Collaboration   IDT Collaboration with  Family / Caregiver;Nursing   Patient Position at End of Therapy Seated;Call Light within Reach;Family / Friend in Room   Collaboration Comments daughter present for family training see PT note.     FT:  - edu dtr on gait belt use, safety and guarding for ambulation or stairs  - recommending 4ww for mobility at this time along with follow up HH PT  - encouraged walking schedule and activity PRN at home.   - verbally went over car transfer at 4ww level, and floor recovery strategies.     Assessment    Patient and daughter understanding of PT recommendations. Daughter Kailee shows ability to provide SPV-CGA to patient and assist in her CLOF. All questions addressed in prep for d/c.     Strengths: Able to follow instructions, Effective  communication skills, Independent prior level of function, Making steady progress towards goals, Motivated for self care and independence, Pleasant and cooperative, Willingly participates in therapeutic activities  Barriers: Decreased endurance, Generalized weakness, Impaired activity tolerance    Plan    D/c    Physical Therapy Problems (Active)       Problem: Balance       Dates: Start: 04/01/23         Goal: STG-Within one week, patient will improve LOBO score by at least 5 points to demonstrate decreased risk for falls.        Dates: Start: 04/01/23               Problem: Mobility       Dates: Start: 04/01/23         Goal: STG-Within one week, patient will ambulate 150' with FWW and supervision       Dates: Start: 04/01/23               Problem: Mobility Transfers       Dates: Start: 04/01/23         Goal: STG-Within one week, patient will perform 4 stairs with B handrails and supervision       Dates: Start: 04/01/23               Problem: PT-Long Term Goals       Dates: Start: 04/01/23         Goal: LTG-By discharge, patient will ambulate 150' with FWW independently.        Dates: Start: 04/01/23            Goal: LTG-By discharge, patient will transfer in/out of a car with FWW independently.        Dates: Start: 04/01/23            Goal: LTG-By discharge, patient will improve LOBO score by at least 10 points to demonstrate decreased risk for falls.        Dates: Start: 04/01/23            Goal: LTG-By discharge, patient will perform 12 stairs with B handrails independently.        Dates: Start: 04/01/23

## 2023-04-12 NOTE — PROGRESS NOTES
This patient ,and family with patient permission, received individualized medication counseling regarding the current regimen they are receiving in this facility. Potential adverse effects, monitoring parameters, and proper administration were covered in preparation for their discharge. This patient is being treated for hypertension and it is recommended that they monitor and record with a blood pressure device. The patient has been instructed to contact their primary care physician if the HR or blood pressure is abnormal. The patient asked relevant questions regarding the medications for which answers were provided. Our pharmacy hours and phone number were provided for follow up questions should they arise.  Abdoulaye Moon  [facility]

## 2023-04-13 ENCOUNTER — PATIENT OUTREACH (OUTPATIENT)
Dept: MEDICAL GROUP | Facility: PHYSICIAN GROUP | Age: 88
End: 2023-04-13
Payer: MEDICARE

## 2023-04-13 NOTE — DISCHARGE SUMMARY
Admission Date: 3/31/2023    Discharge Date: 4/12/2023    Attending Provider: Sailaja Mallory MD    Admission Diagnosis:   Active Hospital Problems    Diagnosis     *Multifocal pneumonia     COVID     Anemia     Hypokalemia     Transaminitis     Debility     Acute bacterial conjunctivitis of right eye     Pulmonary edema     Major depressive disorder     Other specified hypothyroidism     Chronic kidney disease     Chronic hyponatremia     Poor appetite     History of macular degeneration     Essential hypertension        Discharge Diagnosis:  Active Hospital Problems    Diagnosis     *Multifocal pneumonia     COVID     Anemia     Hypokalemia     Transaminitis     Debility     Acute bacterial conjunctivitis of right eye     Pulmonary edema     Major depressive disorder     Other specified hypothyroidism     Chronic kidney disease     Chronic hyponatremia     Poor appetite     History of macular degeneration     Essential hypertension        HPI per H&P:    Patient is a 89 y.o. female  with a past medical history of hypertension, macular degeneration, depression, chronic hyponatremia, chronic kidney disease, hypothyroidism, admitted to [facility] on 3/29, with decreased appetite x1 month secondary to persistent nausea, dry cough over several days, hoarse voice, and diarrhea.     Labs with elevated D-dimer, BNP, leukocytosis to 17.4, hyponatremia with sodium of 124, and elevated troponin.  Patient was hypoxic requiring oxygen.  Patient had a CT of the chest which was negative for pulmonary emboli but did show multifocal pneumonia.  She was admitted for IV antibiotics and treatment of her pneumonia.  She had an echocardiogram that showed ejection fraction of 75% and hyperdynamic left ventricular systolic function.  Patient's TSH was slightly elevated at 5.4, with normal free T4 at 1.2.  Patient continues to have a leukocytosis and her procalcitonin has increased.  Blood cultures negative to  growth thus far.     Patient currently reports cough, hoarse voice and polyuria.  She reports she has had low sodium for about a year with unclear cause.  She reports continued poor appetite ever since she has been ill in the last week.  She denies any pain.  She reports blurry vision in her right eye which is chronic.  She is currently reporting some right eye discharge and pain.     Patient was evaluated by Rehab Medicine physician and Physical Therapy and determined to be appropriate for acute inpatient rehab and was transferred to [facility] on 3/31/2023  3:52 PM.    Rehab Hospital Course by Problem List:    Debility, improved  Generalized weakness, improved  No cognitive impairment     Multilobar pneumonia, resolved  Completed IV antibiotics     +COVID  Symptoms (cough) started 4/13, tested +4/14  Did not require any oxygen supplementation or steroids     Cough, see above  Continue Robitussin and cough drops     Leukocytosis, resolved  Completed IV antibiotics for pneumonia     Transaminitis, improved  No abdominal complaints  Thought secondary to recent antibiotics  Continue to monitor until resolution, outpatient follow-up with PCP     Normocytic anemia  Iron deficiency  Continue ferrous sulfate and vitamin C     Respiratory failure with hypoxia  Elevated BNP, improved  -Continue furosemide and potassium  Titrated off oxygen     Bacterial conjunctivitis, right side, resolved  Completed Cipro     History of depression  Continue sertraline     Hyponatremia, improved/continues  Continue salt tabs     Chronic kidney disease, history of, unknown stage  Labs currently with normal function     Hypertension  Continue losartan and furosemide     Insomnia,  resolved  Continue trazodone     Epistaxis, resolved     DVT prophylaxis  Continue Lovenox       Labs    Recent Labs     03/13/23 2010   TSHULTRASEN 5.400*   FREET4 1.20     Lab Results   Component Value Date/Time    WBC 9.1 04/07/2023 06:09 AM     RBC 3.72 (L) 04/07/2023 06:09 AM    HEMOGLOBIN 11.9 (L) 04/07/2023 06:09 AM    HEMATOCRIT 35.1 (L) 04/07/2023 06:09 AM    MCV 94.4 04/07/2023 06:09 AM    MCH 32.0 04/07/2023 06:09 AM    MCHC 33.9 04/07/2023 06:09 AM    MPV 8.8 (L) 04/07/2023 06:09 AM    NEUTSPOLYS 72.30 (H) 04/07/2023 06:09 AM    LYMPHOCYTES 11.40 (L) 04/07/2023 06:09 AM    MONOCYTES 12.10 04/07/2023 06:09 AM    EOSINOPHILS 0.10 04/07/2023 06:09 AM    BASOPHILS 0.30 04/07/2023 06:09 AM      Lab Results   Component Value Date/Time    SODIUM 133 (L) 04/11/2023 06:24 AM    POTASSIUM 3.7 04/11/2023 06:24 AM    CHLORIDE 100 04/11/2023 06:24 AM    CO2 20 04/11/2023 06:24 AM    GLUCOSE 106 (H) 04/11/2023 06:24 AM    BUN 14 04/11/2023 06:24 AM    CREATININE 0.67 04/11/2023 06:24 AM         [facility] Reference Range & Units 04/04/23 05:38 04/07/23 06:09 04/11/23 06:24   Calcium 8.5 - 10.5 mg/dL 8.3 (L) 8.7 8.3 (L)   Correct Calcium 8.5 - 10.5 mg/dL 9.3 9.3 9.0   AST(SGOT) 12 - 45 U/L 81 (H) 51 (H) 61 (H)   ALT(SGPT) 2 - 50 U/L 103 (H) 68 (H) 57 (H)   Alkaline Phosphatase 30 - 99 U/L 306 (H) 265 (H) 184 (H)   Total Bilirubin 0.1 - 1.5 mg/dL 0.7 0.5 0.4   Albumin 3.2 - 4.9 g/dL 2.8 (L) 3.3 3.1 (L)   (L): Data is abnormally low  (H): Data is abnormally high        Functional Status at Discharge  Eating:  Independent  Eating Description:  Set-up of equipment or meal/tube feeding  Grooming:  Supervision, Standing (hand hygiene)  Grooming Description:  Standing at sink  Bathing:  Supervision  Bathing Description:  Grab bar, Hand held shower, Tub bench, Increased time, Initial preparation for task, Supervision for safety, Verbal cueing  Upper Body Dressing:  Independent  Upper Body Dressing Description:  Set-up of equipment  Lower Body Dressing:  Supervision ([place] for TEDs)  Lower Body Dressing Description:  Grab bar, Increased time, Initial preparation for task, Supervision for safety, Verbal cueing  Discharge Location : Relative / Friend's Home (temporarily d/c  to pt's daughter's home before transitioning to [facility])  Patient Discharging with Assist of: Family   Level of Supervision Required: Intermittent Supervision  Recommended Equipment for Discharge: Hand Held Shower Head;Grab Bars in Tub / Shower;Grab Bars by Toilet;Shower Chair  Recommended Services Upon Discharge: Home Health Occupational Therapy  Long Term Goals Met: 2  Long Term Goals Not Met: 1  Reason(s) for Goals Not Met: Assist for compression stockings  Criteria for Termination of Services: Maximum Function Achieved for Inpatient Rehabilitation  Walk:  Supervised  Distance Walked:  150  Number of Times Distance Was Traveled:  1  Assistive Device:  4 Wheel Walker  Gait Deviation:  Bradykinetic  Wheelchair:  Total Assist  Distance Propelled:      Wheelchair Description:     Stairs Contact Guard Assist (provided from daughter)  Stairs Description Extra time, Limited by fatigue, Supervision for safety, Hand rails (used bed railing for single UE support.)  Discharge Location: Relative / Friend's Home  Patient Discharging with Assist of: Family  Level of Supervision Required Upon Discharge: Intermittent Supervision  Recommended Equipment for Discharge: 4-Wheeled Walker  Recommeded Services Upon Discharge: Home Health Physical Therapy  Long Term Goals Met: 0  Long Term Goals Not Met: 4  Reason(s) for Goals Not Met: patient requiring spv  Criteria for Termination of Services: Maximum Function Achieved for Inpatient Rehabilitation  Comprehension:  Modified Independent  Comprehension Description:  Glasses, Hearing aids/amplifiers  Expression:  Independent  Expression Description:     Social Interaction:  Independent  Social Interaction Description:     Problem Solving:  Minimal Assist  Problem Solving Description:  Therapy schedule, Verbal cueing, Seat belt  Memory:  Supervision  Memory Description:  Therapy schedule, Verbal cueing, Seat belt  Progress since Admit: Pt seen for evaluation and one day of treatment for ST  during this admission. Pt scored within typical functioning on standardized assessment and no further ST services deemed appropriate at this time. Should pt feel it is necessary, recommendations for assistance with medication management from daughter at time of d/c if needed. No other services recommended.  Discharge Location : Assisted Living  Patient Discharging with Assist of: Family   Level of Supervision Required: No Supervision  Recommended Services Upon Discharge: No Follow-Up Speech Therapy Recommended  Long Term Goals Met: 1/1  Long Term Goals Not Met: 0/1  Reason(s) for Goals Not Met: NA  Criteria for Termination of Services: Maximum Function Achieved for Inpatient Rehabilitation    Sailaja AMES M.D., personally performed a complete drug regimen review and no potential clinically significant medication issues were identified.     Discharge Medication:     Medication List        START taking these medications        Instructions   acetaminophen 325 MG Tabs  Commonly known as: Tylenol   Take 2 Tablets by mouth every four hours as needed for Mild Pain.  Dose: 650 mg     ascorbic acid 500 MG tablet  Commonly known as: Vitamin C   Take 1 Tablet by mouth 2 times a day with meals.  Dose: 500 mg     ferrous sulfate 325 (65 Fe) MG tablet   Take 1 Tablet by mouth 2 times a day with meals.  Dose: 325 mg     furosemide 20 MG Tabs  Commonly known as: LASIX   Take 1 Tablet by mouth every day.  Dose: 20 mg     potassium chloride SA 20 MEQ Tbcr  Commonly known as: Kdur   Take 1 tablet by mouth every day.  Dose: 20 mEq     sodium chloride 1 GM Tabs  Commonly known as: SALT  Replaces: SALT SUBSTITUTES PO   Take 1 tablet by mouth 3 times a day with meals.  Dose: 1 g     traZODone 50 MG Tabs  Commonly known as: DESYREL   Take 1 tablet by mouth at bedtime.  Dose: 50 mg            CHANGE how you take these medications        Instructions   losartan 25 MG Tabs  What changed:   medication strength  how much to  take  Commonly known as: COZAAR   Take 3 tablets by mouth every day.  Dose: 75 mg            CONTINUE taking these medications        Instructions   CORICIDIN HBP PO   Take 1 Capsule by mouth 4 times a day as needed (For cough and cold symptoms).  Dose: 1 Capsule     guaiFENesin dextromethorphan 100-10 MG/5ML Syrp syrup  Commonly known as: ROBITUSSIN DM   Take 10 mL by mouth every 6 hours as needed for Cough.  Dose: 10 mL     PreserVision AREDS 2+Multi Vit Caps   Take 1 Capsule by mouth every day.  Dose: 1 Capsule     sertraline 25 MG tablet  Commonly known as: Zoloft   Take 1 tablet by mouth every day.  Dose: 25 mg            STOP taking these medications      NS SOLN 100 mL with ampicillin/sulbactam 3 (2-1) g SOLR 3 g     SALT SUBSTITUTES PO  Replaced by: sodium chloride 1 GM Tabs              Discharge Diet:  Regular    Discharge Activity:  As tolerated.      Disposition:  Patient to discharge home with family support and community resources.     Equipment:  Follow-up & Discharge Instructions:  Home health: PT/OT/RN     Equip: none needed      Follow up: PCP     Condition on Discharge:  Good    More than 35 minutes was spent on discharging this patient, including face-to-face time, prescription management, and the dictation of this note.    Sailaja Mallory M.D.    Date of Service: 4/12/2023

## 2023-04-14 NOTE — PROGRESS NOTES
This patient qualifies for a Transitional Care Management visit as they are being seen within the required time and two attempts were made to contact this patient within two business days to review discharge per CMS guidelines.  You therefore can see them as a TCM visit and charge appropriately.    Coding guide  17961      -face-to-face within 14 days      -moderate medical decision complexity  22743      -face-to-face within 7 days      -high medical decision complexity

## 2023-04-18 ENCOUNTER — OFFICE VISIT (OUTPATIENT)
Dept: MEDICAL GROUP | Facility: PHYSICIAN GROUP | Age: 88
End: 2023-04-18
Payer: MEDICARE

## 2023-04-18 VITALS
HEART RATE: 94 BPM | OXYGEN SATURATION: 97 % | DIASTOLIC BLOOD PRESSURE: 60 MMHG | HEIGHT: 60 IN | BODY MASS INDEX: 23.4 KG/M2 | TEMPERATURE: 98.1 F | SYSTOLIC BLOOD PRESSURE: 120 MMHG | WEIGHT: 119.2 LBS

## 2023-04-18 DIAGNOSIS — D50.9 IRON DEFICIENCY ANEMIA, UNSPECIFIED IRON DEFICIENCY ANEMIA TYPE: ICD-10-CM

## 2023-04-18 DIAGNOSIS — J18.9 MULTIFOCAL PNEUMONIA: ICD-10-CM

## 2023-04-18 DIAGNOSIS — E87.1 CHRONIC HYPONATREMIA: ICD-10-CM

## 2023-04-18 DIAGNOSIS — Z09 HOSPITAL DISCHARGE FOLLOW-UP: Primary | ICD-10-CM

## 2023-04-18 PROCEDURE — 99496 TRANSJ CARE MGMT HIGH F2F 7D: CPT

## 2023-04-18 RX ORDER — FERROUS SULFATE 325(65) MG
325 TABLET ORAL
Qty: 30 TABLET | Refills: 0 | Status: SHIPPED | OUTPATIENT
Start: 2023-04-18 | End: 2023-05-10 | Stop reason: SDUPTHER

## 2023-04-18 RX ORDER — BENZONATATE 100 MG/1
100 CAPSULE ORAL 3 TIMES DAILY PRN
Qty: 60 CAPSULE | Refills: 0 | Status: SHIPPED | OUTPATIENT
Start: 2023-04-18 | End: 2023-05-10

## 2023-04-18 ASSESSMENT — ENCOUNTER SYMPTOMS
SPEECH CHANGE: 0
HEARTBURN: 0
FALLS: 0
DEPRESSION: 0
SHORTNESS OF BREATH: 1
FEVER: 0
ABDOMINAL PAIN: 0
COUGH: 1
BLURRED VISION: 1
PALPITATIONS: 0
DIZZINESS: 0
NAUSEA: 0
SPUTUM PRODUCTION: 1
CHILLS: 0
VOMITING: 0
INSOMNIA: 1
BACK PAIN: 0
CONSTIPATION: 1
WEIGHT LOSS: 1
HEADACHES: 0
ROS GI COMMENTS: POOR APPETITE
WEAKNESS: 1
DIARRHEA: 0
MEMORY LOSS: 0
NERVOUS/ANXIOUS: 1

## 2023-04-18 ASSESSMENT — FIBROSIS 4 INDEX: FIB4 SCORE: 1.87

## 2023-04-18 NOTE — PROGRESS NOTES
Subjective:     Chrissie Dueñas is a 89 y.o. female who presents for Hospital Follow-up.    POST DISCHARGE CALL:  This patient qualifies for a Transitional Care Management visit as they are being seen within the required time and two attempts were made to contact this patient within two business days to review discharge per CMS guidelines.  You therefore can see them as a TCM visit and charge appropriately.    Coding guide  17764      -face-to-face within 14 days      -moderate medical decision complexity  82586      -face-to-face within 7 days      -high medical decision complexity        Discharge Date:4/12/23 See note from Merry Zhou  Date of Outreach Call: 4/14/23  Now that you're home, how are you doing? Very Good  Do you have questions about your medications? No  Did you fill your medications? *  Do you have a follow-up appointment scheduled?Yes  Discharging Department: *  Number of Attempts: 1  Current or previous attempts completed within two business days of discharge? Yes  Provided education regarding treatment plan, medication, self-management, ADLs? Yes  Has patient completed Advance Directive? If yes, advise them to bring to appointment. No  Care Manager phone number provided? Yes  Is there anything else I can help you with? No    HPI:   Recently hospitalized for multifocal pneumonia, discharged to rehab on 4/5 at which time she got Covid. She is making strides and feeling better.  She and her daughter are here today to follow up.  Problem   Hospital Discharge Follow-Up    Recently hospitalized for pneumonia and hyponatremia, subsequently developed Covid following discharge while in Rehab. Overall patient is feeling better, appetite remains poor, but she is improving. She is going to move into assisted living facility: [facility]. She presents with her daughter today and is no acute distress.       Current medicines (including reconciliation performed today)  Current Outpatient Medications    Medication Sig Dispense Refill    ferrous sulfate 325 (65 Fe) MG tablet Take 1 Tablet by mouth every 48 hours. 30 Tablet 0    benzonatate (TESSALON) 100 MG Cap Take 1 Capsule by mouth 3 times a day as needed for Cough. 60 Capsule 0    losartan (COZAAR) 25 MG Tab Take 3 tablets by mouth every day. 90 Tablet 0    sodium chloride (SALT) 1 GM Tab Take 1 tablet by mouth 3 times a day with meals. 90 Tablet 0    sertraline (ZOLOFT) 25 MG tablet Take 1 tablet by mouth every day. 30 Tablet 0    ascorbic acid (VITAMIN C) 500 MG tablet Take 1 Tablet by mouth 2 times a day with meals. 60 Tablet 0    furosemide (LASIX) 20 MG Tab Take 1 Tablet by mouth every day. 30 Tablet 0    potassium chloride SA (KDUR) 20 MEQ Tab CR Take 1 tablet by mouth every day. 30 Tablet 0    traZODone (DESYREL) 50 MG Tab Take 1 tablet by mouth at bedtime. 30 Tablet 0    Multiple Vitamins-Minerals (PRESERVISION AREDS 2+MULTI VIT) Cap Take 1 Capsule by mouth every day.       No current facility-administered medications for this visit.       Allergies:   Patient has no known allergies.    Social History     Tobacco Use    Smoking status: Former     Packs/day: 0.50     Years: 30.00     Pack years: 15.00     Types: Cigarettes     Quit date: 1974     Years since quittin.9    Smokeless tobacco: Never   Vaping Use    Vaping Use: Never used   Substance Use Topics    Alcohol use: Yes     Alcohol/week: 4.2 oz     Types: 7 Glasses of wine per week     Comment: glass of wine everyday     Drug use: Not Currently       ROS:  Review of Systems   Constitutional:  Positive for malaise/fatigue and weight loss (13 lbs since last visit). Negative for chills and fever.   Eyes:  Positive for blurred vision.   Respiratory:  Positive for cough, sputum production (clear) and shortness of breath (at times with coughing).    Cardiovascular:  Negative for chest pain and palpitations.   Gastrointestinal:  Positive for constipation. Negative for abdominal pain, diarrhea,  heartburn, nausea and vomiting.        Poor appetite   Genitourinary:  Negative for dysuria, frequency and urgency.   Musculoskeletal:  Negative for back pain and falls.   Neurological:  Positive for weakness (generalized). Negative for dizziness, speech change and headaches.   Psychiatric/Behavioral:  Negative for depression, memory loss and suicidal ideas. The patient is nervous/anxious and has insomnia.        Objective:     Vitals:    04/18/23 1350   BP: 120/60   BP Location: Left arm   Patient Position: Sitting   BP Cuff Size: Adult   Pulse: 94   Temp: 36.7 °C (98.1 °F)   TempSrc: Temporal   SpO2: 97%   Weight: 54.1 kg (119 lb 3.2 oz)   Height: 1.524 m (5')     Body mass index is 23.28 kg/m².    Physical Exam:  Physical Exam  Constitutional:       General: She is not in acute distress.     Appearance: Normal appearance. She is not ill-appearing or toxic-appearing.   HENT:      Head: Normocephalic.   Eyes:      Conjunctiva/sclera: Conjunctivae normal.   Cardiovascular:      Rate and Rhythm: Normal rate and regular rhythm.      Pulses: Normal pulses.      Heart sounds: Murmur heard.   Pulmonary:      Effort: Pulmonary effort is normal. No respiratory distress.      Breath sounds: Normal breath sounds. No wheezing or rales.   Abdominal:      General: Bowel sounds are normal. There is no distension.      Palpations: Abdomen is soft.      Tenderness: There is no abdominal tenderness.   Skin:     General: Skin is warm and dry.   Neurological:      General: No focal deficit present.      Mental Status: She is alert and oriented to person, place, and time.   Psychiatric:         Mood and Affect: Mood normal.         Behavior: Behavior normal.         Assessment and Plan:   1. Hospital discharge follow-up  - ferrous sulfate 325 (65 Fe) MG tablet; Take 1 Tablet by mouth every 48 hours.  Dispense: 30 Tablet; Refill: 0  - CBC WITHOUT DIFFERENTIAL; Future  - Comp Metabolic Panel; Future  - benzonatate (TESSALON) 100 MG Cap;  Take 1 Capsule by mouth 3 times a day as needed for Cough.  Dispense: 60 Capsule; Refill: 0    2. Multifocal pneumonia  - CBC WITHOUT DIFFERENTIAL; Future  - Comp Metabolic Panel; Future  - benzonatate (TESSALON) 100 MG Cap; Take 1 Capsule by mouth 3 times a day as needed for Cough.  Dispense: 60 Capsule; Refill: 0    3. Iron deficiency anemia, unspecified iron deficiency anemia type  - ferrous sulfate 325 (65 Fe) MG tablet; Take 1 Tablet by mouth every 48 hours.  Dispense: 30 Tablet; Refill: 0  - CBC WITHOUT DIFFERENTIAL; Future    4. Chronic hyponatremia  - Comp Metabolic Panel; Future      - Chart and discharge summary were reviewed.   - Hospitalization and results reviewed with patient.   - Medications reviewed including instructions regarding high risk medications, dosing and side effects.  - Recommended Services: Referral to [facility] Care Coordination Services  - Advance directive/POLST on file?  Yes    Follow-up:No follow-ups on file.    Face-to-face transitional care management services with HIGH (today's visit is within days post discharge & LACE+ score 59+) medical decision complexity were provided.     LACE+ Historical Score Over Time (0-28: Low, 29-58: Medium, 59+: High): 18

## 2023-04-21 ENCOUNTER — TELEPHONE (OUTPATIENT)
Dept: MEDICAL GROUP | Facility: PHYSICIAN GROUP | Age: 88
End: 2023-04-21
Payer: MEDICARE

## 2023-04-21 NOTE — TELEPHONE ENCOUNTER
Patient daughter came in, and would like to request the provider to fill out some form for the patient.. Form is going to the providers desk.. Kailee Ruiz, would like to receive a phone call or G-mode[employer] message when the form is ready to be picked up.. Thank you.. Form going to the providers desk..

## 2023-05-03 ENCOUNTER — HOSPITAL ENCOUNTER (OUTPATIENT)
Dept: LAB | Facility: MEDICAL CENTER | Age: 88
End: 2023-05-03
Payer: MEDICARE

## 2023-05-03 DIAGNOSIS — E87.1 CHRONIC HYPONATREMIA: ICD-10-CM

## 2023-05-03 DIAGNOSIS — D50.9 IRON DEFICIENCY ANEMIA, UNSPECIFIED IRON DEFICIENCY ANEMIA TYPE: ICD-10-CM

## 2023-05-03 DIAGNOSIS — Z09 HOSPITAL DISCHARGE FOLLOW-UP: ICD-10-CM

## 2023-05-03 DIAGNOSIS — J18.9 MULTIFOCAL PNEUMONIA: ICD-10-CM

## 2023-05-03 LAB
ERYTHROCYTE [DISTWIDTH] IN BLOOD BY AUTOMATED COUNT: 46.9 FL (ref 35.9–50)
HCT VFR BLD AUTO: 37.7 % (ref 37–47)
HGB BLD-MCNC: 11.8 G/DL (ref 12–16)
MCH RBC QN AUTO: 31.1 PG (ref 27–33)
MCHC RBC AUTO-ENTMCNC: 31.3 G/DL (ref 33.6–35)
MCV RBC AUTO: 99.2 FL (ref 81.4–97.8)
PLATELET # BLD AUTO: 272 K/UL (ref 164–446)
PMV BLD AUTO: 9.5 FL (ref 9–12.9)
RBC # BLD AUTO: 3.8 M/UL (ref 4.2–5.4)
WBC # BLD AUTO: 11.2 K/UL (ref 4.8–10.8)

## 2023-05-03 PROCEDURE — 85027 COMPLETE CBC AUTOMATED: CPT

## 2023-05-03 PROCEDURE — 80053 COMPREHEN METABOLIC PANEL: CPT

## 2023-05-03 PROCEDURE — 36415 COLL VENOUS BLD VENIPUNCTURE: CPT

## 2023-05-04 LAB
ALBUMIN SERPL BCP-MCNC: 3.7 G/DL (ref 3.2–4.9)
ALBUMIN/GLOB SERPL: 1.2 G/DL
ALP SERPL-CCNC: 143 U/L (ref 30–99)
ALT SERPL-CCNC: 15 U/L (ref 2–50)
ANION GAP SERPL CALC-SCNC: 11 MMOL/L (ref 7–16)
AST SERPL-CCNC: 19 U/L (ref 12–45)
BILIRUB SERPL-MCNC: 0.5 MG/DL (ref 0.1–1.5)
BUN SERPL-MCNC: 22 MG/DL (ref 8–22)
CALCIUM ALBUM COR SERPL-MCNC: 9.1 MG/DL (ref 8.5–10.5)
CALCIUM SERPL-MCNC: 8.9 MG/DL (ref 8.5–10.5)
CHLORIDE SERPL-SCNC: 103 MMOL/L (ref 96–112)
CO2 SERPL-SCNC: 23 MMOL/L (ref 20–33)
CREAT SERPL-MCNC: 0.83 MG/DL (ref 0.5–1.4)
GFR SERPLBLD CREATININE-BSD FMLA CKD-EPI: 66 ML/MIN/1.73 M 2
GLOBULIN SER CALC-MCNC: 3.2 G/DL (ref 1.9–3.5)
GLUCOSE SERPL-MCNC: 99 MG/DL (ref 65–99)
POTASSIUM SERPL-SCNC: 4.6 MMOL/L (ref 3.6–5.5)
PROT SERPL-MCNC: 6.9 G/DL (ref 6–8.2)
SODIUM SERPL-SCNC: 137 MMOL/L (ref 135–145)

## 2023-05-10 ENCOUNTER — OFFICE VISIT (OUTPATIENT)
Dept: MEDICAL GROUP | Facility: PHYSICIAN GROUP | Age: 88
End: 2023-05-10
Payer: MEDICARE

## 2023-05-10 VITALS
DIASTOLIC BLOOD PRESSURE: 50 MMHG | HEART RATE: 76 BPM | HEIGHT: 60 IN | WEIGHT: 120.23 LBS | OXYGEN SATURATION: 94 % | TEMPERATURE: 98.3 F | BODY MASS INDEX: 23.61 KG/M2 | SYSTOLIC BLOOD PRESSURE: 110 MMHG

## 2023-05-10 DIAGNOSIS — D50.9 IRON DEFICIENCY ANEMIA, UNSPECIFIED IRON DEFICIENCY ANEMIA TYPE: ICD-10-CM

## 2023-05-10 DIAGNOSIS — F33.1 MODERATE EPISODE OF RECURRENT MAJOR DEPRESSIVE DISORDER (HCC): ICD-10-CM

## 2023-05-10 DIAGNOSIS — I10 ESSENTIAL HYPERTENSION: ICD-10-CM

## 2023-05-10 DIAGNOSIS — E87.1 CHRONIC HYPONATREMIA: ICD-10-CM

## 2023-05-10 PROCEDURE — 99214 OFFICE O/P EST MOD 30 MIN: CPT

## 2023-05-10 RX ORDER — LOSARTAN POTASSIUM 25 MG/1
75 TABLET ORAL DAILY
Qty: 270 TABLET | Refills: 2 | Status: SHIPPED | OUTPATIENT
Start: 2023-05-10 | End: 2023-06-16 | Stop reason: SDUPTHER

## 2023-05-10 RX ORDER — FERROUS SULFATE 325(65) MG
325 TABLET ORAL
Qty: 60 TABLET | Refills: 1 | Status: SHIPPED | OUTPATIENT
Start: 2023-05-10 | End: 2023-09-05

## 2023-05-10 RX ORDER — POTASSIUM CHLORIDE 20 MEQ/1
20 TABLET, EXTENDED RELEASE ORAL DAILY
Qty: 90 TABLET | Refills: 2 | Status: SHIPPED | OUTPATIENT
Start: 2023-05-10 | End: 2023-09-05

## 2023-05-10 RX ORDER — SERTRALINE HYDROCHLORIDE 25 MG/1
25 TABLET, FILM COATED ORAL DAILY
Qty: 90 TABLET | Refills: 2 | Status: SHIPPED | OUTPATIENT
Start: 2023-05-10 | End: 2023-08-07

## 2023-05-10 RX ORDER — SODIUM CHLORIDE 1 G/1
1 TABLET ORAL
Qty: 270 TABLET | Refills: 2 | Status: SHIPPED | OUTPATIENT
Start: 2023-05-10 | End: 2023-08-07

## 2023-05-10 RX ORDER — FUROSEMIDE 20 MG/1
20 TABLET ORAL DAILY
Qty: 90 TABLET | Refills: 1 | Status: SHIPPED | OUTPATIENT
Start: 2023-05-10 | End: 2023-09-05

## 2023-05-10 ASSESSMENT — ENCOUNTER SYMPTOMS
SHORTNESS OF BREATH: 0
CONSTIPATION: 0
CHILLS: 0
FEVER: 0
DIZZINESS: 0
MYALGIAS: 0
COUGH: 0
WEIGHT LOSS: 0
VOMITING: 0
NAUSEA: 0
ABDOMINAL PAIN: 0
HEADACHES: 0
WEAKNESS: 0
DIARRHEA: 0
BLURRED VISION: 0

## 2023-05-10 ASSESSMENT — FIBROSIS 4 INDEX: FIB4 SCORE: 1.66

## 2023-05-10 NOTE — PROGRESS NOTES
Subjective:     CC: Follow-up visit-lab review    HPI:   Chrissie presents today with    Problem   Anemia    Patient with iron deficiency anemia which is slowly improving.  Currently supplementing with iron tablets 325 mg every 48 hours.  Doing well without side effects.  Most recent CBC reveals the following:   Latest Reference Range & Units 05/03/23 09:37   RBC 4.20 - 5.40 M/uL 3.80 (L)   Hemoglobin 12.0 - 16.0 g/dL 11.8 (L)   Hematocrit 37.0 - 47.0 % 37.7   MCV 81.4 - 97.8 fL 99.2 (H)   MCH 27.0 - 33.0 pg 31.1   MCHC 33.6 - 35.0 g/dL 31.3 (L)        Major Depressive Disorder    -Chronic condition stable  -Taking sertraline 25 mg daily and doing well on this medication without reported side effects.  -Denies SI       Chronic Hyponatremia    Patient with chronic hyponatremia  -Most recently sodium level within normal limits at 137  -Patient has been supplementing with 1 g of sodium 3 times daily with meals.  She is doing well on this regimen as evidence of stability with labs and she is overall feeling much better.  -Her appetite is improved  -Condition could be secondary to losartan as well as sertraline       Essential Hypertension    Chronic condition well-controlled on losartan 75 mg daily.  Patient reports she has been on this medication many years no reported side effects.  Blood pressure today in clinic 110/50              Health Maintenance: Completed    ROS:   Review of Systems   Constitutional:  Negative for chills, fever, malaise/fatigue and weight loss.   Eyes:  Negative for blurred vision.   Respiratory:  Negative for cough and shortness of breath.    Cardiovascular:  Negative for chest pain.   Gastrointestinal:  Negative for abdominal pain, constipation, diarrhea, nausea and vomiting.   Musculoskeletal:  Negative for myalgias.   Neurological:  Negative for dizziness, weakness and headaches.       Objective:     Exam:  /50 (BP Location: Left arm, Patient Position: Sitting, BP Cuff Size: Adult)    Pulse 76   Temp 36.8 °C (98.3 °F) (Temporal)   Ht 1.524 m (5')   Wt 54.5 kg (120 lb 3.7 oz)   SpO2 94%   BMI 23.48 kg/m²  Body mass index is 23.48 kg/m².    Physical Exam  Constitutional:       General: She is not in acute distress.     Appearance: Normal appearance. She is not ill-appearing or toxic-appearing.   HENT:      Head: Normocephalic.   Eyes:      Conjunctiva/sclera: Conjunctivae normal.   Cardiovascular:      Rate and Rhythm: Normal rate and regular rhythm.      Pulses: Normal pulses.      Heart sounds: Normal heart sounds. No murmur heard.  Pulmonary:      Effort: Pulmonary effort is normal. No respiratory distress.      Breath sounds: Normal breath sounds.   Skin:     General: Skin is warm and dry.   Neurological:      General: No focal deficit present.      Mental Status: She is alert and oriented to person, place, and time.   Psychiatric:         Mood and Affect: Mood normal.         Behavior: Behavior normal.         Labs:   Lab Results   Component Value Date/Time    CHOLSTRLTOT 246 (H) 06/23/2020 06:43 AM     (H) 06/23/2020 06:43 AM    HDL 69 06/23/2020 06:43 AM    TRIGLYCERIDE 132 06/23/2020 06:43 AM       Lab Results   Component Value Date/Time    SODIUM 137 05/03/2023 09:37 AM    POTASSIUM 4.6 05/03/2023 09:37 AM    CHLORIDE 103 05/03/2023 09:37 AM    CO2 23 05/03/2023 09:37 AM    GLUCOSE 99 05/03/2023 09:37 AM    BUN 22 05/03/2023 09:37 AM    CREATININE 0.83 05/03/2023 09:37 AM     Lab Results   Component Value Date/Time    ALKPHOSPHAT 143 (H) 05/03/2023 09:37 AM    ASTSGOT 19 05/03/2023 09:37 AM    ALTSGPT 15 05/03/2023 09:37 AM    TBILIRUBIN 0.5 05/03/2023 09:37 AM      No results found for: HBA1C  No results found for: TSH  Lab Results   Component Value Date/Time    FREET4 1.20 03/13/2023 08:10 PM    FREET4 1.01 05/12/2021 09:20 AM         Assessment & Plan: Medical Decision Making     89 y.o. female with the following -     1. Iron deficiency anemia, unspecified iron deficiency  anemia type  Chronic condition appears to be stable and improving therefore we will continue current regimen and recheck CBC in 4 months  - ferrous sulfate 325 (65 Fe) MG tablet; Take 1 Tablet by mouth every 48 hours.  Dispense: 60 Tablet; Refill: 1  - CBC WITHOUT DIFFERENTIAL; Future  - Comp Metabolic Panel; Future    2. Moderate episode of recurrent major depressive disorder (HCC)  Chronic condition stable therefore we will continue Zoloft regimen  - sertraline (ZOLOFT) 25 MG tablet; Take 1 tablet by mouth every day.  Dispense: 90 Tablet; Refill: 2    3. Essential hypertension  Chronic condition stable therefore we will continue current medication regimen as follows and continue to assess electrolyte level regularly  - furosemide (LASIX) 20 MG Tab; Take 1 Tablet by mouth every day.  Dispense: 90 Tablet; Refill: 1  - losartan (COZAAR) 25 MG Tab; Take 3 tablets by mouth every day.  Dispense: 270 Tablet; Refill: 2  - potassium chloride SA (KDUR) 20 MEQ Tab CR; Take 1 tablet by mouth every day.  Dispense: 90 Tablet; Refill: 2  - CBC WITHOUT DIFFERENTIAL; Future  - Comp Metabolic Panel; Future    4. Chronic hyponatremia  Chronic condition appears to be improving and stable therefore we will continue current regimen as follows:  - sodium chloride (SALT) 1 GM Tab; Take 1 tablet by mouth 3 times a day with meals.  Dispense: 270 Tablet; Refill: 2  - CBC WITHOUT DIFFERENTIAL; Future  - Comp Metabolic Panel; Future      Differential diagnosis, natural history, supportive care, and indications for immediate follow-up discussed.  Shared decision making approach utilized, and patient is amendable with plan of care.  Patient understands to return to clinic or go to the emergency department if symptoms worsen. All questions and concerns addressed to the best of my knowledge.    Return in about 4 months (around 9/10/2023) for F/U Lab Review.    Please note that this dictation was created using voice recognition software. I have  made every reasonable attempt to correct obvious errors, but I expect that there are errors of grammar and possibly content that I did not discover before finalizing the note.

## 2023-05-31 PROBLEM — K59.09 OTHER CONSTIPATION: Status: ACTIVE | Noted: 2023-05-30

## 2023-05-31 PROBLEM — R79.89 ABNORMAL LIVER FUNCTION TESTS: Status: ACTIVE | Noted: 2023-05-30

## 2023-05-31 PROBLEM — R19.7 DIARRHEA: Status: RESOLVED | Noted: 2023-03-29 | Resolved: 2023-05-31

## 2023-06-01 PROBLEM — R01.0 BENIGN HEART MURMUR: Status: ACTIVE | Noted: 2023-06-01

## 2023-06-01 PROBLEM — F10.21 HISTORY OF ALCOHOL DEPENDENCE (HCC): Status: ACTIVE | Noted: 2023-06-01

## 2023-06-01 PROBLEM — M19.042 PRIMARY OSTEOARTHRITIS OF BOTH HANDS: Status: ACTIVE | Noted: 2023-05-30

## 2023-06-01 PROBLEM — M19.041 PRIMARY OSTEOARTHRITIS OF BOTH HANDS: Status: ACTIVE | Noted: 2023-05-30

## 2023-06-16 ENCOUNTER — TELEPHONE (OUTPATIENT)
Dept: MEDICAL GROUP | Facility: PHYSICIAN GROUP | Age: 88
End: 2023-06-16
Payer: MEDICARE

## 2023-06-16 DIAGNOSIS — I10 ESSENTIAL HYPERTENSION: ICD-10-CM

## 2023-06-16 RX ORDER — LOSARTAN POTASSIUM 25 MG/1
75 TABLET ORAL DAILY
Qty: 270 TABLET | Refills: 2 | Status: SHIPPED | OUTPATIENT
Start: 2023-06-16 | End: 2023-09-05

## 2023-06-16 RX ORDER — LOSARTAN POTASSIUM 25 MG/1
75 TABLET ORAL DAILY
Qty: 270 TABLET | Refills: 2 | Status: CANCELLED | OUTPATIENT
Start: 2023-06-16

## 2023-07-06 RX ORDER — TRAZODONE HYDROCHLORIDE 50 MG/1
TABLET ORAL
Qty: 31 TABLET | OUTPATIENT
Start: 2023-07-06

## 2023-08-01 PROBLEM — R26.81 UNSTEADY GAIT WHEN WALKING: Status: ACTIVE | Noted: 2023-08-01

## 2023-08-01 PROBLEM — D50.8 IRON DEFICIENCY ANEMIA SECONDARY TO INADEQUATE DIETARY IRON INTAKE: Status: ACTIVE | Noted: 2023-04-04

## 2023-08-01 PROBLEM — J30.1 SEASONAL ALLERGIC RHINITIS DUE TO POLLEN: Status: ACTIVE | Noted: 2023-08-01

## 2023-08-01 PROBLEM — F33.9 MAJOR DEPRESSION, RECURRENT, CHRONIC (HCC): Status: ACTIVE | Noted: 2023-03-15

## 2023-08-04 DIAGNOSIS — J18.9 MULTIFOCAL PNEUMONIA: ICD-10-CM

## 2023-08-04 RX ORDER — ASCORBIC ACID 500 MG
500 TABLET ORAL 2 TIMES DAILY WITH MEALS
Qty: 60 TABLET | Refills: 0 | Status: CANCELLED | OUTPATIENT
Start: 2023-08-04

## 2023-08-29 PROBLEM — G47.00 INSOMNIA: Status: ACTIVE | Noted: 2021-12-28

## 2023-09-03 DIAGNOSIS — D50.9 IRON DEFICIENCY ANEMIA, UNSPECIFIED IRON DEFICIENCY ANEMIA TYPE: ICD-10-CM

## 2023-09-03 DIAGNOSIS — I10 ESSENTIAL HYPERTENSION: ICD-10-CM

## 2023-09-05 RX ORDER — POTASSIUM CHLORIDE 20 MEQ/1
20 TABLET, EXTENDED RELEASE ORAL DAILY
Qty: 30 TABLET | Refills: 11 | Status: SHIPPED | OUTPATIENT
Start: 2023-09-05 | End: 2024-01-22 | Stop reason: SDUPTHER

## 2023-09-05 RX ORDER — MULTIVITAMIN
1 TABLET ORAL DAILY
Qty: 30 TABLET | Refills: 11 | Status: SHIPPED | OUTPATIENT
Start: 2023-09-05 | End: 2024-01-22 | Stop reason: SDUPTHER

## 2023-09-05 RX ORDER — LOSARTAN POTASSIUM 25 MG/1
25 TABLET ORAL DAILY
Qty: 30 TABLET | Refills: 11 | Status: SHIPPED | OUTPATIENT
Start: 2023-09-05 | End: 2023-09-05 | Stop reason: SDUPTHER

## 2023-09-05 RX ORDER — FERROUS SULFATE 325(65) MG
TABLET ORAL
Qty: 15 TABLET | Refills: 11 | Status: SHIPPED
Start: 2023-09-05 | End: 2023-09-21

## 2023-09-05 RX ORDER — FUROSEMIDE 20 MG/1
20 TABLET ORAL DAILY
Qty: 30 TABLET | Refills: 11 | Status: SHIPPED | OUTPATIENT
Start: 2023-09-05 | End: 2024-01-22 | Stop reason: SDUPTHER

## 2023-09-20 ENCOUNTER — HOSPITAL ENCOUNTER (OUTPATIENT)
Dept: LAB | Facility: MEDICAL CENTER | Age: 88
End: 2023-09-20
Attending: NURSE PRACTITIONER
Payer: MEDICARE

## 2023-09-20 DIAGNOSIS — E87.1 HYPONATREMIA: ICD-10-CM

## 2023-09-20 DIAGNOSIS — D50.8 IRON DEFICIENCY ANEMIA SECONDARY TO INADEQUATE DIETARY IRON INTAKE: ICD-10-CM

## 2023-09-20 LAB
ALBUMIN SERPL BCP-MCNC: 4.7 G/DL (ref 3.2–4.9)
ALBUMIN/GLOB SERPL: 1.6 G/DL
ALP SERPL-CCNC: 91 U/L (ref 30–99)
ALT SERPL-CCNC: 15 U/L (ref 2–50)
ANION GAP SERPL CALC-SCNC: 11 MMOL/L (ref 7–16)
AST SERPL-CCNC: 22 U/L (ref 12–45)
BASOPHILS # BLD AUTO: 0.4 % (ref 0–1.8)
BASOPHILS # BLD: 0.03 K/UL (ref 0–0.12)
BILIRUB SERPL-MCNC: 0.8 MG/DL (ref 0.1–1.5)
BUN SERPL-MCNC: 21 MG/DL (ref 8–22)
CALCIUM ALBUM COR SERPL-MCNC: 9.5 MG/DL (ref 8.5–10.5)
CALCIUM SERPL-MCNC: 10.1 MG/DL (ref 8.5–10.5)
CHLORIDE SERPL-SCNC: 100 MMOL/L (ref 96–112)
CO2 SERPL-SCNC: 26 MMOL/L (ref 20–33)
CREAT SERPL-MCNC: 0.93 MG/DL (ref 0.5–1.4)
EOSINOPHIL # BLD AUTO: 0.02 K/UL (ref 0–0.51)
EOSINOPHIL NFR BLD: 0.3 % (ref 0–6.9)
ERYTHROCYTE [DISTWIDTH] IN BLOOD BY AUTOMATED COUNT: 43.6 FL (ref 35.9–50)
GFR SERPLBLD CREATININE-BSD FMLA CKD-EPI: 57 ML/MIN/1.73 M 2
GLOBULIN SER CALC-MCNC: 2.9 G/DL (ref 1.9–3.5)
GLUCOSE SERPL-MCNC: 93 MG/DL (ref 65–99)
HCT VFR BLD AUTO: 43.9 % (ref 37–47)
HGB BLD-MCNC: 14.6 G/DL (ref 12–16)
IMM GRANULOCYTES # BLD AUTO: 0.03 K/UL (ref 0–0.11)
IMM GRANULOCYTES NFR BLD AUTO: 0.4 % (ref 0–0.9)
IRON SATN MFR SERPL: 47 % (ref 15–55)
IRON SERPL-MCNC: 127 UG/DL (ref 40–170)
LYMPHOCYTES # BLD AUTO: 1.9 K/UL (ref 1–4.8)
LYMPHOCYTES NFR BLD: 26.7 % (ref 22–41)
MCH RBC QN AUTO: 32.3 PG (ref 27–33)
MCHC RBC AUTO-ENTMCNC: 33.3 G/DL (ref 32.2–35.5)
MCV RBC AUTO: 97.1 FL (ref 81.4–97.8)
MONOCYTES # BLD AUTO: 0.44 K/UL (ref 0–0.85)
MONOCYTES NFR BLD AUTO: 6.2 % (ref 0–13.4)
NEUTROPHILS # BLD AUTO: 4.7 K/UL (ref 1.82–7.42)
NEUTROPHILS NFR BLD: 66 % (ref 44–72)
NRBC # BLD AUTO: 0 K/UL
NRBC BLD-RTO: 0 /100 WBC (ref 0–0.2)
PLATELET # BLD AUTO: 269 K/UL (ref 164–446)
PMV BLD AUTO: 9.9 FL (ref 9–12.9)
POTASSIUM SERPL-SCNC: 4.9 MMOL/L (ref 3.6–5.5)
PROT SERPL-MCNC: 7.6 G/DL (ref 6–8.2)
RBC # BLD AUTO: 4.52 M/UL (ref 4.2–5.4)
SODIUM SERPL-SCNC: 137 MMOL/L (ref 135–145)
TIBC SERPL-MCNC: 271 UG/DL (ref 250–450)
UIBC SERPL-MCNC: 144 UG/DL (ref 110–370)
WBC # BLD AUTO: 7.1 K/UL (ref 4.8–10.8)

## 2023-09-20 PROCEDURE — 83540 ASSAY OF IRON: CPT

## 2023-09-20 PROCEDURE — 80053 COMPREHEN METABOLIC PANEL: CPT

## 2023-09-20 PROCEDURE — 36415 COLL VENOUS BLD VENIPUNCTURE: CPT

## 2023-09-20 PROCEDURE — 82607 VITAMIN B-12: CPT

## 2023-09-20 PROCEDURE — 83550 IRON BINDING TEST: CPT

## 2023-09-20 PROCEDURE — 84425 ASSAY OF VITAMIN B-1: CPT

## 2023-09-20 PROCEDURE — 85025 COMPLETE CBC W/AUTO DIFF WBC: CPT

## 2023-09-20 PROCEDURE — 82728 ASSAY OF FERRITIN: CPT

## 2023-09-21 LAB
FERRITIN SERPL-MCNC: 372 NG/ML (ref 10–291)
VIT B12 SERPL-MCNC: 740 PG/ML (ref 211–911)

## 2023-09-24 LAB — VIT B1 BLD-MCNC: 171 NMOL/L (ref 70–180)

## 2023-09-25 PROBLEM — N18.31 CHRONIC KIDNEY DISEASE, STAGE 3A: Status: ACTIVE | Noted: 2022-08-03

## 2023-09-25 PROBLEM — J18.9 MULTIFOCAL PNEUMONIA: Status: RESOLVED | Noted: 2023-03-29 | Resolved: 2023-09-25

## 2023-09-25 PROBLEM — H35.3130 BILATERAL NONEXUDATIVE AGE-RELATED MACULAR DEGENERATION: Status: ACTIVE | Noted: 2020-06-01

## 2023-09-25 PROBLEM — E61.1 HYPOFERREMIA: Status: ACTIVE | Noted: 2023-09-25

## 2023-10-30 PROBLEM — Z23 ENCOUNTER FOR VACCINATION: Status: ACTIVE | Noted: 2023-10-30

## 2023-10-30 PROBLEM — Z51.89: Status: ACTIVE | Noted: 2023-10-30

## 2023-11-29 ENCOUNTER — PATIENT MESSAGE (OUTPATIENT)
Dept: HEALTH INFORMATION MANAGEMENT | Facility: OTHER | Age: 88
End: 2023-11-29

## 2023-12-11 PROBLEM — K59.01 SLOW TRANSIT CONSTIPATION: Status: ACTIVE | Noted: 2023-05-30

## 2023-12-26 ENCOUNTER — HOSPITAL ENCOUNTER (OUTPATIENT)
Dept: LAB | Facility: MEDICAL CENTER | Age: 88
End: 2023-12-26
Attending: NURSE PRACTITIONER
Payer: MEDICARE

## 2023-12-26 DIAGNOSIS — D50.8 IRON DEFICIENCY ANEMIA SECONDARY TO INADEQUATE DIETARY IRON INTAKE: ICD-10-CM

## 2023-12-26 DIAGNOSIS — R30.0 DYSURIA: ICD-10-CM

## 2023-12-26 DIAGNOSIS — E03.8 OTHER SPECIFIED HYPOTHYROIDISM: ICD-10-CM

## 2023-12-26 DIAGNOSIS — N18.31 CHRONIC KIDNEY DISEASE, STAGE 3A: ICD-10-CM

## 2023-12-26 LAB
ALBUMIN SERPL BCP-MCNC: 4.4 G/DL (ref 3.2–4.9)
ALBUMIN/GLOB SERPL: 1.6 G/DL
ALP SERPL-CCNC: 74 U/L (ref 30–99)
ALT SERPL-CCNC: 14 U/L (ref 2–50)
ANION GAP SERPL CALC-SCNC: 12 MMOL/L (ref 7–16)
APPEARANCE UR: CLEAR
AST SERPL-CCNC: 19 U/L (ref 12–45)
BASOPHILS # BLD AUTO: 0.4 % (ref 0–1.8)
BASOPHILS # BLD: 0.03 K/UL (ref 0–0.12)
BILIRUB SERPL-MCNC: 0.7 MG/DL (ref 0.1–1.5)
BILIRUB UR QL STRIP.AUTO: NEGATIVE
BUN SERPL-MCNC: 25 MG/DL (ref 8–22)
CALCIUM ALBUM COR SERPL-MCNC: 8.9 MG/DL (ref 8.5–10.5)
CALCIUM SERPL-MCNC: 9.2 MG/DL (ref 8.5–10.5)
CHLORIDE SERPL-SCNC: 98 MMOL/L (ref 96–112)
CO2 SERPL-SCNC: 24 MMOL/L (ref 20–33)
COLOR UR: YELLOW
CREAT SERPL-MCNC: 1.06 MG/DL (ref 0.5–1.4)
CREAT UR-MCNC: 13.77 MG/DL
EOSINOPHIL # BLD AUTO: 0.02 K/UL (ref 0–0.51)
EOSINOPHIL NFR BLD: 0.3 % (ref 0–6.9)
ERYTHROCYTE [DISTWIDTH] IN BLOOD BY AUTOMATED COUNT: 42 FL (ref 35.9–50)
FERRITIN SERPL-MCNC: 368 NG/ML (ref 10–291)
GFR SERPLBLD CREATININE-BSD FMLA CKD-EPI: 49 ML/MIN/1.73 M 2
GLOBULIN SER CALC-MCNC: 2.8 G/DL (ref 1.9–3.5)
GLUCOSE SERPL-MCNC: 116 MG/DL (ref 65–99)
GLUCOSE UR STRIP.AUTO-MCNC: NEGATIVE MG/DL
HCT VFR BLD AUTO: 41.8 % (ref 37–47)
HGB BLD-MCNC: 14.6 G/DL (ref 12–16)
IMM GRANULOCYTES # BLD AUTO: 0.02 K/UL (ref 0–0.11)
IMM GRANULOCYTES NFR BLD AUTO: 0.3 % (ref 0–0.9)
IRON SATN MFR SERPL: 60 % (ref 15–55)
IRON SERPL-MCNC: 162 UG/DL (ref 40–170)
KETONES UR STRIP.AUTO-MCNC: NEGATIVE MG/DL
LEUKOCYTE ESTERASE UR QL STRIP.AUTO: NEGATIVE
LYMPHOCYTES # BLD AUTO: 2.05 K/UL (ref 1–4.8)
LYMPHOCYTES NFR BLD: 26.3 % (ref 22–41)
MCH RBC QN AUTO: 33.3 PG (ref 27–33)
MCHC RBC AUTO-ENTMCNC: 34.9 G/DL (ref 32.2–35.5)
MCV RBC AUTO: 95.4 FL (ref 81.4–97.8)
MICRO URNS: NORMAL
MICROALBUMIN UR-MCNC: <1.2 MG/DL
MICROALBUMIN/CREAT UR: NORMAL MG/G (ref 0–30)
MONOCYTES # BLD AUTO: 0.45 K/UL (ref 0–0.85)
MONOCYTES NFR BLD AUTO: 5.8 % (ref 0–13.4)
NEUTROPHILS # BLD AUTO: 5.22 K/UL (ref 1.82–7.42)
NEUTROPHILS NFR BLD: 66.9 % (ref 44–72)
NITRITE UR QL STRIP.AUTO: NEGATIVE
NRBC # BLD AUTO: 0 K/UL
NRBC BLD-RTO: 0 /100 WBC (ref 0–0.2)
PH UR STRIP.AUTO: 6.5 [PH] (ref 5–8)
PLATELET # BLD AUTO: 259 K/UL (ref 164–446)
PMV BLD AUTO: 9.5 FL (ref 9–12.9)
POTASSIUM SERPL-SCNC: 4.4 MMOL/L (ref 3.6–5.5)
PROT SERPL-MCNC: 7.2 G/DL (ref 6–8.2)
PROT UR QL STRIP: NEGATIVE MG/DL
RBC # BLD AUTO: 4.38 M/UL (ref 4.2–5.4)
RBC UR QL AUTO: NEGATIVE
SODIUM SERPL-SCNC: 134 MMOL/L (ref 135–145)
SP GR UR STRIP.AUTO: 1.01
T3FREE SERPL-MCNC: 1.87 PG/ML (ref 2–4.4)
TIBC SERPL-MCNC: 268 UG/DL (ref 250–450)
TSH SERPL DL<=0.005 MIU/L-ACNC: 2.14 UIU/ML (ref 0.38–5.33)
UIBC SERPL-MCNC: 106 UG/DL (ref 110–370)
UROBILINOGEN UR STRIP.AUTO-MCNC: 0.2 MG/DL
WBC # BLD AUTO: 7.8 K/UL (ref 4.8–10.8)

## 2023-12-26 PROCEDURE — 82728 ASSAY OF FERRITIN: CPT

## 2023-12-26 PROCEDURE — 84481 FREE ASSAY (FT-3): CPT

## 2023-12-26 PROCEDURE — 83550 IRON BINDING TEST: CPT

## 2023-12-26 PROCEDURE — 82043 UR ALBUMIN QUANTITATIVE: CPT

## 2023-12-26 PROCEDURE — 81003 URINALYSIS AUTO W/O SCOPE: CPT

## 2023-12-26 PROCEDURE — 82570 ASSAY OF URINE CREATININE: CPT

## 2023-12-26 PROCEDURE — 85025 COMPLETE CBC W/AUTO DIFF WBC: CPT

## 2023-12-26 PROCEDURE — 80053 COMPREHEN METABOLIC PANEL: CPT

## 2023-12-26 PROCEDURE — 84443 ASSAY THYROID STIM HORMONE: CPT

## 2023-12-26 PROCEDURE — 83540 ASSAY OF IRON: CPT

## 2024-06-07 ENCOUNTER — HOSPITAL ENCOUNTER (OUTPATIENT)
Dept: LAB | Facility: MEDICAL CENTER | Age: 89
End: 2024-06-07
Attending: PHYSICIAN ASSISTANT
Payer: MEDICARE

## 2024-06-07 DIAGNOSIS — N18.31 CHRONIC KIDNEY DISEASE, STAGE 3A: ICD-10-CM

## 2024-06-07 DIAGNOSIS — E87.1 CHRONIC HYPONATREMIA: ICD-10-CM

## 2024-06-07 LAB
ANION GAP SERPL CALC-SCNC: 13 MMOL/L (ref 7–16)
BUN SERPL-MCNC: 15 MG/DL (ref 8–22)
CALCIUM SERPL-MCNC: 9.7 MG/DL (ref 8.5–10.5)
CHLORIDE SERPL-SCNC: 99 MMOL/L (ref 96–112)
CO2 SERPL-SCNC: 24 MMOL/L (ref 20–33)
CREAT SERPL-MCNC: 0.94 MG/DL (ref 0.5–1.4)
FASTING STATUS PATIENT QL REPORTED: NORMAL
GFR SERPLBLD CREATININE-BSD FMLA CKD-EPI: 56 ML/MIN/1.73 M 2
GLUCOSE SERPL-MCNC: 86 MG/DL (ref 65–99)
POTASSIUM SERPL-SCNC: 5.1 MMOL/L (ref 3.6–5.5)
SODIUM SERPL-SCNC: 136 MMOL/L (ref 135–145)

## 2024-06-07 PROCEDURE — 80048 BASIC METABOLIC PNL TOTAL CA: CPT

## 2024-06-07 PROCEDURE — 36415 COLL VENOUS BLD VENIPUNCTURE: CPT

## 2024-06-17 PROBLEM — Z51.89: Status: RESOLVED | Noted: 2023-10-30 | Resolved: 2024-06-17

## 2024-09-06 RX ORDER — FERROUS SULFATE 325(65) MG
TABLET ORAL
Qty: 15 TABLET | Refills: 0 | Status: SHIPPED | OUTPATIENT
Start: 2024-09-06

## 2024-09-06 NOTE — TELEPHONE ENCOUNTER
Received request via: Pharmacy    Was the patient seen in the last year in this department? Yes    Does the patient have an active prescription (recently filled or refills available) for medication(s) requested? No    Pharmacy Name: [facility]     Does the patient have skilled nursing Plus and need 100-day supply? (This applies to ALL medications) Patient does not have SCP

## 2024-09-09 PROBLEM — E87.6 HYPOKALEMIA: Status: RESOLVED | Noted: 2023-04-02 | Resolved: 2024-09-09

## 2024-09-09 PROBLEM — H10.31 ACUTE BACTERIAL CONJUNCTIVITIS OF RIGHT EYE: Status: RESOLVED | Noted: 2023-03-31 | Resolved: 2024-09-09

## 2024-09-09 PROBLEM — E87.1 HYPONATREMIA: Status: RESOLVED | Noted: 2023-03-29 | Resolved: 2024-09-09

## 2024-09-09 PROBLEM — R79.89 ABNORMAL LIVER FUNCTION TESTS: Status: RESOLVED | Noted: 2023-05-30 | Resolved: 2024-09-09

## 2024-09-09 PROBLEM — R74.01 TRANSAMINITIS: Status: RESOLVED | Noted: 2023-04-01 | Resolved: 2024-09-09

## 2024-10-21 PROBLEM — Z71.85 VACCINE COUNSELING: Status: ACTIVE | Noted: 2023-10-30

## 2024-10-21 PROBLEM — E03.9 HYPOTHYROIDISM: Status: ACTIVE | Noted: 2023-03-15

## 2024-10-21 PROBLEM — J30.2 SEASONAL ALLERGIC RHINITIS: Status: ACTIVE | Noted: 2023-08-01

## 2024-10-28 RX ORDER — MV-MIN/FA/VIT K/LUTEIN/ZEAXANT 200MCG-5MG
1 CAPSULE ORAL DAILY
Qty: 30 CAPSULE | Refills: 0 | Status: SHIPPED | OUTPATIENT
Start: 2024-10-28

## 2024-11-05 ENCOUNTER — HOSPITAL ENCOUNTER (OUTPATIENT)
Dept: LAB | Facility: MEDICAL CENTER | Age: 89
End: 2024-11-05
Attending: PHYSICIAN ASSISTANT
Payer: MEDICARE

## 2024-11-05 DIAGNOSIS — N18.31 CHRONIC KIDNEY DISEASE, STAGE 3A: ICD-10-CM

## 2024-11-05 LAB
APPEARANCE UR: CLEAR
BACTERIA #/AREA URNS HPF: ABNORMAL /HPF
BILIRUB UR QL STRIP.AUTO: NEGATIVE
CASTS URNS QL MICRO: ABNORMAL /LPF (ref 0–2)
COLOR UR: YELLOW
EPITHELIAL CELLS 1715: ABNORMAL /HPF (ref 0–5)
ERYTHROCYTE [DISTWIDTH] IN BLOOD BY AUTOMATED COUNT: 42.3 FL (ref 35.9–50)
GLUCOSE UR STRIP.AUTO-MCNC: NEGATIVE MG/DL
HCT VFR BLD AUTO: 42.8 % (ref 37–47)
HGB BLD-MCNC: 14.6 G/DL (ref 12–16)
IRON SATN MFR SERPL: 43 % (ref 15–55)
IRON SERPL-MCNC: 124 UG/DL (ref 40–170)
KETONES UR STRIP.AUTO-MCNC: NEGATIVE MG/DL
LEUKOCYTE ESTERASE UR QL STRIP.AUTO: ABNORMAL
MCH RBC QN AUTO: 33.4 PG (ref 27–33)
MCHC RBC AUTO-ENTMCNC: 34.1 G/DL (ref 32.2–35.5)
MCV RBC AUTO: 97.9 FL (ref 81.4–97.8)
MICRO URNS: ABNORMAL
NITRITE UR QL STRIP.AUTO: NEGATIVE
PH UR STRIP.AUTO: 6.5 [PH] (ref 5–8)
PLATELET # BLD AUTO: 286 K/UL (ref 164–446)
PMV BLD AUTO: 9.3 FL (ref 9–12.9)
PROT UR QL STRIP: NEGATIVE MG/DL
RBC # BLD AUTO: 4.37 M/UL (ref 4.2–5.4)
RBC # URNS HPF: ABNORMAL /HPF (ref 0–2)
RBC UR QL AUTO: NEGATIVE
SP GR UR STRIP.AUTO: 1.01
TIBC SERPL-MCNC: 290 UG/DL (ref 250–450)
TSH SERPL DL<=0.005 MIU/L-ACNC: 2.24 UIU/ML (ref 0.38–5.33)
UIBC SERPL-MCNC: 166 UG/DL (ref 110–370)
UROBILINOGEN UR STRIP.AUTO-MCNC: 0.2 EU/DL
WBC # BLD AUTO: 6 K/UL (ref 4.8–10.8)
WBC #/AREA URNS HPF: ABNORMAL /HPF

## 2024-11-05 PROCEDURE — 81001 URINALYSIS AUTO W/SCOPE: CPT

## 2024-11-05 PROCEDURE — 83550 IRON BINDING TEST: CPT

## 2024-11-05 PROCEDURE — 82570 ASSAY OF URINE CREATININE: CPT

## 2024-11-05 PROCEDURE — 83540 ASSAY OF IRON: CPT

## 2024-11-05 PROCEDURE — 85027 COMPLETE CBC AUTOMATED: CPT

## 2024-11-05 PROCEDURE — 84443 ASSAY THYROID STIM HORMONE: CPT | Mod: GA

## 2024-11-05 PROCEDURE — 82043 UR ALBUMIN QUANTITATIVE: CPT

## 2024-11-05 PROCEDURE — 36415 COLL VENOUS BLD VENIPUNCTURE: CPT

## 2024-11-06 LAB
CREAT UR-MCNC: 8.08 MG/DL
MICROALBUMIN UR-MCNC: <1.2 MG/DL
MICROALBUMIN/CREAT UR: NORMAL MG/G (ref 0–30)

## 2024-11-11 ENCOUNTER — HOSPITAL ENCOUNTER (INPATIENT)
Facility: MEDICAL CENTER | Age: 89
LOS: 2 days | DRG: 689 | End: 2024-11-13
Attending: EMERGENCY MEDICINE | Admitting: INTERNAL MEDICINE
Payer: MEDICARE

## 2024-11-11 ENCOUNTER — APPOINTMENT (OUTPATIENT)
Dept: RADIOLOGY | Facility: MEDICAL CENTER | Age: 89
DRG: 689 | End: 2024-11-11
Attending: EMERGENCY MEDICINE
Payer: MEDICARE

## 2024-11-11 DIAGNOSIS — N39.0 ACUTE UTI: ICD-10-CM

## 2024-11-11 DIAGNOSIS — R41.82 ALTERED MENTAL STATUS, UNSPECIFIED ALTERED MENTAL STATUS TYPE: ICD-10-CM

## 2024-11-11 DIAGNOSIS — R26.81 UNSTEADY GAIT WHEN WALKING: ICD-10-CM
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
## 2024-11-11 PROBLEM — R94.31 ABNORMAL EKG: Status: ACTIVE | Noted: 2024-11-11

## 2024-11-11 PROBLEM — E87.29 HIGH ANION GAP METABOLIC ACIDOSIS: Status: ACTIVE | Noted: 2024-11-11

## 2024-11-11 PROBLEM — D72.829 LEUKOCYTOSIS: Status: ACTIVE | Noted: 2024-11-11

## 2024-11-11 PROBLEM — G93.40 ACUTE ENCEPHALOPATHY: Status: ACTIVE | Noted: 2024-11-11

## 2024-11-11 PROBLEM — I44.0 AV BLOCK, 1ST DEGREE: Status: ACTIVE | Noted: 2024-11-11

## 2024-11-11 LAB
ALBUMIN SERPL BCP-MCNC: 4.1 G/DL (ref 3.2–4.9)
ALBUMIN/GLOB SERPL: 1.4 G/DL
ALP SERPL-CCNC: 104 U/L (ref 30–99)
ALT SERPL-CCNC: 14 U/L (ref 2–50)
ANION GAP SERPL CALC-SCNC: 18 MMOL/L (ref 7–16)
APPEARANCE UR: CLEAR
APTT PPP: 26.7 SEC (ref 24.7–36)
AST SERPL-CCNC: 39 U/L (ref 12–45)
BACTERIA #/AREA URNS HPF: ABNORMAL /HPF
BASOPHILS # BLD AUTO: 0 % (ref 0–1.8)
BASOPHILS # BLD: 0 K/UL (ref 0–0.12)
BILIRUB SERPL-MCNC: 0.6 MG/DL (ref 0.1–1.5)
BILIRUB UR QL STRIP.AUTO: NEGATIVE
BUN SERPL-MCNC: 12 MG/DL (ref 8–22)
CALCIUM ALBUM COR SERPL-MCNC: 9.1 MG/DL (ref 8.5–10.5)
CALCIUM SERPL-MCNC: 9.2 MG/DL (ref 8.5–10.5)
CASTS URNS QL MICRO: ABNORMAL /LPF (ref 0–2)
CHLORIDE SERPL-SCNC: 97 MMOL/L (ref 96–112)
CK SERPL-CCNC: 540 U/L (ref 0–154)
CO2 SERPL-SCNC: 16 MMOL/L (ref 20–33)
COLOR UR: YELLOW
CREAT SERPL-MCNC: 0.91 MG/DL (ref 0.5–1.4)
EKG IMPRESSION: NORMAL
EOSINOPHIL # BLD AUTO: 0 K/UL (ref 0–0.51)
EOSINOPHIL NFR BLD: 0 % (ref 0–6.9)
EPITHELIAL CELLS 1715: ABNORMAL /HPF (ref 0–5)
ERYTHROCYTE [DISTWIDTH] IN BLOOD BY AUTOMATED COUNT: 40.9 FL (ref 35.9–50)
GFR SERPLBLD CREATININE-BSD FMLA CKD-EPI: 58 ML/MIN/1.73 M 2
GLOBULIN SER CALC-MCNC: 3 G/DL (ref 1.9–3.5)
GLUCOSE SERPL-MCNC: 102 MG/DL (ref 65–99)
GLUCOSE UR STRIP.AUTO-MCNC: NEGATIVE MG/DL
HCT VFR BLD AUTO: 41.1 % (ref 37–47)
HGB BLD-MCNC: 14.2 G/DL (ref 12–16)
INR PPP: 1.01 (ref 0.87–1.13)
KETONES UR STRIP.AUTO-MCNC: NEGATIVE MG/DL
LEUKOCYTE ESTERASE UR QL STRIP.AUTO: NEGATIVE
LYMPHOCYTES # BLD AUTO: 1.21 K/UL (ref 1–4.8)
LYMPHOCYTES NFR BLD: 6 % (ref 22–41)
MANUAL DIFF BLD: NORMAL
MCH RBC QN AUTO: 33.1 PG (ref 27–33)
MCHC RBC AUTO-ENTMCNC: 34.5 G/DL (ref 32.2–35.5)
MCV RBC AUTO: 95.8 FL (ref 81.4–97.8)
MICRO URNS: ABNORMAL
MONOCYTES # BLD AUTO: 0.7 K/UL (ref 0–0.85)
MONOCYTES NFR BLD AUTO: 3.5 % (ref 0–13.4)
MORPHOLOGY BLD-IMP: NORMAL
MUCOUS THREADS URNS QL MICRO: PRESENT /HPF
NEUTROPHILS # BLD AUTO: 18.19 K/UL (ref 1.82–7.42)
NEUTROPHILS NFR BLD: 87.9 % (ref 44–72)
NEUTS BAND NFR BLD MANUAL: 2.6 % (ref 0–10)
NITRITE UR QL STRIP.AUTO: NEGATIVE
NRBC # BLD AUTO: 0 K/UL
NRBC BLD-RTO: 0 /100 WBC (ref 0–0.2)
PH UR STRIP.AUTO: 5.5 [PH] (ref 5–8)
PLATELET # BLD AUTO: 236 K/UL (ref 164–446)
PLATELET BLD QL SMEAR: NORMAL
PMV BLD AUTO: 8.7 FL (ref 9–12.9)
POTASSIUM SERPL-SCNC: 3.8 MMOL/L (ref 3.6–5.5)
PROT SERPL-MCNC: 7.1 G/DL (ref 6–8.2)
PROT UR QL STRIP: 30 MG/DL
PROTHROMBIN TIME: 13.3 SEC (ref 12–14.6)
RBC # BLD AUTO: 4.29 M/UL (ref 4.2–5.4)
RBC # URNS HPF: ABNORMAL /HPF (ref 0–2)
RBC BLD AUTO: NORMAL
RBC UR QL AUTO: ABNORMAL
SODIUM SERPL-SCNC: 131 MMOL/L (ref 135–145)
SP GR UR STRIP.AUTO: 1.01
UROBILINOGEN UR STRIP.AUTO-MCNC: 0.2 EU/DL
WBC # BLD AUTO: 20.1 K/UL (ref 4.8–10.8)
WBC #/AREA URNS HPF: ABNORMAL /HPF

## 2024-11-11 PROCEDURE — 700102 HCHG RX REV CODE 250 W/ 637 OVERRIDE(OP): Performed by: NURSE PRACTITIONER

## 2024-11-11 PROCEDURE — 80053 COMPREHEN METABOLIC PANEL: CPT

## 2024-11-11 PROCEDURE — 700105 HCHG RX REV CODE 258: Performed by: INTERNAL MEDICINE

## 2024-11-11 PROCEDURE — 99223 1ST HOSP IP/OBS HIGH 75: CPT | Mod: AI | Performed by: INTERNAL MEDICINE

## 2024-11-11 PROCEDURE — 700105 HCHG RX REV CODE 258: Performed by: EMERGENCY MEDICINE

## 2024-11-11 PROCEDURE — 73562 X-RAY EXAM OF KNEE 3: CPT | Mod: LT

## 2024-11-11 PROCEDURE — 99285 EMERGENCY DEPT VISIT HI MDM: CPT

## 2024-11-11 PROCEDURE — 70486 CT MAXILLOFACIAL W/O DYE: CPT

## 2024-11-11 PROCEDURE — 85610 PROTHROMBIN TIME: CPT

## 2024-11-11 PROCEDURE — 70450 CT HEAD/BRAIN W/O DYE: CPT

## 2024-11-11 PROCEDURE — 72125 CT NECK SPINE W/O DYE: CPT

## 2024-11-11 PROCEDURE — 81001 URINALYSIS AUTO W/SCOPE: CPT

## 2024-11-11 PROCEDURE — 36415 COLL VENOUS BLD VENIPUNCTURE: CPT

## 2024-11-11 PROCEDURE — 85027 COMPLETE CBC AUTOMATED: CPT

## 2024-11-11 PROCEDURE — 700111 HCHG RX REV CODE 636 W/ 250 OVERRIDE (IP): Mod: JZ | Performed by: EMERGENCY MEDICINE

## 2024-11-11 PROCEDURE — 73610 X-RAY EXAM OF ANKLE: CPT | Mod: RT

## 2024-11-11 PROCEDURE — 96374 THER/PROPH/DIAG INJ IV PUSH: CPT

## 2024-11-11 PROCEDURE — 770020 HCHG ROOM/CARE - TELE (206)

## 2024-11-11 PROCEDURE — A9270 NON-COVERED ITEM OR SERVICE: HCPCS | Performed by: NURSE PRACTITIONER

## 2024-11-11 PROCEDURE — 85007 BL SMEAR W/DIFF WBC COUNT: CPT

## 2024-11-11 PROCEDURE — 82550 ASSAY OF CK (CPK): CPT

## 2024-11-11 PROCEDURE — 93005 ELECTROCARDIOGRAM TRACING: CPT | Performed by: EMERGENCY MEDICINE

## 2024-11-11 PROCEDURE — 85730 THROMBOPLASTIN TIME PARTIAL: CPT

## 2024-11-11 RX ORDER — LOSARTAN POTASSIUM 25 MG/1
25 TABLET ORAL DAILY
Status: DISCONTINUED | OUTPATIENT
Start: 2024-11-12 | End: 2024-11-13 | Stop reason: HOSPADM

## 2024-11-11 RX ORDER — HYDRALAZINE HYDROCHLORIDE 20 MG/ML
10 INJECTION INTRAMUSCULAR; INTRAVENOUS EVERY 4 HOURS PRN
Status: DISCONTINUED | OUTPATIENT
Start: 2024-11-11 | End: 2024-11-13 | Stop reason: HOSPADM

## 2024-11-11 RX ORDER — SODIUM CHLORIDE 9 MG/ML
1000 INJECTION, SOLUTION INTRAVENOUS ONCE
Status: COMPLETED | OUTPATIENT
Start: 2024-11-11 | End: 2024-11-11

## 2024-11-11 RX ORDER — POLYETHYLENE GLYCOL 3350 17 G/17G
1 POWDER, FOR SOLUTION ORAL
Status: DISCONTINUED | OUTPATIENT
Start: 2024-11-11 | End: 2024-11-13 | Stop reason: HOSPADM

## 2024-11-11 RX ORDER — CEFAZOLIN SODIUM 1 G/50ML
1 INJECTION, SOLUTION INTRAVENOUS EVERY 12 HOURS
Status: DISCONTINUED | OUTPATIENT
Start: 2024-11-12 | End: 2024-11-13 | Stop reason: HOSPADM

## 2024-11-11 RX ORDER — SODIUM CHLORIDE 9 MG/ML
INJECTION, SOLUTION INTRAVENOUS CONTINUOUS
Status: DISCONTINUED | OUTPATIENT
Start: 2024-11-11 | End: 2024-11-13 | Stop reason: HOSPADM

## 2024-11-11 RX ORDER — ONDANSETRON 4 MG/1
4 TABLET, ORALLY DISINTEGRATING ORAL EVERY 4 HOURS PRN
Status: DISCONTINUED | OUTPATIENT
Start: 2024-11-11 | End: 2024-11-13 | Stop reason: HOSPADM

## 2024-11-11 RX ORDER — CEFTRIAXONE 2 G/1
2000 INJECTION, POWDER, FOR SOLUTION INTRAMUSCULAR; INTRAVENOUS ONCE
Status: COMPLETED | OUTPATIENT
Start: 2024-11-11 | End: 2024-11-11

## 2024-11-11 RX ORDER — ONDANSETRON 2 MG/ML
4 INJECTION INTRAMUSCULAR; INTRAVENOUS EVERY 4 HOURS PRN
Status: DISCONTINUED | OUTPATIENT
Start: 2024-11-11 | End: 2024-11-13 | Stop reason: HOSPADM

## 2024-11-11 RX ORDER — ACETAMINOPHEN 325 MG/1
650 TABLET ORAL EVERY 6 HOURS PRN
Status: DISCONTINUED | OUTPATIENT
Start: 2024-11-11 | End: 2024-11-13 | Stop reason: HOSPADM

## 2024-11-11 RX ORDER — AMOXICILLIN 250 MG
2 CAPSULE ORAL EVERY EVENING
Status: DISCONTINUED | OUTPATIENT
Start: 2024-11-11 | End: 2024-11-13 | Stop reason: HOSPADM

## 2024-11-11 RX ADMIN — Medication 3 MG: at 22:24

## 2024-11-11 RX ADMIN — SODIUM CHLORIDE 1000 ML: 9 INJECTION, SOLUTION INTRAVENOUS at 09:46

## 2024-11-11 RX ADMIN — CEFTRIAXONE SODIUM 2000 MG: 2 INJECTION, POWDER, FOR SOLUTION INTRAMUSCULAR; INTRAVENOUS at 09:46

## 2024-11-11 RX ADMIN — SODIUM CHLORIDE: 9 INJECTION, SOLUTION INTRAVENOUS at 16:39

## 2024-11-11 ASSESSMENT — LIFESTYLE VARIABLES
TOTAL SCORE: 0
EVER FELT BAD OR GUILTY ABOUT YOUR DRINKING: NO
HAVE PEOPLE ANNOYED YOU BY CRITICIZING YOUR DRINKING: NO
CONSUMPTION TOTAL: NEGATIVE
ON A TYPICAL DAY WHEN YOU DRINK ALCOHOL HOW MANY DRINKS DO YOU HAVE: 1
EVER HAD A DRINK FIRST THING IN THE MORNING TO STEADY YOUR NERVES TO GET RID OF A HANGOVER: NO
AVERAGE NUMBER OF DAYS PER WEEK YOU HAVE A DRINK CONTAINING ALCOHOL: 4
TOTAL SCORE: 0
HOW MANY TIMES IN THE PAST YEAR HAVE YOU HAD 5 OR MORE DRINKS IN A DAY: 0
ALCOHOL_USE: YES
HAVE YOU EVER FELT YOU SHOULD CUT DOWN ON YOUR DRINKING: NO
TOTAL SCORE: 0

## 2024-11-11 ASSESSMENT — COGNITIVE AND FUNCTIONAL STATUS - GENERAL
MOVING TO AND FROM BED TO CHAIR: A LITTLE
HELP NEEDED FOR BATHING: A LITTLE
SUGGESTED CMS G CODE MODIFIER MOBILITY: CK
PERSONAL GROOMING: A LITTLE
DRESSING REGULAR UPPER BODY CLOTHING: A LITTLE
WALKING IN HOSPITAL ROOM: A LITTLE
TOILETING: A LITTLE
DAILY ACTIVITIY SCORE: 19
MOBILITY SCORE: 17
STANDING UP FROM CHAIR USING ARMS: A LITTLE
TURNING FROM BACK TO SIDE WHILE IN FLAT BAD: A LITTLE
CLIMB 3 TO 5 STEPS WITH RAILING: A LOT
MOVING FROM LYING ON BACK TO SITTING ON SIDE OF FLAT BED: A LITTLE
DRESSING REGULAR LOWER BODY CLOTHING: A LITTLE
SUGGESTED CMS G CODE MODIFIER DAILY ACTIVITY: CK

## 2024-11-11 ASSESSMENT — ENCOUNTER SYMPTOMS
NAUSEA: 0
SHORTNESS OF BREATH: 0
TINGLING: 0
COUGH: 0
ABDOMINAL PAIN: 0
VOMITING: 0
FEVER: 0
PALPITATIONS: 0
DIZZINESS: 0
DIARRHEA: 0
MEMORY LOSS: 1
CONSTIPATION: 0
CHILLS: 0
FALLS: 1
SPUTUM PRODUCTION: 0
MYALGIAS: 0
WEAKNESS: 0
STRIDOR: 0
HEADACHES: 0
DEPRESSION: 0

## 2024-11-11 ASSESSMENT — SOCIAL DETERMINANTS OF HEALTH (SDOH)
IN THE PAST 12 MONTHS, HAS THE ELECTRIC, GAS, OIL, OR WATER COMPANY THREATENED TO SHUT OFF SERVICE IN YOUR HOME?: NO
WITHIN THE LAST YEAR, HAVE TO BEEN RAPED OR FORCED TO HAVE ANY KIND OF SEXUAL ACTIVITY BY YOUR PARTNER OR EX-PARTNER?: NO
WITHIN THE LAST YEAR, HAVE YOU BEEN HUMILIATED OR EMOTIONALLY ABUSED IN OTHER WAYS BY YOUR PARTNER OR EX-PARTNER?: NO
WITHIN THE LAST YEAR, HAVE YOU BEEN KICKED, HIT, SLAPPED, OR OTHERWISE PHYSICALLY HURT BY YOUR PARTNER OR EX-PARTNER?: NO
WITHIN THE PAST 12 MONTHS, THE FOOD YOU BOUGHT JUST DIDN'T LAST AND YOU DIDN'T HAVE MONEY TO GET MORE: NEVER TRUE
WITHIN THE PAST 12 MONTHS, YOU WORRIED THAT YOUR FOOD WOULD RUN OUT BEFORE YOU GOT THE MONEY TO BUY MORE: NEVER TRUE
WITHIN THE LAST YEAR, HAVE YOU BEEN AFRAID OF YOUR PARTNER OR EX-PARTNER?: NO

## 2024-11-11 ASSESSMENT — FIBROSIS 4 INDEX
FIB4 SCORE: 1.67
FIB4 SCORE: 4.15

## 2024-11-11 ASSESSMENT — PAIN DESCRIPTION - PAIN TYPE: TYPE: ACUTE PAIN

## 2024-11-11 NOTE — ED TRIAGE NOTES
"Chief Complaint   Patient presents with    Head Injury     Pt arrives with redness and swelling to right eye/cheek/upper lip    Syncope     Pt remembers getting ready for bed and having \"weird dreams\". Pt states she woke up on the floor. Per EMS, pt lives in assisted living and was found by staff under a desk. Pt does not remember the event.          "

## 2024-11-11 NOTE — ASSESSMENT & PLAN NOTE
Continue home losartan  Start as needed hydralazine  Adjust as needed    Vitals:    11/12/24 1234   BP: 135/59   Pulse: 60   Resp: 18   Temp: 36.8 °C (98.2 °F)   SpO2: 94%     Stable.

## 2024-11-11 NOTE — ED NOTES
Unable to obtain med rec at this time. Patient is from [facility] no MAR at bedside or chart. Requested MAR from facility staff.

## 2024-11-11 NOTE — PROGRESS NOTES
4 Eyes Skin Assessment Completed by SANGEETHA Pearson and SANGEETHA Marquez.    Head Scab, Scratch, Swelling, and Redness  Ears Redness and Blanching  Nose WDL  Mouth WDL  Neck WDL  Breast/Chest redness under breasts  Shoulder Blades WDL  Spine WDL  (R) Arm/Elbow/Hand Redness and Blanching  (L) Arm/Elbow/Hand Redness and Blanching  Abdomen WDL  Groin WDL  Scrotum/Coccyx/Buttocks Redness and Blanching  (R) Leg WDL  (L) Leg Redness, knee  (R) Heel/Foot/Toe WDL  (L) Heel/Foot/Toe WDL          Devices In Places Tele Box and Blood Pressure Cuff      Interventions In Place TAP System, Pillows, and Low Air Loss Mattress    Possible Skin Injury Yes    Pictures Uploaded Into Epic Yes  Wound Consult Placed Yes  RN Wound Prevention Protocol Ordered Yes

## 2024-11-11 NOTE — ASSESSMENT & PLAN NOTE
Does appear to be at baseline  She has however dehydrated so will be on IV fluids  Repeat BMP in the morning    Recent Labs     11/11/24  0624 11/12/24  0052   SODIUM 131* 134*   POTASSIUM 3.8 4.0   CHLORIDE 97 100   CO2 16* 21   GLUCOSE 102* 106*   BUN 12 12   CREATININE 0.91 0.86     Cr- basline

## 2024-11-11 NOTE — ASSESSMENT & PLAN NOTE
Unsure of a cause  Patient does not remember the event  Unable to rule out syncopal episode  Monitor on telemetry  Now with right ankle pain and decreased range of motion  Obtain physical and Occupational Therapy  Unsure of time down, obtain CPK

## 2024-11-11 NOTE — ASSESSMENT & PLAN NOTE
Patient also lost a significant amount of time  Potentially multifactorial  Patient does appear dehydrated, will give IV fluids  Changes on urinalysis are mild but considering her confusion, continue antibiotics  She is not septic  Does have chronic kidney disease but no acute worsening  Potential for transient global amnesia  CT head with nothing acute  Closely monitor    Seems to be her baseline  Provide vitamins.

## 2024-11-11 NOTE — ASSESSMENT & PLAN NOTE
Patient does have a chronic right bundle branch block as well as a prolonged WI interval, persists  I am unsure what happened, certainly could have had a syncopal episode leading to her being down which could be an arrhythmia  She currently has no chest pain however I will obtain a troponin  Monitor on telemetry  I am not going to obtain an echocardiogram at this time but 1 may be required depending on the telemetry and troponin

## 2024-11-11 NOTE — ED PROVIDER NOTES
"ED Provider Note    CHIEF COMPLAINT  Chief Complaint   Patient presents with    Head Injury     Pt arrives with redness and swelling to right eye/cheek/upper lip    Syncope     Pt remembers getting ready for bed and having \"weird dreams\". Pt states she woke up on the floor. Per EMS, pt lives in assisted living and was found by staff under a desk. Pt does not remember the event.        EXTERNAL RECORDS REVIEWED  Other I reviewed an office note from the patient's geriatrician on 21 October of this year.  The patient was noted to have chronic kidney disease as well as seasonal allergies.  Baseline laboratory analysis was ordered including thyroid studies.    HPI/BUDDY Dickens Cassia Dueñas is a 89 y.o. female who presents via EMS after the patient was found underneath a desk at her assisted care facility.  EMS does provide report upon arrival.  The patient states that she members having vivid dreams and then found herself down underneath her desk.  Assisted care facility staff found the patient confused and EMS was contacted.  She presents with multiple areas of facial trauma as well as abrasions and tenderness to her left knee.  She states she does not remember falling.  She is coherent at this time.  She does have a headache.  She does not have any focal loss of her extremities.  She has had no change in her breathing patterns nor does she have any chest pain.    PAST MEDICAL HISTORY   has a past medical history of Anxiety, Asthma, Cancer (HCC), Depression, Diarrhea (3/29/2023), High blood pressure, Hypertension, Hyponatremia, Hypothyroidism, Low sodium levels, Macular degeneration, and Multifocal pneumonia (3/29/2023).    SURGICAL HISTORY   has a past surgical history that includes ovarian cystectomy and appendectomy.    FAMILY HISTORY  Family History   Problem Relation Age of Onset    Cancer Mother     Stroke Father        SOCIAL HISTORY  Social History     Tobacco Use    Smoking status: Former     Current " packs/day: 0.00     Average packs/day: 0.5 packs/day for 30.0 years (15.0 ttl pk-yrs)     Types: Cigarettes     Start date: 1944     Quit date: 1974     Years since quittin.4    Smokeless tobacco: Never   Vaping Use    Vaping status: Never Used   Substance and Sexual Activity    Alcohol use: Yes     Alcohol/week: 4.2 oz     Types: 7 Glasses of wine per week     Comment: glass of wine everyday     Drug use: Not Currently    Sexual activity: Not on file       CURRENT MEDICATIONS  Home Medications    **Home medications have not yet been reviewed for this encounter**       Audit from Redirected Encounters    **Home medications have not yet been reviewed for this encounter**         ALLERGIES  No Known Allergies    PHYSICAL EXAM  VITAL SIGNS: BP (!) 141/66   Pulse 79   Temp 36.9 °C (98.5 °F) (Temporal)   Resp 20   Ht 1.524 m (5')   Wt 55.8 kg (123 lb)   SpO2 92%   BMI 24.02 kg/m²    In general the patient appears ill    Facial exam the patient does have multiple contusions and abrasions to the forehead as well as the maxillary region    Eyes pupils are 2 and reactive bilaterally and extraocular motors intact    Nares and mouth no signs of trauma    Cervical, thoracic, lumbar spine has no midline tenderness or step-offs    Pulmonary the patient's lungs are clear to auscultation bilaterally    Cardiovascular S1-S2 with a tachycardic rate    GI abdomen is soft    Skin the facial trauma described above she also has an abrasion to the left knee anteriorly    Extremities tenderness to the left knee anteriorly with an abrasion as well as some edema with no obvious deformity    Neurologic examination GCS of 15    EKG/LABS  Results for orders placed or performed during the hospital encounter of 24   EKG (NOW)    Collection Time: 24  6:18 AM   Result Value Ref Range    Report       [facility] Emergency Dept.    Test Date:  2024  Pt Name:    MICHELLE JIANG                 Department: ER  MRN:        [id number]                      Room:        21  Gender:     Female                       Technician: [id number]  :        [id number]                   Requested By:CAMERON MCKEE  Order #:    [id number]                    Reading MD: CAMERON MCKEE MD    Measurements  Intervals                                Axis  Rate:       81                           P:          -86  NH:         266                          QRS:        -98  QRSD:       127                          T:          -29  QT:         436  QTc:        506    Interpretive Statements  Twelve-lead EKG shows a normal sinus rhythm with a ventricular rate of 81,  QRS does show a right bundle branch block that is not acute, T wave inversion  anteriorly again which is not acute.  No ST segment elevation.  No arrhythmic  changes.  Electronically Signed On 2024 09:30:20 PST by CAMERON MCKEE MD     CBC WITH DIFFERENTIAL    Collection Time: 24  6:24 AM   Result Value Ref Range    WBC 20.1 (H) 4.8 - 10.8 K/uL    RBC 4.29 4.20 - 5.40 M/uL    Hemoglobin 14.2 12.0 - 16.0 g/dL    Hematocrit 41.1 37.0 - 47.0 %    MCV 95.8 81.4 - 97.8 fL    MCH 33.1 (H) 27.0 - 33.0 pg    MCHC 34.5 32.2 - 35.5 g/dL    RDW 40.9 35.9 - 50.0 fL    Platelet Count 236 164 - 446 K/uL    MPV 8.7 (L) 9.0 - 12.9 fL    Neutrophils-Polys 87.90 (H) 44.00 - 72.00 %    Lymphocytes 6.00 (L) 22.00 - 41.00 %    Monocytes 3.50 0.00 - 13.40 %    Eosinophils 0.00 0.00 - 6.90 %    Basophils 0.00 0.00 - 1.80 %    Nucleated RBC 0.00 0.00 - 0.20 /100 WBC    Neutrophils (Absolute) 18.19 (H) 1.82 - 7.42 K/uL    Lymphs (Absolute) 1.21 1.00 - 4.80 K/uL    Monos (Absolute) 0.70 0.00 - 0.85 K/uL    Eos (Absolute) 0.00 0.00 - 0.51 K/uL    Baso (Absolute) 0.00 0.00 - 0.12 K/uL    NRBC (Absolute) 0.00 K/uL   COMP METABOLIC PANEL    Collection Time: 24  6:24 AM   Result Value Ref Range    Sodium 131 (L) 135 - 145 mmol/L    Potassium 3.8 3.6 - 5.5 mmol/L    Chloride 97  96 - 112 mmol/L    Co2 16 (L) 20 - 33 mmol/L    Anion Gap 18.0 (H) 7.0 - 16.0    Glucose 102 (H) 65 - 99 mg/dL    Bun 12 8 - 22 mg/dL    Creatinine 0.91 0.50 - 1.40 mg/dL    Calcium 9.2 8.5 - 10.5 mg/dL    Correct Calcium 9.1 8.5 - 10.5 mg/dL    AST(SGOT) 39 12 - 45 U/L    ALT(SGPT) 14 2 - 50 U/L    Alkaline Phosphatase 104 (H) 30 - 99 U/L    Total Bilirubin 0.6 0.1 - 1.5 mg/dL    Albumin 4.1 3.2 - 4.9 g/dL    Total Protein 7.1 6.0 - 8.2 g/dL    Globulin 3.0 1.9 - 3.5 g/dL    A-G Ratio 1.4 g/dL   ESTIMATED GFR    Collection Time: 11/11/24  6:24 AM   Result Value Ref Range    GFR (CKD-EPI) 58 (A) >60 mL/min/1.73 m 2   DIFFERENTIAL MANUAL    Collection Time: 11/11/24  6:24 AM   Result Value Ref Range    Bands-Stabs 2.60 0.00 - 10.00 %    Manual Diff Status PERFORMED    PERIPHERAL SMEAR REVIEW    Collection Time: 11/11/24  6:24 AM   Result Value Ref Range    Peripheral Smear Review see below    PLATELET ESTIMATE    Collection Time: 11/11/24  6:24 AM   Result Value Ref Range    Plt Estimation Normal    MORPHOLOGY    Collection Time: 11/11/24  6:24 AM   Result Value Ref Range    RBC Morphology Normal    URINALYSIS,CULTURE IF INDICATED    Collection Time: 11/11/24  6:56 AM    Specimen: Urine, Straight Cath   Result Value Ref Range    Color Yellow     Character Clear     Specific Gravity 1.010 <1.035    Ph 5.5 5.0 - 8.0    Glucose Negative Negative mg/dL    Ketones Negative Negative mg/dL    Protein 30 (A) Negative mg/dL    Bilirubin Negative Negative    Urobilinogen, Urine 0.2 <=1.0 EU/dL    Nitrite Negative Negative    Leukocyte Esterase Negative Negative    Occult Blood Small (A) Negative    Micro Urine Req Microscopic    URINE MICROSCOPIC (W/UA)    Collection Time: 11/11/24  6:56 AM   Result Value Ref Range    WBC 0-2 /hpf    RBC 0-2 0 - 2 /hpf    Bacteria Rare (A) None /hpf    Epithelial Cells 0-2 0 - 5 /hpf    Mucous Threads Present /hpf    Urine Casts 0-2 0 - 2 /lpf   Prothrombin Time    Collection Time: 11/11/24   7:05 AM   Result Value Ref Range    PT 13.3 12.0 - 14.6 sec    INR 1.01 0.87 - 1.13   APTT    Collection Time: 11/11/24  7:05 AM   Result Value Ref Range    APTT 26.7 24.7 - 36.0 sec       I have independently interpreted this EKG    RADIOLOGY/PROCEDURES     Radiologist interpretation:  DX-ANKLE 3+ VIEWS RIGHT   Final Result      Demineralization and degenerative change without acute fracture or malalignment.      DX-KNEE 3 VIEWS LEFT   Final Result         1.  No acute traumatic bony injury.   2.  Severe tricompartmental degenerative changes with bony      CT-CSPINE WITHOUT PLUS RECONS   Final Result         1.  Multilevel degenerative changes of the cervical spine limit diagnostic sensitivity of this examination, otherwise no acute traumatic bony injury of the cervical spine is apparent.   2.  Atherosclerosis      CT-MAXILLOFACIAL W/O PLUS RECONS   Final Result         1.  No acute traumatic facial bony injuries identified.   2.  Mild bilateral maxillary sinusitis changes      CT-HEAD W/O   Final Result         1.  No acute intracranial abnormality is identified, there are nonspecific white matter changes, commonly associated with small vessel ischemic disease.  Associated mild cerebral atrophy is noted.                   COURSE & MEDICAL DECISION MAKING    This is a 89-year-old female who presents with a possible traumatic brain injury.  On arrival at the charge desk I was called to evaluate the patient for possible traumatic brain injury.  She did have significant evidence of facial trauma and therefore CT scan of the head, maxillofacial bones, and neck was performed.  There is a lot of chronic change but no acute traumatic change.  She also had evidence of significant trauma to the left knee and an x-ray is obtained and showed no evidence of a fracture.  I suspect this is from a significant contusion.  On repeat examination she is also complaining of right ankle pain and x-rays obtained again there is no  fracture.  Family is at the bedside and states that she is not at her baseline from a mental status standpoint.  She does not have any focal deficits to support a stroke.  She states she is having some vivid dreams and this could be hallucinations.  She has had a slight decrease in her sodium supplementation as she has a history of hyponatremia.  Her sodium is only slightly decreased therefore this would be an unlikely source.  She does have some bacteria in her urine but her urine otherwise is not all that impressive from an infectious standpoint.  She will receive Rocephin.  Due to the multiple issues of soft tissue trauma and the inability to determine when she went down I did order a liter of fluid for possible rhabdo and the patient will have a total CK checked.  At the time of admission she continues to follow commands with her extremities but she does continue have some confusion.  She will be admitted to the hospitalist for further workup.    FINAL DIAGNOSIS  1.  Altered mental status  2.  Syncope versus mechanical fall  3.  Multiple facial contusions  4.  Left knee contusion and abrasion  5.  Right ankle pain  6.  UTI    Disposition  The patient will be admitted in stable condition     Electronically signed by: Garrett Laird M.D., 11/11/2024 6:07 AM

## 2024-11-11 NOTE — ASSESSMENT & PLAN NOTE
Likely mostly reactive but possibly partially due to urinary tract infection  Repeat CBC in the morning  Recent Labs     11/11/24  0624 11/12/24  0052   WBC 20.1* 13.2*   RBC 4.29 4.14*   HEMOGLOBIN 14.2 14.0   HEMATOCRIT 41.1 40.5   MCV 95.8 97.8   MCH 33.1* 33.8*   RDW 40.9 42.8   PLATELETCT 236 258   MPV 8.7* 9.1   NEUTSPOLYS 87.90*  --    LYMPHOCYTES 6.00*  --    MONOCYTES 3.50  --    EOSINOPHILS 0.00  --    BASOPHILS 0.00  --    RBCMORPHOLO Normal  --      Downtrending

## 2024-11-11 NOTE — ED NOTES
Pt assisted to restroom. Daughter reporting her mother is more confused then normal. ERP to bedside to talk with family

## 2024-11-11 NOTE — ASSESSMENT & PLAN NOTE
Mild, does appear baseline is the low end of normal  I believe acute worsening is due to dehydration  Start IV fluids and repeat a BMP in the morning    Recent Labs     11/11/24  0624 11/12/24  0052   SODIUM 131* 134*   POTASSIUM 3.8 4.0   CHLORIDE 97 100   CO2 16* 21   GLUCOSE 102* 106*   BUN 12 12   CREATININE 0.91 0.86     Mild

## 2024-11-11 NOTE — H&P
"Hospital Medicine History & Physical Note    Date of Service  11/11/2024    Primary Care Physician  Lucia Neal P.A.-C.    Consultants  None    Specialist Names: None    Code Status  DNAR/DNI    Chief Complaint  Chief Complaint   Patient presents with    Head Injury     Pt arrives with redness and swelling to right eye/cheek/upper lip    Syncope     Pt remembers getting ready for bed and having \"weird dreams\". Pt states she woke up on the floor. Per EMS, pt lives in assisted living and was found by staff under a desk. Pt does not remember the event.        History of Presenting Illness  Chrissie Dueñas is a 89 y.o. female who presented 11/11/2024 with confusion post being found down.  Patient does not remember the episode at all.  She states she felt like she was having a dream that was very vivid and she was trapped in a small confined area.  It is difficult to get information about the episode at all, mostly because she does not remember but she does still have some confusion.  Patient is not at her baseline per family at bedside.  She states eventually she was able to hit her med alert button and staff at her assisted living facility came and found her underneath her desk.  Unsure how long she was there or what happened to cause her to be there.  She does complain of some right ankle pain and has been noted to have decreased range of motion.  Imaging did not show any acute trauma.  I did discuss the case including labs and imaging with the ER physician.    I discussed the plan of care with patient and family.    Review of Systems  Review of Systems   Constitutional:  Negative for chills, fever and malaise/fatigue.   HENT:  Negative for congestion.    Respiratory:  Negative for cough, sputum production, shortness of breath and stridor.    Cardiovascular:  Negative for chest pain, palpitations and leg swelling.   Gastrointestinal:  Negative for abdominal pain, constipation, diarrhea, nausea and " vomiting.   Genitourinary:  Negative for dysuria and urgency.   Musculoskeletal:  Positive for falls and joint pain (right ankle). Negative for myalgias.   Neurological:  Negative for dizziness, tingling, weakness and headaches.   Psychiatric/Behavioral:  Positive for memory loss. Negative for depression and suicidal ideas.    All other systems reviewed and are negative.      Past Medical History   has a past medical history of Anxiety, Asthma, Cancer (HCC), Depression, Diarrhea (3/29/2023), High blood pressure, Hypertension, Hyponatremia, Hypothyroidism, Low sodium levels, Macular degeneration, and Multifocal pneumonia (3/29/2023).    Surgical History   has a past surgical history that includes ovarian cystectomy and appendectomy.     Family History  family history includes Cancer in her mother; Stroke in her father.   Family history reviewed with patient. There is no family history that is pertinent to the chief complaint.     Social History   reports that she quit smoking about 50 years ago. Her smoking use included cigarettes. She started smoking about 80 years ago. She has a 15 pack-year smoking history. She has never used smokeless tobacco. She reports current alcohol use of about 4.2 oz of alcohol per week. She reports that she does not currently use drugs.    Allergies  No Known Allergies    Medications  Prior to Admission Medications   Prescriptions Last Dose Informant Patient Reported? Taking?   Ascorbic Acid (VITAMIN C) 250 MG Chew Tab   No No   Sig: Chew 2 Tablets every day.   FEROSUL 325 (65 Fe) MG tablet   No No   Sig: TAKE 1 TABLET BY MOUTH EVERY 48 HOUR(S)   Multiple Vitamin (ONE-DAILY MULTI-VITAMIN) Tab   No No   Sig: Take 1 Tablet by mouth every day.   Multiple Vitamins-Minerals (PRESERVISION AREDS 2+MULTI VIT) Cap   No No   Sig: TAKE 1 CAPSULE BY MOUTH DAILY   Sennosides (SENNA) 8.6 MG Tab   No No   Sig: TAKE 1 TABLET BY MOUTH DAILY AT BEDTIME FOR CONSTIPATION *PLEASE HOLD FOR LOOSE STOOLS*    cetirizine (ZYRTEC) 10 MG Tab   No No   Sig: Take 1 Tablet by mouth every evening.   furosemide (LASIX) 20 MG Tab   No No   Sig: Take 1 Tablet by mouth every day.   losartan (COZAAR) 25 MG Tab   No No   Sig: Take 1 Tablet by mouth every day.   melatonin 3 MG Tab   No No   Sig: Take 1 Tablet by mouth at bedtime as needed (sleep).   polyethylene glycol 3350 (MIRALAX) 17 GM/SCOOP Powder   No No   Sig: Take 17 g by mouth 1 time a day as needed (for constipation). Mix with 6 ounces of water or juice. Patient may keep in room and self-administer.   potassium chloride SA (KDUR) 20 MEQ Tab CR   No No   Sig: Take 1 Tablet by mouth every day.   traZODone (DESYREL) 100 MG Tab   No No   Sig: Take 1 Tablet by mouth every evening. For insomnia, increased dose      Facility-Administered Medications: None       Physical Exam  Temp:  [36.9 °C (98.5 °F)] 36.9 °C (98.5 °F)  Pulse:  [79] 79  Resp:  [20] 20  BP: (141)/(66) 141/66  SpO2:  [92 %] 92 %  Blood Pressure : (!) 141/66   Temperature: 36.9 °C (98.5 °F)   Pulse: 79   Respiration: 20   Pulse Oximetry: 92 %       Physical Exam  Vitals and nursing note reviewed.   Constitutional:       General: She is not in acute distress.     Appearance: She is well-developed. She is not diaphoretic.      Comments: Thin and frail appearing    HENT:      Head: Normocephalic.      Comments: Right facial bruising     Right Ear: External ear normal.      Left Ear: External ear normal.      Nose: Nose normal. No congestion or rhinorrhea.      Mouth/Throat:      Mouth: Mucous membranes are dry.      Pharynx: No oropharyngeal exudate.   Eyes:      General:         Right eye: No discharge.         Left eye: No discharge.   Neck:      Trachea: No tracheal deviation.   Cardiovascular:      Rate and Rhythm: Normal rate. Rhythm irregular.      Heart sounds: Murmur heard.      No friction rub. No gallop.   Pulmonary:      Effort: Pulmonary effort is normal. No respiratory distress.      Breath sounds: Normal  "breath sounds. No stridor. No wheezing or rales.   Chest:      Chest wall: No tenderness.   Abdominal:      General: Bowel sounds are normal. There is no distension.      Palpations: Abdomen is soft.      Tenderness: There is no abdominal tenderness.   Musculoskeletal:      Cervical back: Neck supple.      Right lower leg: No edema.      Left lower leg: No edema.      Right ankle: Tenderness present. Decreased range of motion.   Lymphadenopathy:      Cervical: No cervical adenopathy.   Skin:     General: Skin is warm and dry.      Findings: No erythema or rash.   Neurological:      Mental Status: She is alert and oriented to person, place, and time.      Cranial Nerves: No cranial nerve deficit.      Comments: Some confusion and forgetfulness   Psychiatric:         Mood and Affect: Mood normal.         Behavior: Behavior normal.         Thought Content: Thought content normal.         Cognition and Memory: Cognition is impaired. Memory is impaired.         Judgment: Judgment normal.         Laboratory:  Recent Labs     11/11/24  0624   WBC 20.1*   RBC 4.29   HEMOGLOBIN 14.2   HEMATOCRIT 41.1   MCV 95.8   MCH 33.1*   MCHC 34.5   RDW 40.9   PLATELETCT 236   MPV 8.7*     Recent Labs     11/11/24  0624   SODIUM 131*   POTASSIUM 3.8   CHLORIDE 97   CO2 16*   GLUCOSE 102*   BUN 12   CREATININE 0.91   CALCIUM 9.2     Recent Labs     11/11/24  0624   ALTSGPT 14   ASTSGOT 39   ALKPHOSPHAT 104*   TBILIRUBIN 0.6   GLUCOSE 102*     Recent Labs     11/11/24  0705   APTT 26.7   INR 1.01     No results for input(s): \"NTPROBNP\" in the last 72 hours.      No results for input(s): \"TROPONINT\" in the last 72 hours.    Imaging:  DX-ANKLE 3+ VIEWS RIGHT   Final Result      Demineralization and degenerative change without acute fracture or malalignment.      DX-KNEE 3 VIEWS LEFT   Final Result         1.  No acute traumatic bony injury.   2.  Severe tricompartmental degenerative changes with bony      CT-CSPINE WITHOUT PLUS RECONS "   Final Result         1.  Multilevel degenerative changes of the cervical spine limit diagnostic sensitivity of this examination, otherwise no acute traumatic bony injury of the cervical spine is apparent.   2.  Atherosclerosis      CT-MAXILLOFACIAL W/O PLUS RECONS   Final Result         1.  No acute traumatic facial bony injuries identified.   2.  Mild bilateral maxillary sinusitis changes      CT-HEAD W/O   Final Result         1.  No acute intracranial abnormality is identified, there are nonspecific white matter changes, commonly associated with small vessel ischemic disease.  Associated mild cerebral atrophy is noted.                   EKG:  I have personally reviewed the images and compared with prior images.    Assessment/Plan:  Justification for Admission Status  I anticipate this patient will require at least two midnights for appropriate medical management, necessitating inpatient admission because acute encephalopathy, UTI, abnormal ekg, found down    Patient will need a Telemetry bed on MEDICAL service .  The need is secondary to abnormal ekg    * Acute encephalopathy- (present on admission)  Assessment & Plan  Patient also lost a significant amount of time  Potentially multifactorial  Patient does appear dehydrated, will give IV fluids  Changes on urinalysis are mild but considering her confusion, continue antibiotics  She is not septic  Does have chronic kidney disease but no acute worsening  Potential for transient global amnesia  CT head with nothing acute  Closely monitor    High anion gap metabolic acidosis- (present on admission)  Assessment & Plan  Due to dehydration  Hold home Lasix  Start IV fluids  Repeat BMP in the morning    Leukocytosis- (present on admission)  Assessment & Plan  Likely mostly reactive but possibly partially due to urinary tract infection  Repeat CBC in the morning    Acute UTI- (present on admission)  Assessment & Plan  Mild changes on urinalysis but considering  everything that occurred, will be treated for full course with antibiotics    Fall- (present on admission)  Assessment & Plan  Unsure of a cause  Patient does not remember the event  Unable to rule out syncopal episode  Monitor on telemetry  Now with right ankle pain and decreased range of motion  Obtain physical and Occupational Therapy  Unsure of time down, obtain CPK    Abnormal EKG- (present on admission)  Assessment & Plan  Patient does have a chronic right bundle branch block as well as a prolonged DE interval, persists  I am unsure what happened, certainly could have had a syncopal episode leading to her being down which could be an arrhythmia  She currently has no chest pain however I will obtain a troponin  Monitor on telemetry  I am not going to obtain an echocardiogram at this time but 1 may be required depending on the telemetry and troponin    Hyponatremia- (present on admission)  Assessment & Plan  Mild, does appear baseline is the low end of normal  I believe acute worsening is due to dehydration  Start IV fluids and repeat a BMP in the morning    Chronic kidney disease, stage 3a- (present on admission)  Assessment & Plan  Does appear to be at baseline  She has however dehydrated so will be on IV fluids  Repeat BMP in the morning    Essential hypertension- (present on admission)  Assessment & Plan  Continue home losartan  Start as needed hydralazine  Adjust as needed        VTE prophylaxis: SCDs/TEDs

## 2024-11-11 NOTE — ED NOTES
Med Rec completed per FOX Donnelly  [phone]  Allergies reviewed-y  Antibiotics in the past 30 days:n  Anticoagulant in past 14 days:n

## 2024-11-11 NOTE — ASSESSMENT & PLAN NOTE
Mild changes on urinalysis but considering everything that occurred, will be treated for full course with antibiotics

## 2024-11-12 LAB
ANION GAP SERPL CALC-SCNC: 13 MMOL/L (ref 7–16)
BUN SERPL-MCNC: 12 MG/DL (ref 8–22)
CALCIUM SERPL-MCNC: 9.4 MG/DL (ref 8.5–10.5)
CHLORIDE SERPL-SCNC: 100 MMOL/L (ref 96–112)
CO2 SERPL-SCNC: 21 MMOL/L (ref 20–33)
CREAT SERPL-MCNC: 0.86 MG/DL (ref 0.5–1.4)
ERYTHROCYTE [DISTWIDTH] IN BLOOD BY AUTOMATED COUNT: 42.8 FL (ref 35.9–50)
GFR SERPLBLD CREATININE-BSD FMLA CKD-EPI: 62 ML/MIN/1.73 M 2
GLUCOSE SERPL-MCNC: 106 MG/DL (ref 65–99)
HCT VFR BLD AUTO: 40.5 % (ref 37–47)
HGB BLD-MCNC: 14 G/DL (ref 12–16)
MCH RBC QN AUTO: 33.8 PG (ref 27–33)
MCHC RBC AUTO-ENTMCNC: 34.6 G/DL (ref 32.2–35.5)
MCV RBC AUTO: 97.8 FL (ref 81.4–97.8)
PLATELET # BLD AUTO: 258 K/UL (ref 164–446)
PMV BLD AUTO: 9.1 FL (ref 9–12.9)
POTASSIUM SERPL-SCNC: 4 MMOL/L (ref 3.6–5.5)
RBC # BLD AUTO: 4.14 M/UL (ref 4.2–5.4)
SODIUM SERPL-SCNC: 134 MMOL/L (ref 135–145)
TROPONIN T SERPL-MCNC: 83 NG/L (ref 6–19)
WBC # BLD AUTO: 13.2 K/UL (ref 4.8–10.8)

## 2024-11-12 PROCEDURE — A9270 NON-COVERED ITEM OR SERVICE: HCPCS | Performed by: NURSE PRACTITIONER

## 2024-11-12 PROCEDURE — 85027 COMPLETE CBC AUTOMATED: CPT

## 2024-11-12 PROCEDURE — 770006 HCHG ROOM/CARE - MED/SURG/GYN SEMI*

## 2024-11-12 PROCEDURE — 700111 HCHG RX REV CODE 636 W/ 250 OVERRIDE (IP): Mod: JZ,JG | Performed by: INTERNAL MEDICINE

## 2024-11-12 PROCEDURE — 700102 HCHG RX REV CODE 250 W/ 637 OVERRIDE(OP): Performed by: INTERNAL MEDICINE

## 2024-11-12 PROCEDURE — 36415 COLL VENOUS BLD VENIPUNCTURE: CPT

## 2024-11-12 PROCEDURE — 84484 ASSAY OF TROPONIN QUANT: CPT

## 2024-11-12 PROCEDURE — A9270 NON-COVERED ITEM OR SERVICE: HCPCS | Performed by: INTERNAL MEDICINE

## 2024-11-12 PROCEDURE — 80048 BASIC METABOLIC PNL TOTAL CA: CPT

## 2024-11-12 PROCEDURE — 99233 SBSQ HOSP IP/OBS HIGH 50: CPT | Performed by: INTERNAL MEDICINE

## 2024-11-12 PROCEDURE — 700105 HCHG RX REV CODE 258: Performed by: INTERNAL MEDICINE

## 2024-11-12 PROCEDURE — 700102 HCHG RX REV CODE 250 W/ 637 OVERRIDE(OP): Performed by: NURSE PRACTITIONER

## 2024-11-12 RX ORDER — GAUZE BANDAGE 2" X 2"
100 BANDAGE TOPICAL DAILY
Status: DISCONTINUED | OUTPATIENT
Start: 2024-11-12 | End: 2024-11-13 | Stop reason: HOSPADM

## 2024-11-12 RX ORDER — UREA 10 %
1000 LOTION (ML) TOPICAL DAILY
Status: DISCONTINUED | OUTPATIENT
Start: 2024-11-12 | End: 2024-11-13 | Stop reason: HOSPADM

## 2024-11-12 RX ADMIN — CEFAZOLIN SODIUM 1 G: 1 INJECTION, SOLUTION INTRAVENOUS at 11:37

## 2024-11-12 RX ADMIN — SODIUM CHLORIDE: 9 INJECTION, SOLUTION INTRAVENOUS at 11:32

## 2024-11-12 RX ADMIN — Medication 3 MG: at 20:49

## 2024-11-12 RX ADMIN — THERA TABS 1 TABLET: TAB at 18:24

## 2024-11-12 RX ADMIN — SODIUM CHLORIDE: 9 INJECTION, SOLUTION INTRAVENOUS at 05:11

## 2024-11-12 RX ADMIN — CYANOCOBALAMIN TAB 500 MCG 1000 MCG: 500 TAB at 18:23

## 2024-11-12 RX ADMIN — SENNOSIDES AND DOCUSATE SODIUM 2 TABLET: 50; 8.6 TABLET ORAL at 18:24

## 2024-11-12 RX ADMIN — LOSARTAN POTASSIUM 25 MG: 25 TABLET, FILM COATED ORAL at 05:08

## 2024-11-12 RX ADMIN — Medication 100 MG: at 18:24

## 2024-11-12 ASSESSMENT — PAIN DESCRIPTION - PAIN TYPE: TYPE: ACUTE PAIN

## 2024-11-12 ASSESSMENT — FIBROSIS 4 INDEX
FIB4 SCORE: 3.8
FIB4 SCORE: 3.8

## 2024-11-12 ASSESSMENT — SOCIAL DETERMINANTS OF HEALTH (SDOH)
IN THE PAST 12 MONTHS, HAS THE ELECTRIC, GAS, OIL, OR WATER COMPANY THREATENED TO SHUT OFF SERVICE IN YOUR HOME?: NO
WITHIN THE PAST 12 MONTHS, THE FOOD YOU BOUGHT JUST DIDN'T LAST AND YOU DIDN'T HAVE MONEY TO GET MORE: NEVER TRUE
WITHIN THE PAST 12 MONTHS, YOU WORRIED THAT YOUR FOOD WOULD RUN OUT BEFORE YOU GOT THE MONEY TO BUY MORE: NEVER TRUE

## 2024-11-12 ASSESSMENT — ENCOUNTER SYMPTOMS: FALLS: 1

## 2024-11-12 NOTE — CARE PLAN
The patient is Stable - Low risk of patient condition declining or worsening    Shift Goals  Clinical Goals: Pain control  Patient Goals: Something for sleep    Progress made toward(s) clinical / shift goals:    Problem: Knowledge Deficit - Standard  Goal: Patient and family/care givers will demonstrate understanding of plan of care, disease process/condition, diagnostic tests and medications  Outcome: Progressing  Note: Pt updated on POC, all current questions answered at this time       Problem: Fall Risk  Goal: Patient will remain free from falls  Outcome: Progressing  Note: Treaded socks and bed/strip alarm on, side rails up x 2. Call light within reach. Pt educated to call for assistance. Reinforce as needed. Continue to monitor.           Patient is not progressing towards the following goals:

## 2024-11-12 NOTE — PROGRESS NOTES
Bedside report received from off going RN/tech: Elizabeth RN  , assumed care of patient.     Fall Risk Score: HIGH RISK  Fall risk interventions in place: Educate patient/family to call staff for assistance when getting out of bed, Place fall precaution signage outside patient door, Utilize bed/chair fall alarm, and Bed alarm connected correctly  Bed type: Low air loss (Prabhjot Score less than 17 interventions in place)  Patient on cardiac monitor: Yes  IVF/IV medications: Infusion per MAR (List Med(s)) NS  Oxygen: Room Air  Bedside sitter: Not Applicable   Isolation: Not applicable

## 2024-11-12 NOTE — CARE PLAN
The patient is Watcher - Medium risk of patient condition declining or worsening    Shift Goals  Clinical Goals: less confusion  Patient Goals: Something for sleep    Progress made toward(s) clinical / shift goals:  Pt remained free from fall and injury during shift.

## 2024-11-12 NOTE — PROGRESS NOTES
Report given to Kathe VIVAS on S6; patient transferring to S631; all questions answered; patient transporting via bed.

## 2024-11-12 NOTE — PROGRESS NOTES
Bedside report received from off going RN/tech: Nicolasa, assumed care of patient.     Fall Risk Score: HIGH RISK  Fall risk interventions in place: Place yellow fall risk ID band on patient, Provide patient/family education based on risk assessment, Educate patient/family to call staff for assistance when getting out of bed, Place fall precaution signage outside patient door, Utilize bed/chair fall alarm, Notify charge of high risk for huddle, and Bed alarm connected correctly  Bed type: Regular (Prabhjot Score less than 17 interventions in place)  Patient on cardiac monitor: Yes  IVF/IV medications: Infusion per MAR (List Med(s)) NS @ 83  Oxygen: Room Air  Bedside sitter: Not Applicable   Isolation: Not applicable

## 2024-11-12 NOTE — PROGRESS NOTES
4 Eyes Skin Assessment Completed by Ricki Childers, RN and Domingo Olivo, SANGEETHA.    Head WDL- face has red marks see pictures.  Ears WDL  Nose WDL  Mouth WDL  Neck WDL  Breast/Chest WDL  Shoulder Blades WDL  Spine WDL  (R) Arm/Elbow/Hand WDL  (L) Arm/Elbow/Hand WDL  Abdomen WDL  Groin WDL  Scrotum/Coccyx/Buttocks WDL; small scab; see picture- pt requested we remove mepilex on sacral area.  (R) Leg WDL  (L) Leg Abrasion/swelling from fall; see image  (R) Heel/Foot/Toe blanching; mepilex  (L) Heel/Foot/Toe Red heels; blanching; mepilex  Interventions In Place Sacral Mepilex, Q2 Turns, and Low Air Loss Mattress  Possible Skin Injury No  Pictures Uploaded Into Epic Yes  Wound Consult Placed N/A  RN Wound Prevention Protocol Ordered No

## 2024-11-12 NOTE — PROGRESS NOTES
Monitor Summary    Rhythm:  SB/SR with prolonged DC and BBB    Rate:  56-87    Ectopy:  none    .24  /  .13  /  .31

## 2024-11-12 NOTE — PROGRESS NOTES
"[facility] Medicine Daily Progress Note    Date of Service  11/12/2024    Chief Complaint  Chrissie Dueñas is a 89 y.o. female admitted 11/11/2024 with Head Injury (Pt arrives with redness and swelling to right eye/cheek/upper lip) and Syncope (Pt remembers getting ready for bed and having \"weird dreams\". Pt states she woke up on the floor. Per EMS, pt lives in assisted living and was found by staff under a desk. Pt does not remember the event. )    Hospital Course  Chrissie Dueñas is a 89 y.o. female who presented 11/11/2024 with confusion post being found down.  Patient does not remember the episode at all.  She states she felt like she was having a dream that was very vivid and she was trapped in a small confined area.  It is difficult to get information about the episode at all, mostly because she does not remember but she does still have some confusion.  Patient is not at her baseline per family at bedside.  She states eventually she was able to hit her med alert button and staff at her assisted living facility came and found her underneath her desk.  Unsure how long she was there or what happened to cause her to be there.  She does complain of some right ankle pain and has been noted to have decreased range of motion.  Imaging did not show any acute trauma.     Interval Problem Update  Patient seen and examine at bedside  She is from assisted living and has a med alert button. Managing confusion and R ankle pain after being found down. No acute fracture or malalignment from ankle XRs. CT head, maxillofacial and C-spine did not show any fractures, dislocation or apparent injury. Started on antibiotics for mild UTI.     . WBC 20-13.2. Cr- 0.86. On Ancef for UTI. Patient has poor insight, somewhat confused. Later on transfer, she is upset as she wants to go home. PT/OT will be contacted to assess if she can go home with [facility] as her 6 clicks for mobility and ADLs is somewhat poor.   DNR/DNI. " PT/OT PENDING may need skilled.  I reviewed the chart along with vitals, labs, imaging, test (both pending and resulted) and recommendations from specialists and interdisciplinary team.  I have discussed this patient's plan of care and discharge plan at IDT rounds today with Case Management, Nursing, Nursing leadership, and other members of the IDT team.    Consultants/Specialty      Code Status  DNAR/DNI    Disposition  The patient is medically cleared for discharge to home or a post-acute facility.      I have placed the appropriate orders for post-discharge needs.    Review of Systems  Review of Systems   Unable to perform ROS: Mental acuity   Musculoskeletal:  Positive for falls and joint pain.        Physical Exam  Temp:  [36.6 °C (97.9 °F)-36.8 °C (98.2 °F)] 36.8 °C (98.2 °F)  Pulse:  [60-82] 60  Resp:  [16-20] 18  BP: (135-164)/() 135/59  SpO2:  [93 %-96 %] 94 %    Physical Exam  Vitals and nursing note reviewed.   Constitutional:       General: She is not in acute distress.     Comments: Frail   HENT:      Head: Normocephalic and atraumatic.      Right Ear: External ear normal.      Left Ear: External ear normal.      Nose: Nose normal.      Mouth/Throat:      Mouth: Mucous membranes are moist.   Eyes:      General: No scleral icterus.     Conjunctiva/sclera: Conjunctivae normal.   Cardiovascular:      Rate and Rhythm: Normal rate and regular rhythm.      Pulses: Normal pulses.      Heart sounds: Murmur heard.      No friction rub. No gallop.   Pulmonary:      Effort: Pulmonary effort is normal. No respiratory distress.      Breath sounds: Normal breath sounds. No stridor. No wheezing, rhonchi or rales.   Chest:      Chest wall: No tenderness.   Abdominal:      General: Abdomen is flat. Bowel sounds are normal. There is no distension.      Palpations: Abdomen is soft.      Tenderness: There is no abdominal tenderness. There is no guarding or rebound.   Musculoskeletal:         General: No swelling,  tenderness or deformity. Normal range of motion.      Cervical back: Normal range of motion and neck supple. No rigidity.   Skin:     General: Skin is warm.      Coloration: Skin is not jaundiced.      Findings: Bruising present.   Neurological:      General: No focal deficit present.      Mental Status: She is alert and oriented to person, place, and time. Mental status is at baseline.   Psychiatric:         Mood and Affect: Mood normal.         Behavior: Behavior normal.         Thought Content: Thought content normal.         Judgment: Judgment normal.         Fluids    Intake/Output Summary (Last 24 hours) at 11/12/2024 1415  Last data filed at 11/12/2024 1137  Gross per 24 hour   Intake 715 ml   Output 1000 ml   Net -285 ml        Laboratory  Recent Labs     11/11/24  0624 11/12/24  0052   WBC 20.1* 13.2*   RBC 4.29 4.14*   HEMOGLOBIN 14.2 14.0   HEMATOCRIT 41.1 40.5   MCV 95.8 97.8   MCH 33.1* 33.8*   MCHC 34.5 34.6   RDW 40.9 42.8   PLATELETCT 236 258   MPV 8.7* 9.1     Recent Labs     11/11/24  0624 11/12/24  0052   SODIUM 131* 134*   POTASSIUM 3.8 4.0   CHLORIDE 97 100   CO2 16* 21   GLUCOSE 102* 106*   BUN 12 12   CREATININE 0.91 0.86   CALCIUM 9.2 9.4     Recent Labs     11/11/24  0705   APTT 26.7   INR 1.01               Imaging  DX-ANKLE 3+ VIEWS RIGHT   Final Result      Demineralization and degenerative change without acute fracture or malalignment.      DX-KNEE 3 VIEWS LEFT   Final Result         1.  No acute traumatic bony injury.   2.  Severe tricompartmental degenerative changes with bony      CT-CSPINE WITHOUT PLUS RECONS   Final Result         1.  Multilevel degenerative changes of the cervical spine limit diagnostic sensitivity of this examination, otherwise no acute traumatic bony injury of the cervical spine is apparent.   2.  Atherosclerosis      CT-MAXILLOFACIAL W/O PLUS RECONS   Final Result         1.  No acute traumatic facial bony injuries identified.   2.  Mild bilateral maxillary  sinusitis changes      CT-HEAD W/O   Final Result         1.  No acute intracranial abnormality is identified, there are nonspecific white matter changes, commonly associated with small vessel ischemic disease.  Associated mild cerebral atrophy is noted.                    Assessment/Plan  * Acute encephalopathy- (present on admission)  Assessment & Plan  Patient also lost a significant amount of time  Potentially multifactorial  Patient does appear dehydrated, will give IV fluids  Changes on urinalysis are mild but considering her confusion, continue antibiotics  She is not septic  Does have chronic kidney disease but no acute worsening  Potential for transient global amnesia  CT head with nothing acute  Closely monitor    Seems to be her baseline  Provide vitamins.     High anion gap metabolic acidosis- (present on admission)  Assessment & Plan  Due to dehydration  Hold home Lasix  Start IV fluids  Repeat BMP in the morning    Leukocytosis- (present on admission)  Assessment & Plan  Likely mostly reactive but possibly partially due to urinary tract infection  Repeat CBC in the morning  Recent Labs     11/11/24  0624 11/12/24  0052   WBC 20.1* 13.2*   RBC 4.29 4.14*   HEMOGLOBIN 14.2 14.0   HEMATOCRIT 41.1 40.5   MCV 95.8 97.8   MCH 33.1* 33.8*   RDW 40.9 42.8   PLATELETCT 236 258   MPV 8.7* 9.1   NEUTSPOLYS 87.90*  --    LYMPHOCYTES 6.00*  --    MONOCYTES 3.50  --    EOSINOPHILS 0.00  --    BASOPHILS 0.00  --    RBCMORPHOLO Normal  --      Downtrending    Acute UTI- (present on admission)  Assessment & Plan  Mild changes on urinalysis but considering everything that occurred, will be treated for full course with antibiotics    Fall- (present on admission)  Assessment & Plan  Unsure of a cause  Patient does not remember the event  Unable to rule out syncopal episode  Monitor on telemetry  Now with right ankle pain and decreased range of motion  Obtain physical and Occupational Therapy  Unsure of time down, obtain  CPK    Abnormal EKG- (present on admission)  Assessment & Plan  Patient does have a chronic right bundle branch block as well as a prolonged MD interval, persists  I am unsure what happened, certainly could have had a syncopal episode leading to her being down which could be an arrhythmia  She currently has no chest pain however I will obtain a troponin  Monitor on telemetry  I am not going to obtain an echocardiogram at this time but 1 may be required depending on the telemetry and troponin    Hyponatremia- (present on admission)  Assessment & Plan  Mild, does appear baseline is the low end of normal  I believe acute worsening is due to dehydration  Start IV fluids and repeat a BMP in the morning    Recent Labs     11/11/24  0624 11/12/24  0052   SODIUM 131* 134*   POTASSIUM 3.8 4.0   CHLORIDE 97 100   CO2 16* 21   GLUCOSE 102* 106*   BUN 12 12   CREATININE 0.91 0.86     Mild    Chronic kidney disease, stage 3a- (present on admission)  Assessment & Plan  Does appear to be at baseline  She has however dehydrated so will be on IV fluids  Repeat BMP in the morning    Recent Labs     11/11/24 0624 11/12/24  0052   SODIUM 131* 134*   POTASSIUM 3.8 4.0   CHLORIDE 97 100   CO2 16* 21   GLUCOSE 102* 106*   BUN 12 12   CREATININE 0.91 0.86     Cr- basline    Essential hypertension- (present on admission)  Assessment & Plan  Continue home losartan  Start as needed hydralazine  Adjust as needed    Vitals:    11/12/24 1234   BP: 135/59   Pulse: 60   Resp: 18   Temp: 36.8 °C (98.2 °F)   SpO2: 94%     Stable.         VTE prophylaxis:   SCDs/TEDs      I have performed a physical exam and reviewed and updated ROS and Plan today (11/12/2024). In review of yesterday's note (11/11/2024), there are no changes except as documented above.    Patient is has a high medical complexity, complex decision making and is at high risk for complication, morbidity, and mortality, thus requiring the highest level of my preparedness for sudden,  emergent intervention. Medical decision making is therefore complex. I provided  services, which included ordering labs and/or imaging, and discussing the case with various consultants.medication orders, frequent reevaluations of the patient's condition and response to treatment. Time was also devoted to counseling and coordinating care including review of records, pertinent lab data and studies, as well as discussing diagnostic evaluation and work up, planned therapeutic interventions and future disposition of care. Where indicated, the assessment and plan reflect discussion of patient with consultants, other healthcare providers, family members, and additional research needed to obtain further information in formulating the plan of care for Chrissie Dueñas. Total time spent was 51 minutes.

## 2024-11-13 ENCOUNTER — PHARMACY VISIT (OUTPATIENT)
Dept: PHARMACY | Facility: MEDICAL CENTER | Age: 89
End: 2024-11-13
Payer: COMMERCIAL

## 2024-11-13 VITALS
HEIGHT: 60 IN | DIASTOLIC BLOOD PRESSURE: 77 MMHG | SYSTOLIC BLOOD PRESSURE: 156 MMHG | WEIGHT: 144.62 LBS | BODY MASS INDEX: 28.39 KG/M2 | OXYGEN SATURATION: 98 % | TEMPERATURE: 97.3 F | HEART RATE: 87 BPM | RESPIRATION RATE: 18 BRPM

## 2024-11-13 PROBLEM — E87.29 HIGH ANION GAP METABOLIC ACIDOSIS: Status: RESOLVED | Noted: 2024-11-11 | Resolved: 2024-11-13

## 2024-11-13 PROBLEM — N39.0 ACUTE UTI: Status: RESOLVED | Noted: 2024-11-11 | Resolved: 2024-11-13

## 2024-11-13 PROBLEM — G93.40 ACUTE ENCEPHALOPATHY: Status: RESOLVED | Noted: 2024-11-11 | Resolved: 2024-11-13

## 2024-11-13 PROBLEM — D72.829 LEUKOCYTOSIS: Status: RESOLVED | Noted: 2024-11-11 | Resolved: 2024-11-13

## 2024-11-13 LAB
ALBUMIN SERPL BCP-MCNC: 3.6 G/DL (ref 3.2–4.9)
BUN SERPL-MCNC: 9 MG/DL (ref 8–22)
CALCIUM ALBUM COR SERPL-MCNC: 9.5 MG/DL (ref 8.5–10.5)
CALCIUM SERPL-MCNC: 9.2 MG/DL (ref 8.5–10.5)
CHLORIDE SERPL-SCNC: 102 MMOL/L (ref 96–112)
CO2 SERPL-SCNC: 21 MMOL/L (ref 20–33)
CREAT SERPL-MCNC: 0.69 MG/DL (ref 0.5–1.4)
ERYTHROCYTE [DISTWIDTH] IN BLOOD BY AUTOMATED COUNT: 43 FL (ref 35.9–50)
GFR SERPLBLD CREATININE-BSD FMLA CKD-EPI: 80 ML/MIN/1.73 M 2
GLUCOSE SERPL-MCNC: 95 MG/DL (ref 65–99)
HCT VFR BLD AUTO: 41.5 % (ref 37–47)
HGB BLD-MCNC: 14 G/DL (ref 12–16)
MCH RBC QN AUTO: 32.9 PG (ref 27–33)
MCHC RBC AUTO-ENTMCNC: 33.7 G/DL (ref 32.2–35.5)
MCV RBC AUTO: 97.6 FL (ref 81.4–97.8)
PHOSPHATE SERPL-MCNC: 3.6 MG/DL (ref 2.5–4.5)
PLATELET # BLD AUTO: 248 K/UL (ref 164–446)
PMV BLD AUTO: 9.2 FL (ref 9–12.9)
POTASSIUM SERPL-SCNC: 3.8 MMOL/L (ref 3.6–5.5)
RBC # BLD AUTO: 4.25 M/UL (ref 4.2–5.4)
SODIUM SERPL-SCNC: 135 MMOL/L (ref 135–145)
WBC # BLD AUTO: 11.5 K/UL (ref 4.8–10.8)

## 2024-11-13 PROCEDURE — A9270 NON-COVERED ITEM OR SERVICE: HCPCS | Performed by: INTERNAL MEDICINE

## 2024-11-13 PROCEDURE — 97162 PT EVAL MOD COMPLEX 30 MIN: CPT

## 2024-11-13 PROCEDURE — 99239 HOSP IP/OBS DSCHRG MGMT >30: CPT | Performed by: INTERNAL MEDICINE

## 2024-11-13 PROCEDURE — 700102 HCHG RX REV CODE 250 W/ 637 OVERRIDE(OP): Performed by: INTERNAL MEDICINE

## 2024-11-13 PROCEDURE — 80069 RENAL FUNCTION PANEL: CPT

## 2024-11-13 PROCEDURE — 36415 COLL VENOUS BLD VENIPUNCTURE: CPT

## 2024-11-13 PROCEDURE — RXMED WILLOW AMBULATORY MEDICATION CHARGE: Performed by: INTERNAL MEDICINE

## 2024-11-13 PROCEDURE — 85027 COMPLETE CBC AUTOMATED: CPT

## 2024-11-13 PROCEDURE — 700111 HCHG RX REV CODE 636 W/ 250 OVERRIDE (IP): Mod: JZ,JG | Performed by: INTERNAL MEDICINE

## 2024-11-13 PROCEDURE — 700105 HCHG RX REV CODE 258: Performed by: INTERNAL MEDICINE

## 2024-11-13 PROCEDURE — 97166 OT EVAL MOD COMPLEX 45 MIN: CPT

## 2024-11-13 RX ORDER — CEPHALEXIN 500 MG/1
500 CAPSULE ORAL 2 TIMES DAILY
Qty: 6 CAPSULE | Refills: 0 | Status: ACTIVE | OUTPATIENT
Start: 2024-11-13 | End: 2024-11-16

## 2024-11-13 RX ADMIN — SODIUM CHLORIDE: 9 INJECTION, SOLUTION INTRAVENOUS at 02:21

## 2024-11-13 RX ADMIN — CEFAZOLIN SODIUM 1 G: 1 INJECTION, SOLUTION INTRAVENOUS at 00:05

## 2024-11-13 RX ADMIN — Medication 100 MG: at 04:55

## 2024-11-13 RX ADMIN — LOSARTAN POTASSIUM 25 MG: 25 TABLET, FILM COATED ORAL at 04:55

## 2024-11-13 RX ADMIN — CYANOCOBALAMIN TAB 500 MCG 1000 MCG: 500 TAB at 04:55

## 2024-11-13 RX ADMIN — THERA TABS 1 TABLET: TAB at 04:55

## 2024-11-13 ASSESSMENT — COGNITIVE AND FUNCTIONAL STATUS - GENERAL
HELP NEEDED FOR BATHING: A LITTLE
SUGGESTED CMS G CODE MODIFIER DAILY ACTIVITY: CJ
TOILETING: A LITTLE
DRESSING REGULAR UPPER BODY CLOTHING: A LITTLE
DRESSING REGULAR LOWER BODY CLOTHING: A LITTLE
MOBILITY SCORE: 23
SUGGESTED CMS G CODE MODIFIER MOBILITY: CI
DAILY ACTIVITIY SCORE: 20
CLIMB 3 TO 5 STEPS WITH RAILING: A LITTLE

## 2024-11-13 ASSESSMENT — GAIT ASSESSMENTS
DISTANCE (FEET): 150
ASSISTIVE DEVICE: FRONT WHEEL WALKER
GAIT LEVEL OF ASSIST: SUPERVISED

## 2024-11-13 ASSESSMENT — PAIN DESCRIPTION - PAIN TYPE: TYPE: ACUTE PAIN

## 2024-11-13 ASSESSMENT — ACTIVITIES OF DAILY LIVING (ADL): TOILETING: INDEPENDENT

## 2024-11-13 NOTE — THERAPY
Physical Therapy   Initial Evaluation     Patient Name: Chrissie Dueñas  Age:  89 y.o., Sex:  female  Medical Record #: [id number]  Today's Date: 11/13/2024          Assessment  Patient is 89 y.o. female w/ hx of anxiety, asthma, cancer, HTN, hyponatremia, macular degeneration, PN.  She lives in an assisted living facility where she was mobile w/ a 4ww.  She was admitted after being found down underneath her desk.  She does not recall the events leading up to her being found.  She is rec'd alert, pleasant and eager to participate w/ PT.  She is able to mobilize at spv level as noted below.  Including ambulating w/ a fww.  Pt appears to be at or very close to her PLOF.  No acute PT needs.  Plan    Physical Therapy Initial Treatment Plan   Duration: Evaluation only    DC Equipment Recommendations: None  Discharge Recommendations:  (Return to [facility])          Objective       11/13/24 0820   Prior Living Situation   Housing / Facility Assisted Living Residence   Equipment Owned 4-Wheel Walker   Prior Level of Functional Mobility   Bed Mobility Independent   Transfer Status Independent   Ambulation Independent   Assistive Devices Used 4-Wheel Walker   Strength Lower Body   Comments grossly wnl   Balance Assessment   Sitting Balance (Static) Fair +   Sitting Balance (Dynamic) Fair +   Standing Balance (Static) Fair   Standing Balance (Dynamic) Fair   Weight Shift Sitting Good   Weight Shift Standing Good   Comments w/ fww   Bed Mobility    Supine to Sit Supervised   Sit to Supine Supervised   Gait Analysis   Gait Level Of Assist Supervised   Assistive Device Front Wheel Walker   Distance (Feet) 150   Functional Mobility   Sit to Stand Supervised   Bed, Chair, Wheelchair Transfer Supervised   Physical Therapy Initial Treatment Plan    Duration Evaluation only   Anticipated Discharge Equipment and Recommendations   DC Equipment Recommendations None   Discharge Recommendations   (Return to [facility])

## 2024-11-13 NOTE — THERAPY
"Occupational Therapy   Initial Evaluation     Patient Name: Chrissie Dueñas  Age:  89 y.o., Sex:  female  Medical Record #: [id number]  Today's Date: 11/13/2024          Assessment    Patient is 89 y.o. female admitted after being found down under desk, pt unsure how long she was there or what happened, remembers having weird dreams. Pt normally independent with ADLs and functional mobility, has assistance with bathing 3x/wk from facility staff and can ask for more assistance as needed via call button she wears. Pt oriented and eager to get home, pt able to complete ADLs with supervision and mobilize in room with FWW (uses 4WW at baseline). Anticipate pt is at her functional baseline, no acute OT needs.      Plan    DC Equipment Recommendations: (P) None  Discharge Recommendations: (P)  (states she will have therapy when she gets back)     Subjective    \"I'm ready to go home\"     Objective       11/13/24 0838   Prior Living Situation   Prior Services Intermittent Physical Support for ADL Per Service   Housing / Facility Assisted Living Residence   Bathroom Set up Walk In Shower;Grab Bars;Shower Chair   Equipment Owned 4-Wheel Walker;Tub / Shower Seat;Grab Bar(s) In Tub / Shower;Grab Bar(s) By Toilet   Lives with - Patient's Self Care Capacity Attendant / Paid Care Giver   Comments Can call for more assistance as needed   Prior Level of ADL Function   Self Feeding Independent   Grooming / Hygiene Independent   Bathing Requires Assist  (has bathing assistance)   Dressing Independent   Toileting Independent   Prior Level of IADL Function   Medication Management Requires Assist   Laundry Requires Assist   Kitchen Mobility Requires Assist   Finances Requires Assist   Home Management Requires Assist   Shopping Requires Assist   Prior Level Of Mobility Independent With Device in Community;Independent Without Device in Home   Driving / Transportation Relatives / Others Provide Transportation   Occupation (Pre-Hospital " Vocational) Retired Due To Age   History of Falls   History of Falls Yes   Date of Last Fall   (possible syncope/fall reason for admission otherwise no other falls)   Vitals   O2 Delivery Device None - Room Air   Pain 0 - 10 Group   Therapist Pain Assessment Post Activity Pain Same as Prior to Activity;Nurse Notified   Cognition    Cognition / Consciousness WDL   Orientation Level Oriented x 4   Level of Consciousness Alert   Active ROM Upper Body   Active ROM Upper Body  WDL   Dominant Hand Right   Strength Upper Body   Upper Body Strength  WDL   Sensation Upper Body   Upper Extremity Sensation  WDL   Upper Body Muscle Tone   Upper Body Muscle Tone  WDL   Neurological Concerns   Neurological Concerns No   Coordination Upper Body   Coordination WDL   Balance Assessment   Sitting Balance (Static) Fair   Sitting Balance (Dynamic) Fair   Standing Balance (Static) Fair   Standing Balance (Dynamic) Fair   Weight Shift Sitting Fair   Weight Shift Standing Fair   Comments w/ FWW   Bed Mobility    Comments up in chair before/after   ADL Assessment   Grooming Supervision;Standing   Upper Body Dressing Supervision   Lower Body Dressing Supervision   Toileting   (NT-refused need)   How much help from another person does the patient currently need...   Putting on and taking off regular lower body clothing? 3   Bathing (including washing, rinsing, and drying)? 3   Toileting, which includes using a toilet, bedpan, or urinal? 3   Putting on and taking off regular upper body clothing? 3   Taking care of personal grooming such as brushing teeth? 4   Eating meals? 4   6 Clicks Daily Activity Score 20   Functional Mobility   Sit to Stand Supervised   Bed, Chair, Wheelchair Transfer Supervised   Transfer Method Stand Step   Mobility STS from chair, in room mobility, up to sink, returned to chair   Comments w/ FWW   Visual Perception   Visual Perception    (has chronic deficits at baseline)   Activity Tolerance   Sitting in Chair  returned to chair at end of session   Standing 10 min   Education Group   Education Provided Role of Occupational Therapist   Role of Occupational Therapist Patient Response Patient;Acceptance;Explanation   Problem List   Problem List None   Anticipated Discharge Equipment and Recommendations   DC Equipment Recommendations None   Discharge Recommendations   (states she will have therapy when she gets back)   Interdisciplinary Plan of Care Collaboration   IDT Collaboration with  Nursing   Patient Position at End of Therapy Seated;Chair Alarm On;Call Light within Reach;Tray Table within Reach;Phone within Reach   Collaboration Comments RN updated

## 2024-11-13 NOTE — PROGRESS NOTES
Patient IV out. Meds 2 Beds brought to patient. Patient dressing with all belongings. Patient has discharge paperwork. Patient daughter at bedside. They are taking a cab to [facility] together. Taken out in wheelchair by RN.

## 2024-11-13 NOTE — DISCHARGE PLANNING
@3971  [facility] received a voice message from pt's daughter stating pt believes she is going to discharge today.  Pt has not been cleared by [facility].   Andrzej needs information from CM before the pt can return.   Daughter needs clarification as to what is going on.  Daughter's name is Kailee Ruiz, phone number [phone].      RN CM notified via Teams.

## 2024-11-13 NOTE — DISCHARGE PLANNING
ERNIE was notified by MD that patient is ready for discharge back to [facility] with HH. CM attempted to get choice for home health, both patient and the daughter Kailee agreed that they would like to decline HH since patient will receive PT at the facility 4 times per week. ERNIE called [facility] and spoke with nurse Lo to let her know the patient is ready to come back to the facility, Lo requested to speak with bedside RN for updates on patient's condition. ERNIE provided phone number for Lo to call. ERNIE spoke with daughter Kailee, she is currently working and will be able to pick the patient up at 1430. She asked that the patient be ready to leave at that time, bedside was notified of this. No additional CM needs identified for this patient.

## 2024-11-13 NOTE — CARE PLAN
The patient is Stable - Low risk of patient condition declining or worsening    Shift Goals  Clinical Goals: Patient will remain free from fall or injuries througohut shift  Patient Goals: Sleep    Progress made toward(s) clinical / shift goals:    Patient is alert and oriented x4, with episodes of forgetfulness. Calls appropriately. Patient denies pain or discomfort. Patient's frame alarm is on, bed in low position, call light within reach.      Problem: Knowledge Deficit - Standard  Goal: Patient and family/care givers will demonstrate understanding of plan of care, disease process/condition, diagnostic tests and medications  Description: Target End Date:  1-3 days or as soon as patient condition allows    Outcome: Progressing    Problem: Fall Risk  Goal: Patient will remain free from falls  Description: Target End Date:  Prior to discharge or change in level of care     Outcome: Progressing    Problem: Pain - Standard  Goal: Alleviation of pain or a reduction in pain to the patient’s comfort goal  Description: Target End Date:  Prior to discharge or change in level of care    Outcome: Progressing

## 2024-11-13 NOTE — DISCHARGE PLANNING
"Received Choice form at   Agency/Facility Name: [facility]  Referral sent per Choice form @ [phone]     @[phone]  DPA faxed a 'disregard referral notice\" to [facility].  Pt does not want home health at this time.    "

## 2024-11-13 NOTE — DISCHARGE SUMMARY
"Discharge Summary    CHIEF COMPLAINT ON ADMISSION  Chief Complaint   Patient presents with    Head Injury     Pt arrives with redness and swelling to right eye/cheek/upper lip    Syncope     Pt remembers getting ready for bed and having \"weird dreams\". Pt states she woke up on the floor. Per EMS, pt lives in assisted living and was found by staff under a desk. Pt does not remember the event.        Reason for Admission  tbi     Admission Date  11/11/2024    CODE STATUS  DNAR/DNI    HPI & HOSPITAL COURSE  89 y.o. female who presented 11/11/2024 with confusion post being found down. Patient does not remember the episode at all. She complained of some right ankle pain and has been noted to have decreased range of motion. Imaging did not show any acute fractures or malalignment. CT head, maxillofacial and C-spine did not show any fractures, dislocation or apparent injury.  She was started on antibiotics for a mild UTI.  He was evaluated by PT OT and was felt to be appropriate to return to assisted living facility with no acute needs.    I have discontinued patient's losartan and Lasix given concern for hypotension at home which may have resulted in her ground-level fall.  I have also discontinued her trazodone which can cause symptoms of dizziness and increase her risk for falls.    Therefore, she is discharged in fair and stable condition to home with close outpatient follow-up.    The patient met 2-midnight criteria for an inpatient stay at the time of discharge.    Discharge Date  11/25/24    FOLLOW UP ITEMS POST DISCHARGE  PCP    DISCHARGE DIAGNOSES  Principal Problem (Resolved):    Acute encephalopathy (POA: Yes)  Active Problems:    Essential hypertension (POA: Yes)      Overview: Chronic condition well-controlled on losartan 75 mg daily.  Patient       reports she has been on this medication many years no reported side       effects.  Blood pressure today in clinic 110/50           Chronic kidney disease, stage " 3a (POA: Yes)      Overview: This is a new condition      -Most recent GFR 59      -Patient denies knowledge of this condition.  She does say that she is not       good at drinking water or staying hydrated.  She does not take any       nephrotoxic substances such as over-the-counter NSAIDs.  Past medical       history significant for hypertension.    Hyponatremia (POA: Yes)    Abnormal EKG (POA: Yes)    Fall (POA: Yes)  Resolved Problems:    Acute UTI (POA: Yes)    Leukocytosis (POA: Yes)    High anion gap metabolic acidosis (POA: Yes)      FOLLOW UP  No future appointments.  No follow-up provider specified.    MEDICATIONS ON DISCHARGE     Medication List        START taking these medications        Instructions   cephALEXin 500 MG Caps  Commonly known as: Keflex   Take 1 Capsule by mouth 2 times a day for 3 days.  Dose: 500 mg            CHANGE how you take these medications        Instructions   Senna 8.6 MG Tabs  What changed: See the new instructions.   TAKE 1 TABLET BY MOUTH DAILY AT BEDTIME FOR CONSTIPATION *PLEASE HOLD FOR LOOSE STOOLS*            CONTINUE taking these medications        Instructions   cetirizine 10 MG Tabs  Commonly known as: ZyrTEC   Take 1 Tablet by mouth every evening.  Dose: 10 mg     FeroSul 325 (65 Fe) MG tablet  Generic drug: ferrous sulfate   TAKE 1 TABLET BY MOUTH EVERY 48 HOUR(S)     melatonin 3 MG Tabs   Take 1 Tablet by mouth at bedtime as needed (sleep).  Dose: 3 mg     One-Daily Multi-Vitamin Tabs   Take 1 Tablet by mouth every day.  Dose: 1 Tablet     polyethylene glycol 3350 17 GM/SCOOP Powd  Commonly known as: Miralax   Take 17 g by mouth 1 time a day as needed (for constipation). Mix with 6 ounces of water or juice. Patient may keep in room and self-administer.  Dose: 17 g     PreserVision AREDS 2+Multi Vit Caps   TAKE 1 CAPSULE BY MOUTH DAILY  Dose: 1 Capsule     Vitamin C 250 MG Chew   Doctor's comments: Patient would prefer Gummy form if available.  Chew 2 Tablets every  day.  Dose: 2 Each            STOP taking these medications      furosemide 20 MG Tabs  Commonly known as: Lasix     losartan 25 MG Tabs  Commonly known as: Cozaar     potassium chloride SA 20 MEQ Tbcr  Commonly known as: Kdur     traZODone 100 MG Tabs  Commonly known as: Desyrel              Allergies  Allergies   Allergen Reactions    Shrimp Flavor Nausea       DIET  Orders Placed This Encounter   Procedures    Diet Order Diet: Regular     Standing Status:   Standing     Number of Occurrences:   1     Order Specific Question:   Diet:     Answer:   Regular [1]       ACTIVITY  As tolerated.  Weight bearing as tolerated    CONSULTATIONS  none    PROCEDURES  none    LABORATORY  Lab Results   Component Value Date    SODIUM 135 11/13/2024    POTASSIUM 3.8 11/13/2024    CHLORIDE 102 11/13/2024    CO2 21 11/13/2024    GLUCOSE 95 11/13/2024    BUN 9 11/13/2024    CREATININE 0.69 11/13/2024        Lab Results   Component Value Date    WBC 11.5 (H) 11/13/2024    HEMOGLOBIN 14.0 11/13/2024    HEMATOCRIT 41.5 11/13/2024    PLATELETCT 248 11/13/2024        Total time of the discharge process exceeds 33 minutes.

## 2024-11-17 PROBLEM — U07.1 COVID: Status: RESOLVED | Noted: 2023-04-07 | Resolved: 2024-11-17

## 2024-11-17 PROBLEM — N18.2 STAGE 2 CHRONIC KIDNEY DISEASE: Status: ACTIVE | Noted: 2022-08-03

## 2024-11-17 PROBLEM — G31.9 CEREBRAL ATROPHY (HCC): Status: ACTIVE | Noted: 2024-11-17

## 2024-11-18 PROBLEM — M25.511 ACUTE PAIN OF RIGHT SHOULDER: Status: ACTIVE | Noted: 2024-11-18

## 2024-11-19 NOTE — DOCUMENTATION QUERY
[facility]                                                                       Query Response Note      PATIENT:               MICHELLE JIANG  ACCT #:                  [id number]  MRN:                     [id number]  :                      1930  ADMIT DATE:       2024 5:56 AM  DISCH DATE:        2024 2:31 PM  RESPONDING  PROVIDER #:        [id number]           QUERY TEXT:    Encephalopathy is documented in the Medical Record. Please specify type.    The patient's Clinical Indicators include:  Findings:  HP: acute encephalopathy dehydration, acute uti mild changes on urinalysis but considering everything that occured will treat with full course of antibiotics confusion  having a dream that was very vivid and she was trapped  in a small confined area. per family not at baseline, found under a desk     Labs  Sodium 131 ,  Sodium 134     Treatment: IV fluids Iv antibiotics labs    Risk factors: dehydration, hyponatremia, Urinary tract infection     Phyllis De Anda RN BSN  Clinical Documentation   [email or web address]  Connect via Voal[employer] Messenger    Note: If you agree with a diagnosis listed above, please remember to include it in your concurrent daily documentation and onto the Discharge Summary.  Options provided:   -- Metabolic encephalopathy   -- Other type of encephalopathy   -- Other explanation, (please specify other explanation)      Query created by: Phyllis Nagy on 2024 9:08 AM    RESPONSE TEXT:    Metabolic encephalopathy          Electronically signed by:  OUMOU HODGSON MD 2024 6:23 PM

## 2024-11-25 ENCOUNTER — HOSPITAL ENCOUNTER (EMERGENCY)
Facility: MEDICAL CENTER | Age: 89
End: 2024-11-25
Attending: EMERGENCY MEDICINE
Payer: MEDICARE

## 2024-11-25 ENCOUNTER — APPOINTMENT (OUTPATIENT)
Dept: RADIOLOGY | Facility: MEDICAL CENTER | Age: 89
End: 2024-11-25
Attending: EMERGENCY MEDICINE
Payer: MEDICARE

## 2024-11-25 ENCOUNTER — PHARMACY VISIT (OUTPATIENT)
Dept: PHARMACY | Facility: MEDICAL CENTER | Age: 89
End: 2024-11-25
Payer: COMMERCIAL

## 2024-11-25 VITALS
RESPIRATION RATE: 23 BRPM | HEIGHT: 60 IN | TEMPERATURE: 97.3 F | OXYGEN SATURATION: 99 % | DIASTOLIC BLOOD PRESSURE: 74 MMHG | HEART RATE: 77 BPM | BODY MASS INDEX: 28.39 KG/M2 | WEIGHT: 144.62 LBS | SYSTOLIC BLOOD PRESSURE: 172 MMHG

## 2024-11-25 DIAGNOSIS — R06.02 SHORTNESS OF BREATH: ICD-10-CM

## 2024-11-25 DIAGNOSIS — F41.9 ANXIETY: ICD-10-CM

## 2024-11-25 LAB
ALBUMIN SERPL BCP-MCNC: 3.9 G/DL (ref 3.2–4.9)
ALBUMIN/GLOB SERPL: 1.1 G/DL
ALP SERPL-CCNC: 143 U/L (ref 30–99)
ALT SERPL-CCNC: 23 U/L (ref 2–50)
ANION GAP SERPL CALC-SCNC: 16 MMOL/L (ref 7–16)
APTT PPP: 33.8 SEC (ref 24.7–36)
AST SERPL-CCNC: 36 U/L (ref 12–45)
BASOPHILS # BLD AUTO: 0.3 % (ref 0–1.8)
BASOPHILS # BLD: 0.03 K/UL (ref 0–0.12)
BILIRUB SERPL-MCNC: 0.5 MG/DL (ref 0.1–1.5)
BUN SERPL-MCNC: 13 MG/DL (ref 8–22)
CALCIUM ALBUM COR SERPL-MCNC: 9.4 MG/DL (ref 8.5–10.5)
CALCIUM SERPL-MCNC: 9.3 MG/DL (ref 8.4–10.2)
CHLORIDE SERPL-SCNC: 96 MMOL/L (ref 96–112)
CO2 SERPL-SCNC: 20 MMOL/L (ref 20–33)
CREAT SERPL-MCNC: 0.64 MG/DL (ref 0.5–1.4)
EKG IMPRESSION: NORMAL
EOSINOPHIL # BLD AUTO: 0 K/UL (ref 0–0.51)
EOSINOPHIL NFR BLD: 0 % (ref 0–6.9)
ERYTHROCYTE [DISTWIDTH] IN BLOOD BY AUTOMATED COUNT: 38.4 FL (ref 35.9–50)
FLUAV RNA SPEC QL NAA+PROBE: NEGATIVE
FLUBV RNA SPEC QL NAA+PROBE: NEGATIVE
GFR SERPLBLD CREATININE-BSD FMLA CKD-EPI: 82 ML/MIN/1.73 M 2
GLOBULIN SER CALC-MCNC: 3.4 G/DL (ref 1.9–3.5)
GLUCOSE SERPL-MCNC: 110 MG/DL (ref 65–99)
HCT VFR BLD AUTO: 40.1 % (ref 37–47)
HGB BLD-MCNC: 14.1 G/DL (ref 12–16)
IMM GRANULOCYTES # BLD AUTO: 0.03 K/UL (ref 0–0.11)
IMM GRANULOCYTES NFR BLD AUTO: 0.3 % (ref 0–0.9)
INR PPP: 1.03 (ref 0.87–1.13)
LYMPHOCYTES # BLD AUTO: 1.8 K/UL (ref 1–4.8)
LYMPHOCYTES NFR BLD: 19.4 % (ref 22–41)
MCH RBC QN AUTO: 33.2 PG (ref 27–33)
MCHC RBC AUTO-ENTMCNC: 35.2 G/DL (ref 32.2–35.5)
MCV RBC AUTO: 94.4 FL (ref 81.4–97.8)
MONOCYTES # BLD AUTO: 0.68 K/UL (ref 0–0.85)
MONOCYTES NFR BLD AUTO: 7.3 % (ref 0–13.4)
NEUTROPHILS # BLD AUTO: 6.74 K/UL (ref 1.82–7.42)
NEUTROPHILS NFR BLD: 72.7 % (ref 44–72)
NRBC # BLD AUTO: 0 K/UL
NRBC BLD-RTO: 0 /100 WBC (ref 0–0.2)
NT-PROBNP SERPL IA-MCNC: 3159 PG/ML (ref 0–125)
PLATELET # BLD AUTO: 366 K/UL (ref 164–446)
PMV BLD AUTO: 8.3 FL (ref 9–12.9)
POTASSIUM SERPL-SCNC: 4.8 MMOL/L (ref 3.6–5.5)
PROT SERPL-MCNC: 7.3 G/DL (ref 6–8.2)
PROTHROMBIN TIME: 13.9 SEC (ref 12–14.6)
RBC # BLD AUTO: 4.25 M/UL (ref 4.2–5.4)
RSV RNA SPEC QL NAA+PROBE: NEGATIVE
SARS-COV-2 RNA RESP QL NAA+PROBE: NOTDETECTED
SODIUM SERPL-SCNC: 132 MMOL/L (ref 135–145)
SPECIMEN SOURCE: NORMAL
TROPONIN T SERPL-MCNC: 35 NG/L (ref 6–19)
WBC # BLD AUTO: 9.3 K/UL (ref 4.8–10.8)

## 2024-11-25 PROCEDURE — 71045 X-RAY EXAM CHEST 1 VIEW: CPT

## 2024-11-25 PROCEDURE — 700117 HCHG RX CONTRAST REV CODE 255: Performed by: EMERGENCY MEDICINE

## 2024-11-25 PROCEDURE — 96374 THER/PROPH/DIAG INJ IV PUSH: CPT

## 2024-11-25 PROCEDURE — 85610 PROTHROMBIN TIME: CPT

## 2024-11-25 PROCEDURE — 84484 ASSAY OF TROPONIN QUANT: CPT

## 2024-11-25 PROCEDURE — 0241U HCHG SARS-COV-2 COVID-19 NFCT DS RESP RNA 4 TRGT MIC: CPT

## 2024-11-25 PROCEDURE — 85730 THROMBOPLASTIN TIME PARTIAL: CPT

## 2024-11-25 PROCEDURE — 93005 ELECTROCARDIOGRAM TRACING: CPT | Performed by: EMERGENCY MEDICINE

## 2024-11-25 PROCEDURE — 99285 EMERGENCY DEPT VISIT HI MDM: CPT

## 2024-11-25 PROCEDURE — 71275 CT ANGIOGRAPHY CHEST: CPT

## 2024-11-25 PROCEDURE — 36415 COLL VENOUS BLD VENIPUNCTURE: CPT

## 2024-11-25 PROCEDURE — 83880 ASSAY OF NATRIURETIC PEPTIDE: CPT

## 2024-11-25 PROCEDURE — RXMED WILLOW AMBULATORY MEDICATION CHARGE: Performed by: EMERGENCY MEDICINE

## 2024-11-25 PROCEDURE — 700111 HCHG RX REV CODE 636 W/ 250 OVERRIDE (IP): Performed by: EMERGENCY MEDICINE

## 2024-11-25 PROCEDURE — 85025 COMPLETE CBC W/AUTO DIFF WBC: CPT

## 2024-11-25 PROCEDURE — 80053 COMPREHEN METABOLIC PANEL: CPT

## 2024-11-25 RX ORDER — POTASSIUM CHLORIDE 1500 MG/1
20 TABLET, EXTENDED RELEASE ORAL 2 TIMES DAILY
COMMUNITY

## 2024-11-25 RX ORDER — TRAZODONE HYDROCHLORIDE 100 MG/1
100 TABLET ORAL NIGHTLY
COMMUNITY

## 2024-11-25 RX ORDER — CEPHALEXIN 500 MG/1
500 CAPSULE ORAL 4 TIMES DAILY
COMMUNITY

## 2024-11-25 RX ORDER — FUROSEMIDE 20 MG/1
20 TABLET ORAL 2 TIMES DAILY
COMMUNITY

## 2024-11-25 RX ORDER — LORAZEPAM 2 MG/ML
0.5 INJECTION INTRAMUSCULAR ONCE
Status: COMPLETED | OUTPATIENT
Start: 2024-11-25 | End: 2024-11-25

## 2024-11-25 RX ORDER — LORAZEPAM 0.5 MG/1
0.5 TABLET ORAL EVERY 4 HOURS PRN
Qty: 20 TABLET | Refills: 0 | Status: SHIPPED | OUTPATIENT
Start: 2024-11-25 | End: 2024-12-03

## 2024-11-25 RX ORDER — LOSARTAN POTASSIUM 25 MG/1
25 TABLET ORAL DAILY
COMMUNITY

## 2024-11-25 RX ADMIN — IOHEXOL 75 ML: 350 INJECTION, SOLUTION INTRAVENOUS at 15:53

## 2024-11-25 RX ADMIN — LORAZEPAM 0.5 MG: 2 INJECTION INTRAMUSCULAR; INTRAVENOUS at 15:59

## 2024-11-25 ASSESSMENT — FIBROSIS 4 INDEX: FIB4 SCORE: 3.95

## 2024-11-25 NOTE — ED PROVIDER NOTES
"ED Provider Note    CHIEF COMPLAINT  Chief Complaint   Patient presents with    Shortness of Breath     Started last night  Stated \"just can't catch my breath\"       EXTERNAL RECORDS REVIEWED  Inpatient Notes I reviewed the discharge summary from inpatient stay on 2024, at that time the patient had syncope and head injury.    HPI/ROS  LIMITATION TO HISTORY   Select: : None  OUTSIDE HISTORIAN(S):  None    Chrissie Dueñas is a 89 y.o. female who presents stating that she has a history of hypertension, hyponatremia, hypothyroidism, multifocal pneumonia, she reports she is usually not on oxygen but today was very short of breath after this began last night while she was sleeping.  She denies any chest pain.  Denies any fever or cough.    PAST MEDICAL HISTORY   has a past medical history of Anxiety, Asthma, Cancer ([facility]), COVID (2023), Depression, Diarrhea (2023), High blood pressure, Hypertension, Hyponatremia, Hypothyroidism, Low sodium levels, Macular degeneration, and Multifocal pneumonia (2023).    SURGICAL HISTORY   has a past surgical history that includes ovarian cystectomy and appendectomy.    FAMILY HISTORY  Family History   Problem Relation Age of Onset    Cancer Mother     Stroke Father        SOCIAL HISTORY  Social History     Tobacco Use    Smoking status: Former     Current packs/day: 0.00     Average packs/day: 0.5 packs/day for 30.0 years (15.0 ttl pk-yrs)     Types: Cigarettes     Start date: 1944     Quit date: 1974     Years since quittin.5    Smokeless tobacco: Never   Vaping Use    Vaping status: Never Used   Substance and Sexual Activity    Alcohol use: Yes     Alcohol/week: 4.2 oz     Types: 7 Glasses of wine per week    Drug use: Not Currently    Sexual activity: Not on file       CURRENT MEDICATIONS  Home Medications       Reviewed by Dre Dorado (Pharmacy Tech) on 24 at 1603  Med List Status: Complete     Medication Last Dose " Status   Ascorbic Acid (VITAMIN C) 250 MG Chew Tab 11/25/2024 Active   cephALEXin (KEFLEX) 500 MG Cap 11/16/2024 Active   cetirizine (ZYRTEC) 10 MG Tab 11/24/2024 Active   FEROSUL 325 (65 Fe) MG tablet 11/25/2024 Active   furosemide (LASIX) 20 MG Tab 11/18/2024 Active   losartan (COZAAR) 25 MG Tab 11/18/2024 Active   melatonin 3 MG Tab 11/23/2024 Active   Multiple Vitamin (ONE-DAILY MULTI-VITAMIN) Tab 11/25/2024 Active   Multiple Vitamins-Minerals (PRESERVISION AREDS 2+MULTI VIT) Cap 11/25/2024 Active   polyethylene glycol 3350 (MIRALAX) 17 GM/SCOOP Powder Not Taking Active   potassium chloride SA (KDUR) 20 MEQ Tab CR 11/18/2024 Active   Sennosides (SENNA) 8.6 MG Tab 11/24/2024 Active   traZODone (DESYREL) 100 MG Tab 11/17/2024 Active                             ALLERGIES  Allergies   Allergen Reactions    Shrimp Flavor Nausea       PHYSICAL EXAM  VITAL SIGNS: BP (!) 172/74   Pulse 77   Temp 36.3 °C (97.3 °F) (Temporal)   Resp (!) 23   Ht 1.524 m (5')   Wt 65.6 kg (144 lb 10 oz)   SpO2 99%   BMI 28.24 kg/m²      Constitutional: Alert.  HENT: No signs of trauma, Bilateral external ears normal, Nose normal.   Eyes: Pupils are equal and reactive, Conjunctiva normal, Non-icteric.   Neck: Normal range of motion, No tenderness, Supple, No stridor.   Lymphatic: No lymphadenopathy noted.   Cardiovascular: Regular rate and rhythm, no murmurs.   Thorax & Lungs: Normal breath sounds, No respiratory distress, No wheezing, No chest tenderness.   Abdomen: Bowel sounds normal, Soft, No tenderness, No peritoneal signs, No masses, No pulsatile masses.   Skin: Warm, Dry, No erythema, No rash.   Back: No bony tenderness, No CVA tenderness.   Extremities: Intact distal pulses, No edema, No tenderness, No cyanosis.  Musculoskeletal: Good range of motion in all major joints. No tenderness to palpation or major deformities noted.   Neurologic: Alert , Normal motor function, Normal sensory function, No focal deficits noted.    Psychiatric: Affect normal, Judgment normal, Mood normal.       EKG/LABS    This is a twelve-lead EKG interpretation by myself.  It is normal sinus rhythm at a rate of 89.  The axis is LAD -107 degrees.  The AZ interval is prolonged 220 consistent with first-degree AV block.  The QRS is prolonged 116 consistent with IVCD the QTc is prolonged 531.  There are nonspecific ST-T wave changes.  Compared to EKG from November 11, 2024 there are no significant changes.  My impression of this EKG, no significant changes, does not meet STEMI criteria at this time  I have independently interpreted this EKG    RADIOLOGY/PROCEDURES   I have independently interpreted the diagnostic imaging associated with this visit and am waiting the final reading from the radiologist.   My preliminary interpretation is as follows: Chest x-ray is consistent with cardiomegaly    Radiologist interpretation:  CT-CTA CHEST PULMONARY ARTERY W/ RECONS   Final Result      1.  No evidence of pulmonary embolus.      2.  Linear bibasilar atelectasis.      3.  Atherosclerotic vascular calcification.      DX-CHEST-PORTABLE (1 VIEW)   Final Result      Cardiomegaly.          COURSE & MEDICAL DECISION MAKING    ASSESSMENT, COURSE AND PLAN  Care Narrative:     Patient presents with severe shortness of breath that started last night.  She is hypoxic.  Labs were ordered, CT chest was ordered to evaluate for possible pulmonary embolism.  The patient is given 0.5 mg IV Ativan for anxiety.        4:14 PM the patient CT scan is negative for PE, her labs are unremarkable.  I have turned off her oxygen and she is 98% on room air.  I believe that there is a component of anxiety causing her to feel like she is short of breath.  I will prescribe her Ativan.  The daughter and the patient agree with this plan to discharge her.                ADDITIONAL PROBLEMS MANAGED  This of breath, anxiety    DISPOSITION AND DISCUSSIONS  I have discussed management of the patient with  the following physicians and VIRGILIO's: None    Discussion of management with other HP or appropriate source(s): None     Escalation of care considered, and ultimately not performed:acute inpatient care management, however at this time, the patient is most appropriate for outpatient management    Barriers to care at this time, including but not limited to:  None .     Decision tools and prescription drugs considered including, but not limited to:  Prescribed the patient Ativan for anxiety .      I reviewed prescription monitoring program for patient's narcotic use before prescribing a scheduled drug.The patient will not drink alcohol nor drive with prescribed medications. The patient will return for new or worsening symptoms and is stable at the time of discharge.    The patient is referred to a primary physician for blood pressure management, diabetic screening, and for all other preventative health concerns.    In prescribing controlled substances to this patient, I certify that I have obtained and reviewed the medical history of Chrissie Dueñas. I have also made a good amanda effort to obtain applicable records from other providers who have treated the patient and records did not demonstrate any increased risk of substance abuse that would prevent me from prescribing controlled substances.     I have conducted a physical exam and documented it. I have reviewed Ms. Dueñas’s prescription history as maintained by the [place] Prescription Monitoring Program.     I have assessed the patient’s risk for abuse, dependency, and addiction using the validated Opioid Risk Tool available at https://www.mdcalc.com/kxmlvw-hipq-fleu-ort-narcotic-abuse.     Given the above, I believe the benefits of controlled substance therapy outweigh the risks. The reasons for prescribing controlled substances include non-narcotic, oral analgesic alternatives have been inadequate for pain control. Accordingly, I have discussed the risk and  benefits, treatment plan, and alternative therapies with the patient.       DISPOSITION:  Patient will be discharged home in stable condition.    FOLLOW UP:  [facility], Emergency Dept  [address]  [place] [address] [phone]    If symptoms worsen    Lucia Neal P.A.-C.  [address]  [place] [id number] [phone]      As needed      OUTPATIENT MEDICATIONS:  New Prescriptions    LORAZEPAM (ATIVAN) 0.5 MG TAB    Take 1 Tablet by mouth every four hours as needed for Anxiety for up to 5 days.         FINAL DIAGNOSIS  1. Shortness of breath    2. Anxiety         Electronically signed by: Pankaj Salazar M.D., 11/25/2024 2:15 PM

## 2024-11-25 NOTE — ED TRIAGE NOTES
"Chief Complaint   Patient presents with    Shortness of Breath     Started last night  Stated \"just can't catch my breath\"     Ht 1.524 m (5')   Wt 65.6 kg (144 lb 10 oz)   BMI 28.24 kg/m²     Pt to ED via [facility] from [facility] for c/o feeling short of breath since last night, denies c/o CP or abd pain denies c/o back pain, states went to PT and dinner and felt short of breath after.    "

## 2024-11-26 NOTE — ED NOTES
Medication history reviewed with Andrzej at [facility] over the phone. Med rec is complete.  Allergies reviewed, per pt    Called Andrzej at [facility] ([phone]) to verify all medications.     Pt FUROSAMIDE 20MG, POTASSIUM 20MEQ, and LOSARTAN 25MG was D/C on 11/18/2024 and pts TRAZODONE 100MG was D/C on 11/17/2024.    Pt started KEFLEX 500MG on 11/13/2024 for 4 day course, per facility pt finish course of antibiotic     Pt is not on any anticoagulants

## 2024-12-01 PROBLEM — R06.02 SHORTNESS OF BREATH: Status: ACTIVE | Noted: 2024-12-01

## 2024-12-01 PROBLEM — R79.89 ELEVATED BRAIN NATRIURETIC PEPTIDE (BNP) LEVEL: Status: ACTIVE | Noted: 2024-12-01

## 2024-12-01 PROBLEM — F41.9 ANXIETY: Status: ACTIVE | Noted: 2024-12-01

## 2024-12-02 PROBLEM — R54 FRAILTY: Status: ACTIVE | Noted: 2024-12-02

## 2024-12-31 ENCOUNTER — OFFICE VISIT (OUTPATIENT)
Dept: URGENT CARE | Facility: CLINIC | Age: 89
End: 2024-12-31
Payer: MEDICARE

## 2024-12-31 ENCOUNTER — APPOINTMENT (OUTPATIENT)
Dept: RADIOLOGY | Facility: IMAGING CENTER | Age: 89
End: 2024-12-31
Payer: MEDICARE

## 2024-12-31 VITALS
RESPIRATION RATE: 16 BRPM | SYSTOLIC BLOOD PRESSURE: 122 MMHG | DIASTOLIC BLOOD PRESSURE: 86 MMHG | OXYGEN SATURATION: 94 % | WEIGHT: 123 LBS | BODY MASS INDEX: 24.15 KG/M2 | HEIGHT: 60 IN | TEMPERATURE: 98.4 F | HEART RATE: 96 BPM

## 2024-12-31 DIAGNOSIS — R22.31 LOCALIZED SWELLING OF FINGER OF RIGHT HAND: ICD-10-CM

## 2024-12-31 PROCEDURE — 73140 X-RAY EXAM OF FINGER(S): CPT | Mod: TC,FY,RT | Performed by: RADIOLOGY

## 2024-12-31 RX ORDER — CEPHALEXIN 500 MG/1
500 CAPSULE ORAL 4 TIMES DAILY
Qty: 28 CAPSULE | Refills: 0 | Status: SHIPPED | OUTPATIENT
Start: 2024-12-31 | End: 2025-01-07

## 2024-12-31 ASSESSMENT — ENCOUNTER SYMPTOMS
MYALGIAS: 0
CHILLS: 0
COUGH: 0
FEVER: 0
DIZZINESS: 0
EYES NEGATIVE: 1
SHORTNESS OF BREATH: 0
GASTROINTESTINAL NEGATIVE: 1

## 2024-12-31 ASSESSMENT — FIBROSIS 4 INDEX: FIB4 SCORE: 1.93

## 2024-12-31 NOTE — PROGRESS NOTES
Subjective:     Chief Complaint   Patient presents with    Edema     Pt R hand pointer finger swollen, she noticed it yesterday, tried ice pack at home       HPI:  Chrissie Dueñas is a 89 y.o. female who presents for Right pointer finger swelling that began yesterday. Pt iced the area w/o improvement. Hx of arthritis in her hands. Denies injury or trauma. Denies decreased range of motion or pain with movement.       ROS:  Review of Systems   Constitutional:  Negative for chills and fever.   HENT: Negative.     Eyes: Negative.    Respiratory:  Negative for cough and shortness of breath.    Cardiovascular:  Negative for chest pain.   Gastrointestinal: Negative.    Genitourinary: Negative.    Musculoskeletal:  Negative for joint pain and myalgias.   Skin:  Negative for rash.   Neurological:  Negative for dizziness.   All other systems reviewed and are negative.       CURRENT MEDICATIONS:  Current Outpatient Medications   Medication Sig Refill Last Dispense    acetaminophen (TYLENOL) 500 MG Tab Take 1-2 Tablets by mouth every 8 hours as needed (For pain). Patient may keep in room and self-administer. (Patient not taking: Reported on 12/31/2024)  Unknown (no pharmacy)    albuterol 108 (90 Base) MCG/ACT Aero Soln inhalation aerosol Inhale 1 Puff every 6 hours as needed for Shortness of Breath. (Patient not taking: Reported on 12/31/2024) 2 Unknown (outside pharmacy)    Ascorbic Acid (VITAMIN C) 250 MG Chew Tab Chew 2 Tablets every day. (Patient not taking: Reported on 12/31/2024) 11 Unknown (outside pharmacy)    cephALEXin (KEFLEX) 500 MG Cap Take 1 Capsule by mouth 4 times a day for 7 days. 0 Unknown (outside pharmacy)    cetirizine (ZYRTEC) 10 MG Tab Take 1 Tablet by mouth every evening. (Patient not taking: Reported on 12/31/2024) 5 Unknown (outside pharmacy)    ferrous sulfate (FEROSUL) 325 (65 Fe) MG tablet Take 1 Tablet by mouth every 48 hours. (Patient not taking: Reported on 12/31/2024) 11 Unknown (outside  pharmacy)    melatonin 3 MG Tab Take 2 Tablets by mouth at bedtime. (Patient not taking: Reported on 12/31/2024) 11 Unknown (outside pharmacy)    mirtazapine (REMERON) 15 MG Tab Take 1 Tablet by mouth every evening. (Patient not taking: Reported on 12/31/2024) 1 Unknown (outside pharmacy)    Multiple Vitamin (ONE-DAILY MULTI-VITAMIN) Tab Take 1 Tablet by mouth every day. 11 Unknown (outside pharmacy)    Multiple Vitamins-Minerals (PRESERVISION AREDS 2+MULTI VIT) Cap TAKE 1 CAPSULE BY MOUTH DAILY 11 Unknown (outside pharmacy)    polyethylene glycol 3350 (MIRALAX) 17 GM/SCOOP Powder Take 17 g by mouth 1 time a day as needed (for constipation). Mix with 6 ounces of water or juice. Patient may keep in room and self-administer. (Patient not taking: Reported on 12/31/2024) 11 Unknown (outside pharmacy)    Sennosides (SENNA) 8.6 MG Tab Take 1 Tablet by mouth at bedtime. *Please hold for loose stools*  Indications: Constipation (Patient not taking: Reported on 12/31/2024) 11 Unknown (outside pharmacy)       ALLERGIES:   Allergies   Allergen Reactions    Shrimp Flavor Nausea       PROBLEM LIST:    does not have any pertinent problems on file.    Allergies, Medications, & Tobacco/Substance Use were reconciled by the Medical Assistant and reviewed by myself.     Objective:   /86 (BP Location: Left arm, Patient Position: Sitting, BP Cuff Size: Adult long)   Pulse 96   Temp 36.9 °C (98.4 °F) (Temporal)   Resp 16   Ht 1.524 m (5')   Wt 55.8 kg (123 lb)   SpO2 94%   BMI 24.02 kg/m²     Physical Exam  Constitutional:       General: She is not in acute distress.     Appearance: She is not ill-appearing or toxic-appearing.   Cardiovascular:      Rate and Rhythm: Normal rate and regular rhythm.   Pulmonary:      Effort: Pulmonary effort is normal.      Breath sounds: Normal breath sounds.   Musculoskeletal:      Right hand: Swelling present. No lacerations or tenderness. Normal range of motion. Normal strength.       Comments: R pointer finger swelling and redness primarily to MCP, no pain with movement or pain with palpation, no abrasion or laceration   Skin:     General: Skin is warm and dry.   Neurological:      Mental Status: She is alert.         Assessment/Plan:   Pt's history and physical exam consistent with significant swelling of the R pointer finger, xray ordered which showed no acute osseous abnormality and severe osteoarthritis. Pt will be treated with keflex for an infection of the soft tissue of the R pointer finger surrounding the MCP joint. Encouraged to keep a close eye on area and return for fever/chills, worsening redness, swelling, pain or if not improved in several days.     Assessment & Plan  Localized swelling of finger of right hand  Orders:    DX-FINGER(S) 2+ RIGHT; Future    cephALEXin (KEFLEX) 500 MG Cap; Take 1 Capsule by mouth 4 times a day for 7 days.      Discussed differential diagnosis, management options, risks/benefits, and alternatives to planned treatment. Pt expressed understanding and the treatment plan was agreed upon. Questions were encouraged and answered. Pt encouraged to return to urgent care as needed if new or worsening symptoms or if there is no improvement in condition. Pt educated in red flags and indications to immediately call 911 or present to the Emergency Department. Advised the patient to follow-up with the primary care physician for recheck, reevaluation, and further management.    I personally reviewed prior external notes and test results pertinent to today's visit. I have independently reviewed and interpreted all diagnostics ordered during this visit.    Please note that this dictation was created using voice recognition software. I have made a reasonable attempt to correct obvious errors, but I expect that there are errors of grammar and possibly content that I did not discover before finalizing the note.    This note was electronically signed by ARIELLA Barahona

## 2025-02-04 NOTE — PROGRESS NOTES
Rehab Progress Note     Encounter Date: 2023    CC: fatigue     Interval Events (Subjective)  Vitals reviewed: WNL   Labs reviewed: leukocytosis improving, anemia stable. Sodium improved. Elevated AST and ALT. Elevated BNP.   Patient seen and examined in room, just transferred back to bed after therapy. Patient able to tolerate therapy well. Denies worsening cough, reports mild SOB but remains on 2 L O2. Denies abdo pain, but had hard stools over night. Feels like she could move her bowels again.   Does not report HA, lightheadedness, SOB, CP, abdominal pain, or changes in numbness/tingling/weakness.       Objective:  Physical Exam:  Vitals: BP (!) 147/78   Pulse 80   Temp 36.6 °C (97.9 °F) (Oral)   Resp 18   Ht 1.524 m (5')   Wt 58.5 kg (129 lb)   SpO2 96%   Gen: NAD, laying comfortably in bed, just finished transferring back to bed   Head:  NC/AT  Eyes/ Nose/ Mouth: PERRLA, moist mucous membranes  Cardio: RRR, good distal perfusion, warm extremities  Pulm: normal respiratory effort, no cyanosis , on 2 L o2   Abd: Soft NTND,   Ext: No peripheral edema. No calf tenderness. No clubbing.        Recent Results (from the past 72 hour(s))   EKG (Now)    Collection Time: 23 10:29 AM   Result Value Ref Range    Report       [facility]    Test Date:  2023  Pt Name:    MICHELLE JIANG                Department: [facility]  MRN:        [id number]                      Room:       -ROOM 3  Gender:     Female                       Technician: [id number]  :        [id number]                   Requested By:DAVID MERLOS  Order #:    [id number]                    Reading MD: David Merlos    Measurements  Intervals                                Axis  Rate:       76                           P:          -13  IN:         295                          QRS:        -92  QRSD:       123                          T:          -27  QT:         428  QTc:        482    Interpretive  Rx Refill Note  Requested Prescriptions     Pending Prescriptions Disp Refills    Droplet Pen Gray 32G X 4 MM misc [Pharmacy Med Name: DROPLET PEN NEEDLE 32G 4MM] 100 each 1     Sig: USE 1 PEN NEEDLE FOUR TIMES A DAY      Last office visit with prescribing clinician: 11/12/2024   Last telemedicine visit with prescribing clinician: Visit date not found   Next office visit with prescribing clinician: 2/12/2025                         Would you like a call back once the refill request has been completed: [] Yes [] No    If the office needs to give you a call back, can they leave a voicemail: [] Yes [] No    April Nunez  02/04/25, 09:56 EST   Statements  Sinus rhythm  Prolonged MT interval  RBBB and LAFB  Compared to ECG 03/13/2023 19:20:32  Left anterior fascicular block now present  Ectopic atrial rhythm no longer present  Electronically Signed On 3- 10:53:24 PDT by Jason Merlos     Blood Culture    Collection Time: 03/29/23 11:33 AM    Specimen: Peripheral; Blood   Result Value Ref Range    Significant Indicator NEG     Source BLD     Site PERIPHERAL     Culture Result       No Growth  Note: Blood cultures are incubated for 5 days and  are monitored continuously.Positive blood cultures  are called to the RN and reported as soon as  they are identified.  Blood culture testing and Gram stain, if indicated, are  performed at [facility], [address] [place] [address].  Positive blood cultures are  sent to [facility], [address] [place] [address], for organism identification and  susceptibility testing.     TROPONIN    Collection Time: 03/29/23 11:33 AM   Result Value Ref Range    Troponin T 53 (H) 6 - 19 ng/L   PROCALCITONIN    Collection Time: 03/29/23 11:33 AM   Result Value Ref Range    Procalcitonin 0.25 (H) <0.25 ng/mL   LACTIC ACID    Collection Time: 03/29/23 11:33 AM   Result Value Ref Range    Lactic Acid 1.0 0.5 - 2.0 mmol/L   LACTIC ACID    Collection Time: 03/29/23  8:02 PM   Result Value Ref Range    Lactic Acid 1.9 0.5 - 2.0 mmol/L   CORTISOL    Collection Time: 03/30/23 12:30 AM   Result Value Ref Range    Cortisol 20.1 0.0 - 23.0 ug/dL   CBC without Differential    Collection Time: 03/30/23 12:30 AM   Result Value Ref Range    WBC 13.9 (H) 4.8 - 10.8 K/uL    RBC 3.47 (L) 4.20 - 5.40 M/uL    Hemoglobin 11.4 (L) 12.0 - 16.0 g/dL    Hematocrit 33.4 (L) 37.0 - 47.0 %    MCV 96.3 81.4 - 97.8 fL    MCH 32.9 27.0 - 33.0 pg    MCHC 34.1 33.6 - 35.0 g/dL    RDW 42.5 35.9 - 50.0 fL    Platelet Count 238 164 - 446 K/uL    MPV 9.7 9.0 - 12.9 fL   Comp Metabolic Panel (CMP)    Collection Time:  03/30/23 12:30 AM   Result Value Ref Range    Sodium 130 (L) 135 - 145 mmol/L    Potassium 4.1 3.6 - 5.5 mmol/L    Chloride 98 96 - 112 mmol/L    Co2 21 20 - 33 mmol/L    Anion Gap 11.0 7.0 - 16.0    Glucose 113 (H) 65 - 99 mg/dL    Bun 20 8 - 22 mg/dL    Creatinine 1.07 0.50 - 1.40 mg/dL    Calcium 7.9 (L) 8.4 - 10.2 mg/dL    AST(SGOT) 32 12 - 45 U/L    ALT(SGPT) 25 2 - 50 U/L    Alkaline Phosphatase 151 (H) 30 - 99 U/L    Total Bilirubin 0.7 0.1 - 1.5 mg/dL    Albumin 2.8 (L) 3.2 - 4.9 g/dL    Total Protein 5.5 (L) 6.0 - 8.2 g/dL    Globulin 2.7 1.9 - 3.5 g/dL    A-G Ratio 1.0 g/dL   LACTIC ACID    Collection Time: 03/30/23 12:30 AM   Result Value Ref Range    Lactic Acid 0.8 0.5 - 2.0 mmol/L   CORRECTED CALCIUM    Collection Time: 03/30/23 12:30 AM   Result Value Ref Range    Correct Calcium 8.9 8.5 - 10.5 mg/dL   ESTIMATED GFR    Collection Time: 03/30/23 12:30 AM   Result Value Ref Range    GFR (CKD-EPI) 49 (A) >60 mL/min/1.73 m 2   PROCALCITONIN    Collection Time: 03/30/23 12:30 AM   Result Value Ref Range    Procalcitonin 0.34 (H) <0.25 ng/mL   LACTIC ACID    Collection Time: 03/30/23  4:36 AM   Result Value Ref Range    Lactic Acid 0.7 0.5 - 2.0 mmol/L   EC-ECHOCARDIOGRAM LTD W/O CONT    Collection Time: 03/30/23 11:20 AM   Result Value Ref Range    Left Ventrical Ejection Fraction 75    MAGNESIUM    Collection Time: 03/31/23 12:46 AM   Result Value Ref Range    Magnesium 2.3 1.5 - 2.5 mg/dL   Basic Metabolic Panel    Collection Time: 03/31/23 12:46 AM   Result Value Ref Range    Sodium 128 (L) 135 - 145 mmol/L    Potassium 4.3 3.6 - 5.5 mmol/L    Chloride 99 96 - 112 mmol/L    Co2 18 (L) 20 - 33 mmol/L    Glucose 110 (H) 65 - 99 mg/dL    Bun 24 (H) 8 - 22 mg/dL    Creatinine 1.18 0.50 - 1.40 mg/dL    Calcium 8.1 (L) 8.4 - 10.2 mg/dL    Anion Gap 11.0 7.0 - 16.0   CBC WITHOUT DIFFERENTIAL    Collection Time: 03/31/23 12:46 AM   Result Value Ref Range    WBC 14.2 (H) 4.8 - 10.8 K/uL    RBC 3.37 (L) 4.20 -  5.40 M/uL    Hemoglobin 11.0 (L) 12.0 - 16.0 g/dL    Hematocrit 33.3 (L) 37.0 - 47.0 %    MCV 98.8 (H) 81.4 - 97.8 fL    MCH 32.6 27.0 - 33.0 pg    MCHC 33.0 (L) 33.6 - 35.0 g/dL    RDW 44.8 35.9 - 50.0 fL    Platelet Count 246 164 - 446 K/uL    MPV 9.1 9.0 - 12.9 fL   ESTIMATED GFR    Collection Time: 03/31/23 12:46 AM   Result Value Ref Range    GFR (CKD-EPI) 43 (A) >60 mL/min/1.73 m 2   CBC with Differential    Collection Time: 04/01/23  6:32 AM   Result Value Ref Range    WBC 12.1 (H) 4.8 - 10.8 K/uL    RBC 3.46 (L) 4.20 - 5.40 M/uL    Hemoglobin 11.1 (L) 12.0 - 16.0 g/dL    Hematocrit 33.1 (L) 37.0 - 47.0 %    MCV 95.7 81.4 - 97.8 fL    MCH 32.1 27.0 - 33.0 pg    MCHC 33.5 (L) 33.6 - 35.0 g/dL    RDW 43.2 35.9 - 50.0 fL    Platelet Count 272 164 - 446 K/uL    MPV 9.2 9.0 - 12.9 fL    Neutrophils-Polys 78.20 (H) 44.00 - 72.00 %    Lymphocytes 9.00 (L) 22.00 - 41.00 %    Monocytes 10.90 0.00 - 13.40 %    Eosinophils 0.20 0.00 - 6.90 %    Basophils 0.30 0.00 - 1.80 %    Immature Granulocytes 1.40 (H) 0.00 - 0.90 %    Nucleated RBC 0.00 /100 WBC    Neutrophils (Absolute) 9.42 (H) 2.00 - 7.15 K/uL    Lymphs (Absolute) 1.08 1.00 - 4.80 K/uL    Monos (Absolute) 1.32 (H) 0.00 - 0.85 K/uL    Eos (Absolute) 0.03 0.00 - 0.51 K/uL    Baso (Absolute) 0.04 0.00 - 0.12 K/uL    Immature Granulocytes (abs) 0.17 (H) 0.00 - 0.11 K/uL    NRBC (Absolute) 0.00 K/uL   Comp Metabolic Panel (CMP)    Collection Time: 04/01/23  6:32 AM   Result Value Ref Range    Sodium 134 (L) 135 - 145 mmol/L    Potassium 3.8 3.6 - 5.5 mmol/L    Chloride 100 96 - 112 mmol/L    Co2 22 20 - 33 mmol/L    Anion Gap 12.0 7.0 - 16.0    Glucose 102 (H) 65 - 99 mg/dL    Bun 17 8 - 22 mg/dL    Creatinine 0.84 0.50 - 1.40 mg/dL    Calcium 8.0 (L) 8.5 - 10.5 mg/dL    AST(SGOT) 105 (H) 12 - 45 U/L    ALT(SGPT) 109 (H) 2 - 50 U/L    Alkaline Phosphatase 274 (H) 30 - 99 U/L    Total Bilirubin 0.9 0.1 - 1.5 mg/dL    Albumin 2.9 (L) 3.2 - 4.9 g/dL    Total Protein 5.5  (L) 6.0 - 8.2 g/dL    Globulin 2.6 1.9 - 3.5 g/dL    A-G Ratio 1.1 g/dL   Magnesium    Collection Time: 04/01/23  6:32 AM   Result Value Ref Range    Magnesium 1.9 1.5 - 2.5 mg/dL   proBrain Natriuretic Peptide, NT    Collection Time: 04/01/23  6:32 AM   Result Value Ref Range    NT-proBNP 5293 (H) 0 - 125 pg/mL   PROCALCITONIN    Collection Time: 04/01/23  6:32 AM   Result Value Ref Range    Procalcitonin 0.24 <0.25 ng/mL   CORRECTED CALCIUM    Collection Time: 04/01/23  6:32 AM   Result Value Ref Range    Correct Calcium 8.9 8.5 - 10.5 mg/dL   ESTIMATED GFR    Collection Time: 04/01/23  6:32 AM   Result Value Ref Range    GFR (CKD-EPI) 65 >60 mL/min/1.73 m 2       Current Facility-Administered Medications   Medication Frequency    melatonin tablet 3 mg HS PRN    Respiratory Therapy Consult Continuous RT    acetaminophen (Tylenol) tablet 650 mg Q4HRS PRN    lactulose 20 GM/30ML solution 30 mL QDAY PRN    docusate sodium (ENEMEEZ) enema 283 mg QDAY PRN    carboxymethylcellulose (REFRESH TEARS) 0.5 % ophthalmic drops 1 Drop PRN    benzocaine-menthol (Cepacol) lozenge 1 Lozenge Q2HRS PRN    mag hydrox-al hydrox-simeth (MAALOX PLUS ES or MYLANTA DS) suspension 20 mL Q2HRS PRN    ondansetron (ZOFRAN ODT) dispertab 4 mg 4X/DAY PRN    Or    ondansetron (ZOFRAN) syringe/vial injection 4 mg 4X/DAY PRN    sodium chloride (OCEAN) 0.65 % nasal spray 2 Spray PRN    senna-docusate (PERICOLACE or SENOKOT S) 8.6-50 MG per tablet 2 Tablet BID    And    polyethylene glycol/lytes (MIRALAX) PACKET 1 Packet QDAY PRN    And    magnesium hydroxide (MILK OF MAGNESIA) suspension 30 mL QDAY PRN    And    bisacodyl (DULCOLAX) suppository 10 mg QDAY PRN    ampicillin/sulbactam (UNASYN) 3 g in  mL IVPB Q12HRS    guaiFENesin dextromethorphan (ROBITUSSIN DM) 100-10 MG/5ML syrup 10 mL Q6HRS PRN    rivaroxaban (XARELTO) tablet 10 mg DAILY AT 1800    sertraline (Zoloft) tablet 25 mg DAILY    sodium chloride (SALT) tablet 1 g TID WITH MEALS     ciprofloxacin (CILOXIN) 0.3 % ophthalmic solution 1 Drop Q4HRS WHILE AWAKE    losartan (COZAAR) tablet 25 mg Q DAY    hydrOXYzine HCl (ATARAX) tablet 50 mg Q6HRS PRN       Orders Placed This Encounter   Procedures    Diet Order Diet: Regular     Standing Status:   Standing     Number of Occurrences:   1     Order Specific Question:   Diet:     Answer:   Regular [1]       Assessment:  Active Hospital Problems    Diagnosis     *Multifocal pneumonia     Transaminitis     Debility     Acute bacterial conjunctivitis of right eye     Elevated brain natriuretic peptide (BNP) level     Major depressive disorder     Other specified hypothyroidism     Chronic kidney disease     Chronic hyponatremia     Poor appetite     History of macular degeneration     Essential hypertension        Medical Decision Making and Plan:  Debility secondary to Pneumonia  Complete IV antibiotics, remains on unasyn   Consult hospitalist     Leukocytosis, continues  Continue IV antibiotics  Check procalcitonin  Consult hospitalist  4/1 WBC improved to 12.1      Respiratory failure with hypoxia  Titrate oxygen  - stable on 2 L o2      Bacterial conjunctivitis, right side  Start Cipro eyedrops     History of depression  Continue sertraline     Hyponatremia  Continue salt tabs for now  Check urine osmolality  Consult hospitalist  4/1 Na improved to 134      Chronic kidney disease  Avoid nephrotoxins  Renally dose medications   4/1 Cr improved to 0.84      Hypertension  Restart losartan at half normal dose  Consult hospitalist  - 4/1 BP elevated to 147      Insomnia  Melatonin ineffective at acute hospital  Trial of Atarax    Transaminitis  - AST and ALT elevated at admission   - hospitalist following   - recheck labs tomorrow       Elevated BNP  - stable on 2 L o2, no increased edema in LE  - CXR ordered, pending     Bowel  - constipated, hard stools on 3/31  - constipation prevent with scheduled bowel meds   - encourage hydration and utilize stool  softeners   - is no BM tmrw will obtain KUB     Bladder  - timed voids and bladder scan if no void > 4 hrs    - last PVR 27, not curently retaining     DVT ppx: Xarelto     Total time:  37 minutes.  I spent greater than 50% of the time for patient care and coordination on this date, including unit/floor time, and face-to-face time with the patient as per assessment and plan above including discussing plans for cxr and repeat labs .    Fifi Wilson D.O.

## 2025-03-04 PROBLEM — J84.10 LUNG GRANULOMA (HCC): Status: ACTIVE | Noted: 2025-03-04

## 2025-03-04 PROBLEM — E78.5 HLD (HYPERLIPIDEMIA): Status: ACTIVE | Noted: 2025-03-04

## 2025-03-04 PROBLEM — H40.9 GLAUCOMA: Status: ACTIVE | Noted: 2025-03-04

## 2025-03-25 ENCOUNTER — HOSPITAL ENCOUNTER (OUTPATIENT)
Dept: LAB | Facility: MEDICAL CENTER | Age: OVER 89
End: 2025-03-25
Attending: NURSE PRACTITIONER
Payer: MEDICARE

## 2025-03-25 DIAGNOSIS — E03.9 HYPOTHYROIDISM, UNSPECIFIED TYPE: ICD-10-CM

## 2025-03-25 DIAGNOSIS — E87.1 HYPONATREMIA: ICD-10-CM

## 2025-03-25 DIAGNOSIS — D50.8 IRON DEFICIENCY ANEMIA SECONDARY TO INADEQUATE DIETARY IRON INTAKE: ICD-10-CM

## 2025-03-25 DIAGNOSIS — N18.2 STAGE 2 CHRONIC KIDNEY DISEASE: ICD-10-CM

## 2025-03-25 DIAGNOSIS — E55.9 VITAMIN D DEFICIENCY: ICD-10-CM

## 2025-03-25 DIAGNOSIS — E78.2 MODERATE MIXED HYPERLIPIDEMIA NOT REQUIRING STATIN THERAPY: ICD-10-CM

## 2025-03-25 DIAGNOSIS — E61.1 HYPOFERREMIA: ICD-10-CM

## 2025-03-25 LAB
25(OH)D3 SERPL-MCNC: 31 NG/ML (ref 30–100)
ALBUMIN SERPL BCP-MCNC: 4.3 G/DL (ref 3.2–4.9)
ALBUMIN/GLOB SERPL: 1.3 G/DL
ALP SERPL-CCNC: 114 U/L (ref 30–99)
ALT SERPL-CCNC: 22 U/L (ref 2–50)
ANION GAP SERPL CALC-SCNC: 10 MMOL/L (ref 7–16)
AST SERPL-CCNC: 33 U/L (ref 12–45)
BASOPHILS # BLD AUTO: 0.3 % (ref 0–1.8)
BASOPHILS # BLD: 0.04 K/UL (ref 0–0.12)
BILIRUB SERPL-MCNC: 0.9 MG/DL (ref 0.1–1.5)
BUN SERPL-MCNC: 12 MG/DL (ref 8–22)
CALCIUM ALBUM COR SERPL-MCNC: 9.5 MG/DL (ref 8.5–10.5)
CALCIUM SERPL-MCNC: 9.7 MG/DL (ref 8.5–10.5)
CHLORIDE SERPL-SCNC: 101 MMOL/L (ref 96–112)
CHOLEST SERPL-MCNC: 230 MG/DL (ref 100–199)
CO2 SERPL-SCNC: 25 MMOL/L (ref 20–33)
CREAT SERPL-MCNC: 0.9 MG/DL (ref 0.5–1.4)
EOSINOPHIL # BLD AUTO: 0.01 K/UL (ref 0–0.51)
EOSINOPHIL NFR BLD: 0.1 % (ref 0–6.9)
ERYTHROCYTE [DISTWIDTH] IN BLOOD BY AUTOMATED COUNT: 48.5 FL (ref 35.9–50)
FASTING STATUS PATIENT QL REPORTED: NORMAL
GFR SERPLBLD CREATININE-BSD FMLA CKD-EPI: 59 ML/MIN/1.73 M 2
GLOBULIN SER CALC-MCNC: 3.2 G/DL (ref 1.9–3.5)
GLUCOSE SERPL-MCNC: 98 MG/DL (ref 65–99)
HCT VFR BLD AUTO: 46.3 % (ref 37–47)
HDLC SERPL-MCNC: 91 MG/DL
HGB BLD-MCNC: 15.2 G/DL (ref 12–16)
IMM GRANULOCYTES # BLD AUTO: 0.04 K/UL (ref 0–0.11)
IMM GRANULOCYTES NFR BLD AUTO: 0.3 % (ref 0–0.9)
LDLC SERPL CALC-MCNC: 118 MG/DL
LYMPHOCYTES # BLD AUTO: 1.87 K/UL (ref 1–4.8)
LYMPHOCYTES NFR BLD: 15.5 % (ref 22–41)
MCH RBC QN AUTO: 32.3 PG (ref 27–33)
MCHC RBC AUTO-ENTMCNC: 32.8 G/DL (ref 32.2–35.5)
MCV RBC AUTO: 98.3 FL (ref 81.4–97.8)
MONOCYTES # BLD AUTO: 0.87 K/UL (ref 0–0.85)
MONOCYTES NFR BLD AUTO: 7.2 % (ref 0–13.4)
NEUTROPHILS # BLD AUTO: 9.24 K/UL (ref 1.82–7.42)
NEUTROPHILS NFR BLD: 76.6 % (ref 44–72)
NRBC # BLD AUTO: 0 K/UL
NRBC BLD-RTO: 0 /100 WBC (ref 0–0.2)
PLATELET # BLD AUTO: 225 K/UL (ref 164–446)
PMV BLD AUTO: 9.6 FL (ref 9–12.9)
POTASSIUM SERPL-SCNC: 5.2 MMOL/L (ref 3.6–5.5)
PROT SERPL-MCNC: 7.5 G/DL (ref 6–8.2)
RBC # BLD AUTO: 4.71 M/UL (ref 4.2–5.4)
SODIUM SERPL-SCNC: 136 MMOL/L (ref 135–145)
T4 SERPL-MCNC: 6 UG/DL (ref 4–12)
TRIGL SERPL-MCNC: 104 MG/DL (ref 0–149)
TSH SERPL-ACNC: 2.21 UIU/ML (ref 0.35–5.5)
URATE SERPL-MCNC: 8.9 MG/DL (ref 1.9–8.2)
VIT B12 SERPL-MCNC: 1038 PG/ML (ref 211–911)
WBC # BLD AUTO: 12.1 K/UL (ref 4.8–10.8)

## 2025-03-25 PROCEDURE — 82306 VITAMIN D 25 HYDROXY: CPT

## 2025-03-25 PROCEDURE — 84436 ASSAY OF TOTAL THYROXINE: CPT

## 2025-03-25 PROCEDURE — 84550 ASSAY OF BLOOD/URIC ACID: CPT

## 2025-03-25 PROCEDURE — 85025 COMPLETE CBC W/AUTO DIFF WBC: CPT

## 2025-03-25 PROCEDURE — 80061 LIPID PANEL: CPT

## 2025-03-25 PROCEDURE — 80053 COMPREHEN METABOLIC PANEL: CPT

## 2025-03-25 PROCEDURE — 82607 VITAMIN B-12: CPT | Mod: GA

## 2025-03-25 PROCEDURE — 36415 COLL VENOUS BLD VENIPUNCTURE: CPT

## 2025-03-25 PROCEDURE — 84443 ASSAY THYROID STIM HORMONE: CPT

## 2025-04-01 PROBLEM — N18.30 STAGE 3 CHRONIC KIDNEY DISEASE: Status: ACTIVE | Noted: 2022-08-03

## 2025-04-01 PROBLEM — E55.9 VITAMIN D DEFICIENCY: Status: ACTIVE | Noted: 2025-04-01

## 2025-06-24 PROBLEM — Z79.899 MEDICATION MANAGEMENT: Status: ACTIVE | Noted: 2025-06-24

## 2025-07-29 PROBLEM — M10.9 ACUTE GOUT: Status: ACTIVE | Noted: 2025-07-29

## 2025-08-26 ENCOUNTER — HOSPITAL ENCOUNTER (OUTPATIENT)
Dept: LAB | Facility: MEDICAL CENTER | Age: OVER 89
End: 2025-08-26
Attending: NURSE PRACTITIONER
Payer: MEDICARE

## 2025-08-26 DIAGNOSIS — M10.9 ACUTE GOUT INVOLVING TOE OF LEFT FOOT, UNSPECIFIED CAUSE: ICD-10-CM

## 2025-08-26 DIAGNOSIS — E87.1 HYPONATREMIA: ICD-10-CM

## 2025-08-26 LAB
ALBUMIN SERPL BCP-MCNC: 4.5 G/DL (ref 3.2–4.9)
ALBUMIN/GLOB SERPL: 1.4 G/DL
ALP SERPL-CCNC: 123 U/L (ref 30–99)
ALT SERPL-CCNC: 21 U/L (ref 2–50)
ANION GAP SERPL CALC-SCNC: 15 MMOL/L (ref 7–16)
AST SERPL-CCNC: 34 U/L (ref 12–45)
BILIRUB SERPL-MCNC: 0.7 MG/DL (ref 0.1–1.5)
BUN SERPL-MCNC: 13 MG/DL (ref 8–22)
CALCIUM ALBUM COR SERPL-MCNC: 9.5 MG/DL (ref 8.5–10.5)
CALCIUM SERPL-MCNC: 9.9 MG/DL (ref 8.5–10.5)
CHLORIDE SERPL-SCNC: 100 MMOL/L (ref 96–112)
CO2 SERPL-SCNC: 21 MMOL/L (ref 20–33)
CREAT SERPL-MCNC: 0.87 MG/DL (ref 0.5–1.4)
GFR SERPLBLD CREATININE-BSD FMLA CKD-EPI: 61 ML/MIN/1.73 M 2
GLOBULIN SER CALC-MCNC: 3.2 G/DL (ref 1.9–3.5)
GLUCOSE SERPL-MCNC: 110 MG/DL (ref 65–99)
POTASSIUM SERPL-SCNC: 4.3 MMOL/L (ref 3.6–5.5)
PROT SERPL-MCNC: 7.7 G/DL (ref 6–8.2)
SODIUM SERPL-SCNC: 136 MMOL/L (ref 135–145)
URATE SERPL-MCNC: 7 MG/DL (ref 1.9–8.2)

## 2025-08-26 PROCEDURE — 36415 COLL VENOUS BLD VENIPUNCTURE: CPT

## 2025-08-26 PROCEDURE — 84550 ASSAY OF BLOOD/URIC ACID: CPT

## 2025-08-26 PROCEDURE — 80053 COMPREHEN METABOLIC PANEL: CPT
